# Patient Record
Sex: FEMALE | Race: WHITE | NOT HISPANIC OR LATINO | Employment: FULL TIME | URBAN - METROPOLITAN AREA
[De-identification: names, ages, dates, MRNs, and addresses within clinical notes are randomized per-mention and may not be internally consistent; named-entity substitution may affect disease eponyms.]

---

## 2017-01-04 DIAGNOSIS — E03.9 HYPOTHYROIDISM, UNSPECIFIED TYPE: ICD-10-CM

## 2017-01-04 LAB — TSH SERPL DL<=0.005 MIU/L-ACNC: 2.3 MU/L (ref 0.4–4)

## 2017-01-04 PROCEDURE — 36415 COLL VENOUS BLD VENIPUNCTURE: CPT | Performed by: NURSE PRACTITIONER

## 2017-01-04 PROCEDURE — 84443 ASSAY THYROID STIM HORMONE: CPT | Performed by: NURSE PRACTITIONER

## 2017-01-04 NOTE — PROGRESS NOTES
SUBJECTIVE:                                                    Naomy Friend is a 46 year old female who presents to clinic today for the following health issues:    Hypothyroidism Follow-up      Since last visit, patient describes the following symptoms: loose stools and fatigue       Amount of exercise or physical activity: 4-5 days/week for an average of greater than 60 minutes    Problems taking medications regularly: No    Medication side effects: none    Diet: regular (no restrictions)    Has been dealing with chronic sinus infection; just finished antibiotics and prednisone, and has noticed some discomfort on her tongue. Was exposed to another virus this week and has lost sense of smell again- plans to have a sinus surgery and tonsillectomy in March.  Mood has been well-controlled, seeing therapist every other week. Son is living at home again and working full-time, but they are generally getting along well. Daughter is 26 and will be having a baby in the next month; lives three hurs away.     Problem list and histories reviewed & adjusted, as indicated.  Additional history: as documented    Patient Active Problem List   Diagnosis     Hypothyroidism     CARDIOVASCULAR SCREENING; LDL GOAL LESS THAN 160     Mild recurrent major depression (H)     Obesity     Menorrhagia, treated with mirena IUD (inserted July 2013)      Family history of diabetes mellitus     Dysfunction of eustachian tube     HTN, goal below 140/90     Morbid obesity, unspecified obesity type (H)     Past Surgical History   Procedure Laterality Date     Laparoscopic cholecystectomy  08/05/99       Social History   Substance Use Topics     Smoking status: Never Smoker      Smokeless tobacco: Never Used     Alcohol Use: No     Family History   Problem Relation Age of Onset     CANCER Father      pancreatic     HEART DISEASE Maternal Grandmother      Cancer - colorectal Paternal Grandfather      DIABETES Brother      Asthma Son      Depression  Son      Asthma Daughter      DIABETES Mother      DIABETES Brother      DIABETES Maternal Grandmother      Hypertension Mother      CEREBROVASCULAR DISEASE Maternal Grandfather          Current Outpatient Prescriptions   Medication Sig Dispense Refill     predniSONE (DELTASONE) 10 MG tablet Take 30 mg daily for 7 days, then 20 mg daily for 7 days, then 10 mg daily for 7 days, then stop. 42 tablet 0     nystatin (MYCOSTATIN) 092963 UNIT/ML suspension Take 5 mLs (500,000 Units) by mouth 4 times daily for 7 days 140 mL 1     levothyroxine (SYNTHROID/LEVOTHROID) 75 MCG tablet Take 1 tablet (75 mcg) by mouth daily 90 tablet 1     citalopram (CELEXA) 20 MG tablet Take 1.5 tablets (30 mg) by mouth daily 135 tablet 1     lisinopril (PRINIVIL,ZESTRIL) 10 MG tablet Take 1 tablet (10 mg) by mouth daily 90 tablet 3     levonorgestrel (MIRENA) 20 MCG/24HR IUD 1 each by Intrauterine route once       clotrimazole-betamethasone (LOTRISONE) cream Apply topically 2 times daily 30 g 1     mupirocin (BACTROBAN) 2 % ointment Apply topically 2 times daily Use 2 times a day to affected area. 15 g 0     fluticasone (FLONASE) 50 MCG/ACT nasal spray Spray 2 sprays into both nostrils daily.       IBUPROFEN PO Take 600 mg by mouth 3 times daily.       [DISCONTINUED] levothyroxine (SYNTHROID/LEVOTHROID) 75 MCG tablet Take 1 tablet (75 mcg) by mouth daily PLEASE SCHEDULE AN APPOINTMENT TO RECEIVE FUTURE REFILLS [phone] 30 tablet 0     [DISCONTINUED] levothyroxine (SYNTHROID/LEVOTHROID) 75 MCG tablet Take 1 tablet (75 mcg) by mouth daily PLEASE SCHEDULE AN APPOINTMENT TO RECEIVE FUTURE REFILLS [phone] 30 tablet 0     Chlorpheniramine-Pseudoeph (DECONGESTANT + PO)        hydrOXYzine (ATARAX) 25 MG tablet Take 2-4 tablets ( mg) by mouth nightly as needed for other 60 tablet 1     cetirizine (ZYRTEC) 10 MG tablet Take 1 tablet (10 mg) by mouth every evening 90 tablet 1       ROS:  Constitutional, HEENT, cardiovascular, pulmonary, gi  and gu systems are negative, except as otherwise noted.    OBJECTIVE:                                                    /90 mmHg  Pulse 92  Temp(Src) 97.7  F (36.5  C) (Oral)  Wt 297 lb 12.8 oz (135.081 kg)  SpO2 100%  Body mass index is 46.63 kg/(m^2).  GENERAL: healthy, alert and no distress  HENT: normal cephalic/atraumatic, ear canals and TM's normal, nasal mucosa edematous , oral mucous membranes moist, tonsillar hypertrophy, tonsillar erythema, sinuses: maxillary tenderness on bilateral and white coating on tongue  NECK: bilateral anterior cervical adenopathy, no asymmetry, masses, or scars and thyroid normal to palpation  RESP: lungs clear to auscultation - no rales, rhonchi or wheezes  CV: regular rate and rhythm, normal S1 S2, no S3 or S4, no murmur, click or rub, no peripheral edema and peripheral pulses strong  MS: no gross musculoskeletal defects noted, no edema    Diagnostic Test Results:  No results found for this or any previous visit (from the past 24 hour(s)).     ASSESSMENT/PLAN:                                                      Problem List Items Addressed This Visit     Hypothyroidism - Primary    Relevant Medications    levothyroxine (SYNTHROID/LEVOTHROID) 75 MCG tablet      Other Visit Diagnoses     Other chronic sinusitis         Relevant Medications     predniSONE (DELTASONE) 10 MG tablet     Thrush         Relevant Medications     nystatin (MYCOSTATIN) 349451 UNIT/ML suspension          Discussed waiting one week; if still having sinus pain and lack of smell, start another course of prednisone and follow up with ENT.  JERRICA Church CNP  [facility]

## 2017-01-06 ENCOUNTER — OFFICE VISIT (OUTPATIENT)
Dept: FAMILY MEDICINE | Facility: CLINIC | Age: 47
End: 2017-01-06
Payer: COMMERCIAL

## 2017-01-06 VITALS
BODY MASS INDEX: 46.63 KG/M2 | WEIGHT: 293 LBS | TEMPERATURE: 97.7 F | OXYGEN SATURATION: 100 % | SYSTOLIC BLOOD PRESSURE: 138 MMHG | DIASTOLIC BLOOD PRESSURE: 90 MMHG | HEART RATE: 92 BPM

## 2017-01-06 DIAGNOSIS — J32.8 OTHER CHRONIC SINUSITIS: ICD-10-CM

## 2017-01-06 DIAGNOSIS — B37.0 THRUSH: ICD-10-CM

## 2017-01-06 DIAGNOSIS — E03.9 HYPOTHYROIDISM, UNSPECIFIED TYPE: Primary | ICD-10-CM

## 2017-01-06 PROCEDURE — 99213 OFFICE O/P EST LOW 20 MIN: CPT | Performed by: NURSE PRACTITIONER

## 2017-01-06 RX ORDER — PREDNISONE 10 MG/1
TABLET ORAL
Qty: 42 TABLET | Refills: 0 | Status: SHIPPED | OUTPATIENT
Start: 2017-01-06 | End: 2017-03-13

## 2017-01-06 RX ORDER — LEVOTHYROXINE SODIUM 75 UG/1
75 TABLET ORAL DAILY
Qty: 30 TABLET | Refills: 0 | Status: SHIPPED | OUTPATIENT
Start: 2017-01-06 | End: 2017-01-06

## 2017-01-06 RX ORDER — NYSTATIN 100000/ML
500000 SUSPENSION, ORAL (FINAL DOSE FORM) ORAL 4 TIMES DAILY
Qty: 140 ML | Refills: 1 | Status: SHIPPED | OUTPATIENT
Start: 2017-01-06 | End: 2017-01-13

## 2017-01-06 RX ORDER — LEVOTHYROXINE SODIUM 75 UG/1
75 TABLET ORAL DAILY
Qty: 90 TABLET | Refills: 1 | Status: SHIPPED | OUTPATIENT
Start: 2017-01-06 | End: 2017-03-27

## 2017-01-06 NOTE — NURSING NOTE
"Chief Complaint   Patient presents with     Thyroid Problem       Initial /92 mmHg  Pulse 92  Temp(Src) 97.7  F (36.5  C) (Oral)  Wt 297 lb 12.8 oz (135.081 kg)  SpO2 100% Estimated body mass index is 46.63 kg/(m^2) as calculated from the following:    Height as of 9/28/16: 5' 7\" (1.702 m).    Weight as of this encounter: 297 lb 12.8 oz (135.081 kg).  BP completed using cuff size: guerrero Mckeon CMA    "

## 2017-01-06 NOTE — MR AVS SNAPSHOT
After Visit Summary   1/6/2017    Naomy Friend    MRN: [id number]           Patient Information     Date Of Birth          1970        Visit Information        Provider Department      1/6/2017 10:00 AM Jovita Jonas APRN CNP [facility]        Today's Diagnoses     Hypothyroidism, unspecified type    -  1     Other chronic sinusitis         Thrush            Follow-ups after your visit        Who to contact     If you have questions or need follow up information about today's clinic visit or your schedule please contact [facility] directly at [phone].  Normal or non-critical lab and imaging results will be communicated to you by [employer], letter or phone within 4 business days after the clinic has received the results. If you do not hear from us within 7 days, please contact the clinic through [employer] or phone. If you have a critical or abnormal lab result, we will notify you by phone as soon as possible.  Submit refill requests through [facility] or call your pharmacy and they will forward the refill request to us. Please allow 3 business days for your refill to be completed.          Additional Information About Your Visit        MyChart Information     [facility] gives you secure access to your electronic health record. If you see a primary care provider, you can also send messages to your care team and make appointments. If you have questions, please call your primary care clinic.  If you do not have a primary care provider, please call [phone] and they will assist you.        Care EveryWhere ID     This is your Care EveryWhere ID. This could be used by other organizations to access your [facility] medical records  [id number]        Your Vitals Were     Pulse Temperature Pulse Oximetry             92 97.7  F (36.5  C) (Oral) 100%          Blood Pressure from Last 3 Encounters:   01/06/17 138/90   09/28/16 126/98   08/09/16 128/82    Weight from Last  3 Encounters:   01/06/17 297 lb 12.8 oz (135.081 kg)   09/28/16 298 lb 14.4 oz (135.58 kg)   08/09/16 298 lb 6.4 oz (135.353 kg)              Today, you had the following     No orders found for display         Today's Medication Changes          These changes are accurate as of: 1/6/17 10:30 AM.  If you have any questions, ask your nurse or doctor.               Start taking these medicines.        Dose/Directions    levothyroxine 75 MCG tablet   Commonly known as:  SYNTHROID/LEVOTHROID   Used for:  Hypothyroidism, unspecified type   Started by:  Jovita Jonas APRN CNP        Dose:  75 mcg   Take 1 tablet (75 mcg) by mouth daily   Quantity:  90 tablet   Refills:  1       nystatin [id number] UNIT/ML suspension   Commonly known as:  MYCOSTATIN   Used for:  Thrush   Started by:  Jovita Jonas APRN CNP        Dose:  019422 Units   Take 5 mLs (500,000 Units) by mouth 4 times daily for 7 days   Quantity:  140 mL   Refills:  1            Where to get your medicines      These medications were sent to [facility]pharmacy #[id number] - [place], [place] - [address] [place] [address] [address]     Phone:  [phone]    - levothyroxine 75 MCG tablet  - nystatin [id number] UNIT/ML suspension  - predniSONE 10 MG tablet             Primary Care Provider Office Phone # Fax #    JERRICA Church [phone]       [facility] [address] [address]        Thank you!     Thank you for choosing [facility]  for your care. Our goal is always to provide you with excellent care. Hearing back from our patients is one way we can continue to improve our services. Please take a few minutes to complete the written survey that you may receive in the mail after your visit with us. Thank you!             Your Updated Medication List - Protect others around you: Learn how to safely use, store and throw away your  medicines at www.disposemymeds.org.          This list is accurate as of: 1/6/17 10:30 AM.  Always use your most recent med list.                   Brand Name Dispense Instructions for use    cetirizine 10 MG tablet    zyrTEC    90 tablet    Take 1 tablet (10 mg) by mouth every evening       citalopram 20 MG tablet    celeXA    135 tablet    Take 1.5 tablets (30 mg) by mouth daily       clotrimazole-betamethasone cream    LOTRISONE    30 g    Apply topically 2 times daily       DECONGESTANT + PO          fluticasone 50 MCG/ACT spray    FLONASE     Spray 2 sprays into both nostrils daily.       hydrOXYzine 25 MG tablet    ATARAX    60 tablet    Take 2-4 tablets ( mg) by mouth nightly as needed for other       IBUPROFEN PO      Take 600 mg by mouth 3 times daily.       levonorgestrel 20 MCG/24HR IUD    MIRENA     1 each by Intrauterine route once       levothyroxine 75 MCG tablet    SYNTHROID/LEVOTHROID    90 tablet    Take 1 tablet (75 mcg) by mouth daily       lisinopril 10 MG tablet    PRINIVIL/ZESTRIL    90 tablet    Take 1 tablet (10 mg) by mouth daily       mupirocin 2 % ointment    BACTROBAN    15 g    Apply topically 2 times daily Use 2 times a day to affected area.       nystatin [id number] UNIT/ML suspension    MYCOSTATIN    140 mL    Take 5 mLs (500,000 Units) by mouth 4 times daily for 7 days       predniSONE 10 MG tablet    DELTASONE    42 tablet    Take 30 mg daily for 7 days, then 20 mg daily for 7 days, then 10 mg daily for 7 days, then stop.

## 2017-01-10 ENCOUNTER — CARE COORDINATION (OUTPATIENT)
Dept: OTOLARYNGOLOGY | Facility: CLINIC | Age: 47
End: 2017-01-10

## 2017-01-10 NOTE — PROGRESS NOTES
"Call was placed to the patient to discuss her current status.  She stated that she felt \"much better and could smell while on antibiotics and steroids\".  She states that she recently had symptoms of a cold again and had enlarged tonsils.  She was seen by her PCP who started her on another course of steroids.  She would like to move forward with a tonsillectomy and FESS. Will discuss this with Dr. Zurita and whether or not she needs to be seen in clinic prior to moving forward with surgery.  Naomy will be updated regarding this.    Vicki Kraus R.N. B.S.N.  1/10/2017 3:07 PM     The above information was discussed with Dr. Zurita. She is okay moving forward with surgery.  Orders were placed.  She was given the contact information for [facility] in [place].   was left with this information.    Vicki Kraus R.N. B.S.N.  1/10/2017 4:36 PM     "

## 2017-01-16 ENCOUNTER — TELEPHONE (OUTPATIENT)
Dept: OTOLARYNGOLOGY | Facility: CLINIC | Age: 47
End: 2017-01-16

## 2017-01-16 NOTE — TELEPHONE ENCOUNTER
I left a message for the patient to call back to get the surgery scheduled with Dr. Zurita.  Chelsea Aden, Surgery Scheduling Coordinator

## 2017-01-24 DIAGNOSIS — J32.8 OTHER CHRONIC SINUSITIS: Primary | ICD-10-CM

## 2017-01-24 DIAGNOSIS — F33.0 MILD RECURRENT MAJOR DEPRESSION (H): ICD-10-CM

## 2017-01-24 RX ORDER — CITALOPRAM HYDROBROMIDE 20 MG/1
30 TABLET ORAL DAILY
Qty: 45 TABLET | Refills: 0 | Status: SHIPPED | OUTPATIENT
Start: 2017-01-24 | End: 2017-02-01

## 2017-01-24 RX ORDER — PREDNISONE 10 MG/1
TABLET ORAL
Qty: 42 TABLET | Refills: 0 | Status: CANCELLED | OUTPATIENT
Start: 2017-01-24

## 2017-01-24 NOTE — TELEPHONE ENCOUNTER
Prednisone declined  we only use this one time for sinusitis within a month or two as it can suppress our bodies adrenal gland   She needs to meet with ENT for a better plan

## 2017-01-24 NOTE — TELEPHONE ENCOUNTER
Prednisone 10MG Tablet      Last Written Prescription Date:  1/6/17  Last Fill Quantity: 42,   # refills: 0  Last Office Visit with [facility] or  Health prescribing provider: 1/6/17  Future Office visit:       Routing refill request to provider for review/approval because:  Drug not on the [facility], [facility] or [facility] refill protocol or controlled substance

## 2017-01-24 NOTE — TELEPHONE ENCOUNTER
Called patient did not make request for prednisone - is following up with ENT        citalopram (CELEXA) 20 MG tablet     Last Written Prescription Date: 8/8/2016  Last Fill Quantity: 135, # refills: 1  Last Office Visit with [facility] primary care provider:         Last PHQ-9 score on record=   PHQ-9 SCORE 8/9/2016   Total Score -   Total Score 8       Spoke with patient on phone discuss depression to be addressed in office visit every 6 months - reviewed one month refill - patient states she will give a call back to schedule      Routing refill request to provider for review/approval because:  PROVIDER TO APPROVE OVERRIDE   MEDICATION IS ADVISING PRIOR AUTHORIZATION MAY BE NEEDED    Routing to RD FP MA - please review if prior authorization needed    Thank you,  Canide Nath RN

## 2017-02-01 DIAGNOSIS — F33.0 MILD RECURRENT MAJOR DEPRESSION (H): Primary | ICD-10-CM

## 2017-02-01 NOTE — TELEPHONE ENCOUNTER
citalopram (CELEXA) 20 MG tablet      Last Written Prescription Date: 01/24/2017  Last Fill Quantity: 45, # refills: 0  Last Office Visit with [facility] primary care provider:  01/06/2017        Last PHQ-9 score on record=   PHQ-9 SCORE 8/9/2016   Total Score -   Total Score 8

## 2017-02-03 NOTE — TELEPHONE ENCOUNTER
Sent PHQ 9 questionnaire via [employer]. Await response prior to refilling.    Vicki Cosby RN  [facility]

## 2017-02-06 RX ORDER — CITALOPRAM HYDROBROMIDE 20 MG/1
30 TABLET ORAL DAILY
Qty: 135 TABLET | Refills: 1 | Status: SHIPPED | OUTPATIENT
Start: 2017-02-06 | End: 2017-09-25

## 2017-02-06 NOTE — TELEPHONE ENCOUNTER
PHQ-9 score:    PHQ-9 SCORE 2/4/2017   Total Score -   Total Score MyChart 4 (Minimal depression)   Total Score -       Prescription approved per [facility] Refill Protocol.    Vicki Cosby RN  [facility]

## 2017-02-07 ENCOUNTER — TELEPHONE (OUTPATIENT)
Dept: FAMILY MEDICINE | Facility: CLINIC | Age: 47
End: 2017-02-07

## 2017-02-07 NOTE — TELEPHONE ENCOUNTER
Prior Authorization needed on:     Medication:  Citalopram Dose:  20 mg (1.5 tabs daily=30 mg)    Pharmacy confirmed as   [id number] - [place], [place] - [address], [place] [address] [address] [facility] [address]  Phone: [phone] Fax: [phone]  Yes    Insurance Name:  [facility]  Insurance Phone: [phone]  Insurance Patient ID: [id number]  BIN: [id number]  PCN: [facility]  RxGROUP: [id number]    PA submitted to insurance company via cover my meds    Alternatives Suggested:  None given, insurance only allows 1 tab per day dosing.     Rosario Mckeon February 7, 2017 at 2:19 PM

## 2017-02-14 NOTE — TELEPHONE ENCOUNTER
Called to check status of PA. Insurance company states PA is still in process & can take up to 15 business days.

## 2017-02-14 NOTE — TELEPHONE ENCOUNTER
Prior Authorization: Approved    Approved as of: 2/6/17-2/6/18    Rosario Mckeon February 14, 2017 at 3:56 PM

## 2017-02-16 NOTE — TELEPHONE ENCOUNTER
Date Scheduled: 3-6-17  Facility: [facility]  Surgeon: Dr. Zurita   Post-op appointment scheduled: 3-13-17   scheduled?: No  Surgery packet/instructions confirmed received?  Yes, mailed  Special Considerations:   Chelsea Aden, Surgery Scheduling Coordinator

## 2017-02-27 ENCOUNTER — OFFICE VISIT (OUTPATIENT)
Dept: FAMILY MEDICINE | Facility: CLINIC | Age: 47
End: 2017-02-27
Payer: COMMERCIAL

## 2017-02-27 VITALS
BODY MASS INDEX: 45.99 KG/M2 | OXYGEN SATURATION: 94 % | TEMPERATURE: 97.3 F | SYSTOLIC BLOOD PRESSURE: 120 MMHG | HEIGHT: 67 IN | HEART RATE: 95 BPM | WEIGHT: 293 LBS | DIASTOLIC BLOOD PRESSURE: 78 MMHG

## 2017-02-27 DIAGNOSIS — Z01.818 PREOP GENERAL PHYSICAL EXAM: Primary | ICD-10-CM

## 2017-02-27 DIAGNOSIS — B37.0 THRUSH: ICD-10-CM

## 2017-02-27 DIAGNOSIS — M19.90 ARTHRITIS: ICD-10-CM

## 2017-02-27 DIAGNOSIS — E66.9 OBESITY, UNSPECIFIED OBESITY SEVERITY, UNSPECIFIED OBESITY TYPE: ICD-10-CM

## 2017-02-27 DIAGNOSIS — E03.9 HYPOTHYROIDISM, UNSPECIFIED TYPE: ICD-10-CM

## 2017-02-27 DIAGNOSIS — J34.9 SINUS DISEASE: ICD-10-CM

## 2017-02-27 DIAGNOSIS — G47.33 OBSTRUCTIVE SLEEP APNEA SYNDROME: ICD-10-CM

## 2017-02-27 DIAGNOSIS — I10 BENIGN ESSENTIAL HYPERTENSION: ICD-10-CM

## 2017-02-27 DIAGNOSIS — J35.1 ENLARGED TONSILS: ICD-10-CM

## 2017-02-27 LAB
ERYTHROCYTE [DISTWIDTH] IN BLOOD BY AUTOMATED COUNT: 13.5 % (ref 10–15)
HCT VFR BLD AUTO: 45.4 % (ref 35–47)
HGB BLD-MCNC: 14.4 G/DL (ref 11.7–15.7)
MCH RBC QN AUTO: 27.4 PG (ref 26.5–33)
MCHC RBC AUTO-ENTMCNC: 31.7 G/DL (ref 31.5–36.5)
MCV RBC AUTO: 87 FL (ref 78–100)
PLATELET # BLD AUTO: 294 10E9/L (ref 150–450)
RBC # BLD AUTO: 5.25 10E12/L (ref 3.8–5.2)
TSH SERPL DL<=0.05 MIU/L-ACNC: 3.71 MU/L (ref 0.4–4)
WBC # BLD AUTO: 15.5 10E9/L (ref 4–11)

## 2017-02-27 PROCEDURE — 84443 ASSAY THYROID STIM HORMONE: CPT | Performed by: NURSE PRACTITIONER

## 2017-02-27 PROCEDURE — 85027 COMPLETE CBC AUTOMATED: CPT | Performed by: NURSE PRACTITIONER

## 2017-02-27 PROCEDURE — 99214 OFFICE O/P EST MOD 30 MIN: CPT | Performed by: NURSE PRACTITIONER

## 2017-02-27 PROCEDURE — 36415 COLL VENOUS BLD VENIPUNCTURE: CPT | Performed by: NURSE PRACTITIONER

## 2017-02-27 RX ORDER — NYSTATIN 100000/ML
500000 SUSPENSION, ORAL (FINAL DOSE FORM) ORAL 4 TIMES DAILY
Qty: 140 ML | Refills: 0 | Status: SHIPPED | OUTPATIENT
Start: 2017-02-27 | End: 2017-03-06

## 2017-02-27 NOTE — MR AVS SNAPSHOT
After Visit Summary   2/27/2017    Naomy Friend    MRN: [id number]           Patient Information     Date Of Birth          1970        Visit Information        Provider Department      2/27/2017 8:00 AM Jovita Jonas APRN [facility]        Today's Diagnoses     Preop general physical exam    -  1    Obstructive sleep apnea syndrome        Enlarged tonsils        Sinus disease        Obesity, unspecified obesity severity, unspecified obesity type        Benign essential hypertension        Arthritis        Hypothyroidism, unspecified type          Care Instructions      Before Your Surgery      Call your surgeon if there is any change in your health. This includes signs of a cold or flu (such as a sore throat, runny nose, cough, rash or fever).    Do not smoke, drink alcohol or take over the counter medicine (unless your surgeon or primary care doctor tells you to) for the 24 hours before and after surgery.    If you take prescribed drugs: Follow your doctor s orders about which medicines to take and which to stop until after surgery.    Eating and drinking prior to surgery: follow the instructions from your surgeon    Take a shower or bath the night before surgery. Use the soap your surgeon gave you to gently clean your skin. If you do not have soap from your surgeon, use your regular soap. Do not shave or scrub the surgery site.  Wear clean pajamas and have clean sheets on your bed.         Follow-ups after your visit        Your next 10 appointments already scheduled     Mar 06, 2017   Procedure with Vicki Zurita MD   [facility] (--)    [address] [address] [place], [place] [address]e [facility] [address]   [phone]            Mar 13, 2017 10:00 AM CDT   [facility] Visit with [facility] MD   [facility]    [address] [place] [address]   [phone]              Who to contact     If  "you have questions or need follow up information about today's clinic visit or your schedule please contact [facility] directly at [phone].  Normal or non-critical lab and imaging results will be communicated to you by MyChart, letter or phone within 4 business days after the clinic has received the results. If you do not hear from us within 7 days, please contact the clinic through [employer] or phone. If you have a critical or abnormal lab result, we will notify you by phone as soon as possible.  Submit refill requests through [employer] or call your pharmacy and they will forward the refill request to us. Please allow 3 business days for your refill to be completed.          Additional Information About Your Visit        [employer] Information     [facility] gives you secure access to your electronic health record. If you see a primary care provider, you can also send messages to your care team and make appointments. If you have questions, please call your primary care clinic.  If you do not have a primary care provider, please call [phone] and they will assist you.        Care EveryWhere ID     This is your Care EveryWhere ID. This could be used by other organizations to access your [place] [facility] medical records  [id number]        Your Vitals Were     Pulse Temperature Height Pulse Oximetry BMI (Body Mass Index)       95 97.3  F (36.3  C) (Oral) 5' 7.1\" (1.704 m) 94% 46.27 kg/m2        Blood Pressure from Last 3 Encounters:   02/27/17 120/78   01/06/17 138/90   09/28/16 (!) 126/98    Weight from Last 3 Encounters:   02/27/17 296 lb 4.8 oz (134.4 kg)   01/06/17 297 lb 12.8 oz (135.1 kg)   09/28/16 298 lb 14.4 oz (135.6 kg)              We Performed the Following     CBC with platelets     TSH        Primary Care Provider Office Phone # Fax #    JERRICA Church [phone]       [facility] [address]  [facility] [address]        Thank you!     Thank you " for choosing [facility]  for your care. Our goal is always to provide you with excellent care. Hearing back from our patients is one way we can continue to improve our services. Please take a few minutes to complete the written survey that you may receive in the mail after your visit with us. Thank you!             Your Updated Medication List - Protect others around you: Learn how to safely use, store and throw away your medicines at www.disposemymeds.org.          This list is accurate as of: 2/27/17  8:49 AM.  Always use your most recent med list.                   Brand Name Dispense Instructions for use    cetirizine 10 MG tablet    zyrTEC    90 tablet    Take 1 tablet (10 mg) by mouth every evening       citalopram 20 MG tablet    celeXA    135 tablet    Take 1.5 tablets (30 mg) by mouth daily       clotrimazole-betamethasone cream    LOTRISONE    30 g    Apply topically 2 times daily       DECONGESTANT + PO      Reported on 2/27/2017       fluticasone 50 MCG/ACT spray    FLONASE     Spray 2 sprays into both nostrils daily Reported on 2/27/2017       hydrOXYzine 25 MG tablet    ATARAX    60 tablet    Take 2-4 tablets ( mg) by mouth nightly as needed for other       IBUPROFEN PO      Take 600 mg by mouth 3 times daily.       levonorgestrel 20 MCG/24HR IUD    MIRENA     1 each by Intrauterine route once       levothyroxine 75 MCG tablet    SYNTHROID/LEVOTHROID    90 tablet    Take 1 tablet (75 mcg) by mouth daily       lisinopril 10 MG tablet    PRINIVIL/ZESTRIL    90 tablet    Take 1 tablet (10 mg) by mouth daily       mupirocin 2 % ointment    BACTROBAN    15 g    Apply topically 2 times daily Use 2 times a day to affected area.       predniSONE 10 MG tablet    DELTASONE    42 tablet    Take 30 mg daily for 7 days, then 20 mg daily for 7 days, then 10 mg daily for 7 days, then stop.

## 2017-02-27 NOTE — PROGRESS NOTES
[address] [address]  [phone]  Dept: [phone]    PRE-OP EVALUATION:  Today's date: 2017    Naomy Friend (: 1970) presents for pre-operative evaluation assessment as requested by Dr. Zurita.  She requires evaluation and anesthesia risk assessment prior to undergoing surgery/procedure for treatment of tonsils.  Proposed procedure: Bilateral tonsillectomy, right functional endoscopic sinus surgery    Date of Surgery/ Procedure: 3/6/17  Time of Surgery/ Procedure: 12 pm  Hospital/Surgical Facility: [facility]  Fax number for surgical facility: n/a  Primary Physician: Jovita Jonas  Type of Anesthesia Anticipated: General    Patient has a Health Care Directive or Living Will:  NO    1. NO - Do you have a history of heart attack, stroke, stent, bypass or surgery on an artery in the head, neck, heart or legs?  2. NO - Do you ever have any pain or discomfort in your chest?  3. NO - Do you have a history of  Heart Failure?  4. NO - Are you troubled by shortness of breath when: walking on the level, up a slight hill or at night?  5. YES - DO YOU CURRENTLY HAVE A COLD, BRONCHITIS OR OTHER RESPIRATORY INFECTION? - Possibly, sneezing last night   6. NO - Do you have a cough, shortness of breath or wheezing?  7. NO - Do you sometimes get pains in the calves of your legs when you walk?  8. YES - DO YOU OR ANYONE IN YOUR FAMILY HAVE PREVIOUS HISTORY OF BLOOD CLOTS? -mother  9. NO - Do you or does anyone in your family have a serious bleeding problem such as prolonged bleeding following surgeries or cuts?  10. NO - Have you ever had problems with anemia or been told to take iron pills?  11. NO - Have you had any abnormal blood loss such as black, tarry or bloody stools, or abnormal vaginal bleeding?  12. NO - Have you ever had a blood transfusion?  13. NO - Have you or any of your relatives ever had problems with  anesthesia?  14. YES - DO YOU HAVE SLEEP APNEA, EXCESSIVE SNORING OR DAYTIME DROWSINESS? -All   15. NO - Do you have any prosthetic heart valves?  16. NO - Do you have prosthetic joints?  17. NO - Is there any chance that you may be pregnant?      HPI:                                                      Brief HPI related to upcoming procedure: Ongoing history of enlarged tonsils and extensive sinus disease. Naomy has undergone multiple courses of steroids and antibiotics.      HYPERTENSION - Patient has longstanding history of mod-severe HTN , currently denies any symptoms referable to elevated blood pressure. Specifically denies chest pain, palpitations, dyspnea, orthopnea, PND or peripheral edema. Blood pressure readings have been in normal range. Current medication regimen is as listed below. Patient denies any side effects of medication.                                                                                                                                                                                          .  HYPOTHYROIDISM - Patient has a longstanding history of chronic Hypothyroidism. Patient has been doing well, noting no tremor, insomnia, hair loss or changes in skin texture.  Continues to take medications as directed, without adverse reactions or side effects.                                                                                                                                                                                                                        .  SLEEP PROBLEM - Patient has a longstanding history of snoring, excessive daytime somnolence and fatigue of moderate severity. Patient has tried OTC medications with limited success.   She DOES NOT have a CPAP macihine.                                                                                                                                      .    MEDICAL HISTORY:                                                       Patient Active Problem List    Diagnosis Date Noted     Morbid obesity, unspecified obesity type (H) 10/03/2016     Priority: Medium     Menorrhagia, treated with mirena IUD (inserted July 2013)  12/02/2013     Priority: Medium     HTN, goal below 140/90 06/15/2015     Dysfunction of eustachian tube 01/03/2014     Family history of diabetes mellitus 12/02/2013     Mild recurrent major depression (H) 10/01/2012     Obesity      Obesity  Problem list name updated by automated process. Provider to review       CARDIOVASCULAR SCREENING; LDL GOAL LESS THAN 160 06/06/2012     Hypothyroidism 07/18/2011      Past Medical History   Diagnosis Date     CARDIOVASCULAR SCREENING; LDL GOAL LESS THAN 160 6/6/2012     Obesity, unspecified      Obesity     Past Surgical History   Procedure Laterality Date     Laparoscopic cholecystectomy  08/05/99     Current Outpatient Prescriptions   Medication Sig Dispense Refill     nystatin (MYCOSTATIN) 166778 UNIT/ML suspension Take 5 mLs (500,000 Units) by mouth 4 times daily for 7 days 140 mL 0     citalopram (CELEXA) 20 MG tablet Take 1.5 tablets (30 mg) by mouth daily 135 tablet 1     levothyroxine (SYNTHROID/LEVOTHROID) 75 MCG tablet Take 1 tablet (75 mcg) by mouth daily 90 tablet 1     lisinopril (PRINIVIL,ZESTRIL) 10 MG tablet Take 1 tablet (10 mg) by mouth daily 90 tablet 3     levonorgestrel (MIRENA) 20 MCG/24HR IUD 1 each by Intrauterine route once       clotrimazole-betamethasone (LOTRISONE) cream Apply topically 2 times daily 30 g 1     mupirocin (BACTROBAN) 2 % ointment Apply topically 2 times daily Use 2 times a day to affected area. 15 g 0     fluticasone (FLONASE) 50 MCG/ACT nasal spray Spray 2 sprays into both nostrils daily Reported on 2/27/2017       IBUPROFEN PO Take 600 mg by mouth 3 times daily.       predniSONE (DELTASONE) 10 MG tablet Take 30 mg daily for 7 days, then 20 mg daily for 7 days, then 10 mg daily for 7 days, then stop. (Patient not taking: Reported on  "2/27/2017) 42 tablet 0     Chlorpheniramine-Pseudoeph (DECONGESTANT + PO) Reported on 2/27/2017       hydrOXYzine (ATARAX) 25 MG tablet Take 2-4 tablets ( mg) by mouth nightly as needed for other (Patient not taking: Reported on 2/27/2017) 60 tablet 1     cetirizine (ZYRTEC) 10 MG tablet Take 1 tablet (10 mg) by mouth every evening (Patient not taking: Reported on 2/27/2017) 90 tablet 1     OTC products: None, except as noted above    Allergies   Allergen Reactions     Percocet [Oxycodone-Acetaminophen] Itching      Latex Allergy: NO    Social History   Substance Use Topics     Smoking status: Never Smoker     Smokeless tobacco: Never Used     Alcohol use No     History   Drug Use No       REVIEW OF SYSTEMS:                                                    C: NEGATIVE for fever, chills, change in weight  I: NEGATIVE for worrisome rashes, moles or lesions  E: NEGATIVE for vision changes or irritation  E/M: NEGATIVE for ear, mouth and throat problems  R: NEGATIVE for significant cough or SOB  B: NEGATIVE for masses, tenderness or discharge  CV: NEGATIVE for chest pain, palpitations or peripheral edema  GI: NEGATIVE for nausea, abdominal pain, heartburn, or change in bowel habits  : NEGATIVE for frequency, dysuria, or hematuria  MUSCULOSKELETAL:myalgia  N: NEGATIVE for weakness, dizziness or paresthesias  E: NEGATIVE for temperature intolerance, skin/hair changes  H: NEGATIVE for bleeding problems  P: NEGATIVE for changes in mood or affect    EXAM:                                                    /78  Pulse 95  Temp 97.3  F (36.3  C) (Oral)  Ht 5' 7.1\" (1.704 m)  Wt 296 lb 4.8 oz (134.4 kg)  SpO2 94%  BMI 46.27 kg/m2    GENERAL APPEARANCE: healthy, alert and no distress     EYES: EOMI,- PERRL     HENT: TM's pearly grey, normal light reflex bilateral, tonsillar hypertrophy, tonsillar exudate and thrush; Nasal mucosa edema; thrush present on tongue     RESP: lungs clear to auscultation - no rales, " "rhonchi or wheezes     CV: regular rates and rhythm, normal S1 S2, no S3 or S4 and no murmur, click or rub -     ABDOMEN:  soft, nontender, no HSM or masses and bowel sounds normal     MS: extremities normal- no gross deformities noted, no evidence of inflammation in joints, FROM in all extremities.     SKIN: no suspicious lesions or rashes     NEURO: Normal strength and tone, sensory exam grossly normal, mentation intact and speech normal     PSYCH: mentation appears normal. and affect normal/bright     LYMPHATICS: normal ant/post cervical, supraclavicular nodes    DIAGNOSTICS:                                                    No labs or EKG required for low risk surgery (cataract, skin procedure, breast biopsy, etc)    Recent Labs   Lab Test  04/26/16   0912  01/25/16   0835  04/10/15   1122  07/16/12   1545   HGB   --    --   15.3  10.9*   PLT   --    --   304   --    NA  141  140   --   143   POTASSIUM  4.0  4.1   --   4.1   CR  0.74  0.62   --   0.64   A1C  6.2*  6.3*   --    --         IMPRESSION:                                                    Reason for surgery/procedure: Tonsillectomy; sinus disease  Diagnosis/reason for consult: Tonsillectomy and Right functional endoscopic surgery    The proposed surgical procedure is considered INTERMEDIATE risk.    REVISED CARDIAC RISK INDEX  The patient has the following serious cardiovascular risks for perioperative complications such as (MI, PE, VFib and 3  AV Block):  No serious cardiac risks  INTERPRETATION: 1 risks: Class II (low risk - 0.9% complication rate)    The patient has the following additional risks for perioperative complications:    Likely Sleep Apnea  Obesity  Arthritis of Right jaw   Pain medication- Has noticed severe itching with percocet and \"increased energy\"          ICD-10-CM    1. Preop general physical exam Z01.818 CBC with platelets   2. Obstructive sleep apnea syndrome G47.33    3. Enlarged tonsils J35.1    4. Sinus disease J34.9    5. " Obesity, unspecified obesity severity, unspecified obesity type E66.9    6. Benign essential hypertension I10    7. Arthritis M19.90    8. Hypothyroidism, unspecified type E03.9 TSH   9. Thrush B37.0 nystatin (MYCOSTATIN) [id number] UNIT/ML suspension       RECOMMENDATIONS:                                                      --Consult hospital rounder / IM to assist post-op medical management    Pulmonary Risk  Incentive spirometry post op  Respiratory Therapy (Respiratory Care IP Consult)  post op        Obstructive Sleep Apnea (or suspected sleep apnea)  Hospital staff are advised to monitor for sleep related oxygen desaturations due to suspicion of VANITA      --Patient is to take all scheduled medications on the day of surgery EXCEPT for modifications listed below.    APPROVAL GIVEN to proceed with proposed procedure, without further diagnostic evaluation       Signed Electronically by: JERRICA Church CNP    Copy of this evaluation report is provided to requesting physician.    [facility] Preop Guidelines

## 2017-02-27 NOTE — NURSING NOTE
"Chief Complaint   Patient presents with     Pre-Op Exam       Initial BP (!) 138/94 (BP Location: Right arm, Patient Position: Chair, Cuff Size: Adult Large)  Pulse 95  Temp 97.3  F (36.3  C) (Oral)  Ht 5' 7.1\" (1.704 m)  Wt 296 lb 4.8 oz (134.4 kg)  SpO2 94%  BMI 46.27 kg/m2 Estimated body mass index is 46.27 kg/(m^2) as calculated from the following:    Height as of this encounter: 5' 7.1\" (1.704 m).    Weight as of this encounter: 296 lb 4.8 oz (134.4 kg).  Medication Reconciliation: complete     Rosario Mckeon CMA    "

## 2017-02-28 RX ORDER — CEFAZOLIN SODIUM 2 G/100ML
2 INJECTION, SOLUTION INTRAVENOUS EVERY 8 HOURS
Status: CANCELLED | OUTPATIENT
Start: 2017-03-15

## 2017-03-02 DIAGNOSIS — J03.90 TONSILLITIS: Primary | ICD-10-CM

## 2017-03-02 NOTE — NURSING NOTE
Dr. Zurita decided to start the patient on Augmentin for a 5 day course.  This information was called to the patient and she voiced an understanding.    Vicki Kraus R.N., B.S.N.  [phone]  3/2/2017 2:24 PM

## 2017-03-06 ENCOUNTER — PRE VISIT (OUTPATIENT)
Dept: OTOLARYNGOLOGY | Facility: CLINIC | Age: 47
End: 2017-03-06

## 2017-03-06 NOTE — TELEPHONE ENCOUNTER
Spoke with Naomy, cancelled her 2 post op appointments and scheduled a new one. Her surgery was rescheduled.    Breanna Sharif CMA ([facility])

## 2017-03-09 ENCOUNTER — TELEPHONE (OUTPATIENT)
Dept: FAMILY MEDICINE | Facility: CLINIC | Age: 47
End: 2017-03-09

## 2017-03-09 DIAGNOSIS — B35.6 TINEA CRURIS: ICD-10-CM

## 2017-03-09 NOTE — TELEPHONE ENCOUNTER
Clotrimazole-Betamethasone LOT      Last Written Prescription Date:  12/23/14  Last Fill Quantity: 30g,   # refills: 1  Last Office Visit with [facility], [facility] or [facility] [employer] prescribing provider: 2/27/17  Future Office visit:    Next 5 appointments (look out 90 days)     Mar 27, 2017 10:15 AM CDT   Return Visit with Vicki Zurita MD   [facility]    [address] [place] [address] [phone]                   Routing refill request to provider for review/approval because:  Drug not on the [facility], [facility] or [facility] [employer] [employer] refill protocol or controlled substance    Patient did NOT call this medication in, pharmacy sent over to clinic VIA FAX

## 2017-03-10 NOTE — TELEPHONE ENCOUNTER
Routing to provider    Jovita,   Pt has been using the lotion for on an off yeasty rash issues, she started 3 days ago using again to her groin but has run out. She had recently started on an antibiotic, her preop labs were elevated  Results for BARRIE MAYES (MRN [id number]) as of 3/10/2017 09:25   Ref. Range 2/27/2017 08:54   TSH Latest Ref Range: 0.40 - 4.00 mU/L 3.71   WBC Latest Ref Range: 4.0 - 11.0 10e9/L 15.5 (H)   Hemoglobin Latest Ref Range: 11.7 - 15.7 g/dL 14.4   Hematocrit Latest Ref Range: 35.0 - 47.0 % 45.4   Platelet Count Latest Ref Range: 150 - 450 10e9/L 294   RBC Count Latest Ref Range: 3.8 - 5.2 10e12/L 5.25 (H)   MCV Latest Ref Range: 78 - 100 fl 87   MCH Latest Ref Range: 26.5 - 33.0 pg 27.4   MCHC Latest Ref Range: 31.5 - 36.5 g/dL 31.7   RDW Latest Ref Range: 10.0 - 15.0 % 13.5   Also her surg was canceled and rescheduled for 3/15/17    Please review and advise  Laura Cheung RN

## 2017-03-15 ENCOUNTER — SURGERY (OUTPATIENT)
Age: 47
End: 2017-03-15

## 2017-03-15 ENCOUNTER — ANESTHESIA EVENT (OUTPATIENT)
Dept: SURGERY | Facility: CLINIC | Age: 47
End: 2017-03-15
Payer: COMMERCIAL

## 2017-03-15 ENCOUNTER — HOSPITAL ENCOUNTER (OUTPATIENT)
Facility: CLINIC | Age: 47
Setting detail: OBSERVATION
Discharge: HOME | End: 2017-03-16
Attending: OTOLARYNGOLOGY | Admitting: OTOLARYNGOLOGY
Payer: COMMERCIAL

## 2017-03-15 ENCOUNTER — ANESTHESIA (OUTPATIENT)
Dept: SURGERY | Facility: CLINIC | Age: 47
End: 2017-03-15
Payer: COMMERCIAL

## 2017-03-15 DIAGNOSIS — G89.18 POSTOPERATIVE PAIN: Primary | ICD-10-CM

## 2017-03-15 PROBLEM — J03.90 TONSILLITIS: Status: ACTIVE | Noted: 2017-03-15

## 2017-03-15 LAB — HCG UR QL: NEGATIVE

## 2017-03-15 PROCEDURE — 71000027 ZZH RECOVERY PHASE 2 EACH 15 MINS: Performed by: OTOLARYNGOLOGY

## 2017-03-15 PROCEDURE — 81025 URINE PREGNANCY TEST: CPT | Performed by: ANESTHESIOLOGY

## 2017-03-15 PROCEDURE — 25800025 ZZH RX 258: Performed by: NURSE ANESTHETIST, CERTIFIED REGISTERED

## 2017-03-15 PROCEDURE — 25000128 H RX IP 250 OP 636: Performed by: NURSE ANESTHETIST, CERTIFIED REGISTERED

## 2017-03-15 PROCEDURE — 25000125 ZZHC RX 250: Performed by: NURSE ANESTHETIST, CERTIFIED REGISTERED

## 2017-03-15 PROCEDURE — G0378 HOSPITAL OBSERVATION PER HR: HCPCS

## 2017-03-15 PROCEDURE — 25000125 ZZHC RX 250: Performed by: ANESTHESIOLOGY

## 2017-03-15 PROCEDURE — 37000008 ZZH ANESTHESIA TECHNICAL FEE, 1ST 30 MIN: Performed by: OTOLARYNGOLOGY

## 2017-03-15 PROCEDURE — 36000057 ZZH SURGERY LEVEL 3 1ST 30 MIN - UMMC: Performed by: OTOLARYNGOLOGY

## 2017-03-15 PROCEDURE — 37000009 ZZH ANESTHESIA TECHNICAL FEE, EACH ADDTL 15 MIN: Performed by: OTOLARYNGOLOGY

## 2017-03-15 PROCEDURE — 25000566 ZZH SEVOFLURANE, EA 15 MIN: Performed by: OTOLARYNGOLOGY

## 2017-03-15 PROCEDURE — 40000170 ZZH STATISTIC PRE-PROCEDURE ASSESSMENT II: Performed by: OTOLARYNGOLOGY

## 2017-03-15 PROCEDURE — 71000015 ZZH RECOVERY PHASE 1 LEVEL 2 EA ADDTL HR: Performed by: OTOLARYNGOLOGY

## 2017-03-15 PROCEDURE — 25000125 ZZHC RX 250: Performed by: OTOLARYNGOLOGY

## 2017-03-15 PROCEDURE — 25800025 ZZH RX 258: Performed by: STUDENT IN AN ORGANIZED HEALTH CARE EDUCATION/TRAINING PROGRAM

## 2017-03-15 PROCEDURE — 71000014 ZZH RECOVERY PHASE 1 LEVEL 2 FIRST HR: Performed by: OTOLARYNGOLOGY

## 2017-03-15 PROCEDURE — 25000132 ZZH RX MED GY IP 250 OP 250 PS 637: Performed by: STUDENT IN AN ORGANIZED HEALTH CARE EDUCATION/TRAINING PROGRAM

## 2017-03-15 PROCEDURE — 25000128 H RX IP 250 OP 636: Performed by: ANESTHESIOLOGY

## 2017-03-15 PROCEDURE — 25000132 ZZH RX MED GY IP 250 OP 250 PS 637: Performed by: OTOLARYNGOLOGY

## 2017-03-15 PROCEDURE — 88304 TISSUE EXAM BY PATHOLOGIST: CPT | Performed by: OTOLARYNGOLOGY

## 2017-03-15 PROCEDURE — 27210794 ZZH OR GENERAL SUPPLY STERILE: Performed by: OTOLARYNGOLOGY

## 2017-03-15 PROCEDURE — 36000059 ZZH SURGERY LEVEL 3 EA 15 ADDTL MIN UMMC: Performed by: OTOLARYNGOLOGY

## 2017-03-15 RX ORDER — NALOXONE HYDROCHLORIDE 0.4 MG/ML
.1-.4 INJECTION, SOLUTION INTRAMUSCULAR; INTRAVENOUS; SUBCUTANEOUS
Status: DISCONTINUED | OUTPATIENT
Start: 2017-03-15 | End: 2017-03-15 | Stop reason: HOSPADM

## 2017-03-15 RX ORDER — FLUTICASONE PROPIONATE 50 MCG
2 SPRAY, SUSPENSION (ML) NASAL DAILY
Status: DISCONTINUED | OUTPATIENT
Start: 2017-03-16 | End: 2017-03-16 | Stop reason: HOSPADM

## 2017-03-15 RX ORDER — HYDROCODONE BITARTRATE AND ACETAMINOPHEN 7.5; 325 MG/15ML; MG/15ML
10-15 SOLUTION ORAL EVERY 4 HOURS PRN
Qty: 750 ML | Refills: 0 | Status: SHIPPED | OUTPATIENT
Start: 2017-03-15 | End: 2017-03-15

## 2017-03-15 RX ORDER — NEOSTIGMINE METHYLSULFATE 1 MG/ML
VIAL (ML) INJECTION PRN
Status: DISCONTINUED | OUTPATIENT
Start: 2017-03-15 | End: 2017-03-15

## 2017-03-15 RX ORDER — LIDOCAINE 40 MG/G
CREAM TOPICAL
Status: DISCONTINUED | OUTPATIENT
Start: 2017-03-15 | End: 2017-03-15 | Stop reason: HOSPADM

## 2017-03-15 RX ORDER — GLYCOPYRROLATE 0.2 MG/ML
INJECTION, SOLUTION INTRAMUSCULAR; INTRAVENOUS PRN
Status: DISCONTINUED | OUTPATIENT
Start: 2017-03-15 | End: 2017-03-15

## 2017-03-15 RX ORDER — ONDANSETRON 4 MG/1
4 TABLET, ORALLY DISINTEGRATING ORAL EVERY 6 HOURS PRN
Status: DISCONTINUED | OUTPATIENT
Start: 2017-03-15 | End: 2017-03-16 | Stop reason: HOSPADM

## 2017-03-15 RX ORDER — PROPOFOL 10 MG/ML
INJECTION, EMULSION INTRAVENOUS PRN
Status: DISCONTINUED | OUTPATIENT
Start: 2017-03-15 | End: 2017-03-15

## 2017-03-15 RX ORDER — ONDANSETRON 2 MG/ML
INJECTION INTRAMUSCULAR; INTRAVENOUS PRN
Status: DISCONTINUED | OUTPATIENT
Start: 2017-03-15 | End: 2017-03-15

## 2017-03-15 RX ORDER — LEVOTHYROXINE SODIUM 75 UG/1
75 TABLET ORAL DAILY
Status: DISCONTINUED | OUTPATIENT
Start: 2017-03-16 | End: 2017-03-16 | Stop reason: HOSPADM

## 2017-03-15 RX ORDER — ESMOLOL HYDROCHLORIDE 10 MG/ML
INJECTION INTRAVENOUS PRN
Status: DISCONTINUED | OUTPATIENT
Start: 2017-03-15 | End: 2017-03-15

## 2017-03-15 RX ORDER — FENTANYL CITRATE 50 UG/ML
INJECTION, SOLUTION INTRAMUSCULAR; INTRAVENOUS PRN
Status: DISCONTINUED | OUTPATIENT
Start: 2017-03-15 | End: 2017-03-15

## 2017-03-15 RX ORDER — HYDROMORPHONE HCL/0.9% NACL/PF 0.2MG/0.2
.2-.4 SYRINGE (ML) INTRAVENOUS
Status: DISCONTINUED | OUTPATIENT
Start: 2017-03-15 | End: 2017-03-16 | Stop reason: HOSPADM

## 2017-03-15 RX ORDER — DEXAMETHASONE SODIUM PHOSPHATE 4 MG/ML
INJECTION, SOLUTION INTRA-ARTICULAR; INTRALESIONAL; INTRAMUSCULAR; INTRAVENOUS; SOFT TISSUE PRN
Status: DISCONTINUED | OUTPATIENT
Start: 2017-03-15 | End: 2017-03-15

## 2017-03-15 RX ORDER — BUPIVACAINE HYDROCHLORIDE AND EPINEPHRINE 2.5; 5 MG/ML; UG/ML
INJECTION, SOLUTION EPIDURAL; INFILTRATION; INTRACAUDAL; PERINEURAL PRN
Status: DISCONTINUED | OUTPATIENT
Start: 2017-03-15 | End: 2017-03-15 | Stop reason: HOSPADM

## 2017-03-15 RX ORDER — CETIRIZINE HYDROCHLORIDE 10 MG/1
10 TABLET ORAL EVERY EVENING
Status: DISCONTINUED | OUTPATIENT
Start: 2017-03-15 | End: 2017-03-16 | Stop reason: HOSPADM

## 2017-03-15 RX ORDER — ACETAMINOPHEN 325 MG/1
650 TABLET ORAL EVERY 6 HOURS PRN
Status: CANCELLED | OUTPATIENT
Start: 2017-03-15

## 2017-03-15 RX ORDER — SODIUM CHLORIDE, SODIUM LACTATE, POTASSIUM CHLORIDE, CALCIUM CHLORIDE 600; 310; 30; 20 MG/100ML; MG/100ML; MG/100ML; MG/100ML
INJECTION, SOLUTION INTRAVENOUS CONTINUOUS
Status: DISCONTINUED | OUTPATIENT
Start: 2017-03-15 | End: 2017-03-15 | Stop reason: HOSPADM

## 2017-03-15 RX ORDER — ONDANSETRON 4 MG/1
4 TABLET, ORALLY DISINTEGRATING ORAL EVERY 30 MIN PRN
Status: DISCONTINUED | OUTPATIENT
Start: 2017-03-15 | End: 2017-03-15 | Stop reason: HOSPADM

## 2017-03-15 RX ORDER — LIDOCAINE HYDROCHLORIDE 20 MG/ML
INJECTION, SOLUTION INFILTRATION; PERINEURAL PRN
Status: DISCONTINUED | OUTPATIENT
Start: 2017-03-15 | End: 2017-03-15

## 2017-03-15 RX ORDER — SOD CHLOR,BICARB/SQUEEZ BOTTLE
1 PACKET, WITH RINSE DEVICE NASAL 2 TIMES DAILY
Qty: 10 EACH | Refills: 3 | Status: SHIPPED | OUTPATIENT
Start: 2017-03-15 | End: 2020-04-29

## 2017-03-15 RX ORDER — ONDANSETRON 4 MG/1
4 TABLET, FILM COATED ORAL EVERY 8 HOURS PRN
Qty: 20 TABLET | Refills: 0 | Status: SHIPPED | OUTPATIENT
Start: 2017-03-15 | End: 2017-03-27

## 2017-03-15 RX ORDER — PREDNISONE 20 MG/1
20 TABLET ORAL 2 TIMES DAILY
Qty: 4 TABLET | Refills: 0 | Status: SHIPPED | OUTPATIENT
Start: 2017-03-15 | End: 2017-03-27

## 2017-03-15 RX ORDER — CLOTRIMAZOLE AND BETAMETHASONE DIPROPIONATE 10; .64 MG/G; MG/G
CREAM TOPICAL 2 TIMES DAILY
Status: DISCONTINUED | OUTPATIENT
Start: 2017-03-15 | End: 2017-03-16 | Stop reason: HOSPADM

## 2017-03-15 RX ORDER — CHLORHEXIDINE GLUCONATE ORAL RINSE 1.2 MG/ML
15 SOLUTION DENTAL 2 TIMES DAILY
Qty: 473 ML | Refills: 1 | Status: SHIPPED | OUTPATIENT
Start: 2017-03-15 | End: 2017-06-20

## 2017-03-15 RX ORDER — HYDROCODONE BITARTRATE AND ACETAMINOPHEN 7.5; 325 MG/15ML; MG/15ML
10-15 SOLUTION ORAL EVERY 4 HOURS PRN
Qty: 750 ML | Refills: 0 | Status: SHIPPED | OUTPATIENT
Start: 2017-03-15 | End: 2017-03-24

## 2017-03-15 RX ORDER — HYDROXYZINE HYDROCHLORIDE 25 MG/1
50-100 TABLET, FILM COATED ORAL
Status: DISCONTINUED | OUTPATIENT
Start: 2017-03-15 | End: 2017-03-16 | Stop reason: HOSPADM

## 2017-03-15 RX ORDER — FENTANYL CITRATE 50 UG/ML
25-50 INJECTION, SOLUTION INTRAMUSCULAR; INTRAVENOUS
Status: DISCONTINUED | OUTPATIENT
Start: 2017-03-15 | End: 2017-03-15 | Stop reason: HOSPADM

## 2017-03-15 RX ORDER — NALOXONE HYDROCHLORIDE 0.4 MG/ML
.1-.4 INJECTION, SOLUTION INTRAMUSCULAR; INTRAVENOUS; SUBCUTANEOUS
Status: DISCONTINUED | OUTPATIENT
Start: 2017-03-15 | End: 2017-03-16 | Stop reason: HOSPADM

## 2017-03-15 RX ORDER — HYDROCODONE BITARTRATE AND ACETAMINOPHEN 7.5; 325 MG/15ML; MG/15ML
10 SOLUTION ORAL
Status: COMPLETED | OUTPATIENT
Start: 2017-03-15 | End: 2017-03-15

## 2017-03-15 RX ORDER — NALOXONE HYDROCHLORIDE 0.4 MG/ML
.1-.4 INJECTION, SOLUTION INTRAMUSCULAR; INTRAVENOUS; SUBCUTANEOUS
Status: DISCONTINUED | OUTPATIENT
Start: 2017-03-15 | End: 2017-03-15

## 2017-03-15 RX ORDER — HYDROCODONE BITARTRATE AND ACETAMINOPHEN 7.5; 325 MG/15ML; MG/15ML
15 SOLUTION ORAL EVERY 4 HOURS PRN
Qty: 750 ML | Refills: 0 | Status: SHIPPED | OUTPATIENT
Start: 2017-03-15 | End: 2017-03-15

## 2017-03-15 RX ORDER — HYDROMORPHONE HYDROCHLORIDE 1 MG/ML
.3-.5 INJECTION, SOLUTION INTRAMUSCULAR; INTRAVENOUS; SUBCUTANEOUS EVERY 10 MIN PRN
Status: DISCONTINUED | OUTPATIENT
Start: 2017-03-15 | End: 2017-03-15 | Stop reason: HOSPADM

## 2017-03-15 RX ORDER — OXYMETAZOLINE HYDROCHLORIDE 0.05 G/100ML
SPRAY NASAL PRN
Status: DISCONTINUED | OUTPATIENT
Start: 2017-03-15 | End: 2017-03-15 | Stop reason: HOSPADM

## 2017-03-15 RX ORDER — MUPIROCIN 20 MG/G
OINTMENT TOPICAL 2 TIMES DAILY
Status: DISCONTINUED | OUTPATIENT
Start: 2017-03-15 | End: 2017-03-16 | Stop reason: HOSPADM

## 2017-03-15 RX ORDER — ONDANSETRON 2 MG/ML
4 INJECTION INTRAMUSCULAR; INTRAVENOUS EVERY 6 HOURS PRN
Status: DISCONTINUED | OUTPATIENT
Start: 2017-03-15 | End: 2017-03-16 | Stop reason: HOSPADM

## 2017-03-15 RX ORDER — HYDROCODONE BITARTRATE AND ACETAMINOPHEN 7.5; 325 MG/15ML; MG/15ML
10 SOLUTION ORAL EVERY 4 HOURS PRN
Status: DISCONTINUED | OUTPATIENT
Start: 2017-03-15 | End: 2017-03-16 | Stop reason: HOSPADM

## 2017-03-15 RX ORDER — SODIUM CHLORIDE, SODIUM LACTATE, POTASSIUM CHLORIDE, CALCIUM CHLORIDE 600; 310; 30; 20 MG/100ML; MG/100ML; MG/100ML; MG/100ML
INJECTION, SOLUTION INTRAVENOUS CONTINUOUS PRN
Status: DISCONTINUED | OUTPATIENT
Start: 2017-03-15 | End: 2017-03-15

## 2017-03-15 RX ORDER — ONDANSETRON 2 MG/ML
4 INJECTION INTRAMUSCULAR; INTRAVENOUS EVERY 30 MIN PRN
Status: DISCONTINUED | OUTPATIENT
Start: 2017-03-15 | End: 2017-03-15 | Stop reason: HOSPADM

## 2017-03-15 RX ORDER — CEFAZOLIN SODIUM 1 G/3ML
INJECTION, POWDER, FOR SOLUTION INTRAMUSCULAR; INTRAVENOUS PRN
Status: DISCONTINUED | OUTPATIENT
Start: 2017-03-15 | End: 2017-03-15

## 2017-03-15 RX ORDER — MEPERIDINE HYDROCHLORIDE 25 MG/ML
12.5 INJECTION INTRAMUSCULAR; INTRAVENOUS; SUBCUTANEOUS
Status: DISCONTINUED | OUTPATIENT
Start: 2017-03-15 | End: 2017-03-15 | Stop reason: HOSPADM

## 2017-03-15 RX ORDER — SODIUM CHLORIDE, SODIUM LACTATE, POTASSIUM CHLORIDE, CALCIUM CHLORIDE 600; 310; 30; 20 MG/100ML; MG/100ML; MG/100ML; MG/100ML
INJECTION, SOLUTION INTRAVENOUS CONTINUOUS
Status: DISCONTINUED | OUTPATIENT
Start: 2017-03-15 | End: 2017-03-16

## 2017-03-15 RX ORDER — AMOXICILLIN 250 MG
1 CAPSULE ORAL 2 TIMES DAILY
Qty: 60 TABLET | Refills: 1 | Status: SHIPPED | OUTPATIENT
Start: 2017-03-15 | End: 2017-06-20

## 2017-03-15 RX ADMIN — FENTANYL CITRATE 50 MCG: 50 INJECTION, SOLUTION INTRAMUSCULAR; INTRAVENOUS at 12:45

## 2017-03-15 RX ADMIN — ESMOLOL HYDROCHLORIDE 20 MG: 10 INJECTION, SOLUTION INTRAVENOUS at 14:22

## 2017-03-15 RX ADMIN — METOROPROLOL TARTRATE 2 MG: 5 INJECTION, SOLUTION INTRAVENOUS at 14:55

## 2017-03-15 RX ADMIN — PHENYLEPHRINE HYDROCHLORIDE 100 MCG: 10 INJECTION, SOLUTION INTRAMUSCULAR; INTRAVENOUS; SUBCUTANEOUS at 12:08

## 2017-03-15 RX ADMIN — BUPIVACAINE HYDROCHLORIDE AND EPINEPHRINE BITARTRATE 1.5 ML: 2.5; .0091 INJECTION, SOLUTION EPIDURAL; INFILTRATION; INTRACAUDAL; PERINEURAL at 12:20

## 2017-03-15 RX ADMIN — HYDROMORPHONE HYDROCHLORIDE 0.5 MG: 1 INJECTION, SOLUTION INTRAMUSCULAR; INTRAVENOUS; SUBCUTANEOUS at 13:21

## 2017-03-15 RX ADMIN — SODIUM CHLORIDE, POTASSIUM CHLORIDE, SODIUM LACTATE AND CALCIUM CHLORIDE: 600; 310; 30; 20 INJECTION, SOLUTION INTRAVENOUS at 11:40

## 2017-03-15 RX ADMIN — MIDAZOLAM HYDROCHLORIDE 2 MG: 1 INJECTION, SOLUTION INTRAMUSCULAR; INTRAVENOUS at 11:40

## 2017-03-15 RX ADMIN — SUCCINYLCHOLINE CHLORIDE 140 MG: 20 INJECTION, SOLUTION INTRAMUSCULAR; INTRAVENOUS at 11:53

## 2017-03-15 RX ADMIN — PHENYLEPHRINE HYDROCHLORIDE 100 MCG: 10 INJECTION, SOLUTION INTRAMUSCULAR; INTRAVENOUS; SUBCUTANEOUS at 12:36

## 2017-03-15 RX ADMIN — HYDROMORPHONE HYDROCHLORIDE 0.5 MG: 10 INJECTION, SOLUTION INTRAMUSCULAR; INTRAVENOUS; SUBCUTANEOUS at 14:40

## 2017-03-15 RX ADMIN — METOROPROLOL TARTRATE 2 MG: 5 INJECTION, SOLUTION INTRAVENOUS at 14:47

## 2017-03-15 RX ADMIN — SODIUM CHLORIDE, POTASSIUM CHLORIDE, SODIUM LACTATE AND CALCIUM CHLORIDE: 600; 310; 30; 20 INJECTION, SOLUTION INTRAVENOUS at 22:14

## 2017-03-15 RX ADMIN — HYDROCODONE BITARTRATE AND ACETAMINOPHEN 10 ML: 2.5; 108 SOLUTION ORAL at 20:32

## 2017-03-15 RX ADMIN — BUPIVACAINE HYDROCHLORIDE AND EPINEPHRINE BITARTRATE 4.5 ML: 2.5; .0091 INJECTION, SOLUTION EPIDURAL; INFILTRATION; INTRACAUDAL; PERINEURAL at 13:17

## 2017-03-15 RX ADMIN — HYDROMORPHONE HYDROCHLORIDE 0.5 MG: 10 INJECTION, SOLUTION INTRAMUSCULAR; INTRAVENOUS; SUBCUTANEOUS at 14:57

## 2017-03-15 RX ADMIN — ROCURONIUM BROMIDE 40 MG: 10 INJECTION INTRAVENOUS at 11:56

## 2017-03-15 RX ADMIN — CEFAZOLIN 2 G: 1 INJECTION, POWDER, FOR SOLUTION INTRAMUSCULAR; INTRAVENOUS at 12:10

## 2017-03-15 RX ADMIN — LIDOCAINE HYDROCHLORIDE 80 MG: 20 INJECTION, SOLUTION INFILTRATION; PERINEURAL at 11:53

## 2017-03-15 RX ADMIN — FENTANYL CITRATE 50 MCG: 50 INJECTION, SOLUTION INTRAMUSCULAR; INTRAVENOUS at 13:13

## 2017-03-15 RX ADMIN — FENTANYL CITRATE 25 MCG: 50 INJECTION, SOLUTION INTRAMUSCULAR; INTRAVENOUS at 16:35

## 2017-03-15 RX ADMIN — ONDANSETRON 4 MG: 2 INJECTION INTRAMUSCULAR; INTRAVENOUS at 12:05

## 2017-03-15 RX ADMIN — FENTANYL CITRATE 100 MCG: 50 INJECTION, SOLUTION INTRAMUSCULAR; INTRAVENOUS at 11:53

## 2017-03-15 RX ADMIN — Medication 30 ML: at 12:42

## 2017-03-15 RX ADMIN — FENTANYL CITRATE 50 MCG: 50 INJECTION, SOLUTION INTRAMUSCULAR; INTRAVENOUS at 12:20

## 2017-03-15 RX ADMIN — HYDROMORPHONE HYDROCHLORIDE 0.5 MG: 10 INJECTION, SOLUTION INTRAMUSCULAR; INTRAVENOUS; SUBCUTANEOUS at 15:46

## 2017-03-15 RX ADMIN — DEXAMETHASONE SODIUM PHOSPHATE 10 MG: 4 INJECTION, SOLUTION INTRAMUSCULAR; INTRAVENOUS at 12:05

## 2017-03-15 RX ADMIN — HYDROMORPHONE HYDROCHLORIDE 0.5 MG: 10 INJECTION, SOLUTION INTRAMUSCULAR; INTRAVENOUS; SUBCUTANEOUS at 15:20

## 2017-03-15 RX ADMIN — GLYCOPYRROLATE 1 MG: 0.2 INJECTION, SOLUTION INTRAMUSCULAR; INTRAVENOUS at 14:05

## 2017-03-15 RX ADMIN — Medication 15 EACH: at 17:02

## 2017-03-15 RX ADMIN — METOROPROLOL TARTRATE 1 MG: 5 INJECTION, SOLUTION INTRAVENOUS at 15:22

## 2017-03-15 RX ADMIN — PROPOFOL 20 MG: 10 INJECTION, EMULSION INTRAVENOUS at 13:13

## 2017-03-15 RX ADMIN — FENTANYL CITRATE 50 MCG: 50 INJECTION, SOLUTION INTRAMUSCULAR; INTRAVENOUS at 16:31

## 2017-03-15 RX ADMIN — ROCURONIUM BROMIDE 10 MG: 10 INJECTION INTRAVENOUS at 12:55

## 2017-03-15 RX ADMIN — PROPOFOL 140 MG: 10 INJECTION, EMULSION INTRAVENOUS at 11:53

## 2017-03-15 RX ADMIN — SODIUM CHLORIDE, POTASSIUM CHLORIDE, SODIUM LACTATE AND CALCIUM CHLORIDE: 600; 310; 30; 20 INJECTION, SOLUTION INTRAVENOUS at 12:48

## 2017-03-15 RX ADMIN — FENTANYL CITRATE 25 MCG: 50 INJECTION, SOLUTION INTRAMUSCULAR; INTRAVENOUS at 16:29

## 2017-03-15 RX ADMIN — Medication 5 MG: at 14:05

## 2017-03-15 ASSESSMENT — PAIN DESCRIPTION - DESCRIPTORS: DESCRIPTORS: ACHING

## 2017-03-15 NOTE — OR NURSING
Paged DR. Kandy Carlson regarding pt's 02 sats. She desats to the 70s on room air, and on 2L desats to upper 80s. O2 increased to 4Lper NC to maintain sats, but still drops to 88% with apneic periods of 20-30 seconds. RR 6-8 while sleeping. While awake, RR is 16 and sats are mid-high 90s on RA.

## 2017-03-15 NOTE — IP AVS SNAPSHOT
[address] [address]    Phone:  [phone]                                       After Visit Summary   3/15/2017    Naomy Friend    MRN: [id number]           After Visit Summary Signature Page     I have received my discharge instructions, and my questions have been answered. I have discussed any challenges I see with this plan with the nurse or doctor.    ..........................................................................................................................................  Patient/Patient Representative Signature      ..........................................................................................................................................  Patient Representative Print Name and Relationship to Patient    ..................................................               ................................................  Date                                            Time    ..........................................................................................................................................  Reviewed by Signature/Title    ...................................................              ..............................................  Date                                                            Time

## 2017-03-15 NOTE — IP AVS SNAPSHOT
"                  MRN:[id number]                      After Visit Summary   3/15/2017    Naomy Friend    MRN: [id number]           Thank you!     Thank you for choosing [facility] for your care. Our goal is always to provide you with excellent care. Hearing back from our patients is one way we can continue to improve our services. Please take a few minutes to complete the written survey that you may receive in the mail after you visit with us. Thank you!        Patient Information     Date Of Birth          1970        About your hospital stay     You were admitted on:  March 15, 2017 You last received care in the:  [address] [facility]    You were discharged on:  March 16, 2017        Reason for your hospital stay       Post-operative care                  Who to Call     For medical emergencies, please call 911.  For non-urgent questions about your medical care, please call your primary care provider or clinic, [phone]  For questions related to your surgery, please call your surgery clinic        Attending Provider     Provider Vicki Araujo MD Otolaryngology       Primary Care Provider Office Phone # Fax #    JERRICA Church [facility] [phone]       [facility] [address] [address]         When to contact your care team       Please notify your doctor if you experience wound breakdown, sustained bleeding from the wound site, or increasing redness, swelling, and/or purulent malorodorous discharge from the wound site which may indicate infection. If you feel it is acute, or experience sudden changes in breathing, chest pain, or excessive sleepiness/somnolence please return to the emergency department or call 911. If you have questions or concerns during the day please call ENT clinic and [phone]. If at night you can call [facility] at [phone] and ask for the \"ENT resident on call\".                  After " Care Instructions     Activity       Your activity upon discharge: No heavy lifting greater than 10 lbs and no strenuous exercise for 2 weeks or until follow up appointment. No driving while taking narcotic pain medications.            Diet       Follow this diet upon discharge: mechanical soft diet. No hard or crunchy foods for at least 2 weeks.            Diet Instructions       Try clear liquids today, and increase to soft foods as you feel comfortable (eggs, oatmeal, pudding, mashed potatoes, etc). Keep on soft food diet until your follow up appointment in 3 weeks.            Discharge Instructions - Lifting restrictions       Lifting Restrictions 10 pounds until seen at Post-op follow up appointment. A gallon of milk is 8lbs. Sneeze/cough with your mouth open, do not blow your nose, do not strain while going to the bathroom (we will give you stool softeners), and try not to bend over (increases pressure in your nose) until we see you in clinic            No blowing nose           No driving or operating machinery       While taking the lortab pain medication                  Follow-up Appointments     Adult [facility] Follow-up and recommended labs and tests       Follow up in ENT clinic with Dr. Zurita on Monday, March 27th at 10:15AM. Please call the clinic with questions/concerns: [phone].    Otolaryngology/ENT Clinic:  [facility]  [address] [place] [address]      Appointments on [place] and/or [facility] (with [facility] or [facility] provider or service). Call [phone] if you haven't heard regarding these appointments within 7 days of discharge.                  Your next 10 appointments already scheduled     Mar 27, 2017 10:15 AM CDT   Return Visit with Vicki Zurita MD   [facility]    [address] [place] [id number] [phone]              Further  instructions from your care team       [facility] [place]  Same-Day Surgery   Adult Discharge Orders & Instructions     For 24 hours after surgery    1. Get plenty of rest.  A responsible adult must stay with you for at least 24 hours after you leave the hospital.   2. Do not drive or use heavy equipment.  If you have weakness or tingling, don't drive or use heavy equipment until this feeling goes away.  3. Do not drink alcohol.  4. Avoid strenuous or risky activities.  Ask for help when climbing stairs.   5. You may feel lightheaded.  IF so, sit for a few minutes before standing.  Have someone help you get up.   6. If you have nausea (feel sick to your stomach): Drink only clear liquids such as apple juice, ginger ale, broth or 7-Up.  Rest may also help.  Be sure to drink enough fluids.  Move to a regular diet as you feel able.  7. You may have a slight fever. Call the doctor if your fever is over 100 F (37.7 C) (taken under the tongue) or lasts longer than 24 hours.  8. You may have a dry mouth, a sore throat, muscle aches or trouble sleeping.  These should go away after 24 hours.  9. Do not make important or legal decisions.   Call your doctor for any of the followin.  Signs of infection (fever, growing tenderness at the surgery site, a large amount of drainage or bleeding, severe pain, foul-smelling drainage, redness, swelling).    2. It has been over 8 to 10 hours since surgery and you are still not able to urinate (pass water).    3.  Headache for over 24 hours.    To contact a doctor, call Dr Zurita's office at [phone] (ENT clinic) or [phone] (Sleep Clinic) or:    x   [phone] and ask for the resident on call for   ENT/Otololaryngology (answered 24 hours a day)  x   Emergency Department:    [facility]: [phone]       (TTY for hearing impaired: [phone])         Tips for taking pain medications  To get the best pain relief possible , remember  "these points:      Take pain medications as directed, before pain becomes severe      Pain medication can upset your stomach: taking it with food may help      Constipation is a common side effect of pain medication. Drink plenty of  Fluids      Eat foods high in fiber. Take a stool softener  if recommended by your doctor or  Pharmacist.        Do not drink alcohol, drive or operate machinery while taking pain medications.      Ask about other ways to control pain, such as with heat, ice or relaxation.    NO ibuprofen or other NSAIDS (advil, nuprin, naprosyn, motrin) for a week. Can take tylenol INSTEAD OF Lortab       if pain is mild, but DO NOT supplement tylenol WITH Lortab because Lortab has tylenol in it and extra       tylenol could result in an overdose. Goal tylenol auymj=3640xd/day. Not more than 4000mg/day.    Start saline rinses 3/16/2017.                Pending Results     Date and Time Order Name Status Description    3/15/2017 1403 Surgical pathology exam In process             Statement of Approval     Ordered          03/16/17 0943  I have reviewed and agree with all the recommendations and orders detailed in this document.  EFFECTIVE NOW     Approved and electronically signed by:  Diane Melgoza PA-C             Admission Information     Date & Time Provider Department Dept. Phone    3/15/2017 Vicki Zurita MD [address] [facility] [phone]      Your Vitals Were     Blood Pressure Temperature Respirations Height Weight Pulse Oximetry    161/82 (BP Location: Right arm) 97  F (36.1  C) (Oral) 16 1.676 m (5' 6\") 135.1 kg (297 lb 13.5 oz) 92%    BMI (Body Mass Index)                   48.07 kg/m2           [employer] Information     [employer] gives you secure access to your electronic health record. If you see a primary care provider, you can also send messages to your care team and make appointments. If you have questions, please call your primary care clinic.  If you do not have a " primary care provider, please call [phone] and they will assist you.        Care EveryWhere ID     This is your Care EveryWhere ID. This could be used by other organizations to access your [place] medical records  [id number]           Review of your medicines      START taking        Dose / Directions    chlorhexidine 0.12 % solution   Commonly known as:  PERIDEX   Used for:  Postoperative pain        Dose:  15 mL   Swish and spit 15 mLs in mouth 2 times daily   Quantity:  473 mL   Refills:  1       HYDROcodone-acetaminophen 7.5-325 MG/15ML solution   Commonly known as:  LORTAB   Used for:  Postoperative pain        Dose:  10-15 mL   Take 10-15 mLs by mouth every 4 hours as needed for moderate to severe pain or pain   Quantity:  750 mL   Refills:  0       ondansetron 4 MG tablet   Commonly known as:  ZOFRAN   Used for:  Postoperative pain        Dose:  4 mg   Take 1 tablet (4 mg) by mouth every 8 hours as needed for nausea   Quantity:  20 tablet   Refills:  0       predniSONE 20 MG tablet   Commonly known as:  DELTASONE   Used for:  Postoperative pain        Dose:  20 mg   Take 1 tablet (20 mg) by mouth 2 times daily   Quantity:  4 tablet   Refills:  0       senna-docusate 8.6-50 MG per tablet   Commonly known as:  SENOKOT-S;PERICOLACE   Used for:  Postoperative pain        Dose:  1 tablet   Take 1 tablet by mouth 2 times daily   Quantity:  60 tablet   Refills:  1       SINUS RINSE BOTTLE KIT Pack   Used for:  Postoperative pain        Dose:  1 packet   Spray 1 packet in nostril 2 times daily   Quantity:  10 each   Refills:  3         CONTINUE these medicines which have NOT CHANGED        Dose / Directions    cetirizine 10 MG tablet   Commonly known as:  zyrTEC   Used for:  Post-nasal drainage        Dose:  10 mg   Take 1 tablet (10 mg) by mouth every evening   Quantity:  90 tablet   Refills:  1       citalopram 20 MG tablet   Commonly known as:  celeXA   Used for:  Mild recurrent major depression (H)         Dose:  30 mg   Take 1.5 tablets (30 mg) by mouth daily   Quantity:  135 tablet   Refills:  1       clotrimazole-betamethasone cream   Commonly known as:  LOTRISONE   Used for:  Tinea cruris        Apply topically 2 times daily   Quantity:  30 g   Refills:  1       DECONGESTANT + PO        Reported on 2/27/2017   Refills:  0       fluticasone 50 MCG/ACT spray   Commonly known as:  FLONASE        Dose:  2 spray   Spray 2 sprays into both nostrils daily Reported on 2/27/2017   Refills:  0       hydrOXYzine 25 MG tablet   Commonly known as:  ATARAX   Used for:  Other insomnia        Dose:   mg   Take 2-4 tablets ( mg) by mouth nightly as needed for other   Quantity:  60 tablet   Refills:  1       levonorgestrel 20 MCG/24HR IUD   Commonly known as:  MIRENA        Dose:  1 each   1 each by Intrauterine route once   Refills:  0       levothyroxine 75 MCG tablet   Commonly known as:  SYNTHROID/LEVOTHROID   Used for:  Hypothyroidism, unspecified type        Dose:  75 mcg   Take 1 tablet (75 mcg) by mouth daily   Quantity:  90 tablet   Refills:  1       mupirocin 2 % ointment   Commonly known as:  BACTROBAN   Used for:  Pyoderma        Apply topically 2 times daily Use 2 times a day to affected area.   Quantity:  15 g   Refills:  0         STOP taking     IBUPROFEN PO                Where to get your medicines      These medications were sent to [facility]/pharmacy #[id number] - [facility] [address] [facility] [address], [facility] [address]     Phone:  [phone]     chlorhexidine 0.12 % solution    ondansetron 4 MG tablet    predniSONE 20 MG tablet         These medications were sent to [facility] [place], [place] - [address] [facility]  [address] [facility] [address]     Phone:  [phone]     senna-docusate 8.6-50 MG per tablet    SINUS RINSE BOTTLE KIT Pack         Some of these will need a paper prescription and others can be bought  over the counter. Ask your nurse if you have questions.     Bring a paper prescription for each of these medications     HYDROcodone-acetaminophen 7.5-325 MG/15ML solution                Protect others around you: Learn how to safely use, store and throw away your medicines at www.disposemymeds.org.             Medication List: This is a list of all your medications and when to take them. Check marks below indicate your daily home schedule. Keep this list as a reference.      Medications           Morning Afternoon Evening Bedtime As Needed    cetirizine 10 MG tablet   Commonly known as:  zyrTEC   Take 1 tablet (10 mg) by mouth every evening                                chlorhexidine 0.12 % solution   Commonly known as:  PERIDEX   Swish and spit 15 mLs in mouth 2 times daily                                citalopram 20 MG tablet   Commonly known as:  celeXA   Take 1.5 tablets (30 mg) by mouth daily   Last time this was given:  30 mg on 3/16/2017  8:25 AM                                clotrimazole-betamethasone cream   Commonly known as:  LOTRISONE   Apply topically 2 times daily   Last time this was given:  3/16/2017  8:25 AM                                DECONGESTANT + PO   Reported on 2/27/2017                                fluticasone 50 MCG/ACT spray   Commonly known as:  FLONASE   Spray 2 sprays into both nostrils daily Reported on 2/27/2017                                HYDROcodone-acetaminophen 7.5-325 MG/15ML solution   Commonly known as:  LORTAB   Take 10-15 mLs by mouth every 4 hours as needed for moderate to severe pain or pain   Last time this was given:  10 mLs on 3/16/2017  8:17 AM                                hydrOXYzine 25 MG tablet   Commonly known as:  ATARAX   Take 2-4 tablets ( mg) by mouth nightly as needed for other                                levonorgestrel 20 MCG/24HR IUD   Commonly known as:  MIRENA   1 each by Intrauterine route once                                 levothyroxine 75 MCG tablet   Commonly known as:  SYNTHROID/LEVOTHROID   Take 1 tablet (75 mcg) by mouth daily   Last time this was given:  75 mcg on 3/16/2017  8:25 AM                                mupirocin 2 % ointment   Commonly known as:  BACTROBAN   Apply topically 2 times daily Use 2 times a day to affected area.   Last time this was given:  3/16/2017  8:25 AM                                ondansetron 4 MG tablet   Commonly known as:  ZOFRAN   Take 1 tablet (4 mg) by mouth every 8 hours as needed for nausea                                predniSONE 20 MG tablet   Commonly known as:  DELTASONE   Take 1 tablet (20 mg) by mouth 2 times daily                                senna-docusate 8.6-50 MG per tablet   Commonly known as:  SENOKOT-S;PERICOLACE   Take 1 tablet by mouth 2 times daily                                SINUS RINSE BOTTLE KIT Pack   Spray 1 packet in nostril 2 times daily                                          More Information        Tonsillectomy, Post-Op Bleeding  You have had surgery to remove your tonsils. Bleeding at the surgery site can happen after surgery. The doctor can often control it using direct pressure or applying medicine to shrink the blood vessels. If this fails, you will need to return to the operating room for further treatment. Notify your doctor at once or return to this hospital if there are signs of any further bleeding.  Home Care    Your throat will be sore. Throat pain after surgery improves over the first 3-5 days. It is normal to have more pain at the end of the first week before it finally goes away.    Soft foods will be easier to swallow.    Drink plenty of fluids to prevent dehydration. You must continue to drink even though your throat hurts.    For pain relief, suck on ice cubes or frozen fruit pops, eat ice cream or sherbet, and drink cold liquids. You may also use liquid acetaminophen. Avoid taking ibuprofen or aspirin. These may increase bleeding risk. If  your doctor has prescribed pain medication, take this as directed.     If antibiotics were prescribed, take these as directed until they are gone.    Do not overheat your home since this dries the air and may irritate throat tissues, especially at night. A cool-mist humidifier in the bedroom may help.    Avoid exposure to people with colds or the flu during the recovery period. You may be more likely to get ill during this time.    Smoking irritates the surgery site. If you smoke, this is a good time to stop.    Avoid any heavy exercise, lifting, or straining for the next 10 days.  Follow-up care  Follow up with your doctor or this facility as advised.  When to see medical advice  Call your doctor right away if any of the following occur:    Trouble speaking, swallowing, or breathing    Nausea and vomiting    Bleeding from the mouth    Fever over 100.4 F (38.3 C)    Increasing pain not controlled by pain medications    Signs of dehydration: Very dark urine, less urine, dry mouth, weakness    7812-3589 The [employer]. [address] [place] [address]. All rights reserved. This information is not intended as a substitute for professional medical care. Always follow your healthcare professional's instructions.                Adult Tonsillectomy  The tonsils are 2 small masses of tissue at the back of the throat. They are part of the body s immune system. This helps the body fight disease. In some people, the tonsils become infected or enlarged. This can cause severe sore throats, snoring, or other problems. Tonsillectomy is surgery to remove the tonsils. Tonsillectomy may be recommended if you have obstruction causing sleep apnea, or recurring, chronic, or severe infections. This sheet tells you more about this surgery and what to expect.    Preparing for surgery  Prepare as you have been told. Tell your healthcare provider about all medicine you take. This includes over-the-counter drugs. It also  includes herbs and other supplements. You may need to stop taking some or all of them before surgery as directed by your healthcare provider. Also, follow any directions you re given for not eating or drinking before surgery.  The day of surgery  The surgery takes about 60 minutes. You will likely go home on the same day.  Before the surgery  Here is what to expect before the surgery begins:    An IV line is put into a vein in your arm or hand. This line supplies fluids and medicines.    To keep you free of pain during the surgery, you re given general anesthesia. This medicine puts you into a state like deep sleep through the surgery.  During the surgery  Here is what to expect during the surgery:    A special device is used to keep the mouth open.    Other tools are used to remove the tonsils from the back of the throat. The tissue is taken out through the mouth.    The device holding the mouth open is then removed.  After the surgery  You will be taken to a recovery room. Healthcare staff will make sure you can drink some liquids. They will also make sure your pain is being managed. When you are ready to leave the hospital, you will need to be driven home by an adult family member or friend.  Recovering at home  It will likely take about 2 weeks to heal from the surgery. During your recovery:    Expect to have throat pain. You may also feel pain in your ears. This is  referred  pain from the throat, and is normal. Your post-surgery pain may come and go. It may be worse on the 4th or 5th day after surgery.    Talk as little as possible, if it is painful.    Take pain medicine as directed.    Do not drive while you are on opioid or narcotic pain medicine. Expect to feel sleepy or dizzy while you are taking this medicine.    Do not use ibuprofen or aspirin for 14 days after surgery unless your healthcare provider says it s OK. You may use acetaminophen as directed.    Use 2 or 3 pillows under your head while  resting. This will help keep swelling down.    Drink lots of chilled liquids. Water, noncitrus juices, and frozen juice bars are good choices.    Eat cold foods and soft foods, which are easiest to swallow. Try foods like ice cream, gelatin, scrambled eggs, pasta, and mashed potatoes.    Avoid foods that require a lot of chewing. Also avoid foods that may scratch the throat, like toast or potato chips. Avoid hot, spicy, or acidic foods.    Avoid strenuous activity for 2 to 3 weeks after surgery.    Be aware that white patches will form in the throat during healing. These are scabs and are not a sign of infection. The patches will come off in 1 to 2 weeks and may cause bleeding. To minimize bleeding, drink lots of fluids. Gargling with cold water can help.  Call the healthcare provider  Call your healthcare provider if you have any of the following:    Chest pain or trouble breathing (call 911)    Fever of 100.4 F (38 C) or higher, or as directed by your healthcare provider    Bright red bleeding from the mouth or nose    Severe pain not relieved by medicine    Signs of dehydration (dark urine, urinating less often)    Heavy or persistent bleeding in the throat at any time    Other signs or symptoms as indicated by your healthcare provider   Follow-up  Schedule a follow-up visit with your healthcare provider as advised. During this visit, the healthcare provider will make sure you are healing well. Ask any questions you have about the surgery or your recovery.  Risks and possible complications   Risks of this surgery include:    Infection    Bleeding    Injury to the lips or teeth    Painful swallowing during recovery    The need for a second surgery    Risks of anesthesia      9576-3433 [employer]. [address] [place] [address], [place] [address]. All rights reserved. This information is not intended as a substitute for professional medical care. Always follow your healthcare professional's  instructions.

## 2017-03-15 NOTE — DISCHARGE INSTRUCTIONS
[facility]  Same-Day Surgery   Adult Discharge Orders & Instructions     For 24 hours after surgery    1. Get plenty of rest.  A responsible adult must stay with you for at least 24 hours after you leave the hospital.   2. Do not drive or use heavy equipment.  If you have weakness or tingling, don't drive or use heavy equipment until this feeling goes away.  3. Do not drink alcohol.  4. Avoid strenuous or risky activities.  Ask for help when climbing stairs.   5. You may feel lightheaded.  IF so, sit for a few minutes before standing.  Have someone help you get up.   6. If you have nausea (feel sick to your stomach): Drink only clear liquids such as apple juice, ginger ale, broth or 7-Up.  Rest may also help.  Be sure to drink enough fluids.  Move to a regular diet as you feel able.  7. You may have a slight fever. Call the doctor if your fever is over 100 F (37.7 C) (taken under the tongue) or lasts longer than 24 hours.  8. You may have a dry mouth, a sore throat, muscle aches or trouble sleeping.  These should go away after 24 hours.  9. Do not make important or legal decisions.   Call your doctor for any of the followin.  Signs of infection (fever, growing tenderness at the surgery site, a large amount of drainage or bleeding, severe pain, foul-smelling drainage, redness, swelling).    2. It has been over 8 to 10 hours since surgery and you are still not able to urinate (pass water).    3.  Headache for over 24 hours.    To contact a doctor, call Dr Zurita's office at [phone] (ENT clinic) or [phone] (Sleep Clinic) or:    x   [phone] and ask for the resident on call for   ENT/Otololaryngology (answered 24 hours a day)  x   Emergency Department:    [facility]: [phone]       (TTY for hearing impaired: [phone])         Tips for taking pain medications  To get the best pain relief possible , remember these points:      Take pain medications as  directed, before pain becomes severe      Pain medication can upset your stomach: taking it with food may help      Constipation is a common side effect of pain medication. Drink plenty of  Fluids      Eat foods high in fiber. Take a stool softener  if recommended by your doctor or  Pharmacist.        Do not drink alcohol, drive or operate machinery while taking pain medications.      Ask about other ways to control pain, such as with heat, ice or relaxation.    NO ibuprofen or other NSAIDS (advil, nuprin, naprosyn, motrin) for a week. Can take tylenol INSTEAD OF Lortab       if pain is mild, but DO NOT supplement tylenol WITH Lortab because Lortab has tylenol in it and extra       tylenol could result in an overdose. Goal tylenol kiegs=4896ia/day. Not more than 4000mg/day.    Start saline rinses 3/16/2017.
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
## 2017-03-15 NOTE — ANESTHESIA POSTPROCEDURE EVALUATION
Patient: Naomy Friend    Procedure(s):  Bilateral Tonsillectomy, Right Functional Endoscopic Sinus Surgery  - Wound Class: II-Clean Contaminated   - Wound Class: II-Clean Contaminated    Diagnosis:Recurrent Tonsillitis and Sinusitis   Diagnosis Additional Information: No value filed.    Anesthesia Type:  General, ETT    Note:  Anesthesia Post Evaluation    Patient location during evaluation: PACU  Patient participation: Able to fully participate in evaluation  Level of consciousness: awake and alert  Pain management: adequate  Airway patency: patent  Cardiovascular status: acceptable  Respiratory status: acceptable  Hydration status: acceptable  PONV: none     Anesthetic complications: None          Last vitals:  Vitals:    03/15/17 1006 03/15/17 1425 03/15/17 1430   BP: (!) 146/99 (!) 146/102 (!) 198/110   Resp:  18 18   Temp: 36.5  C (97.7  F) 36.6  C (97.9  F)    SpO2: 96% 98% 98%         Electronically Signed By: Chikis Alonzo MD  March 15, 2017  2:45 PM

## 2017-03-15 NOTE — OP NOTE
DATE OF OPERATION:  03/15/2017      PREOPERATIVE DIAGNOSES:   1.  Recurrent tonsillith.   2.  Recurrent tonsillitis.   3.  Chronic right maxillary sinusitis.      POSTOPERATIVE DIAGNOSES:     1.  Recurrent tonsillith.   2.  Recurrent tonsillitis.   3.  Chronic right maxillary sinusitis.      OPERATIVE PROCEDURES:   1.  Bilateral tonsillectomy.   2.  Right maxillary antrostomy with anterior ethmoidectomy.      SURGEON:  Vicki Zurita MD      ASSISTANTS:  Kandy Carlson MD, resident      ANESTHESIA:  General endotracheal.      SPECIMENS REMOVED:  Right and left tonsils.      COMPLICATIONS:  None.      INDICATIONS:  Naomy Friend is a 46-year-old female with a history of recurrent tonsillitis as well as chronic tonsillitis from chronic tonsil stone formation.  In addition, she has a history of frequent sinus infections.  A CT scan showed an impacted right maxillary sinus that was present despite 3 weeks of antibiotics and steroids.      FINDINGS:  The patient had 3+ tonsils that were cryptic and tonsil stones were present.  Right maxillary sinus did not show any polyps or pus.      DESCRIPTION OF PROCEDURE:  The patient was brought into the operating room, placed on the operating table in supine position.  A member of the Department of Anesthesia was present and intubated the patient without difficulty.  The tube was secured and the table was turned 90 degrees.  A timeout was taken to correctly identify the patient and the procedure.  The patient was prepped and draped in our normal sterile fashion.  The Afrin pledgets were placed in the right nose.  This allowed for decongestion of the tissues and the pledgets were removed.  The septum is deviated to the left anteriorly.  A 0-degree rigid endoscope was used to visualize the right nasal cavity as well as the right sinus spaces.  The right middle turbinate was medialized.  An uncinectomy was performed using a [place] elevator to incise at the very anterior portion of  the uncinate.  The vertical attachment was removed and the horizontal attachment was also removed.  The patient's tissue was inflamed overall.  A ball-tipped seeker was then used to identify the maxillary os.  An Allis tip sucker was then placed into the os and the maxillary os was dilated.  A straight true biter was then used to enlarge the maxillary antrum posteriorly.  The loose mucosal tissue was then debrided away.  Once this was done, the maxillary antrostomy was completed, and we were able to see into the maxillary sinus without difficulty.  In order to better improve visualization as well, anterior ethmoidectomy was performed.  A curet was then used to enter the ethmoid bulla and the anterior face of the ethmoid bulla was removed.  Microdebrider was used to remove the soft tissue.  The patient had minimal bleeding.  An Afrin pledget was then packed for the remainder of the case in the right nose.      We then turned our attention to a tonsillectomy.  McIvor mouth retractor was then placed and used to expose the oropharynx.  The left tonsil was grasped with an Allis clamp and medialized.  An incision was made along the anterior tonsillar border.  The tonsillar capsule was identified and the left tonsil was dissected away from the underlying musculature.  The patient did have a large vein that was bleeding and required suction Bovie cautery for control.  The left tonsil was passed off the field.  The right tonsil was removed in a similar fashion. Once again there was a large bleeder at the midportion of the tonsillar fossa on the right side.  This was similar to the left side.  We controlled this very easily with suction Bovie cautery.  The right tonsil was then removed and passed off the field.  The mouth was irrigated.  The retractor was released for 60 seconds and resuspended.  We determined there was good hemostasis.  The McIvor was removed.  The patient was handed back over to Anesthesia, allowed to  extubate and transferred to the PACU in stable condition.         YUNIEL LLOYD MD             D: 03/15/2017 14:37   T: 03/15/2017 18:45   MT:       Name:     BARRIE MAYES   MRN:      [id number]        Account:        [id number]   :      1970           Procedure Date: 03/15/2017      Document: [id number]

## 2017-03-15 NOTE — OR NURSING
Dionna Carlson, resident to ask if pt can take ibuprofen at home and when she should start her sinus irrigation.

## 2017-03-15 NOTE — ANESTHESIA CARE TRANSFER NOTE
Patient: Naomy Friend    Procedure(s):  Bilateral Tonsillectomy, Right Functional Endoscopic Sinus Surgery  - Wound Class: II-Clean Contaminated   - Wound Class: II-Clean Contaminated    Diagnosis: Recurrent Tonsillitis and Sinusitis   Diagnosis Additional Information: No value filed.    Anesthesia Type:   General, ETT     Note:  Airway :Face Mask  Patient transferred to:PACU  Comments: Pt denies pain or nausea. Report given to RN.       Vitals: (Last set prior to Anesthesia Care Transfer)    CRNA VITALS  3/15/2017 1353 - 3/15/2017 1428      3/15/2017             Pulse: 79    SpO2: 99 %    Resp Rate (observed): (!)  6    Resp Rate (set): 10                Electronically Signed By: JERRICA Julian CRNA  March 15, 2017  2:28 PM

## 2017-03-15 NOTE — BRIEF OP NOTE
[facility], [place]    Brief Operative Note    Pre-operative diagnosis: Recurrent Tonsillitis and Sinusitis   Post-operative diagnosis Same  Procedure: Procedure(s):  Bilateral Tonsillectomy, Right Functional Endoscopic Sinus Surgery  - Wound Class: II-Clean Contaminated   - Wound Class: II-Clean Contaminated  Surgeon: Surgeon(s) and Role:     * Vicki Zurita MD - Primary     * Kandy Carlson MD - Resident - Assisting  Anesthesia: General   Estimated blood loss: 30mL  Drains: None  Specimens:   ID Type Source Tests Collected by Time Destination   A : Right Tonsil  Tissue Tonsil, Right SURGICAL PATHOLOGY EXAM Vicki Zurita MD 3/15/2017  1:59 PM    B : Left Tonsil  Tissue Tonsil, Left SURGICAL PATHOLOGY EXAM Vicki Zurita MD 3/15/2017  1:59 PM      Findings:   3+ tonsils, nasal septum deviated to left anteriorly..  Complications: None.  Implants: None.    Kandy Carlson

## 2017-03-16 ENCOUNTER — CARE COORDINATION (OUTPATIENT)
Dept: CARDIOLOGY | Facility: CLINIC | Age: 47
End: 2017-03-16

## 2017-03-16 VITALS
WEIGHT: 293 LBS | HEIGHT: 66 IN | DIASTOLIC BLOOD PRESSURE: 68 MMHG | OXYGEN SATURATION: 92 % | RESPIRATION RATE: 16 BRPM | SYSTOLIC BLOOD PRESSURE: 142 MMHG | TEMPERATURE: 97.1 F | BODY MASS INDEX: 47.09 KG/M2

## 2017-03-16 PROCEDURE — G0378 HOSPITAL OBSERVATION PER HR: HCPCS

## 2017-03-16 PROCEDURE — 25800025 ZZH RX 258: Performed by: STUDENT IN AN ORGANIZED HEALTH CARE EDUCATION/TRAINING PROGRAM

## 2017-03-16 PROCEDURE — 25000132 ZZH RX MED GY IP 250 OP 250 PS 637: Performed by: STUDENT IN AN ORGANIZED HEALTH CARE EDUCATION/TRAINING PROGRAM

## 2017-03-16 RX ADMIN — HYDROCODONE BITARTRATE AND ACETAMINOPHEN 10 ML: 7.5; 325 SOLUTION ORAL at 08:17

## 2017-03-16 RX ADMIN — HYDROCODONE BITARTRATE AND ACETAMINOPHEN 10 ML: 7.5; 325 SOLUTION ORAL at 04:19

## 2017-03-16 RX ADMIN — SODIUM CHLORIDE, POTASSIUM CHLORIDE, SODIUM LACTATE AND CALCIUM CHLORIDE: 600; 310; 30; 20 INJECTION, SOLUTION INTRAVENOUS at 06:36

## 2017-03-16 RX ADMIN — CITALOPRAM HYDROBROMIDE 30 MG: 20 TABLET ORAL at 08:25

## 2017-03-16 RX ADMIN — MUPIROCIN: 20 OINTMENT TOPICAL at 08:25

## 2017-03-16 RX ADMIN — CLOTRIMAZOLE AND BETAMETHASONE DIPROPIONATE: 10; .5 CREAM TOPICAL at 08:25

## 2017-03-16 RX ADMIN — HYDROCODONE BITARTRATE AND ACETAMINOPHEN 10 ML: 7.5; 325 SOLUTION ORAL at 00:21

## 2017-03-16 RX ADMIN — LEVOTHYROXINE SODIUM 75 MCG: 75 TABLET ORAL at 08:25

## 2017-03-16 NOTE — PROGRESS NOTES
Notified of patient having oxygen desaturations to 70s while sleeping requiring 4L nasal canula for appropriate oxygenation above 90%. Will admit patient to Observation for failed post-op/anesthesia recovery due to requiring supplemental oxygen.    Patient discussed with ENT staff, Dr. Vicki Zurita.    Rossy Sosa MD  Otolaryngology- Head and Neck Surgery PGY2

## 2017-03-16 NOTE — PLAN OF CARE
Observation goals: Delayed Recovery from Anesthesia PRIOR TO DISCHARGE    List all goals to be met before discharge home:   - hypoxia resolved for at least 2 hours without supplemental oxygen - Met. O2 sats >92% on RA.   - adequate po intake - Met. Tolerating mechanical soft diet.   Pain well controlled with prn PO lortab q4hrs. Up ad mendoza, voiding not saving. IVF d'c. PIV removed. Discharge teaching completed, paperwork signed. Transport called, patient meds picked up.

## 2017-03-16 NOTE — OR NURSING
Paged Dr. Rossy Sosa to ask about pre-op orders, and to request a transfer order to try to get room placement.

## 2017-03-16 NOTE — OR NURSING
Report given to Katharine Mckenzie RN on 7B. Transferred pt via wheelchair to room 28 on room air, with  present. Informed receiving nurse that pt will need 02 for sleeping, and that I just gave her pain med. Receiving nurse had no questions.

## 2017-03-16 NOTE — PROGRESS NOTES
Alert and oriented. Makes need known. BP elevated. Received Lortab x2 for throat pain with some effectiveness. No signs of bleeding.  Intermittently on oxygen overnight. Desats to mid 80 s on RA. Up independently. Tolerating sips of water and popsicles. Voiding not saving.     Observation goals: Delayed Recovery from Anesthesia PRIOR TO DISCHARGE  List all goals to be met before discharge home:   - hypoxia resolved for at least 2 hours without supplemental oxygen - partially met. O2 sats at 94-96% on RA. Dips to  mid 80s when laying down.  - adequate po intake - partially met. Has not had solid food yet. Tolerating clears.

## 2017-03-16 NOTE — PROGRESS NOTES
Observation goals: Delayed Recovery from Anesthesia PRIOR TO DISCHARGE     Comments: List all goals to be met before discharge home:   - hypoxia resolved for at least 2 hours without supplemental oxygen   - adequate po intake         Patient arrived from  S/P bilateral tonsillectomy and right maxillary sinus scrapping.  Observation status.  Monitor for oxygen needs, patient was desatting in  in the 70% on room air.  Brought up in wheelchair from .  Ambulated independent to room and bed.  Denies pain, just given Lortab.  Denies nausea.  No bleeding. On 2 L NC 94%.  Has not eaten food yet.  Drinking apple juice, popsicles.   Observation sheet given to patient.

## 2017-03-16 NOTE — PROGRESS NOTES
"[facility]  \"Hello, my name is Sonia Enrique , and I am calling from the [facility].  I want to check in and see how you are doing, after leaving the hospital.  You may also receive a call from your Care Coordinator (care team), but I want to make sure you don t have any urgent needs.  I have a couple questions to review with you:     Post-Discharge Outreach                                                    Naomy Friend is a 46 year old female     Follow-up Appointments           Adult [facility] Follow-up and recommended labs and tests       Follow up in ENT clinic with Dr. Zurita on Monday, March 27th at 10:15AM. Please call the clinic with questions/concerns: [phone].     Otolaryngology/ENT Clinic:  [facility]  [address] [place] [address]        Appointments on [place] and/or [facility] (with [facility] or [facility] provider or service). Call [phone] if you haven't heard regarding these appointments within 7 days of discharge.                       Your next 10 appointments already scheduled            Mar 27, 2017 10:15 AM CDT   Return Visit with Vicki Zurita MD   [facility] ([facility])              Care Team:    Patient Care Team       Relationship Specialty Notifications Jovita Fonseca APRN CNP PCP - General Nurse Practitioner  3/24/16     Phone: [phone] Fax: [phone]         [address] [address]            Transition of Care Review                                                      Did you have a surgery or procedure during your hospital visit? Yes   If yes, do you have any of the following:     Signs of infection:  No    Pain:  Yes     Pain Scale \"Managed by pain meds\"     Location: Surg site/neck    Wound/incision concerns? no    Do you have all of your " medications/refills?  Yes    Are you having any side effects or questions about your medication(s)? No    Do you have any new or worsening symptoms?  Yes- has a cough    Do you have any future appointments scheduled?   Yes       Next 5 appointments (look out 90 days)     Mar 27, 2017 10:15 AM CDT   Return Visit with Vicki Zurita MD   [facility] ([facility])    [address] [place] [id number] [phone]                      Plan                                                      Thanks for your time.  Your Care Coordinator may follow-up within the next couple days.  In the meantime if you have questions, concerns or problems call your care team.        Sonia Enrique

## 2017-03-16 NOTE — OR NURSING
No more apneic periods noted. RR 12-16, with snoring. Pt still desats on RA to 87-88%. Changed oxi plus mask to NC 2 L, and maintaining sats above 92%.

## 2017-03-16 NOTE — OR NURSING
Pt will be admitted overnight for observation per Dr. Rossy Sosa. Pt and  aware. Awaiting orders. Switched to oxi plus mask since pt is a mouth breather.

## 2017-03-16 NOTE — PLAN OF CARE
Observation goals: Delayed Recovery from Anesthesia PRIOR TO DISCHARGE    List all goals to be met before discharge home:   - hypoxia resolved for at least 2 hours without supplemental oxygen - met. O2 sats at 94-96% on RA. History of sleep apnea, O2 sats drop to 88% when sleeping, patient has sleep dr. And have plans for sleep study in future.   - adequate po intake - met. Tolerating mechanical soft diet. Denies n/v.

## 2017-03-16 NOTE — DISCHARGE SUMMARY
Discharge Summary  Naomy Friend  [id number]  1970    Date of Admission: 3/15/2017  Date of Discharge: 3/16/2017    Admission Diagnosis: Recurrent Tonsillitis and Sinusitis   Tonsillitis  Discharge Diagnosis: Same    Procedures:  Date: 3/15/2017  Procedure(s):  1. Bilateral tonsillectomy.   2. Right maxillary antrostomy with anterior ethmoidectomy.     Pathology: pending    HPI: Naomy Friend is a 46 year old female with history of recurrent tonsillitis as well as chronic tonsillitis from chronic tonsil stone formation. In addition, she has a history of frequent sinus infections. A CT scan showed an impacted right maxillary sinus that was present despite 3 weeks of antibiotics and steroids. It was recommended that she undergo operative intervention and the patient consented to the above procedure after detailed explanation of the risks and benefits of said procedure.    Hospital Course: The patient was admitted to the hospital and underwent the above mentioned procedure. She tolerated the procedure without any intra- or guille-operative complications. Please see the operative report for full details of the procedure. The patient was admitted for post-operative monitoring due to supplemental oxygen requirements post-operatively. Her postoperative course was uneventful. Supplemental oxygen was able to be weaned off.  At discharge, the patient's pain was well controlled, the patient was voiding on her own, and was ambulating and tolerating a full liquid diet.     Discharge Exam:  Vitals:    03/16/17 0300 03/16/17 0314 03/16/17 0417 03/16/17 0713   BP:   167/89 161/82   BP Location:   Right arm Right arm   Cuff Size:       Resp:   16 16   Temp:   96.1  F (35.6  C) 97  F (36.1  C)   TempSrc:   Axillary Oral   SpO2: 97% 96% 94% 92%   Weight:       Height:           General: A&O x 3, No acute distress  HEENT: EOMI. Expected post-surgical changes to tonsillar beds bilaterally, no bleeding in the oropharynx.    Respiratory: Breathing non-labored on room air, no stridor, no accessory muscle use.     Discharge Medications:   Naomy Friend   Home Medication Instructions FREDDIE:[id number]    Printed on:03/16/17 1033   Medication Information                      cetirizine (ZYRTEC) 10 MG tablet  Take 1 tablet (10 mg) by mouth every evening             chlorhexidine (PERIDEX) 0.12 % solution  Swish and spit 15 mLs in mouth 2 times daily             Chlorpheniramine-Pseudoeph (DECONGESTANT + PO)  Reported on 2/27/2017             citalopram (CELEXA) 20 MG tablet  Take 1.5 tablets (30 mg) by mouth daily             clotrimazole-betamethasone (LOTRISONE) cream  Apply topically 2 times daily             fluticasone (FLONASE) 50 MCG/ACT nasal spray  Spray 2 sprays into both nostrils daily Reported on 2/27/2017             HYDROcodone-acetaminophen (LORTAB) 7.5-325 MG/15ML solution  Take 10-15 mLs by mouth every 4 hours as needed for moderate to severe pain or pain             hydrOXYzine (ATARAX) 25 MG tablet  Take 2-4 tablets ( mg) by mouth nightly as needed for other             Hypertonic Nasal Wash (SINUS RINSE BOTTLE KIT) PACK  Spray 1 packet in nostril 2 times daily             levonorgestrel (MIRENA) 20 MCG/24HR IUD  1 each by Intrauterine route once             levothyroxine (SYNTHROID/LEVOTHROID) 75 MCG tablet  Take 1 tablet (75 mcg) by mouth daily             mupirocin (BACTROBAN) 2 % ointment  Apply topically 2 times daily Use 2 times a day to affected area.             ondansetron (ZOFRAN) 4 MG tablet  Take 1 tablet (4 mg) by mouth every 8 hours as needed for nausea             predniSONE (DELTASONE) 20 MG tablet  Take 1 tablet (20 mg) by mouth 2 times daily             senna-docusate (SENOKOT-S;PERICOLACE) 8.6-50 MG per tablet  Take 1 tablet by mouth 2 times daily                   Discharge Procedure Orders  Discharge Instructions - Lifting restrictions   Order Comments: Lifting Restrictions 10 pounds until seen  at Post-op follow up appointment. A gallon of milk is 8lbs. Sneeze/cough with your mouth open, do not blow your nose, do not strain while going to the bathroom (we will give you stool softeners), and try not to bend over (increases pressure in your nose) until we see you in clinic     Diet Instructions   Order Comments: Try clear liquids today, and increase to soft foods as you feel comfortable (eggs, oatmeal, pudding, mashed potatoes, etc). Keep on soft food diet until your follow up appointment in 3 weeks.     No driving or operating machinery   Order Comments: While taking the lortab pain medication     No blowing nose     Reason for your hospital stay   Order Comments: Post-operative care     Adult [facility] Follow-up and recommended labs and tests   Order Comments: Follow up in ENT clinic with Dr. Zurita on Monday, March 27th at 10:15AM. Please call the clinic with questions/concerns: [phone].    Otolaryngology/ENT Clinic:  [facility]  [address] [place] [address]      Appointments on [place] and/or [facility] (with [facility] or [facility] provider or service). Call [phone] if you haven't heard regarding these appointments within 7 days of discharge.     Activity   Order Comments: Your activity upon discharge: No heavy lifting greater than 10 lbs and no strenuous exercise for 2 weeks or until follow up appointment. No driving while taking narcotic pain medications.   Order Specific Question Answer Comments   Is discharge order? Yes      When to contact your care team   Order Comments: Please notify your doctor if you experience wound breakdown, sustained bleeding from the wound site, or increasing redness, swelling, and/or purulent malorodorous discharge from the wound site which may indicate infection. If you feel it is acute, or experience sudden changes in breathing, chest pain, or excessive sleepiness/somnolence please return to the  "emergency department or call 911. If you have questions or concerns during the day please call ENT clinic and [phone]. If at night you can call [facility] at [phone] and ask for the \"ENT resident on call\".     Full Code     Diet   Order Comments: Follow this diet upon discharge: mechanical soft diet. No hard or crunchy foods for at least 2 weeks.   Order Specific Question Answer Comments   Is discharge order? Yes          Dispo: To home in good condition. All of the patient's questions/concerns have been addressed at this time.     Diane Melgoza PA-C  Otolaryngology-Head & Neck Surgery  Please contact ENT by dialing [phone] and entering job code [phone].    "

## 2017-03-16 NOTE — PROGRESS NOTES
Observation goals: Delayed Recovery from Anesthesia PRIOR TO DISCHARGE  List all goals to be met before discharge home:   - hypoxia resolved for at least 2 hours without supplemental oxygen - met. O2 sats at 94-96% on RA.   - adequate po intake - partially met. Has not had solid food yet. Tolerating clears.

## 2017-03-17 LAB — COPATH REPORT: NORMAL

## 2017-03-20 ENCOUNTER — TELEPHONE (OUTPATIENT)
Dept: OTOLARYNGOLOGY | Facility: CLINIC | Age: 47
End: 2017-03-20

## 2017-03-20 ENCOUNTER — TELEPHONE (OUTPATIENT)
Dept: NURSING | Facility: CLINIC | Age: 47
End: 2017-03-20

## 2017-03-20 NOTE — TELEPHONE ENCOUNTER
Call Type: Triage Call    Presenting Problem: Caller had tonsillectomy and sinus surgery last  Thursday. On Sunday morning early she had some bleeding and had chip  go into ED for cauter. She says that today the pain medication is  not holding the full four hours at the dose prescribed. Message to  provider in EPIC  Triage Note:  Guideline Title: Postoperative Problems  Recommended Disposition: Call Provider Immediately  Original Inclination: Wanted to speak with a nurse  Override Disposition:  Intended Action: Follow advice given  Physician Contacted: No  Pain not relieved by pain medication when taken as directed ?  YES  Signs of dehydration ? NO  Unconscious now ? NO  Abdominal bloating ? NO  Depression and no other symptoms ? NO  Severe breathing problems ? NO  Wound separation AND internal organs protrude through wound or surgical incision  (evisceration) ? NO  Hives /Urticaria /Rash ? NO  New or worsening signs and symptoms that may indicate shock ? NO  Neck lump/swelling ? NO  Coughing up large amount of obvious blood (not blood-streaked sputum) ? NO  New seizure now or within last 6 hours ? NO  Continuous or heavy bleeding from operative site and NOT controlled with 10  minutes of steady pressure ? NO  Passing red, black or tarry material from rectum AND onset of new signs and  symptoms of hypovolemia ? NO  Wearing cast or splint AND new or worsening pain, swelling, numbness, tingling,  coolness or change in color that is NOT improved by elevation for 30 minutes OR  not resolved within 2 hours ? NO  Vomiting red, bloody or coffee-ground material, more than streaks of blood or  scant amount (not following nosebleed within past day) ? NO  New onset severe pain and pale, discolored or cool below the surgical site  compared to the other extremity ? NO  New or worsening breathing problems ? NO  Heavy vaginal bleeding (soaking 1 pad/tampon every hour for 2 hours or more) ? NO  Chest discomfort associated with  shortness of breath, sweating, odd heartbeats or  different heart rate, nausea, vomiting, lightheadedness, or fainting lasting 5 or  more minutes now or within the last hour ? NO  Chest pain spreading to the shoulders, neck, jaw, in one or both arms, stomach or  back lasting 5 or more minutes now or within the last hour. Pain is NOT  associated with taking a deep breath or a productive cough, movement, or touch to  a localized area. ? NO  Signs and symptoms of anaphylaxis ? NO  Questions or concerns about postoperative wound ? NO  Pressure, fullness, squeezing sensation or pain anywhere in the chest lasting 5 or  more minutes now or within the last hour. Pain is NOT associated with taking a  deep breath or a productive cough, movement, or touch to a localized area on the  chest. ? NO  Sudden onset of focal neurological changes (difficulty speaking, numbness,  weakness, paralysis, loss of coordination, or change in vision such as double  vision or loss of visual field) ? NO  Sudden onset of shortness of breath, chest pain and cough with blood tinged sputum  ? NO  Abdominal pain, any blood in vomitus or stool, abdominal bloating, new fever or  other cautionary symptoms began after GI surgery or procedure and is lasting  longer than defined in provider specified discharge information. ? NO  Abdominal pain, any blood in vomitus or stool, abdominal bloating, new fever or  other cautionary symptoms began after GI surgery or procedure and is lasting  longer than defined in provider specified discharge information. ? NO  New onset of unbearable pain within last 24 hours ? NO  Unable to retain food or fluids for 24 hours or more due to recurrent vomiting ? NO  Physician Instructions:  Care Advice: POST-OP PAIN RELIEF:  - If narcotic analgesic ordered, take as  directed.  Drug dependence or tolerance is not a common problem for most  individuals.  - Call provider for further questions about prescription pain  medications.  -  Take medications with food or on a full stomach to avoid GI  upset or possible bleeding.  Do not use NSAID's, aspirin or aspirin-containing medicines after any  surgery or procedure without the permission of provider.

## 2017-03-20 NOTE — TELEPHONE ENCOUNTER
Returned phone call to pt who states she is having ear, tongue and throat pain following post-tonsillectomy bleed at [facility] on Sunday.  Pt states she is taking lortab 12.5ml during the day, 15ml at bedtime.  Discussed comfort measures such as eating and drinking about 30 minutes following pain med dose, eating small amounts of popsicle or ice chips, using analgesic lozenges if needed.  Continued soft diet and minimal activity recommended to decrease chance of re-bleed.  Pt verbalizes agreement with plan and will call back with further concerns or questions as needed.  Follow up appointment scheduled for 3/27/17.  Hannah Garnder RN

## 2017-03-20 NOTE — TELEPHONE ENCOUNTER
Naomy called. She had a tonsillectomy and sinus surgery on 0-3/25/17. Early Sunday Morning she was seen in ED for bleeding and had some cautery. She says that today she is having increased mouth and throat pain. She has been taking the full dose of lortab and it helps but only for about 2 hours. Please advise.    Linnette Coronado RN     [place] Nurse Advisor

## 2017-03-22 RX ORDER — CLOTRIMAZOLE AND BETAMETHASONE DIPROPIONATE 10; .64 MG/G; MG/G
CREAM TOPICAL 2 TIMES DAILY
Qty: 30 G | Refills: 1 | Status: SHIPPED | OUTPATIENT
Start: 2017-03-22 | End: 2018-07-27

## 2017-03-24 ENCOUNTER — OFFICE VISIT (OUTPATIENT)
Dept: FAMILY MEDICINE | Facility: CLINIC | Age: 47
End: 2017-03-24
Payer: COMMERCIAL

## 2017-03-24 VITALS
OXYGEN SATURATION: 96 % | WEIGHT: 285 LBS | BODY MASS INDEX: 46 KG/M2 | DIASTOLIC BLOOD PRESSURE: 86 MMHG | HEART RATE: 86 BPM | SYSTOLIC BLOOD PRESSURE: 120 MMHG | TEMPERATURE: 97.7 F

## 2017-03-24 DIAGNOSIS — L29.9 ITCHING: ICD-10-CM

## 2017-03-24 DIAGNOSIS — G89.18 POSTOPERATIVE PAIN: ICD-10-CM

## 2017-03-24 DIAGNOSIS — J95.830 POST-TONSILLECTOMY HEMORRHAGE: ICD-10-CM

## 2017-03-24 DIAGNOSIS — Z90.89 POST-TONSILLECTOMY PAIN: Primary | ICD-10-CM

## 2017-03-24 DIAGNOSIS — G89.18 POST-TONSILLECTOMY PAIN: Primary | ICD-10-CM

## 2017-03-24 PROCEDURE — 99213 OFFICE O/P EST LOW 20 MIN: CPT | Performed by: NURSE PRACTITIONER

## 2017-03-24 RX ORDER — HYDROCODONE BITARTRATE AND ACETAMINOPHEN 7.5; 325 MG/15ML; MG/15ML
10-15 SOLUTION ORAL EVERY 4 HOURS PRN
Qty: 750 ML | Refills: 0 | Status: SHIPPED | OUTPATIENT
Start: 2017-03-24 | End: 2017-06-20

## 2017-03-24 RX ORDER — HYDROXYZINE HYDROCHLORIDE 25 MG/1
25-50 TABLET, FILM COATED ORAL EVERY 6 HOURS PRN
Qty: 60 TABLET | Refills: 1 | Status: SHIPPED | OUTPATIENT
Start: 2017-03-24 | End: 2017-06-20

## 2017-03-24 NOTE — PROGRESS NOTES
SUBJECTIVE:                                                    Naomy Friend is a 46 year old female who presents to clinic today for the following health issues:    Hypertension Follow-up      Outpatient blood pressures are not being checked.    Low Salt Diet: low salt       Amount of exercise or physical activity: None for the last 1-2 weeks     Problems taking medications regularly: No    Medication side effects: Itching from pain med    Diet: Mechanical diet    Has had a very hard time since surgery. Was hospitalized overnight recently for burst incision and blood clot formation at the site. She has continued to have a lot of pain in throat. No fever or chills right now. Able to swallow liquids and soft foods. Tongue feels tingly, but not raw or irritated. No bleeding currently. Has a follow up with ENT on Monday, and she is almost out of her pain medications. Taking Hydrocodone liquid about every 2-4 hours. Has not yet been able to go longer than 4 hours without pain meds- has an instruction sheet from her doctor discussing how to alternate acetaminophen for a dose once she is feeling a bit better.   -------------------------------------    Problem list and histories reviewed & adjusted, as indicated.  Additional history: as documented    Patient Active Problem List   Diagnosis     Hypothyroidism     CARDIOVASCULAR SCREENING; LDL GOAL LESS THAN 160     Mild recurrent major depression (H)     Obesity     Menorrhagia, treated with mirena IUD (inserted July 2013)      Family history of diabetes mellitus     Dysfunction of eustachian tube     HTN, goal below 140/90     Morbid obesity, unspecified obesity type (H)     Tonsillitis     Past Surgical History:   Procedure Laterality Date     BREAST SURGERY      breast reduction bilat     ENDOSCOPIC SINUS SURGERY Right 3/15/2017    Procedure: ENDOSCOPIC SINUS SURGERY;  Surgeon: Vicki Zurita MD;  Location:  OR     LAPAROSCOPIC CHOLECYSTECTOMY  08/05/99      TONSILLECTOMY Bilateral 3/15/2017    Procedure: TONSILLECTOMY;  Surgeon: Vicki Zurita MD;  Location:  OR       Social History   Substance Use Topics     Smoking status: Never Smoker     Smokeless tobacco: Never Used     Alcohol use No     Family History   Problem Relation Age of Onset     DIABETES Mother      Hypertension Mother      CANCER Father      pancreatic     HEART DISEASE Maternal Grandmother      DIABETES Maternal Grandmother      CEREBROVASCULAR DISEASE Maternal Grandfather      Cancer - colorectal Paternal Grandfather      DIABETES Brother      Asthma Son      Depression Son      Asthma Daughter          Current Outpatient Prescriptions   Medication Sig Dispense Refill     hydrOXYzine (ATARAX) 25 MG tablet Take 1-2 tablets (25-50 mg) by mouth every 6 hours as needed for itching 60 tablet 1     HYDROcodone-acetaminophen (LORTAB) 7.5-325 MG/15ML solution Take 10-15 mLs by mouth every 4 hours as needed for moderate to severe pain or pain 750 mL 0     clotrimazole-betamethasone (LOTRISONE) cream Apply topically 2 times daily 30 g 1     chlorhexidine (PERIDEX) 0.12 % solution Swish and spit 15 mLs in mouth 2 times daily 473 mL 1     Hypertonic Nasal Wash (SINUS RINSE BOTTLE KIT) PACK Spray 1 packet in nostril 2 times daily 10 each 3     senna-docusate (SENOKOT-S;PERICOLACE) 8.6-50 MG per tablet Take 1 tablet by mouth 2 times daily 60 tablet 1     citalopram (CELEXA) 20 MG tablet Take 1.5 tablets (30 mg) by mouth daily 135 tablet 1     levothyroxine (SYNTHROID/LEVOTHROID) 75 MCG tablet Take 1 tablet (75 mcg) by mouth daily 90 tablet 1     cetirizine (ZYRTEC) 10 MG tablet Take 1 tablet (10 mg) by mouth every evening 90 tablet 1     levonorgestrel (MIRENA) 20 MCG/24HR IUD 1 each by Intrauterine route once       mupirocin (BACTROBAN) 2 % ointment Apply topically 2 times daily Use 2 times a day to affected area. 15 g 0     predniSONE (DELTASONE) 20 MG tablet Take 1 tablet (20 mg) by mouth 2  times daily (Patient not taking: Reported on 3/24/2017) 4 tablet 0     ondansetron (ZOFRAN) 4 MG tablet Take 1 tablet (4 mg) by mouth every 8 hours as needed for nausea (Patient not taking: Reported on 3/24/2017) 20 tablet 0     Chlorpheniramine-Pseudoeph (DECONGESTANT + PO) Reported on 3/24/2017       hydrOXYzine (ATARAX) 25 MG tablet Take 2-4 tablets ( mg) by mouth nightly as needed for other (Patient not taking: Reported on 3/24/2017) 60 tablet 1     fluticasone (FLONASE) 50 MCG/ACT nasal spray Spray 2 sprays into both nostrils daily Reported on 3/24/2017         Reviewed and updated as needed this visit by clinical staff  Tobacco  Allergies  Meds  Med Hx  Surg Hx  Fam Hx  Soc Hx      Reviewed and updated as needed this visit by Provider         ROS:  Constitutional, HEENT, cardiovascular, pulmonary, gi and gu systems are negative, except as otherwise noted.    OBJECTIVE:                                                    /86 (BP Location: Left arm, Patient Position: Chair, Cuff Size: Adult Large)  Pulse 86  Temp 97.7  F (36.5  C) (Oral)  Wt 285 lb (129.3 kg)  SpO2 96%  BMI 46 kg/m2  Body mass index is 46 kg/(m^2).  GENERAL: healthy, alert and no distress  EYES: Eyes grossly normal to inspection, PERRL and conjunctivae and sclerae normal  HENT: normal cephalic/atraumatic, ear canals and TM's normal, nasal mucosa edematous , oropharynx clear, oral mucous membranes moist and tongue is white  NECK: bilateral anterior cervical adenopathy, no asymmetry, masses, or scars and thyroid normal to palpation  RESP: lungs clear to auscultation - no rales, rhonchi or wheezes  CV: regular rate and rhythm, normal S1 S2, no S3 or S4, no murmur, click or rub, no peripheral edema and peripheral pulses strong  PSYCH: mentation appears normal, affect normal/bright    Diagnostic Test Results:  No results found for this or any previous visit (from the past 24 hour(s)).     ASSESSMENT/PLAN:                                                       Problem List Items Addressed This Visit     None      Visit Diagnoses     Post-tonsillectomy pain    -  Primary    Itching        Relevant Medications    hydrOXYzine (ATARAX) 25 MG tablet    Postoperative pain        Relevant Medications    HYDROcodone-acetaminophen (LORTAB) 7.5-325 MG/15ML solution    Post-tonsillectomy hemorrhage             Blood pressure is normal today- was likely raised due to pain and anxiety. Discussed keeping up with pain medication as scheduled over the weekend, and following up with ENT on Monday. She will swish her mouth with salt water and brush her tongue, and let us know if the whiteness/odd sensation on her tongue does not resolve.  JERRICA Church [facility]

## 2017-03-24 NOTE — NURSING NOTE
"Chief Complaint   Patient presents with     Hypertension       Initial /86 (BP Location: Left arm, Patient Position: Chair, Cuff Size: Adult Large)  Pulse 86  Temp 97.7  F (36.5  C) (Oral)  Wt 285 lb (129.3 kg)  SpO2 96%  BMI 46 kg/m2 Estimated body mass index is 46 kg/(m^2) as calculated from the following:    Height as of 3/15/17: 5' 6\" (1.676 m).    Weight as of this encounter: 285 lb (129.3 kg).  Medication Reconciliation: complete     Rosario Mckeon CMA    "

## 2017-03-24 NOTE — MR AVS SNAPSHOT
After Visit Summary   3/24/2017    Naomy Friend    MRN: [id number]           Patient Information     Date Of Birth          1970        Visit Information        Provider Department      3/24/2017 9:30 AM Jovita Jonas APRN CNP [facility]        Today's Diagnoses     Itching    -  1    Postoperative pain           Follow-ups after your visit        Your next 10 appointments already scheduled     Mar 27, 2017 10:15 AM CDT   Return Visit with Vicki Zurita MD   [facility]    [id number] [place] [address] [phone]              Who to contact     If you have questions or need follow up information about today's clinic visit or your schedule please contact [facility] directly at [phone].  Normal or non-critical lab and imaging results will be communicated to you by [facility]IM[facility]art, letter or phone within 4 business days after the clinic has received the results. If you do not hear from us within 7 days, please contact the clinic through [facility] [facility] or phone. If you have a critical or abnormal lab result, we will notify you by phone as soon as possible.  Submit refill requests through Brainpark or call your pharmacy and they will forward the refill request to us. Please allow 3 business days for your refill to be completed.          Additional Information About Your Visit        MyChart Information     Brainpark gives you secure access to your electronic health record. If you see a primary care provider, you can also send messages to your care team and make appointments. If you have questions, please call your primary care clinic.  If you do not have a primary care provider, please call [phone] and they will assist you.        Care EveryWhere ID     This is your Care EveryWhere ID. This could be used by other organizations to access your [place] medical records  [id number]        Your  Vitals Were     Pulse Temperature Pulse Oximetry BMI (Body Mass Index)          86 97.7  F (36.5  C) (Oral) 96% 46 kg/m2         Blood Pressure from Last 3 Encounters:   03/24/17 120/86   03/16/17 142/68   02/27/17 120/78    Weight from Last 3 Encounters:   03/24/17 285 lb (129.3 kg)   03/15/17 297 lb 13.5 oz (135.1 kg)   02/27/17 296 lb 4.8 oz (134.4 kg)              Today, you had the following     No orders found for display         Today's Medication Changes          These changes are accurate as of: 3/24/17  9:50 AM.  If you have any questions, ask your nurse or doctor.               These medicines have changed or have updated prescriptions.        Dose/Directions    * hydrOXYzine 25 MG tablet   Commonly known as:  ATARAX   This may have changed:  Another medication with the same name was added. Make sure you understand how and when to take each.   Used for:  Other insomnia   Changed by:  Jovita Jonas APRN CNP        Dose:   mg   Take 2-4 tablets ( mg) by mouth nightly as needed for other   Quantity:  60 tablet   Refills:  1       * hydrOXYzine 25 MG tablet   Commonly known as:  ATARAX   This may have changed:  You were already taking a medication with the same name, and this prescription was added. Make sure you understand how and when to take each.   Used for:  Itching   Changed by:  Jovita Jonas APRN CNP        Dose:  25-50 mg   Take 1-2 tablets (25-50 mg) by mouth every 6 hours as needed for itching   Quantity:  60 tablet   Refills:  1       * Notice:  This list has 2 medication(s) that are the same as other medications prescribed for you. Read the directions carefully, and ask your doctor or other care provider to review them with you.         Where to get your medicines      These medications were sent to [facility]/pharmacy #[id number] - [facility] [address], [place] AT [address] [place], [facility] [address]     Phone:  [phone]      hydrOXYzine 25 MG tablet         Some of these will need a paper prescription and others can be bought over the counter.  Ask your nurse if you have questions.     Bring a paper prescription for each of these medications     HYDROcodone-acetaminophen 7.5-325 MG/15ML solution                Primary Care Provider Office Phone # Fax #    JERRICA Church [phone]       [facility] [address] [facility] [address]  [facility] [address]        Thank you!     Thank you for choosing [facility]  for your care. Our goal is always to provide you with excellent care. Hearing back from our patients is one way we can continue to improve our services. Please take a few minutes to complete the written survey that you may receive in the mail after your visit with us. Thank you!             Your Updated Medication List - Protect others around you: Learn how to safely use, store and throw away your medicines at www.disposemymeds.org.          This list is accurate as of: 3/24/17  9:50 AM.  Always use your most recent med list.                   Brand Name Dispense Instructions for use    cetirizine 10 MG tablet    zyrTEC    90 tablet    Take 1 tablet (10 mg) by mouth every evening       chlorhexidine 0.12 % solution    PERIDEX    473 mL    Swish and spit 15 mLs in mouth 2 times daily       citalopram 20 MG tablet    celeXA    135 tablet    Take 1.5 tablets (30 mg) by mouth daily       clotrimazole-betamethasone cream    LOTRISONE    30 g    Apply topically 2 times daily       DECONGESTANT + PO      Reported on 3/24/2017       fluticasone 50 MCG/ACT spray    FLONASE     Spray 2 sprays into both nostrils daily Reported on 3/24/2017       HYDROcodone-acetaminophen 7.5-325 MG/15ML solution    LORTAB    750 mL    Take 10-15 mLs by mouth every 4 hours as needed for moderate to severe pain or pain       * hydrOXYzine 25 MG tablet    ATARAX    60 tablet    Take 2-4 tablets ( mg) by mouth  nightly as needed for other       * hydrOXYzine 25 MG tablet    ATARAX    60 tablet    Take 1-2 tablets (25-50 mg) by mouth every 6 hours as needed for itching       levonorgestrel 20 MCG/24HR IUD    MIRENA     1 each by Intrauterine route once       levothyroxine 75 MCG tablet    SYNTHROID/LEVOTHROID    90 tablet    Take 1 tablet (75 mcg) by mouth daily       mupirocin 2 % ointment    BACTROBAN    15 g    Apply topically 2 times daily Use 2 times a day to affected area.       ondansetron 4 MG tablet    ZOFRAN    20 tablet    Take 1 tablet (4 mg) by mouth every 8 hours as needed for nausea       predniSONE 20 MG tablet    DELTASONE    4 tablet    Take 1 tablet (20 mg) by mouth 2 times daily       senna-docusate 8.6-50 MG per tablet    SENOKOT-S;PERICOLACE    60 tablet    Take 1 tablet by mouth 2 times daily       SINUS RINSE BOTTLE KIT Pack     10 each    Spray 1 packet in nostril 2 times daily       * Notice:  This list has 2 medication(s) that are the same as other medications prescribed for you. Read the directions carefully, and ask your doctor or other care provider to review them with you.

## 2017-03-27 ENCOUNTER — OFFICE VISIT (OUTPATIENT)
Dept: OTOLARYNGOLOGY | Facility: CLINIC | Age: 47
End: 2017-03-27
Payer: COMMERCIAL

## 2017-03-27 VITALS — BODY MASS INDEX: 45.8 KG/M2 | HEIGHT: 66 IN | WEIGHT: 285 LBS

## 2017-03-27 DIAGNOSIS — Z90.89 S/P TONSILLECTOMY: Primary | ICD-10-CM

## 2017-03-27 PROCEDURE — 99024 POSTOP FOLLOW-UP VISIT: CPT | Performed by: OTOLARYNGOLOGY

## 2017-03-27 RX ORDER — LEVOTHYROXINE SODIUM 100 UG/1
100 TABLET ORAL
COMMUNITY
Start: 2012-05-05 | End: 2017-07-14

## 2017-03-27 RX ORDER — CLOTRIMAZOLE AND BETAMETHASONE DIPROPIONATE 10; .5 MG/ML; MG/ML
LOTION TOPICAL
COMMUNITY
Start: 2015-09-01 | End: 2017-10-12

## 2017-03-27 RX ORDER — CITALOPRAM HYDROBROMIDE 20 MG/1
20 TABLET ORAL
COMMUNITY
Start: 2012-05-05 | End: 2017-03-27

## 2017-03-27 NOTE — MR AVS SNAPSHOT
After Visit Summary   3/27/2017    Naomy Friend    MRN: [id number]           Patient Information     Date Of Birth          1970        Visit Information        Provider Department      3/27/2017 10:15 AM Vicki Zurita MD [facility]         Follow-ups after your visit        Your next 10 appointments already scheduled     Apr 10, 2017  9:00 AM CDT   Return Visit with Vicki Zurita MD   [facility] ([facility])    [address] [place] [address] [phone]              Who to contact     If you have questions or need follow up information about today's clinic visit or your schedule please contact [facility] directly at [phone].  Normal or non-critical lab and imaging results will be communicated to you by [employer].ithart, letter or phone within 4 business days after the clinic has received the results. If you do not hear from us within 7 days, please contact the clinic through Spl[employer].ithart or phone. If you have a critical or abnormal lab result, we will notify you by phone as soon as possible.  Submit refill requests through CyberSense or call your pharmacy and they will forward the refill request to us. Please allow 3 business days for your refill to be completed.          Additional Information About Your Visit        Splick.ithart Information     CyberSense gives you secure access to your electronic health record. If you see a primary care provider, you can also send messages to your care team and make appointments. If you have questions, please call your primary care clinic.  If you do not have a primary care provider, please call [phone] and they will assist you.      CyberSense is an electronic gateway that provides easy, online access to your medical records. With CyberSense, you can request a clinic appointment, read your test results, renew a prescription or communicate with your care team.     To  "access your existing account, please contact your [facility] or call [phone] for assistance.        Care EveryWhere ID     This is your Care EveryWhere ID. This could be used by other organizations to access your [place] medical records  [id number]        Your Vitals Were     Height BMI (Body Mass Index)                1.676 m (5' 6\") 46 kg/m2           Blood Pressure from Last 3 Encounters:   03/24/17 120/86   03/16/17 142/68   02/27/17 120/78    Weight from Last 3 Encounters:   03/27/17 129.3 kg (285 lb)   03/24/17 129.3 kg (285 lb)   03/15/17 135.1 kg (297 lb 13.5 oz)              Today, you had the following     No orders found for display       Primary Care Provider Office Phone # Fax #    JERRICA Church [phone]       [facility] [address] [facility] [address]  [facility] [address]        Thank you!     Thank you for choosing [facility]  for your care. Our goal is always to provide you with excellent care. Hearing back from our patients is one way we can continue to improve our services. Please take a few minutes to complete the written survey that you may receive in the mail after your visit with us. Thank you!             Your Updated Medication List - Protect others around you: Learn how to safely use, store and throw away your medicines at www.disposemymeds.org.          This list is accurate as of: 3/27/17 10:44 AM.  Always use your most recent med list.                   Brand Name Dispense Instructions for use    cetirizine 10 MG tablet    zyrTEC    90 tablet    Take 1 tablet (10 mg) by mouth every evening       chlorhexidine 0.12 % solution    PERIDEX    473 mL    Swish and spit 15 mLs in mouth 2 times daily       citalopram 20 MG tablet    celeXA    135 tablet    Take 1.5 tablets (30 mg) by mouth daily       * clotrimazole-betamethasone lotion    LOTRISONE         * clotrimazole-betamethasone cream    LOTRISONE    " 30 g    Apply topically 2 times daily       DECONGESTANT + PO      Reported on 3/24/2017       fluticasone 50 MCG/ACT spray    FLONASE     Spray 2 sprays into both nostrils daily Reported on 3/24/2017       HYDROcodone-acetaminophen 7.5-325 MG/15ML solution    LORTAB    750 mL    Take 10-15 mLs by mouth every 4 hours as needed for moderate to severe pain or pain       * hydrOXYzine 25 MG tablet    ATARAX    60 tablet    Take 2-4 tablets ( mg) by mouth nightly as needed for other       * hydrOXYzine 25 MG tablet    ATARAX    60 tablet    Take 1-2 tablets (25-50 mg) by mouth every 6 hours as needed for itching       levonorgestrel 20 MCG/24HR IUD    MIRENA     1 each by Intrauterine route once       levothyroxine 100 MCG tablet    SYNTHROID/LEVOTHROID     Take 100 mcg by mouth       mupirocin 2 % ointment    BACTROBAN    15 g    Apply topically 2 times daily Use 2 times a day to affected area.       senna-docusate 8.6-50 MG per tablet    SENOKOT-S;PERICOLACE    60 tablet    Take 1 tablet by mouth 2 times daily       SINUS RINSE BOTTLE KIT Pack     10 each    Spray 1 packet in nostril 2 times daily       * Notice:  This list has 4 medication(s) that are the same as other medications prescribed for you. Read the directions carefully, and ask your doctor or other care provider to review them with you.

## 2017-03-27 NOTE — NURSING NOTE
"Naomy Friend's goals for this visit include:   Chief Complaint   Patient presents with     RECHECK     8 days ago had a blood clot let go and had it cauterized       She requests these members of her care team be copied on today's visit information: yes      PCP: Jovita Jonas    Referring Provider:  No referring provider defined for this encounter.    Chief Complaint   Patient presents with     RECHECK     8 days ago had a blood clot let go and had it cauterized       Initial Ht 1.676 m (5' 6\")  Wt 129.3 kg (285 lb)  BMI 46 kg/m2 Estimated body mass index is 46 kg/(m^2) as calculated from the following:    Height as of this encounter: 1.676 m (5' 6\").    Weight as of this encounter: 129.3 kg (285 lb).  Medication Reconciliation: complete    "

## 2017-03-27 NOTE — PROGRESS NOTES
CC: s/p tonsillectomy and  Right FESS on 3/15/2017    HPI:  Patient did have a post-tonsillectomy bleed about 4 days after surgery.  She did have to go back to the OR for cauterization.  This occurred in MG.  She is still having sore throat but better.  No bleeding since the cauterization.  Her right ear feels full.  She is doing sinus rinse.    PE:  GEN: nad  NOSE: slightly dry  O/C: tonsillar fossae still with healing exudates but no bleeding.    A/P:  Healing from surgery.  Will have her continue sinus rinse.  Will need to clean sinuses in 2 weeks.  Once she is healed we will need to start a workup for VANITA.

## 2017-04-10 ENCOUNTER — OFFICE VISIT (OUTPATIENT)
Dept: OTOLARYNGOLOGY | Facility: CLINIC | Age: 47
End: 2017-04-10
Payer: COMMERCIAL

## 2017-04-10 VITALS
HEART RATE: 88 BPM | DIASTOLIC BLOOD PRESSURE: 114 MMHG | WEIGHT: 293 LBS | TEMPERATURE: 97.7 F | SYSTOLIC BLOOD PRESSURE: 154 MMHG | HEIGHT: 66 IN | BODY MASS INDEX: 47.09 KG/M2

## 2017-04-10 DIAGNOSIS — R06.83 SNORING: Primary | ICD-10-CM

## 2017-04-10 DIAGNOSIS — J32.0 CHRONIC MAXILLARY SINUSITIS: ICD-10-CM

## 2017-04-10 PROCEDURE — 99024 POSTOP FOLLOW-UP VISIT: CPT | Performed by: OTOLARYNGOLOGY

## 2017-04-10 NOTE — PROGRESS NOTES
CC: s/p tonsillectomy and right FESS on 3/15/17    HPI:  Patient reports that she was doing well until this weekend.  She has caught a head cold which has made her sore throat return.  It is better than it was one week ago though.  She is only taking ibuprofren for the sore throat.  She is doing sinus rinses for the cold.  She has no complaints about her sinuses at this time.    PE:  GEN: nad  O/C: right tonsil site is healed though there is some mild edema present.  Left tonsil site is 90% healed.  NASAL ENDOSCOPY: In order to better evaluate the right sinus surgery site, a 0 degree rigid endoscope was used.  There is a small synchae present by the right middle turbinate.  The mucosa is healthy.  Clear nasal secretions seen.  No pus.    A/P:  Patient is well-healed from the sinus surgery.  She is healing as expected from the tonsillectomy.  The cold has set her back a little but she is cleared to return to all normal activity.  She may swim.  She will follow-up with me as needed.  If she has increasing frequency of sinus infections, she should return to see me but may see her PMD for the occasional sinus flare.  I made a referral for sleep medicine evaluation for suspected VANITA.  Her PMD has also made the same referral.

## 2017-04-10 NOTE — NURSING NOTE
"Naomy Friend's goals for this visit include:   Chief Complaint   Patient presents with     RECHECK     Things are improved, but now she has a cold       She requests these members of her care team be copied on today's visit information: yes      PCP: Jovita Jonas    Referring Provider:  No referring provider defined for this encounter.    Chief Complaint   Patient presents with     RECHECK     Things are improved, but now she has a cold       Initial BP (!) 154/114  Pulse 88  Temp 97.7  F (36.5  C)  Ht 1.676 m (5' 6\")  Wt 133.4 kg (294 lb)  BMI 47.45 kg/m2 Estimated body mass index is 47.45 kg/(m^2) as calculated from the following:    Height as of this encounter: 1.676 m (5' 6\").    Weight as of this encounter: 133.4 kg (294 lb).  Medication Reconciliation: complete    "

## 2017-04-10 NOTE — MR AVS SNAPSHOT
After Visit Summary   4/10/2017    Naomy Friend    MRN: [id number]           Patient Information     Date Of Birth          1970        Visit Information        Provider Department      4/10/2017 9:00 AM Vicki Zurita MD [facility]        Today's Diagnoses     Snoring    -  1    Chronic maxillary sinusitis           Follow-ups after your visit        Who to contact     If you have questions or need follow up information about today's clinic visit or your schedule please contact [facility] directly at [phone].  Normal or non-critical lab and imaging results will be communicated to you by [employer] Repairhart, letter or phone within 4 business days after the clinic has received the results. If you do not hear from us within 7 days, please contact the clinic through datangot or phone. If you have a critical or abnormal lab result, we will notify you by phone as soon as possible.  Submit refill requests through MongoDB or call your pharmacy and they will forward the refill request to us. Please allow 3 business days for your refill to be completed.          Additional Information About Your Visit        MyChart Information     MongoDB gives you secure access to your electronic health record. If you see a primary care provider, you can also send messages to your care team and make appointments. If you have questions, please call your primary care clinic.  If you do not have a primary care provider, please call [phone] and they will assist you.      MongoDB is an electronic gateway that provides easy, online access to your medical records. With MongoDB, you can request a clinic appointment, read your test results, renew a prescription or communicate with your care team.     To access your existing account, please contact your [facility] or call [phone] for assistance.        Care EveryWhere ID     This is your Care  "EveryWhere ID. This could be used by other organizations to access your [place] medical records  [id number]        Your Vitals Were     Pulse Temperature Height BMI (Body Mass Index)          88 97.7  F (36.5  C) 1.676 m (5' 6\") 47.45 kg/m2         Blood Pressure from Last 3 Encounters:   04/10/17 (!) 154/114   03/24/17 120/86   03/16/17 142/68    Weight from Last 3 Encounters:   04/10/17 133.4 kg (294 lb)   03/27/17 129.3 kg (285 lb)   03/24/17 129.3 kg (285 lb)              We Performed the Following     NASAL ENDOSCOPY, DIAGNOSTIC     SLEEP EVALUATION & MANAGEMENT REFERRAL - ADULT        Primary Care Provider Office Phone # Fax #    JERRICA Church [phone]       [facility] [address] [address]        Thank you!     Thank you for choosing [facility]  for your care. Our goal is always to provide you with excellent care. Hearing back from our patients is one way we can continue to improve our services. Please take a few minutes to complete the written survey that you may receive in the mail after your visit with us. Thank you!             Your Updated Medication List - Protect others around you: Learn how to safely use, store and throw away your medicines at www.disposemymeds.org.          This list is accurate as of: 4/10/17 11:59 AM.  Always use your most recent med list.                   Brand Name Dispense Instructions for use    cetirizine 10 MG tablet    zyrTEC    90 tablet    Take 1 tablet (10 mg) by mouth every evening       chlorhexidine 0.12 % solution    PERIDEX    473 mL    Swish and spit 15 mLs in mouth 2 times daily       * citalopram 20 MG tablet    celeXA    135 tablet    Take 1.5 tablets (30 mg) by mouth daily       * citalopram 10 MG tablet    celeXA    30 tablet    TAKE 1 TAB BY MOUTH DAILY WITH 20MG TAB FOR TOTAL DAILY DOSE OF 30MG       * clotrimazole-betamethasone lotion    LOTRISONE         * " clotrimazole-betamethasone cream    LOTRISONE    30 g    Apply topically 2 times daily       DECONGESTANT + PO      Reported on 3/24/2017       fluticasone 50 MCG/ACT spray    FLONASE     Spray 2 sprays into both nostrils daily Reported on 3/24/2017       HYDROcodone-acetaminophen 7.5-325 MG/15ML solution    LORTAB    750 mL    Take 10-15 mLs by mouth every 4 hours as needed for moderate to severe pain or pain       * hydrOXYzine 25 MG tablet    ATARAX    60 tablet    Take 2-4 tablets ( mg) by mouth nightly as needed for other       * hydrOXYzine 25 MG tablet    ATARAX    60 tablet    Take 1-2 tablets (25-50 mg) by mouth every 6 hours as needed for itching       levonorgestrel 20 MCG/24HR IUD    MIRENA     1 each by Intrauterine route once       levothyroxine 100 MCG tablet    SYNTHROID/LEVOTHROID     Take 100 mcg by mouth       mupirocin 2 % ointment    BACTROBAN    15 g    Apply topically 2 times daily Use 2 times a day to affected area.       senna-docusate 8.6-50 MG per tablet    SENOKOT-S;PERICOLACE    60 tablet    Take 1 tablet by mouth 2 times daily       SINUS RINSE BOTTLE KIT Pack     10 each    Spray 1 packet in nostril 2 times daily       * Notice:  This list has 6 medication(s) that are the same as other medications prescribed for you. Read the directions carefully, and ask your doctor or other care provider to review them with you.

## 2017-06-19 ENCOUNTER — TELEPHONE (OUTPATIENT)
Dept: FAMILY MEDICINE | Facility: CLINIC | Age: 47
End: 2017-06-19

## 2017-06-19 NOTE — TELEPHONE ENCOUNTER
_Pt states the citalopram is no longer working for her and she is requesting to be advised.  Pt can be reached @ [phone] shamika

## 2017-06-20 ENCOUNTER — OFFICE VISIT (OUTPATIENT)
Dept: FAMILY MEDICINE | Facility: CLINIC | Age: 47
End: 2017-06-20
Payer: COMMERCIAL

## 2017-06-20 VITALS
OXYGEN SATURATION: 99 % | DIASTOLIC BLOOD PRESSURE: 100 MMHG | WEIGHT: 290.2 LBS | SYSTOLIC BLOOD PRESSURE: 156 MMHG | TEMPERATURE: 98.6 F | BODY MASS INDEX: 46.84 KG/M2 | HEART RATE: 81 BPM

## 2017-06-20 DIAGNOSIS — I10 HTN, GOAL BELOW 140/90: ICD-10-CM

## 2017-06-20 DIAGNOSIS — E03.9 HYPOTHYROIDISM, UNSPECIFIED TYPE: Primary | ICD-10-CM

## 2017-06-20 DIAGNOSIS — Z30.430 ENCOUNTER FOR INSERTION OF MIRENA IUD: ICD-10-CM

## 2017-06-20 DIAGNOSIS — R19.00 PELVIC FULLNESS: ICD-10-CM

## 2017-06-20 DIAGNOSIS — F33.0 MILD RECURRENT MAJOR DEPRESSION (H): ICD-10-CM

## 2017-06-20 LAB
BASOPHILS # BLD AUTO: 0 10E9/L (ref 0–0.2)
BASOPHILS NFR BLD AUTO: 0.3 %
DIFFERENTIAL METHOD BLD: ABNORMAL
EOSINOPHIL # BLD AUTO: 0.2 10E9/L (ref 0–0.7)
EOSINOPHIL NFR BLD AUTO: 1.8 %
ERYTHROCYTE [DISTWIDTH] IN BLOOD BY AUTOMATED COUNT: 14.7 % (ref 10–15)
HCT VFR BLD AUTO: 41.9 % (ref 35–47)
HGB BLD-MCNC: 13.3 G/DL (ref 11.7–15.7)
LYMPHOCYTES # BLD AUTO: 2.7 10E9/L (ref 0.8–5.3)
LYMPHOCYTES NFR BLD AUTO: 20.2 %
MCH RBC QN AUTO: 26.5 PG (ref 26.5–33)
MCHC RBC AUTO-ENTMCNC: 31.7 G/DL (ref 31.5–36.5)
MCV RBC AUTO: 84 FL (ref 78–100)
MONOCYTES # BLD AUTO: 0.8 10E9/L (ref 0–1.3)
MONOCYTES NFR BLD AUTO: 6.3 %
NEUTROPHILS # BLD AUTO: 9.4 10E9/L (ref 1.6–8.3)
NEUTROPHILS NFR BLD AUTO: 71.4 %
PLATELET # BLD AUTO: 314 10E9/L (ref 150–450)
RBC # BLD AUTO: 5.02 10E12/L (ref 3.8–5.2)
WBC # BLD AUTO: 13.2 10E9/L (ref 4–11)

## 2017-06-20 PROCEDURE — 36415 COLL VENOUS BLD VENIPUNCTURE: CPT | Performed by: NURSE PRACTITIONER

## 2017-06-20 PROCEDURE — 85025 COMPLETE CBC W/AUTO DIFF WBC: CPT | Performed by: NURSE PRACTITIONER

## 2017-06-20 PROCEDURE — 99214 OFFICE O/P EST MOD 30 MIN: CPT | Performed by: NURSE PRACTITIONER

## 2017-06-20 PROCEDURE — 84439 ASSAY OF FREE THYROXINE: CPT | Performed by: NURSE PRACTITIONER

## 2017-06-20 PROCEDURE — 84443 ASSAY THYROID STIM HORMONE: CPT | Performed by: NURSE PRACTITIONER

## 2017-06-20 NOTE — PROGRESS NOTES
SUBJECTIVE:                                                    Naomy Friend is a 46 year old female who presents to clinic today for the following health issues:    Depression Followup    Status since last visit: Worsened in past couple weeks    See PHQ-9 for current symptoms.  Other associated symptoms: Difficulty relaxing/feeling jittery for the past 1-2 weeks     Son having mental health trouble and trying meth, daughter and grandchild live far away, working two jobs and feeling overwhelmed, financial trouble, car in repair    Celexa started before 2013.  Usually sees a therapist, but work schedule changed and affected ability to get to appointments    Tonsillectomy in March and had profuse bleeding    Complicating factors:   Significant life event:  Yes   Current substance abuse:  None  Anxiety or Panic symptoms:  Yes    PHQ-9  English PHQ-9   Any Language            Amount of exercise or physical activity:     Problems taking medications regularly: No    Medication side effects: none    Diet: regular (no restrictions)    Other questions/concerns: Concerned about IUD, has had some spotting every other day for 4 weeks, painful intercourse and bleeding after intercourse, possible cramping. Got Mirena IUD July 2013    Problem list and histories reviewed & adjusted, as indicated.  Additional history: as documented    Reviewed and updated as needed this visit by clinical staff       Reviewed and updated as needed this visit by Provider         ROS:  Constitutional, HEENT, cardiovascular, pulmonary, gi and gu systems are negative, except as otherwise noted.    OBJECTIVE:                                                    BP (!) 156/100 (BP Location: Right arm, Patient Position: Chair, Cuff Size: Adult Large)  Pulse 81  Temp 98.6  F (37  C) (Oral)  Wt 290 lb 3.2 oz (131.6 kg)  SpO2 99%  BMI 46.84 kg/m2  Body mass index is 46.84 kg/(m^2).  GENERAL: healthy, alert and no distress  EYES: Eyes grossly normal to  inspection, PERRL and conjunctivae and sclerae normal  HENT: ear canals and TM's normal, nose and mouth without ulcers or lesions  NECK: no adenopathy, no asymmetry, masses, or scars and thyroid normal to palpation  RESP: lungs clear to auscultation - no rales, rhonchi or wheezes  CV: regular rate and rhythm, normal S1 S2, no S3 or S4, no murmur, click or rub, no peripheral edema and peripheral pulses strong  ABDOMEN: soft, nontender, no hepatosplenomegaly, no masses and bowel sounds normal  PSYCH: mentation appears normal, affect normal/bright    Diagnostic Test Results:  Results for orders placed or performed in visit on 06/20/17   TSH   Result Value Ref Range    TSH 2.43 0.40 - 4.00 mU/L   T4 free   Result Value Ref Range    T4 Free 1.29 0.76 - 1.46 ng/dL   CBC with platelets differential   Result Value Ref Range    WBC 13.2 (H) 4.0 - 11.0 10e9/L    RBC Count 5.02 3.8 - 5.2 10e12/L    Hemoglobin 13.3 11.7 - 15.7 g/dL    Hematocrit 41.9 35.0 - 47.0 %    MCV 84 78 - 100 fl    MCH 26.5 26.5 - 33.0 pg    MCHC 31.7 31.5 - 36.5 g/dL    RDW 14.7 10.0 - 15.0 %    Platelet Count 314 150 - 450 10e9/L    Diff Method Automated Method     % Neutrophils 71.4 %    % Lymphocytes 20.2 %    % Monocytes 6.3 %    % Eosinophils 1.8 %    % Basophils 0.3 %    Absolute Neutrophil 9.4 (H) 1.6 - 8.3 10e9/L    Absolute Lymphocytes 2.7 0.8 - 5.3 10e9/L    Absolute Monocytes 0.8 0.0 - 1.3 10e9/L    Absolute Eosinophils 0.2 0.0 - 0.7 10e9/L    Absolute Basophils 0.0 0.0 - 0.2 10e9/L        ASSESSMENT/PLAN:                                                    Depression; recurrent episode-- Moderate   Associated with the following complications:    None   Plan:  Labs:  Thyroid      (E03.9) Hypothyroidism, unspecified type  (primary encounter diagnosis)  Comment:   Plan: TSH, T4 free            (F33.0) Mild recurrent major depression (H)  Comment:   Plan: CBC with platelets differential        Check for anemia    (I10) HTN, goal below  140/90  Comment:   Plan: take home pressures and sent Bandtastic message back    (Z30.430) Menorrhagia, treated with mirena IUD (inserted July 2013)   Comment:   Plan: US Pelvic Complete with Transvaginal            (R19.00) Pelvic fullness  Comment:   Plan: US Pelvic Complete with Transvaginal              Patient Instructions   Schedule pelvic ultrasound for IUD placement check    Call and set up CPAP    Check thyroid and hemoglobin today    Check BP out of the clinic a couple times a week for the next 2 weeks and send Bandtastic message with results      JERRICA Mcknight [facility]

## 2017-06-20 NOTE — LETTER
[place] [id number] [place] [id number] [phone]      June 20, 2017      Naomy OCTAVIO Friend  [address] [place] [id number]            To Whom it May Concern,    The patient was seen today and treated.  She will miss work 6/20/17 due to illness.          Sincerely,      DEANGELO Chiu

## 2017-06-20 NOTE — TELEPHONE ENCOUNTER
Spoke with pt. Really emotional. Has a lot of things going on in her life. Son is depressed and has been doing drugs. Working 2 jobs to pay the bills.  Little things set her off. Just feels like it hasn't been working for the past month.   Has the mirena and for 3 weeks has been spotting and this is unusual for her. Will be laying in bed and will have cramps and pain.     Completed PHQ 9 with pt on the phone.  PHQ-9 score:    PHQ-9 SCORE 6/20/2017   Total Score -   Total Score MyChart -   Total Score 10       Appt scheduled for today.    Vicki Cosby RN  [facility]

## 2017-06-20 NOTE — MR AVS SNAPSHOT
After Visit Summary   6/20/2017    Naomy Friend    MRN: [id number]           Patient Information     Date Of Birth          1970        Visit Information        Provider Department      6/20/2017 1:00 PM Yareli Damon APRN CNP [facility]        Today's Diagnoses     Hypothyroidism, unspecified type    -  1    Mild recurrent major depression (H)        HTN, goal below 140/90        Menorrhagia, treated with mirena IUD (inserted July 2013)         Pelvic fullness          Care Instructions    Schedule pelvic ultrasound for IUD placement check    Call and set up CPAP    Check thyroid and hemoglobin today    Check BP out of the clinic a couple times a week for the next 2 weeks and send ZeaVision message with results          Follow-ups after your visit        Future tests that were ordered for you today     Open Future Orders        Priority Expected Expires Ordered    US Pelvic Complete with Transvaginal Routine  6/20/2018 6/20/2017            Who to contact     If you have questions or need follow up information about today's clinic visit or your schedule please contact [facility] directly at [phone].  Normal or non-critical lab and imaging results will be communicated to you by [employer]isan [employer], letter or phone within 4 business days after the clinic has received the results. If you do not hear from us within 7 days, please contact the clinic through [employer] or phone. If you have a critical or abnormal lab result, we will notify you by phone as soon as possible.  Submit refill requests through [employer] or call your pharmacy and they will forward the refill request to us. Please allow 3 business days for your refill to be completed.          Additional Information About Your Visit        [employer]isan [employer]hart Information     Z[employer] gives you secure access to your electronic health record. If you see a primary care provider, you can also send messages to your care team and make  appointments. If you have questions, please call your primary care clinic.  If you do not have a primary care provider, please call [phone] and they will assist you.        Care EveryWhere ID     This is your Care EveryWhere ID. This could be used by other organizations to access your [place] medical records  [id number]        Your Vitals Were     Pulse Temperature Pulse Oximetry BMI (Body Mass Index)          81 98.6  F (37  C) (Oral) 99% 46.84 kg/m2         Blood Pressure from Last 3 Encounters:   06/20/17 (!) 156/100   04/10/17 (!) 154/114   03/24/17 120/86    Weight from Last 3 Encounters:   06/20/17 290 lb 3.2 oz (131.6 kg)   04/10/17 294 lb (133.4 kg)   03/27/17 285 lb (129.3 kg)              We Performed the Following     CBC with platelets differential     T4 free     TSH          Today's Medication Changes          These changes are accurate as of: 6/20/17  2:05 PM.  If you have any questions, ask your nurse or doctor.               Stop taking these medicines if you haven't already. Please contact your care team if you have questions.     chlorhexidine 0.12 % solution   Commonly known as:  PERIDEX           HYDROcodone-acetaminophen 7.5-325 MG/15ML solution   Commonly known as:  LORTAB           hydrOXYzine 25 MG tablet   Commonly known as:  ATARAX           senna-docusate 8.6-50 MG per tablet   Commonly known as:  SENOKOT-S;PERICOLACE                    Primary Care Provider Office Phone # Fax #    JERRICA Church [facility] [phone]       [facility] [address] [address]        Thank you!     Thank you for choosing [facility]  for your care. Our goal is always to provide you with excellent care. Hearing back from our patients is one way we can continue to improve our services. Please take a few minutes to complete the written survey that you may receive in the mail after your visit with us. Thank you!             Your Updated  Medication List - Protect others around you: Learn how to safely use, store and throw away your medicines at www.disposemymeds.org.          This list is accurate as of: 6/20/17  2:05 PM.  Always use your most recent med list.                   Brand Name Dispense Instructions for use    cetirizine 10 MG tablet    zyrTEC    90 tablet    Take 1 tablet (10 mg) by mouth every evening       * citalopram 20 MG tablet    celeXA    135 tablet    Take 1.5 tablets (30 mg) by mouth daily       * citalopram 10 MG tablet    celeXA    30 tablet    TAKE 1 TAB BY MOUTH DAILY WITH 20MG TAB FOR TOTAL DAILY DOSE OF 30MG       * clotrimazole-betamethasone lotion    LOTRISONE         * clotrimazole-betamethasone cream    LOTRISONE    30 g    Apply topically 2 times daily       DECONGESTANT + PO      Reported on 3/24/2017       fluticasone 50 MCG/ACT spray    FLONASE     Spray 2 sprays into both nostrils daily Reported on 3/24/2017       levonorgestrel 20 MCG/24HR IUD    MIRENA     1 each by Intrauterine route once       levothyroxine 100 MCG tablet    SYNTHROID/LEVOTHROID     Take 100 mcg by mouth       mupirocin 2 % ointment    BACTROBAN    15 g    Apply topically 2 times daily Use 2 times a day to affected area.       sinus rinse bottle     10 each    Spray 1 packet in nostril 2 times daily       * Notice:  This list has 4 medication(s) that are the same as other medications prescribed for you. Read the directions carefully, and ask your doctor or other care provider to review them with you.

## 2017-06-20 NOTE — NURSING NOTE
"Chief Complaint   Patient presents with     Depression       Initial BP (!) 156/100 (BP Location: Right arm, Patient Position: Chair, Cuff Size: Adult Large)  Pulse 81  Temp 98.6  F (37  C) (Oral)  Wt 290 lb 3.2 oz (131.6 kg)  SpO2 99%  BMI 46.84 kg/m2 Estimated body mass index is 46.84 kg/(m^2) as calculated from the following:    Height as of 4/10/17: 5' 6\" (1.676 m).    Weight as of this encounter: 290 lb 3.2 oz (131.6 kg).  Medication Reconciliation: complete     Rosario Mckeon CMA    "

## 2017-06-20 NOTE — PATIENT INSTRUCTIONS
Schedule pelvic ultrasound for IUD placement check    Call and set up CPAP    Check thyroid and hemoglobin today    Check BP out of the clinic a couple times a week for the next 2 weeks and send M-Files message with results

## 2017-06-21 LAB
T4 FREE SERPL-MCNC: 1.29 NG/DL (ref 0.76–1.46)
TSH SERPL DL<=0.05 MIU/L-ACNC: 2.43 MU/L (ref 0.4–4)

## 2017-06-21 ASSESSMENT — PATIENT HEALTH QUESTIONNAIRE - PHQ9: SUM OF ALL RESPONSES TO PHQ QUESTIONS 1-9: 10

## 2017-06-26 ENCOUNTER — MYC MEDICAL ADVICE (OUTPATIENT)
Dept: FAMILY MEDICINE | Facility: CLINIC | Age: 47
End: 2017-06-26

## 2017-06-26 DIAGNOSIS — I10 HTN, GOAL BELOW 140/90: Primary | ICD-10-CM

## 2017-06-26 NOTE — TELEPHONE ENCOUNTER
Mariya,     Patient sent [employer] message with blood pressure readings.     6/22  144/100  6/23  147/101  6/26  138/91      Candi Garcia RN  [facility]

## 2017-06-28 ENCOUNTER — RADIANT APPOINTMENT (OUTPATIENT)
Dept: ULTRASOUND IMAGING | Facility: CLINIC | Age: 47
End: 2017-06-28
Attending: NURSE PRACTITIONER
Payer: COMMERCIAL

## 2017-06-28 DIAGNOSIS — R19.00 PELVIC FULLNESS: ICD-10-CM

## 2017-06-28 DIAGNOSIS — Z30.430 ENCOUNTER FOR INSERTION OF MIRENA IUD: ICD-10-CM

## 2017-06-28 PROCEDURE — 76830 TRANSVAGINAL US NON-OB: CPT | Performed by: OBSTETRICS & GYNECOLOGY

## 2017-06-28 NOTE — TELEPHONE ENCOUNTER
Mariya,  Please see MyScreent message below.    Vicki Cosby RN  [facility]

## 2017-06-28 NOTE — PROGRESS NOTES
Naomy,    The ultrasound showed the IUD in the correct place and normal anatomy.  There was a normal simple cyst on the left ovary that does not need any follow-up.  I suspect that the amount of medication for the IUD is starting to wane.  I'd advise a follow-up with OB-GYN for the bleeding.  If you have any questions, please feel free to contact the clinic.    DEANGELO Chiu

## 2017-07-05 ENCOUNTER — TELEPHONE (OUTPATIENT)
Dept: MIDWIFE SERVICES | Facility: CLINIC | Age: 47
End: 2017-07-05

## 2017-07-05 DIAGNOSIS — E03.9 HYPOTHYROIDISM, UNSPECIFIED TYPE: ICD-10-CM

## 2017-07-05 RX ORDER — LEVOTHYROXINE SODIUM 75 UG/1
TABLET ORAL
Qty: 90 TABLET | Refills: 1 | Status: SHIPPED | OUTPATIENT
Start: 2017-07-05 | End: 2017-12-12

## 2017-07-05 RX ORDER — LISINOPRIL 20 MG/1
20 TABLET ORAL DAILY
Qty: 90 TABLET | Refills: 1 | Status: SHIPPED | OUTPATIENT
Start: 2017-07-05 | End: 2017-12-12

## 2017-07-05 NOTE — TELEPHONE ENCOUNTER
Patient daughter left message for patient to get pre op clearance for surgery on 7/7/17. Can the last office visit work, or does she need to be seen.   Sent a [employer] message to the pt as requested with Rachel's recommendations. Aidee Rios RN
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
## 2017-07-05 NOTE — TELEPHONE ENCOUNTER
LEVOTHYROXINE 75 MCG TABLET       Last Written Prescription Date: n/a  Last Quantity: n/a, # refills: n/a  Last Office Visit with [facility], [facility] or [facility] prescribing provider: 6/20/17   Next 5 appointments (look out 90 days)     Jul 14, 2017 10:00 AM CDT   Office Visit with JERRICA Brothers CNM   [facility] ([facility])    [address] [place] [id number] [phone]                   TSH   Date Value Ref Range Status   06/20/2017 2.43 0.40 - 4.00 mU/L Final     Patient reported medication
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
## 2017-07-14 ENCOUNTER — OFFICE VISIT (OUTPATIENT)
Dept: MIDWIFE SERVICES | Facility: CLINIC | Age: 47
End: 2017-07-14
Payer: COMMERCIAL

## 2017-07-14 ENCOUNTER — CARE COORDINATION (OUTPATIENT)
Dept: CARE COORDINATION | Facility: CLINIC | Age: 47
End: 2017-07-14

## 2017-07-14 VITALS
BODY MASS INDEX: 43.44 KG/M2 | SYSTOLIC BLOOD PRESSURE: 114 MMHG | HEIGHT: 66 IN | TEMPERATURE: 97.4 F | HEART RATE: 73 BPM | OXYGEN SATURATION: 99 % | DIASTOLIC BLOOD PRESSURE: 67 MMHG | WEIGHT: 270.3 LBS

## 2017-07-14 DIAGNOSIS — Z30.430 ENCOUNTER FOR INTRAUTERINE DEVICE PLACEMENT: ICD-10-CM

## 2017-07-14 DIAGNOSIS — Z30.432 ENCOUNTER FOR IUD REMOVAL: ICD-10-CM

## 2017-07-14 DIAGNOSIS — Z11.3 SCREEN FOR STD (SEXUALLY TRANSMITTED DISEASE): ICD-10-CM

## 2017-07-14 DIAGNOSIS — Z12.31 ENCOUNTER FOR SCREENING MAMMOGRAM FOR BREAST CANCER: Primary | ICD-10-CM

## 2017-07-14 DIAGNOSIS — F33.0 MILD RECURRENT MAJOR DEPRESSION (H): ICD-10-CM

## 2017-07-14 PROCEDURE — 58301 REMOVE INTRAUTERINE DEVICE: CPT | Performed by: ADVANCED PRACTICE MIDWIFE

## 2017-07-14 PROCEDURE — 99214 OFFICE O/P EST MOD 30 MIN: CPT | Mod: 25 | Performed by: ADVANCED PRACTICE MIDWIFE

## 2017-07-14 PROCEDURE — 87591 N.GONORRHOEAE DNA AMP PROB: CPT | Performed by: ADVANCED PRACTICE MIDWIFE

## 2017-07-14 PROCEDURE — 58300 INSERT INTRAUTERINE DEVICE: CPT | Performed by: ADVANCED PRACTICE MIDWIFE

## 2017-07-14 PROCEDURE — 87491 CHLMYD TRACH DNA AMP PROBE: CPT | Performed by: ADVANCED PRACTICE MIDWIFE

## 2017-07-14 NOTE — PROGRESS NOTES
Clinic Care Coordination Contact  [facility]/Voicemail    Referral Source: PCP  Clinical Data: Care Coordinator Outreach  Outreach attempted x 1.  Left message on voicemail with call back information and requested return call.  Plan:  Care Coordinator will try to reach patient again in 1-2 business days.  LOKI Salas    Care Coordinator  [facility] and [facility]   [phone]

## 2017-07-14 NOTE — PATIENT INSTRUCTIONS

## 2017-07-14 NOTE — MR AVS SNAPSHOT
After Visit Summary   7/14/2017    Naomy Friend    MRN: [id number]           Patient Information     Date Of Birth          1970        Visit Information        Provider Department      7/14/2017 10:00 AM [facility]in [facility]        Today's Diagnoses     Encounter for screening mammogram for breast cancer    -  1      Care Instructions    What Mirena Users May Expect    What to watch for right after Mirena is placed  Some women may experience uterine cramps, bleeding, and/or dizziness during and right after Mirena is placed. To help minimize the cramps, you may taken ibuprofen 600 mg with food prior to your appointment. These symptoms should improve over the next 24 hours.  Mild cramping may be present for a few days after your placement  As a follow up, you should check your strings on 4 weeks or visit your clinic once in the first 4 to 12 weeks after Mirena is placed to make sure it is in the right position. After that, Mirena can be checked once a year as part of your routine exam.    Please use a back-up method (abstinence or condoms) for 5 days after placement.    Your periods may change  For the first 3 to 6 months, your monthly period may become irregular. You may also have frequent spotting or light bleeding. A few women have heavy bleeding during this time. After your body adjusts, the number of bleeding days is likely to decrease (but may remain irregular), and you may even find that your periods stop altogether for as long as Mirena is in place. Around the end of the third month of use, you may see up to a 75% reduction in the amount of menstrual bleeding. By one year, about 1 out of 5 users may hay have no period at all. At the end of two years, 70% have little or no bleeding. Your periods will return rapidly once Mirena is removed.     Mirena Strings  You may check your own Mirena strings by inserting a finger into the vagina and feeling the  strings as they exit the cervix.  The strings will initially feel firm, like fishing line, but will soften over a few weeks.  After the strings have softened, you or your partner should not be able to feel the strings during intercourse.  If you can feel the IUD, see your healthcare provider to have the position confirmed.  You may use tampons with Mirena in place.    Mirena does not protect against HIV or STDs.  Mirena does not prevent the formation of ovarian cysts.  Mirena does not typically reduce acne or cause weight gain or mood changes.    Please call [facility] at [phone] if you have questions or concerns.    For more information:  [email or web address]          Follow-ups after your visit        Your next 10 appointments already scheduled     Jul 14, 2017 10:00 AM CDT   Office Visit with JERRICA Brothers CNM   [facility] ([facility]    [address] [place] [id number] [phone]           Bring a current list of meds and any records pertaining to this visit.  For Physicals, please bring immunization records and any forms needing to be filled out.  Please arrive 10 minutes early to complete paperwork.              Who to contact     If you have questions or need follow up information about today's clinic visit or your schedule please contact [facility] directly at [phone].  Normal or non-critical lab and imaging results will be communicated to you by MyChart, letter or phone within 4 business days after the clinic has received the results. If you do not hear from us within 7 days, please contact the clinic through MyChart or phone. If you have a critical or abnormal lab result, we will notify you by phone as soon as possible.  Submit refill requests through [employer] or call your pharmacy and they will forward the refill request to us. Please allow 3 business days for your refill to be  "completed.          Additional Information About Your Visit        [employer] Information     [employer] gives you secure access to your electronic health record. If you see a primary care provider, you can also send messages to your care team and make appointments. If you have questions, please call your primary care clinic.  If you do not have a primary care provider, please call [phone] and they will assist you.        Care EveryWhere ID     This is your Care EveryWhere ID. This could be used by other organizations to access your [facility] medical records  [id number]        Your Vitals Were     Height                   5' 6\" (1.676 m)            Blood Pressure from Last 3 Encounters:   06/20/17 (!) 156/100   04/10/17 (!) 154/114   03/24/17 120/86    Weight from Last 3 Encounters:   06/20/17 290 lb 3.2 oz (131.6 kg)   04/10/17 294 lb (133.4 kg)   03/27/17 285 lb (129.3 kg)              Today, you had the following     No orders found for display       Primary Care Provider Office Phone # Fax #    Jovita Jonas, JERRICA [facility] [phone]       [facility] [address]  [facility] [address]        Equal Access to Services     HODA SOSA : Hadii aad ku hadasho Soomaali, waaxda luqadaha, qaybta kaalmada adeegyada, waxay idiin hayaan adeeg kharapa lamay . So [facility] [phone].    ATENCIÓN: Si habla español, tiene a lorenzana disposición servicios gratuitos de asistencia lingüística. Llame al [phone].    We comply with applicable federal civil rights laws and [place] laws. We do not discriminate on the basis of race, color, national origin, age, disability sex, sexual orientation or gender identity.            Thank you!     Thank you for choosing [facility]  for your care. Our goal is always to provide you with excellent care. Hearing back from our patients is one way we can continue to improve our services. Please take a few minutes to complete the written survey " that you may receive in the mail after your visit with us. Thank you!             Your Updated Medication List - Protect others around you: Learn how to safely use, store and throw away your medicines at www.disposemymeds.org.          This list is accurate as of: 7/14/17  9:52 AM.  Always use your most recent med list.                   Brand Name Dispense Instructions for use Diagnosis    cetirizine 10 MG tablet    zyrTEC    90 tablet    Take 1 tablet (10 mg) by mouth every evening    Post-nasal drainage       * citalopram 20 MG tablet    celeXA    135 tablet    Take 1.5 tablets (30 mg) by mouth daily    Mild recurrent major depression (H)       * citalopram 10 MG tablet    celeXA    30 tablet    TAKE 1 TAB BY MOUTH DAILY WITH 20MG TAB FOR TOTAL DAILY DOSE OF 30MG    Mild recurrent major depression (H)       * clotrimazole-betamethasone lotion    LOTRISONE          * clotrimazole-betamethasone cream    LOTRISONE    30 g    Apply topically 2 times daily    Tinea cruris       DECONGESTANT + PO      Reported on 3/24/2017        fluticasone 50 MCG/ACT spray    FLONASE     Spray 2 sprays into both nostrils daily Reported on 3/24/2017        levonorgestrel 20 MCG/24HR IUD    MIRENA     1 each by Intrauterine route once        * levothyroxine 100 MCG tablet    SYNTHROID/LEVOTHROID     Take 100 mcg by mouth        * levothyroxine 75 MCG tablet    SYNTHROID/LEVOTHROID    90 tablet    TAKE 1 TABLET (75 MCG) BY MOUTH DAILY    Hypothyroidism, unspecified type       lisinopril 20 MG tablet    PRINIVIL/ZESTRIL    90 tablet    Take 1 tablet (20 mg) by mouth daily    HTN, goal below 140/90       mupirocin 2 % ointment    BACTROBAN    15 g    Apply topically 2 times daily Use 2 times a day to affected area.    Pyoderma       sinus rinse bottle     10 each    Spray 1 packet in nostril 2 times daily    Postoperative pain       * Notice:  This list has 6 medication(s) that are the same as other medications prescribed for you. Read  the directions carefully, and ask your doctor or other care provider to review them with you.

## 2017-07-14 NOTE — PROGRESS NOTES
" MIRENA   LOT: [id number]   EXP:    NDC: 24284-165-15      Chief Complaint   Patient presents with     IUD     removal and replacement of Mirena.   MIRENA   LOT: [id number]   EXP:    NDC: 31919-538-83       Initial /67 (BP Location: Left arm, Patient Position: Sitting, Cuff Size: Adult Large)  Pulse 73  Temp 97.4  F (36.3  C) (Oral)  Ht 5' 6\" (1.676 m)  Wt 270 lb 4.8 oz (122.6 kg)  LMP 2017  SpO2 99%  Breastfeeding? No  BMI 43.63 kg/m2 Estimated body mass index is 43.63 kg/(m^2) as calculated from the following:    Height as of this encounter: 5' 6\" (1.676 m).    Weight as of this encounter: 270 lb 4.8 oz (122.6 kg).  BP completed using cuff size: large        The following HM Due: mammogram, possible      The following patient reported/Care Every where data was sent to:  P ABSTRACT QUALITY INITIATIVES [70555]  n/a     patient has appointment for today and orders have been placed             "
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
## 2017-07-18 LAB
C TRACH DNA SPEC QL NAA+PROBE: NORMAL
N GONORRHOEA DNA SPEC QL NAA+PROBE: NORMAL
SPECIMEN SOURCE: NORMAL
SPECIMEN SOURCE: NORMAL

## 2017-08-04 NOTE — PROGRESS NOTES
Naomy,    Your thyroid labs were all normal. We had checked your CBC related to depression and thinking about hemoglobin.  However, your white blood cell was elevated (hemoglobin was normal).  It looks like this has been your trend as you had similar results 5 months ago and 2 years ago.  Have you ever seen hematology?  If you have any questions, please feel free to contact the clinic.    DEANGELO Chiu

## 2017-08-11 ENCOUNTER — MYC MEDICAL ADVICE (OUTPATIENT)
Dept: FAMILY MEDICINE | Facility: CLINIC | Age: 47
End: 2017-08-11

## 2017-08-11 DIAGNOSIS — D72.825 BANDEMIA: Primary | ICD-10-CM

## 2017-08-11 NOTE — TELEPHONE ENCOUNTER
Mariya    Please see pt mychart message    Advise    Anabella Cheung RN   [facility]

## 2017-08-21 ENCOUNTER — MYC MEDICAL ADVICE (OUTPATIENT)
Dept: FAMILY MEDICINE | Facility: CLINIC | Age: 47
End: 2017-08-21

## 2017-08-21 DIAGNOSIS — R10.84 ABDOMINAL PAIN, GENERALIZED: Primary | ICD-10-CM

## 2017-08-21 NOTE — TELEPHONE ENCOUNTER
Patient scheduled for an appointment tomorrow at 920am    Candi Garcia RN  [facility]

## 2017-08-21 NOTE — TELEPHONE ENCOUNTER
Route to provider pool    See Thrupoint message    Colonoscopy is cued    Advise    Anabella Cheung, RN   [facility]

## 2017-08-22 ENCOUNTER — OFFICE VISIT (OUTPATIENT)
Dept: FAMILY MEDICINE | Facility: CLINIC | Age: 47
End: 2017-08-22
Payer: COMMERCIAL

## 2017-08-22 VITALS
TEMPERATURE: 97.1 F | HEART RATE: 67 BPM | SYSTOLIC BLOOD PRESSURE: 110 MMHG | WEIGHT: 266 LBS | DIASTOLIC BLOOD PRESSURE: 58 MMHG | BODY MASS INDEX: 42.93 KG/M2 | OXYGEN SATURATION: 96 %

## 2017-08-22 DIAGNOSIS — R10.32 ABDOMINAL PAIN, LEFT LOWER QUADRANT: Primary | ICD-10-CM

## 2017-08-22 LAB
ERYTHROCYTE [DISTWIDTH] IN BLOOD BY AUTOMATED COUNT: 14.8 % (ref 10–15)
HCT VFR BLD AUTO: 44.2 % (ref 35–47)
HGB BLD-MCNC: 14.2 G/DL (ref 11.7–15.7)
MCH RBC QN AUTO: 27.6 PG (ref 26.5–33)
MCHC RBC AUTO-ENTMCNC: 32.1 G/DL (ref 31.5–36.5)
MCV RBC AUTO: 86 FL (ref 78–100)
PLATELET # BLD AUTO: 289 10E9/L (ref 150–450)
RBC # BLD AUTO: 5.15 10E12/L (ref 3.8–5.2)
WBC # BLD AUTO: 11.8 10E9/L (ref 4–11)

## 2017-08-22 PROCEDURE — 36415 COLL VENOUS BLD VENIPUNCTURE: CPT | Performed by: PHYSICIAN ASSISTANT

## 2017-08-22 PROCEDURE — 99214 OFFICE O/P EST MOD 30 MIN: CPT | Performed by: PHYSICIAN ASSISTANT

## 2017-08-22 PROCEDURE — 85027 COMPLETE CBC AUTOMATED: CPT | Performed by: PHYSICIAN ASSISTANT

## 2017-08-22 NOTE — NURSING NOTE
"Chief Complaint   Patient presents with     ER F/U       Initial /58  Pulse 67  Temp 97.1  F (36.2  C) (Oral)  Wt 266 lb (120.7 kg)  SpO2 96%  BMI 42.93 kg/m2 Estimated body mass index is 42.93 kg/(m^2) as calculated from the following:    Height as of 7/14/17: 5' 6\" (1.676 m).    Weight as of this encounter: 266 lb (120.7 kg).  Medication Reconciliation: complete   Donna Mojica MA      "

## 2017-08-22 NOTE — MR AVS SNAPSHOT
After Visit Summary   8/22/2017    Naomy Friend    MRN: [id number]           Patient Information     Date Of Birth          1970        Visit Information        Provider Department      8/22/2017 9:20 AM Cristal Redmond PA-C [facility]        Today's Diagnoses     Abdominal pain, left lower quadrant    -  1       Follow-ups after your visit        Additional Services     GASTROENTEROLOGY ADULT REF PROCEDURE ONLY       Last Lab Result: Creatinine (mg/dL)       Date                     Value                 04/26/2016               0.74             ----------  Body mass index is 42.93 kg/(m^2).     Needed:  No  Language:  English    Patient will be contacted to schedule procedure.     Please be aware that coverage of these services is subject to the terms and limitations of your health insurance plan.  Call member services at your health plan with any benefit or coverage questions.  Any procedures must be performed at a [facility] facility OR coordinated by your clinic's referral office.    Please bring the following with you to your appointment:    (1) Any X-Rays, CTs or MRIs which have been performed.  Contact the facility where they were done to arrange for  prior to your scheduled appointment.    (2) List of current medications   (3) This referral request   (4) Any documents/labs given to you for this referral                  Who to contact     If you have questions or need follow up information about today's clinic visit or your schedule please contact [facility] directly at [phone].  Normal or non-critical lab and imaging results will be communicated to you by MyChart, letter or phone within 4 business days after the clinic has received the results. If you do not hear from us within 7 days, please contact the clinic through MyChart or phone. If you have a critical or abnormal lab result, we will notify you by phone as soon as  possible.  Submit refill requests through [employer] or call your pharmacy and they will forward the refill request to us. Please allow 3 business days for your refill to be completed.          Additional Information About Your Visit        DocRunhart Information     [employer] gives you secure access to your electronic health record. If you see a primary care provider, you can also send messages to your care team and make appointments. If you have questions, please call your primary care clinic.  If you do not have a primary care provider, please call [phone] and they will assist you.        Care EveryWhere ID     This is your Care EveryWhere ID. This could be used by other organizations to access your [facility] medical records  [id number]        Your Vitals Were     Pulse Temperature Pulse Oximetry BMI (Body Mass Index)          67 97.1  F (36.2  C) (Oral) 96% 42.93 kg/m2         Blood Pressure from Last 3 Encounters:   08/22/17 110/58   07/14/17 114/67   06/20/17 (!) 156/100    Weight from Last 3 Encounters:   08/22/17 266 lb (120.7 kg)   07/14/17 270 lb 4.8 oz (122.6 kg)   06/20/17 290 lb 3.2 oz (131.6 kg)              We Performed the Following     CBC with platelets     GASTROENTEROLOGY ADULT REF PROCEDURE ONLY        Primary Care Provider Office Phone # Fax #    Jovita JERRICA [facility] [phone]       [address]  [facility] [address]        Equal Access to Services     HODA SOSA : Hadii julien ku adriáno Soysabel, waaxda luqadaha, qaybta kaalmada tamra, josr tyson . So [facility] [phone].    ATENCIÓN: Si habla español, tiene a lorenzana disposición servicios gratuitos de asistencia lingüística. Llame al [phone].    We comply with applicable federal civil rights laws and [place] laws. We do not discriminate on the basis of race, color, national origin, age, disability sex, sexual orientation or gender identity.            Thank you!     Thank you  for choosing [facility]  for your care. Our goal is always to provide you with excellent care. Hearing back from our patients is one way we can continue to improve our services. Please take a few minutes to complete the written survey that you may receive in the mail after your visit with us. Thank you!             Your Updated Medication List - Protect others around you: Learn how to safely use, store and throw away your medicines at www.disposemymeds.org.          This list is accurate as of: 8/22/17 10:16 AM.  Always use your most recent med list.                   Brand Name Dispense Instructions for use Diagnosis    cetirizine 10 MG tablet    zyrTEC    90 tablet    Take 1 tablet (10 mg) by mouth every evening    Post-nasal drainage       * citalopram 20 MG tablet    celeXA    135 tablet    Take 1.5 tablets (30 mg) by mouth daily    Mild recurrent major depression (H)       * citalopram 10 MG tablet    celeXA    30 tablet    TAKE 1 TAB BY MOUTH DAILY WITH 20MG TAB FOR TOTAL DAILY DOSE OF 30MG    Mild recurrent major depression (H)       * clotrimazole-betamethasone lotion    LOTRISONE          * clotrimazole-betamethasone cream    LOTRISONE    30 g    Apply topically 2 times daily    Tinea cruris       DECONGESTANT + PO      Reported on 3/24/2017        fluticasone 50 MCG/ACT spray    FLONASE     Spray 2 sprays into both nostrils daily Reported on 3/24/2017        levonorgestrel 20 MCG/24HR IUD    MIRENA     1 each by Intrauterine route once        levothyroxine 75 MCG tablet    SYNTHROID/LEVOTHROID    90 tablet    TAKE 1 TABLET (75 MCG) BY MOUTH DAILY    Hypothyroidism, unspecified type       lisinopril 20 MG tablet    PRINIVIL/ZESTRIL    90 tablet    Take 1 tablet (20 mg) by mouth daily    HTN, goal below 140/90       mupirocin 2 % ointment    BACTROBAN    15 g    Apply topically 2 times daily Use 2 times a day to affected area.    Pyoderma       NAPROXEN PO      Take 375 mg by mouth         sinus rinse bottle     10 each    Spray 1 packet in nostril 2 times daily    Postoperative pain       * Notice:  This list has 4 medication(s) that are the same as other medications prescribed for you. Read the directions carefully, and ask your doctor or other care provider to review them with you.

## 2017-08-22 NOTE — PROGRESS NOTES
SUBJECTIVE:     ED/UC Followup:    Facility:  [facility]   Date of visit: 08/18/2017  Reason for visit: lower abdominal pain   Current Status: still having the same pain not feeling good, would like a white blood cell count again     HPI: Naomy Friend is a 46 year old female who presents to clinic today for follow up from 8/18  ED. She presented to the ED on Friday, with B/L lower abdominal pain. She was recently treated for gonorrhea in July, had her mirena replaced and repeat testing which was negative. She rated the pain in the ED at a 7-8/10. She has had continued elevated WBC, last taken on Friday at 16.2 and again at 13. She had a CT of abdomen and pelvis which was unremarkable. She has had decreased appetite and some intermittent vaginal bleeding. Today, she still complains of the pain but it has improved.     Problem list and histories reviewed & adjusted, as indicated.  Additional history: as documented    Patient Active Problem List   Diagnosis     Hypothyroidism     CARDIOVASCULAR SCREENING; LDL GOAL LESS THAN 160     Mild recurrent major depression (H)     Obesity     Menorrhagia, treated with mirena IUD (inserted July 2013)      Family history of diabetes mellitus     Dysfunction of eustachian tube     HTN, goal below 140/90     Morbid obesity, unspecified obesity type (H)     Tonsillitis     Past Surgical History:   Procedure Laterality Date     BREAST SURGERY      breast reduction bilat     ENDOSCOPIC SINUS SURGERY Right 3/15/2017    Procedure: ENDOSCOPIC SINUS SURGERY;;  Surgeon: Vicki Zurita MD;  Location:  OR     LAPAROSCOPIC CHOLECYSTECTOMY  08/05/99     TONSILLECTOMY Bilateral 3/15/2017    Procedure: TONSILLECTOMY;  Bilateral Tonsillectomy, Right Functional Endoscopic Sinus Surgery ;  Surgeon: Vicki Zurita MD;  Location:  OR       Social History   Substance Use Topics     Smoking status: Never Smoker     Smokeless tobacco: Never Used     Alcohol use No      Family History   Problem Relation Age of Onset     DIABETES Mother      Hypertension Mother      CANCER Father      pancreatic     HEART DISEASE Maternal Grandmother      DIABETES Maternal Grandmother      CEREBROVASCULAR DISEASE Maternal Grandfather      Cancer - colorectal Paternal Grandfather      DIABETES Brother      Asthma Son      Depression Son      Asthma Daughter          Current Outpatient Prescriptions   Medication Sig Dispense Refill     NAPROXEN PO Take 375 mg by mouth       levothyroxine (SYNTHROID/LEVOTHROID) 75 MCG tablet TAKE 1 TABLET (75 MCG) BY MOUTH DAILY 90 tablet 1     lisinopril (PRINIVIL/ZESTRIL) 20 MG tablet Take 1 tablet (20 mg) by mouth daily 90 tablet 1     citalopram (CELEXA) 10 MG tablet TAKE 1 TAB BY MOUTH DAILY WITH 20MG TAB FOR TOTAL DAILY DOSE OF 30MG 30 tablet 3     clotrimazole-betamethasone (LOTRISONE) lotion        clotrimazole-betamethasone (LOTRISONE) cream Apply topically 2 times daily 30 g 1     Hypertonic Nasal Wash (SINUS RINSE BOTTLE KIT) PACK Spray 1 packet in nostril 2 times daily 10 each 3     citalopram (CELEXA) 20 MG tablet Take 1.5 tablets (30 mg) by mouth daily 135 tablet 1     Chlorpheniramine-Pseudoeph (DECONGESTANT + PO) Reported on 3/24/2017       cetirizine (ZYRTEC) 10 MG tablet Take 1 tablet (10 mg) by mouth every evening 90 tablet 1     levonorgestrel (MIRENA) 20 MCG/24HR IUD 1 each by Intrauterine route once       mupirocin (BACTROBAN) 2 % ointment Apply topically 2 times daily Use 2 times a day to affected area. 15 g 0     fluticasone (FLONASE) 50 MCG/ACT nasal spray Spray 2 sprays into both nostrils daily Reported on 3/24/2017       Allergies   Allergen Reactions     Percocet [Oxycodone-Acetaminophen] Itching         Reviewed and updated as needed this visit by clinical staff       Reviewed and updated as needed this visit by Provider       ROS:  Constitutional, HEENT, cardiovascular, pulmonary, GI, , musculoskeletal, neuro, skin, endocrine and  psych systems are negative, except as otherwise noted.      OBJECTIVE:   /58  Pulse 67  Temp 97.1  F (36.2  C) (Oral)  Wt 266 lb (120.7 kg)  SpO2 96%  BMI 42.93 kg/m2  Body mass index is 42.93 kg/(m^2).  GENERAL: healthy, alert and no distress  NECK: no adenopathy, no asymmetry, masses, or scars and thyroid normal to palpation  RESP: lungs clear to auscultation - no rales, rhonchi or wheezes  CV: regular rate and rhythm, normal S1 S2, no S3 or S4, no murmur, click or rub, no peripheral edema and peripheral pulses strong  ABDOMEN: soft, nontender, no hepatosplenomegaly, no masses and bowel sounds normal  MS: no gross musculoskeletal defects noted, no edema    Diagnostic Test Results:  Results for orders placed or performed in visit on 08/22/17   CBC with platelets   Result Value Ref Range    WBC 11.8 (H) 4.0 - 11.0 10e9/L    RBC Count 5.15 3.8 - 5.2 10e12/L    Hemoglobin 14.2 11.7 - 15.7 g/dL    Hematocrit 44.2 35.0 - 47.0 %    MCV 86 78 - 100 fl    MCH 27.6 26.5 - 33.0 pg    MCHC 32.1 31.5 - 36.5 g/dL    RDW 14.8 10.0 - 15.0 %    Platelet Count 289 150 - 450 10e9/L         ASSESSMENT/PLAN:   1. Abdominal pain, left lower quadrant   Patient with abdominal pain without a source. Elevated WBC has been seen in the past and may be her baseline, mildly elevated at 11.8 today. She has had some chronic abdominal pain, and that sounds what she is describing today. Sending in for colonoscopy for further eval into abdominal pain. Went over RED FLAG symptoms.   - GASTROENTEROLOGY ADULT REF PROCEDURE ONLY  - CBC with platelets    25 minutes was spent with the patient and greater than 50% was spent counseling on abdominal pain.  Cristal Redmond PA-C  [facility]

## 2017-09-01 ENCOUNTER — HOSPITAL ENCOUNTER (OUTPATIENT)
Facility: AMBULATORY SURGERY CENTER | Age: 47
Discharge: HOME | End: 2017-09-01
Attending: INTERNAL MEDICINE | Admitting: INTERNAL MEDICINE
Payer: COMMERCIAL

## 2017-09-01 ENCOUNTER — TELEPHONE (OUTPATIENT)
Dept: GASTROENTEROLOGY | Facility: CLINIC | Age: 47
End: 2017-09-01

## 2017-09-01 ENCOUNTER — SURGERY (OUTPATIENT)
Age: 47
End: 2017-09-01
Payer: COMMERCIAL

## 2017-09-01 ENCOUNTER — PRE VISIT (OUTPATIENT)
Dept: SURGERY | Facility: CLINIC | Age: 47
End: 2017-09-01

## 2017-09-01 VITALS
TEMPERATURE: 97.6 F | DIASTOLIC BLOOD PRESSURE: 66 MMHG | RESPIRATION RATE: 18 BRPM | SYSTOLIC BLOOD PRESSURE: 132 MMHG | HEART RATE: 70 BPM | OXYGEN SATURATION: 95 %

## 2017-09-01 DIAGNOSIS — K62.9 ANAL LESION: Primary | ICD-10-CM

## 2017-09-01 PROCEDURE — G8918 PT W/O PREOP ORDER IV AB PRO: HCPCS

## 2017-09-01 PROCEDURE — 45378 DIAGNOSTIC COLONOSCOPY: CPT

## 2017-09-01 PROCEDURE — G8907 PT DOC NO EVENTS ON DISCHARG: HCPCS

## 2017-09-01 PROCEDURE — 45378 DIAGNOSTIC COLONOSCOPY: CPT | Performed by: INTERNAL MEDICINE

## 2017-09-01 RX ORDER — FENTANYL CITRATE 50 UG/ML
INJECTION, SOLUTION INTRAMUSCULAR; INTRAVENOUS PRN
Status: DISCONTINUED | OUTPATIENT
Start: 2017-09-01 | End: 2017-09-01 | Stop reason: HOSPADM

## 2017-09-01 RX ORDER — ONDANSETRON 2 MG/ML
4 INJECTION INTRAMUSCULAR; INTRAVENOUS
Status: DISCONTINUED | OUTPATIENT
Start: 2017-09-01 | End: 2017-09-02 | Stop reason: HOSPADM

## 2017-09-01 RX ORDER — LIDOCAINE 40 MG/G
CREAM TOPICAL
Status: DISCONTINUED | OUTPATIENT
Start: 2017-09-01 | End: 2017-09-02 | Stop reason: HOSPADM

## 2017-09-01 RX ADMIN — FENTANYL CITRATE 100 MCG: 50 INJECTION, SOLUTION INTRAMUSCULAR; INTRAVENOUS at 09:07

## 2017-09-01 RX ADMIN — FENTANYL CITRATE 50 MCG: 50 INJECTION, SOLUTION INTRAMUSCULAR; INTRAVENOUS at 09:09

## 2017-09-01 RX ADMIN — FENTANYL CITRATE 50 MCG: 50 INJECTION, SOLUTION INTRAMUSCULAR; INTRAVENOUS at 09:12

## 2017-09-01 NOTE — TELEPHONE ENCOUNTER
1.  Date/reason for appt:  9/07/17   Anal Lesion    2.  Referring provider:  Dr Rodríguez, Internal    3.  Call to patient (Yes / No - short description):  No, referred    Records reviewed.  All records are in Epic

## 2017-09-01 NOTE — TELEPHONE ENCOUNTER
----- Message from Anuel Rodríguez MD sent at 9/1/2017 10:38 AM CDT -----  Regarding: schedule colorectal surger consult  Pt has anal lesion that i'd like to be evaluated by colorectal surgery. Order is in. Pt is expecting call to help schedule.    Can you help?    Thanks,  Anuel

## 2017-09-01 NOTE — TELEPHONE ENCOUNTER
Nurse called patient and gave her the number to call and schedule appointment with Colorectal surgery. She will call and schedule today.    Meredith Gallagher RN, BSN  [facility]  GI/Gen Surg/Hepatology Care Coordinator

## 2017-09-04 ASSESSMENT — ENCOUNTER SYMPTOMS
NAUSEA: 0
NERVOUS/ANXIOUS: 1
RECTAL BLEEDING: 0
DEPRESSION: 1
JAUNDICE: 0
ABDOMINAL PAIN: 1
HOT FLASHES: 0
DECREASED CONCENTRATION: 0
BLOATING: 0
DIARRHEA: 1
BOWEL INCONTINENCE: 0
RECTAL PAIN: 0
VOMITING: 0
INSOMNIA: 1
HEARTBURN: 0
PANIC: 0
DECREASED LIBIDO: 0
BLOOD IN STOOL: 0
CONSTIPATION: 0

## 2017-09-05 DIAGNOSIS — B00.9 RECURRENT HERPES SIMPLEX: ICD-10-CM

## 2017-09-06 RX ORDER — ACYCLOVIR 400 MG/1
TABLET ORAL
Qty: 15 TABLET | Refills: 0 | Status: SHIPPED | OUTPATIENT
Start: 2017-09-06 | End: 2017-10-15

## 2017-09-06 NOTE — TELEPHONE ENCOUNTER
Routing refill request to provider for review/approval because:  Labs not current:  Creatinine    Lab cued, if you agree, lab only appt    Anabella Cheung RN   [facility]

## 2017-09-06 NOTE — TELEPHONE ENCOUNTER
ACYCLOVIR 400 MG TABLET       Last Written Prescription Date: n/a  Last Fill Quantity: n/a, # refills: n/a  Last Office Visit with G, P or [facility] prescribing provider: 8/22/17        Creatinine   Date Value Ref Range Status   04/26/2016 0.74 0.52 - 1.04 mg/dL Final

## 2017-09-07 ENCOUNTER — OFFICE VISIT (OUTPATIENT)
Dept: SURGERY | Facility: CLINIC | Age: 47
End: 2017-09-07

## 2017-09-07 VITALS
DIASTOLIC BLOOD PRESSURE: 88 MMHG | HEIGHT: 66 IN | SYSTOLIC BLOOD PRESSURE: 154 MMHG | OXYGEN SATURATION: 96 % | BODY MASS INDEX: 42.72 KG/M2 | HEART RATE: 58 BPM | TEMPERATURE: 97.8 F | WEIGHT: 265.8 LBS

## 2017-09-07 DIAGNOSIS — K62.9 ANAL LESION: Primary | ICD-10-CM

## 2017-09-07 DIAGNOSIS — A63.0 ANAL CONDYLOMA: ICD-10-CM

## 2017-09-07 ASSESSMENT — PAIN SCALES - GENERAL: PAINLEVEL: NO PAIN (0)

## 2017-09-07 NOTE — NURSING NOTE
"Chief Complaint   Patient presents with     Clinic Care Coordination - Initial     New pt consult, anal lesion.        Vitals:    09/07/17 0722   BP: 154/88   BP Location: Left arm   Patient Position: Chair   Cuff Size: Adult Large   Pulse: 58   Temp: 97.8  F (36.6  C)   TempSrc: Oral   SpO2: 96%   Weight: 265 lb 12.8 oz   Height: 5' 6\"       Body mass index is 42.9 kg/(m^2).    Clari SOTO LPN                        "

## 2017-09-07 NOTE — MR AVS SNAPSHOT
After Visit Summary   9/7/2017    Naomy Friend    MRN: [id number]           Patient Information     Date Of Birth          1970        Visit Information        Provider Department      9/7/2017 7:30 AM Linnette Galarza APRN CNP [facility]        Today's Diagnoses     Anal lesion    -  1    Anal condyloma           Follow-ups after your visit        Future tests that were ordered for you today     Open Future Orders        Priority Expected Expires Ordered    ANOSCOPY W BIOPSY SINGLE/MULTIPLE Routine  10/22/2017 9/7/2017            Who to contact     Please call your clinic at [phone] to:    Ask questions about your health    Make or cancel appointments    Discuss your medicines    Learn about your test results    Speak to your doctor   If you have compliments or concerns about an experience at your clinic, or if you wish to file a complaint, please contact [facility] Patient Relations at [phone] or email us at [email or web address]         Additional Information About Your Visit        [employer] Information     [facility] gives you secure access to your electronic health record. If you see a primary care provider, you can also send messages to your care team and make appointments. If you have questions, please call your primary care clinic.  If you do not have a primary care provider, please call [phone] and they will assist you.      [facility] is an electronic gateway that provides easy, online access to your medical records. With [facility], you can request a clinic appointment, read your test results, renew a prescription or communicate with your care team.     To access your existing account, please contact your [facility] or call [phone] for assistance.        Care EveryWhere ID     This is your Care EveryWhere ID. This could be used by other organizations to access your  "[place] medical records  [id number]        Your Vitals Were     Pulse Temperature Height Pulse Oximetry BMI (Body Mass Index)       58 97.8  F (36.6  C) (Oral) 5' 6\" 96% 42.9 kg/m2        Blood Pressure from Last 3 Encounters:   09/07/17 154/88   09/01/17 132/66   08/22/17 110/58    Weight from Last 3 Encounters:   09/07/17 265 lb 12.8 oz   08/22/17 266 lb   07/14/17 270 lb 4.8 oz               Primary Care Provider Office Phone # Fax #    Jovita Jonas, APRN [phone]       [address] [facility] [address]  [facility] [address]        Equal Access to Services     HODA SOSA : Hadii aad ku hadasho Sojohnathanali, waaxda luqadaha, qaybta kaalmada adeegyada, waxdc hardwickin marta tyson . So [facility] [phone].    ATENCIÓN: Si habla español, tiene a lorenzana disposición servicios gratuitos de asistencia lingüística. [facility] [phone].    We comply with applicable federal civil rights laws and [place] laws. We do not discriminate on the basis of race, color, national origin, age, disability sex, sexual orientation or gender identity.            Thank you!     Thank you for choosing [facility]  for your care. Our goal is always to provide you with excellent care. Hearing back from our patients is one way we can continue to improve our services. Please take a few minutes to complete the written survey that you may receive in the mail after your visit with us. Thank you!             Your Updated Medication List - Protect others around you: Learn how to safely use, store and throw away your medicines at www.disposemymeds.org.          This list is accurate as of: 9/7/17  7:57 AM.  Always use your most recent med list.                   Brand Name Dispense Instructions for use Diagnosis    acyclovir 400 MG tablet    ZOVIRAX    15 tablet    TAKE 1 TABLET (400 MG) BY MOUTH 3 TIMES DAILY    Recurrent herpes simplex       cetirizine 10 MG tablet    zyrTEC    90 tablet    Take 1 " tablet (10 mg) by mouth every evening    Post-nasal drainage       * citalopram 20 MG tablet    celeXA    135 tablet    Take 1.5 tablets (30 mg) by mouth daily    Mild recurrent major depression (H)       * citalopram 10 MG tablet    celeXA    30 tablet    TAKE 1 TAB BY MOUTH DAILY WITH 20MG TAB FOR TOTAL DAILY DOSE OF 30MG    Mild recurrent major depression (H)       * clotrimazole-betamethasone lotion    LOTRISONE          * clotrimazole-betamethasone cream    LOTRISONE    30 g    Apply topically 2 times daily    Tinea cruris       DECONGESTANT + PO      Reported on 3/24/2017        fluticasone 50 MCG/ACT spray    FLONASE     Spray 2 sprays into both nostrils daily Reported on 3/24/2017        levonorgestrel 20 MCG/24HR IUD    MIRENA     1 each by Intrauterine route once        levothyroxine 75 MCG tablet    SYNTHROID/LEVOTHROID    90 tablet    TAKE 1 TABLET (75 MCG) BY MOUTH DAILY    Hypothyroidism, unspecified type       lisinopril 20 MG tablet    PRINIVIL/ZESTRIL    90 tablet    Take 1 tablet (20 mg) by mouth daily    HTN, goal below 140/90       MULTIVITAMIN ADULT PO           mupirocin 2 % ointment    BACTROBAN    15 g    Apply topically 2 times daily Use 2 times a day to affected area.    Pyoderma       NAPROXEN PO      Take 375 mg by mouth        sinus rinse bottle     10 each    Spray 1 packet in nostril 2 times daily    Postoperative pain       * Notice:  This list has 4 medication(s) that are the same as other medications prescribed for you. Read the directions carefully, and ask your doctor or other care provider to review them with you.

## 2017-09-07 NOTE — PROGRESS NOTES
Colon and Rectal Surgery Consult Clinic Note    Date: 2017     Referring provider:  Anuel Rodríguez MD  [address] [facility] [address]  [place] [address]     RE: Naomy Friend  : 1970  MARIE: 2017    Naomy Frined is a very pleasant 46 year old female without a significant past medical history with a recent diagnosis of anal lesion.  Given these findings they were subsequently sent to the Colon and Rectal Surgery Clinic for an opinion on this and a new patient consultation.     Ms. Friend underwent a colonoscopy on 17 with Dr. Rodríguez and was noted to have a lesion in her anal canal suspicious for a condyloma. She reports that she had genital condyloma many years ago but has never had anal condyloma. She is otherwise healthy. She is not a smoker. She denies any pain or bleeding.    Assessment/Plan: 46 year old female with verruciform lesion in the posterior anal canal. Recommended biopsy of this in clinic today as it is fairly large. Will set her up for EUA with fulguration/excision of anal condyloma. Discussed the nature of condyloma, the HPV virus, and that they can be recurrent. Would want to see her every 6 months for a wart check. Patient's questions were answered to her stated satisfaction and she is in agreement with this plan.    Medical history:  Past Medical History:   Diagnosis Date     CARDIOVASCULAR SCREENING; LDL GOAL LESS THAN 160 2012     Hypertension     watching blood pressure     Obesity, unspecified     Obesity     Sleep apnea     no cpap     Thyroid disease        Surgical history:  Past Surgical History:   Procedure Laterality Date     BREAST SURGERY      breast reduction bilat     COLONOSCOPY WITH CO2 INSUFFLATION N/A 2017    Procedure: COLONOSCOPY WITH CO2 INSUFFLATION;  Colonoscopy, Abdominal pain, left lower quadrant, [place], BMI 42.93, [phone];  Surgeon: Anuel Rodríguez MD;  Location:  OR     ENDOSCOPIC SINUS SURGERY Right 3/15/2017     Procedure: ENDOSCOPIC SINUS SURGERY;;  Surgeon: Vicki Zurita MD;  Location: [facility] OR     LAPAROSCOPIC CHOLECYSTECTOMY  08/05/99     TONSILLECTOMY Bilateral 3/15/2017    Procedure: TONSILLECTOMY;  Bilateral Tonsillectomy, Right Functional Endoscopic Sinus Surgery ;  Surgeon: Vicki Zurita MD;  Location: [facility] OR       Problem list:  Patient Active Problem List    Diagnosis Date Noted     Tonsillitis 03/15/2017     Priority: Medium     Morbid obesity, unspecified obesity type (H) 10/03/2016     Priority: Medium     HTN, goal below 140/90 06/15/2015     Priority: Medium     Dysfunction of eustachian tube 01/03/2014     Priority: Medium     Menorrhagia, treated with mirena IUD (inserted July 2013)  12/02/2013     Priority: Medium     Family history of diabetes mellitus 12/02/2013     Priority: Medium     Mild recurrent major depression (H) 10/01/2012     Priority: Medium     Obesity      Priority: Medium     Obesity  Problem list name updated by automated process. Provider to review       CARDIOVASCULAR SCREENING; LDL GOAL LESS THAN 160 06/06/2012     Priority: Medium     Hypothyroidism 07/18/2011     Priority: Medium       Medications:  Current Outpatient Prescriptions   Medication Sig Dispense Refill     Multiple Vitamins-Minerals (MULTIVITAMIN ADULT PO)        acyclovir (ZOVIRAX) 400 MG tablet TAKE 1 TABLET (400 MG) BY MOUTH 3 TIMES DAILY 15 tablet 0     NAPROXEN PO Take 375 mg by mouth       levothyroxine (SYNTHROID/LEVOTHROID) 75 MCG tablet TAKE 1 TABLET (75 MCG) BY MOUTH DAILY 90 tablet 1     lisinopril (PRINIVIL/ZESTRIL) 20 MG tablet Take 1 tablet (20 mg) by mouth daily 90 tablet 1     citalopram (CELEXA) 10 MG tablet TAKE 1 TAB BY MOUTH DAILY WITH 20MG TAB FOR TOTAL DAILY DOSE OF 30MG 30 tablet 3     clotrimazole-betamethasone (LOTRISONE) lotion        clotrimazole-betamethasone (LOTRISONE) cream Apply topically 2 times daily 30 g 1     Hypertonic Nasal Wash (SINUS RINSE BOTTLE KIT) PACK  "Spray 1 packet in nostril 2 times daily 10 each 3     citalopram (CELEXA) 20 MG tablet Take 1.5 tablets (30 mg) by mouth daily 135 tablet 1     cetirizine (ZYRTEC) 10 MG tablet Take 1 tablet (10 mg) by mouth every evening 90 tablet 1     levonorgestrel (MIRENA) 20 MCG/24HR IUD 1 each by Intrauterine route once       mupirocin (BACTROBAN) 2 % ointment Apply topically 2 times daily Use 2 times a day to affected area. 15 g 0     fluticasone (FLONASE) 50 MCG/ACT nasal spray Spray 2 sprays into both nostrils daily Reported on 3/24/2017       Chlorpheniramine-Pseudoeph (DECONGESTANT + PO) Reported on 3/24/2017         Allergies:  Allergies   Allergen Reactions     Percocet [Oxycodone-Acetaminophen] Itching       Family history:  Family History   Problem Relation Age of Onset     DIABETES Mother      Hypertension Mother      CANCER Father      pancreatic     HEART DISEASE Maternal Grandmother      DIABETES Maternal Grandmother      CEREBROVASCULAR DISEASE Maternal Grandfather      Cancer - colorectal Paternal Grandfather      DIABETES Brother      Asthma Son      Depression Son      Asthma Daughter        Social history:  Social History   Substance Use Topics     Smoking status: Never Smoker     Smokeless tobacco: Never Used     Alcohol use No    Marital status: .  Occupation: works for a [employer] and is a .    Nursing Notes:   Erin Mcfarlane LPN  9/7/2017  7:25 AM  Signed  Chief Complaint   Patient presents with     Clinic Care Coordination - Initial     New pt consult, anal lesion.        Vitals:    09/07/17 0722   BP: 154/88   BP Location: Left arm   Patient Position: Chair   Cuff Size: Adult Large   Pulse: 58   Temp: 97.8  F (36.6  C)   TempSrc: Oral   SpO2: 96%   Weight: 265 lb 12.8 oz   Height: 5' 6\"       Body mass index is 42.9 kg/(m^2).    Clari SOTO LPN                           Physical Examination:  /88 (BP Location: Left arm, Patient Position: Chair, Cuff Size: Adult Large)  Pulse " "58  Temp 97.8  F (36.6  C) (Oral)  Ht 5' 6\"  Wt 265 lb 12.8 oz  SpO2 96%  BMI 42.9 kg/m2  General: alert, oriented, in no acute distress, sitting comfortably  HEENT: mucous membranes moist  Perianal external examination:  Perianal skin: Intact with no excoriation or lichenification.  Lesions: No evidence of an external lesion, nodularity, or induration in the perianal region.  Eversion of buttocks: There was not evidence of an anal fissure. Details: N/A.  Skin tags or external hemorrhoids: None  Digital rectal examination: Was performed.   Sphincter tone: Good.  Palpable lesions: Yes - Location: lesion palpable in the posterior anal canal.  Other: None.  Bimanual examination: was not performed      Anoscopy: Was performed.   Hemorrhoids: Yes. Grade 1-2 internal hemorrhoids without bleednig  Lesions: Yes: verruciform lesion in the posterior anal canal, approximately 2 cm in size      Procedures:  After injection with 1.5% lidocaine with epinephrine, biopsy was obtained in the posterior anal canal using baby Tischler forceps and hemostasis was obtained using Monsel and silver nitrate.     Total face to face time was 20 minutes, outside the procedure time, >50% counseling.    JERRICA Hamm, NP-C  Colon and Rectal Surgery   [facility]    This note was created using speech recognition software and may contain unintended word substitutions.  "

## 2017-09-08 ENCOUNTER — TELEPHONE (OUTPATIENT)
Dept: SURGERY | Facility: CLINIC | Age: 47
End: 2017-09-08

## 2017-09-08 DIAGNOSIS — A63.0 ANAL CONDYLOMA: Primary | ICD-10-CM

## 2017-09-08 LAB — COPATH REPORT: NORMAL

## 2017-09-08 NOTE — TELEPHONE ENCOUNTER
Called to discuss pathology results showing condyloma. Proceed with EUA with fulguration as planned and I would like to see her for a 6 month recheck.  Patient's questions were answered to her stated satisfaction and she is in agreement with this plan.    JERRICA Hamm, NP-C  Colon and Rectal Surgery  [facility]

## 2017-09-15 ENCOUNTER — CARE COORDINATION (OUTPATIENT)
Dept: CARE COORDINATION | Facility: CLINIC | Age: 47
End: 2017-09-15

## 2017-09-15 NOTE — PROGRESS NOTES
Clinic Care Coordination Contact  [facility]/Voicemail    Referral Source: PCP  Clinical Data: Care Coordinator Outreach  Outreach attempted x 1.  Left message on voicemail with call back information and requested return call.  Plan:  Care Coordinator will continue to work with patient.  LOKI Salas    Care Coordinator  [facility]   [phone]

## 2017-09-20 ENCOUNTER — OFFICE VISIT (OUTPATIENT)
Dept: FAMILY MEDICINE | Facility: CLINIC | Age: 47
End: 2017-09-20
Payer: COMMERCIAL

## 2017-09-20 ENCOUNTER — TELEPHONE (OUTPATIENT)
Dept: SURGERY | Facility: CLINIC | Age: 47
End: 2017-09-20

## 2017-09-20 VITALS
HEIGHT: 66 IN | HEART RATE: 79 BPM | TEMPERATURE: 97.6 F | DIASTOLIC BLOOD PRESSURE: 84 MMHG | RESPIRATION RATE: 16 BRPM | BODY MASS INDEX: 41.3 KG/M2 | OXYGEN SATURATION: 98 % | SYSTOLIC BLOOD PRESSURE: 146 MMHG | WEIGHT: 257 LBS

## 2017-09-20 DIAGNOSIS — Z01.818 PREOP GENERAL PHYSICAL EXAM: Primary | ICD-10-CM

## 2017-09-20 LAB — HGB BLD-MCNC: 14.1 G/DL (ref 11.7–15.7)

## 2017-09-20 PROCEDURE — 36415 COLL VENOUS BLD VENIPUNCTURE: CPT | Performed by: PHYSICIAN ASSISTANT

## 2017-09-20 PROCEDURE — 85018 HEMOGLOBIN: CPT | Performed by: PHYSICIAN ASSISTANT

## 2017-09-20 PROCEDURE — 93000 ELECTROCARDIOGRAM COMPLETE: CPT | Performed by: PHYSICIAN ASSISTANT

## 2017-09-20 PROCEDURE — 99214 OFFICE O/P EST MOD 30 MIN: CPT | Performed by: PHYSICIAN ASSISTANT

## 2017-09-20 NOTE — LETTER
My Depression Action Plan  Name: Naomy Friend   Date of Birth 1970  Date: 9/20/2017    My doctor: Jovita Jonas   My clinic: [address] [address] [phone]          GREEN    ZONE   Good Control    What it looks like:     Things are going generally well. You have normal up s and down s. You may even feel depressed from time to time, but bad moods usually last less than a day.   What you need to do:  1. Continue to care for yourself (see self care plan)  2. Check your depression survival kit and update it as needed  3. Follow your physician s recommendations including any medication.  4. Do not stop taking medication unless you consult with your physician first.           YELLOW         ZONE Getting Worse    What it looks like:     Depression is starting to interfere with your life.     It may be hard to get out of bed; you may be starting to isolate yourself from others.    Symptoms of depression are starting to last most all day and this has happened for several days.     You may have suicidal thoughts but they are not constant.   What you need to do:     1. Call your care team, your response to treatment will improve if you keep your care team informed of your progress. Yellow periods are signs an adjustment may need to be made.     2. Continue your self-care, even if you have to fake it!    3. Talk to someone in your support network    4. Open up your depression survival kit           RED    ZONE Medical Alert - Get Help    What it looks like:     Depression is seriously interfering with your life.     You may experience these or other symptoms: You can t get out of bed most days, can t work or engage in other necessary activities, you have trouble taking care of basic hygiene, or basic responsibilities, thoughts of suicide or death that will not go away, self-injurious behavior.     What you need to do:  1. Call your  care team and request a same-day appointment. If they are not available (weekends or after hours) call your local crisis line, emergency room or 911.      Electronically signed by: Mahsa Urbina, September 20, 2017    Depression Self Care Plan / Survival Kit    Self-Care for Depression  Here s the deal. Your body and mind are really not as separate as most people think.  What you do and think affects how you feel and how you feel influences what you do and think. This means if you do things that people who feel good do, it will help you feel better.  Sometimes this is all it takes.  There is also a place for medication and therapy depending on how severe your depression is, so be sure to consult with your medical provider and/ or Behavioral Health Consultant if your symptoms are worsening or not improving.     In order to better manage my stress, I will:    Exercise  Get some form of exercise, every day. This will help reduce pain and release endorphins, the  feel good  chemicals in your brain. This is almost as good as taking antidepressants!  This is not the same as joining a gym and then never going! (they count on that by the way ) It can be as simple as just going for a walk or doing some gardening, anything that will get you moving.      Hygiene   Maintain good hygiene (Get out of bed in the morning, Make your bed, Brush your teeth, Take a shower, and Get dressed like you were going to work, even if you are unemployed).  If your clothes don't fit try to get ones that do.    Diet  I will strive to eat foods that are good for me, drink plenty of water, and avoid excessive sugar, caffeine, alcohol, and other mood-altering substances.  Some foods that are helpful in depression are: complex carbohydrates, B vitamins, flaxseed, fish or fish oil, fresh fruits and vegetables.    Psychotherapy  I agree to participate in Individual Therapy (if recommended).    Medication  If prescribed medications, I agree to take them.   Missing doses can result in serious side effects.  I understand that drinking alcohol, or other illicit drug use, may cause potential side effects.  I will not stop my medication abruptly without first discussing it with my provider.    Staying Connected With Others  I will stay in touch with my friends, family members, and my primary care provider/team.    Use your imagination  Be creative.  We all have a creative side; it doesn t matter if it s oil painting, sand castles, or mud pies! This will also kick up the endorphins.    Witness Beauty  (AKA stop and smell the roses) Take a look outside, even in mid-winter. Notice colors, textures. Watch the squirrels and birds.     Service to others  Be of service to others.  There is always someone else in need.  By helping others we can  get out of ourselves  and remember the really important things.  This also provides opportunities for practicing all the other parts of the program.    Humor  Laugh and be silly!  Adjust your TV habits for less news and crime-drama and more comedy.    Control your stress  Try breathing deep, massage therapy, biofeedback, and meditation. Find time to relax each day.     My support system    Clinic Contact:  Phone number:    Contact 1:  Phone number:    Contact 2:  Phone number:    Yazdanism/:  Phone number:    Therapist:  Phone number:    Local crisis center:    Phone number:    Other community support:  Phone number:

## 2017-09-20 NOTE — TELEPHONE ENCOUNTER
Patient left a message stating she's misplaced her paperwork for surgery (can't remember if she received paperwork in clinic or if it was mailed to her).  Patient states she would like prep instructions and check-in information.  Called patient at number provided and left a message.  Informed patient that surgery packet was provided at the clinic appointment 09/07/17.  Informed patient I will re-send her surgery packet via [employer], and that she can call me directly if she would like her packet sent a different method.  Provided my direct number for questions or concerns.  Surgery packet sent to patient via [employer].

## 2017-09-20 NOTE — MR AVS SNAPSHOT
After Visit Summary   9/20/2017    Naomy Friend    MRN: [id number]           Patient Information     Date Of Birth          1970        Visit Information        Provider Department      9/20/2017 8:00 AM Cristal Redmond PA-C [facility]        Today's Diagnoses     Preop general physical exam    -  1      Care Instructions      Before Your Surgery      Call your surgeon if there is any change in your health. This includes signs of a cold or flu (such as a sore throat, runny nose, cough, rash or fever).    Do not smoke, drink alcohol or take over the counter medicine (unless your surgeon or primary care doctor tells you to) for the 24 hours before and after surgery.    If you take prescribed drugs: Follow your doctor s orders about which medicines to take and which to stop until after surgery.    Eating and drinking prior to surgery: follow the instructions from your surgeon    Take a shower or bath the night before surgery. Use the soap your surgeon gave you to gently clean your skin. If you do not have soap from your surgeon, use your regular soap. Do not shave or scrub the surgery site.  Wear clean pajamas and have clean sheets on your bed.           Follow-ups after your visit        Your next 10 appointments already scheduled     Sep 26, 2017   Procedure with Td Garvey MD   [facility] [place], Same Day Surgery (--)    [address] [address] [phone]              Who to contact     If you have questions or need follow up information about today's clinic visit or your schedule please contact [facility] directly at [phone].  Normal or non-critical lab and imaging results will be communicated to you by MyChart, letter or phone within 4 business days after the clinic has received the results. If you do not hear from us within 7 days, please contact the clinic through MyChart or phone. If you have a critical or abnormal lab  "result, we will notify you by phone as soon as possible.  Submit refill requests through [employer] or call your pharmacy and they will forward the refill request to us. Please allow 3 business days for your refill to be completed.          Additional Information About Your Visit        [facility] Information     [employer] gives you secure access to your electronic health record. If you see a primary care provider, you can also send messages to your care team and make appointments. If you have questions, please call your primary care clinic.  If you do not have a primary care provider, please call [phone] and they will assist you.        Care EveryWhere ID     This is your Care EveryWhere ID. This could be used by other organizations to access your [facility] medical records  [id number]        Your Vitals Were     Pulse Temperature Respirations Height Pulse Oximetry BMI (Body Mass Index)    79 97.6  F (36.4  C) (Oral) 16 5' 6\" (1.676 m) 98% 41.48 kg/m2       Blood Pressure from Last 3 Encounters:   09/20/17 146/84   09/07/17 154/88   09/01/17 132/66    Weight from Last 3 Encounters:   09/20/17 257 lb (116.6 kg)   09/07/17 265 lb 12.8 oz (120.6 kg)   08/22/17 266 lb (120.7 kg)              We Performed the Following     EKG 12-lead complete w/read - Clinics     Hemoglobin        Primary Care Provider Office Phone # Fax #    Jovita JERRICA [facility] [phone]       [address] [address]        Equal Access to Services     HODA SOSA : Hadii julien lawler hadasho Sojohnathanali, waaxda luqadaha, qaybta kaalmada adekaylynnyajayla, josr tyson . So [facility] [phone].    ATENCIÓN: Si habla español, tiene a lorenzana disposición servicios gratuitos de asistencia lingüística. Llame al [phone].    We comply with applicable federal civil rights laws and [place] laws. We do not discriminate on the basis of race, color, national origin, age, disability sex, sexual orientation or " gender identity.            Thank you!     Thank you for choosing [facility]  for your care. Our goal is always to provide you with excellent care. Hearing back from our patients is one way we can continue to improve our services. Please take a few minutes to complete the written survey that you may receive in the mail after your visit with us. Thank you!             Your Updated Medication List - Protect others around you: Learn how to safely use, store and throw away your medicines at www.disposemymeds.org.          This list is accurate as of: 9/20/17 10:25 AM.  Always use your most recent med list.                   Brand Name Dispense Instructions for use Diagnosis    acyclovir 400 MG tablet    ZOVIRAX    15 tablet    TAKE 1 TABLET (400 MG) BY MOUTH 3 TIMES DAILY    Recurrent herpes simplex       cetirizine 10 MG tablet    zyrTEC    90 tablet    Take 1 tablet (10 mg) by mouth every evening    Post-nasal drainage       * citalopram 20 MG tablet    celeXA    135 tablet    Take 1.5 tablets (30 mg) by mouth daily    Mild recurrent major depression (H)       * citalopram 10 MG tablet    celeXA    30 tablet    TAKE 1 TAB BY MOUTH DAILY WITH 20MG TAB FOR TOTAL DAILY DOSE OF 30MG    Mild recurrent major depression (H)       * clotrimazole-betamethasone lotion    LOTRISONE          * clotrimazole-betamethasone cream    LOTRISONE    30 g    Apply topically 2 times daily    Tinea cruris       DECONGESTANT + PO      Reported on 3/24/2017        fluticasone 50 MCG/ACT spray    FLONASE     Spray 2 sprays into both nostrils daily Reported on 3/24/2017        levonorgestrel 20 MCG/24HR IUD    MIRENA     1 each by Intrauterine route once        levothyroxine 75 MCG tablet    SYNTHROID/LEVOTHROID    90 tablet    TAKE 1 TABLET (75 MCG) BY MOUTH DAILY    Hypothyroidism, unspecified type       lisinopril 20 MG tablet    PRINIVIL/ZESTRIL    90 tablet    Take 1 tablet (20 mg) by mouth daily    HTN, goal below 140/90        MULTIVITAMIN ADULT PO           mupirocin 2 % ointment    BACTROBAN    15 g    Apply topically 2 times daily Use 2 times a day to affected area.    Pyoderma       NAPROXEN PO      Take 375 mg by mouth        sinus rinse bottle     10 each    Spray 1 packet in nostril 2 times daily    Postoperative pain       * Notice:  This list has 4 medication(s) that are the same as other medications prescribed for you. Read the directions carefully, and ask your doctor or other care provider to review them with you.

## 2017-09-20 NOTE — NURSING NOTE
"Chief Complaint   Patient presents with     Pre-Op Exam       Initial /84 (BP Location: Right arm)  Pulse 79  Temp 97.6  F (36.4  C) (Oral)  Resp 16  Ht 5' 6\" (1.676 m)  Wt 257 lb (116.6 kg)  SpO2 98%  BMI 41.48 kg/m2 Estimated body mass index is 41.48 kg/(m^2) as calculated from the following:    Height as of this encounter: 5' 6\" (1.676 m).    Weight as of this encounter: 257 lb (116.6 kg).  Medication Reconciliation: complete     Mahsa Urbina CMA      "

## 2017-09-20 NOTE — PROGRESS NOTES
[address] [address]  [phone]  Dept: [phone]    PRE-OP EVALUATION:  Today's date: 2017    Naomy Friend (: 1970) presents for pre-operative evaluation assessment as requested by Dr. Td Garvey.  She requires evaluation and anesthesia risk assessment prior to undergoing surgery/procedure for treatment of Condyloma .  Proposed procedure: COMBINED EXAM UNDER ANESTHESIA, FULGURATE CONDYLOMA ANUS    Date of Surgery/ Procedure: 2017  Time of Surgery/ Procedure: 08:15AM  Hospital/Surgical Facility: [facility]    Primary Physician: Jovita Jonas  Type of Anesthesia Anticipated: General    Patient has a Health Care Directive or Living Will:  NO    Preop Questions 2017   1.  Do you have a history of heart attack, stroke, stent, bypass or surgery on an artery in the head, neck, heart or legs? No   2.  Do you ever have any pain or discomfort in your chest? No   3.  Do you have a history of  Heart Failure? No   4.   Are you troubled by shortness of breath when:  walking on a level surface, or up a slight hill, or at night? No   5.  Do you currently have a cold, bronchitis or other respiratory infection? YES - currently has a cold   6.  Do you have a cough, shortness of breath, or wheezing? YES - wheezing with cold   7.  Do you sometimes get pains in the calves of your legs when you walk? No   8. Do you or anyone in your family have previous history of blood clots? YES - mom had phlebitis when she was born   9.  Do you or does anyone in your family have a serious bleeding problem such as prolonged bleeding following surgeries or cuts? No   10. Have you ever had problems with anemia or been told to take iron pills? No   11. Have you had any abnormal blood loss such as black, tarry or bloody stools, or abnormal vaginal bleeding? No   12. Have you ever had a blood  transfusion? No   13. Have you or any of your relatives ever had problems with anesthesia? No   14. Do you have sleep apnea, excessive snoring or daytime drowsiness? YES - sleep apnea, had tonsils removed has gotten better   15. Do you have any prosthetic heart valves? No   16. Do you have prosthetic joints? No   17. Is there any chance that you may be pregnant? No           HPI:                                                      Brief HPI related to upcoming procedure: Anal verruciform lesion, condyloma. Patient has hx of vaginal condyloma, but has not had recurrence in many years. She denies pain and bleeding from lesion.       HYPERTENSION - Patient has longstanding history of mod-severe HTN , currently denies any symptoms referable to elevated blood pressure. Specifically denies chest pain, palpitations, dyspnea, orthopnea, PND or peripheral edema. Blood pressure readings have been in normal range. Current medication regimen is as listed below. Patient denies any side effects of medication.                                                                                                                                                                                          .    MEDICAL HISTORY:                                                    Patient Active Problem List    Diagnosis Date Noted     Tonsillitis 03/15/2017     Priority: Medium     Morbid obesity, unspecified obesity type (H) 10/03/2016     Priority: Medium     HTN, goal below 140/90 06/15/2015     Priority: Medium     Dysfunction of eustachian tube 01/03/2014     Priority: Medium     Menorrhagia, treated with mirena IUD (inserted July 2013)  12/02/2013     Priority: Medium     Family history of diabetes mellitus 12/02/2013     Priority: Medium     Mild recurrent major depression (H) 10/01/2012     Priority: Medium     Obesity      Priority: Medium     Obesity  Problem list name updated by automated process. Provider to review        CARDIOVASCULAR SCREENING; LDL GOAL LESS THAN 160 06/06/2012     Priority: Medium     Hypothyroidism 07/18/2011     Priority: Medium      Past Medical History:   Diagnosis Date     CARDIOVASCULAR SCREENING; LDL GOAL LESS THAN 160 6/6/2012     Hypertension     watching blood pressure     Obesity, unspecified     Obesity     Sleep apnea     no cpap     Thyroid disease      Past Surgical History:   Procedure Laterality Date     BREAST SURGERY      breast reduction bilat     COLONOSCOPY WITH CO2 INSUFFLATION N/A 9/1/2017    Procedure: COLONOSCOPY WITH CO2 INSUFFLATION;  Colonoscopy, Abdominal pain, left lower quadrant, Parenteau, BMI 42.93, [phone];  Surgeon: Anuel Rodríguez MD;  Location:  OR     ENDOSCOPIC SINUS SURGERY Right 3/15/2017    Procedure: ENDOSCOPIC SINUS SURGERY;;  Surgeon: Vicki Zurita MD;  Location: [facility] OR     LAPAROSCOPIC CHOLECYSTECTOMY  08/05/99     TONSILLECTOMY Bilateral 3/15/2017    Procedure: TONSILLECTOMY;  Bilateral Tonsillectomy, Right Functional Endoscopic Sinus Surgery ;  Surgeon: Vicki Zurita MD;  Location: [facility] OR     Current Outpatient Prescriptions   Medication Sig Dispense Refill     Multiple Vitamins-Minerals (MULTIVITAMIN ADULT PO)        acyclovir (ZOVIRAX) 400 MG tablet TAKE 1 TABLET (400 MG) BY MOUTH 3 TIMES DAILY 15 tablet 0     NAPROXEN PO Take 375 mg by mouth       levothyroxine (SYNTHROID/LEVOTHROID) 75 MCG tablet TAKE 1 TABLET (75 MCG) BY MOUTH DAILY 90 tablet 1     lisinopril (PRINIVIL/ZESTRIL) 20 MG tablet Take 1 tablet (20 mg) by mouth daily 90 tablet 1     citalopram (CELEXA) 10 MG tablet TAKE 1 TAB BY MOUTH DAILY WITH 20MG TAB FOR TOTAL DAILY DOSE OF 30MG 30 tablet 3     clotrimazole-betamethasone (LOTRISONE) lotion        clotrimazole-betamethasone (LOTRISONE) cream Apply topically 2 times daily 30 g 1     Hypertonic Nasal Wash (SINUS RINSE BOTTLE KIT) PACK Spray 1 packet in nostril 2 times daily 10 each 3     citalopram (CELEXA)  "20 MG tablet Take 1.5 tablets (30 mg) by mouth daily 135 tablet 1     Chlorpheniramine-Pseudoeph (DECONGESTANT + PO) Reported on 3/24/2017       cetirizine (ZYRTEC) 10 MG tablet Take 1 tablet (10 mg) by mouth every evening 90 tablet 1     levonorgestrel (MIRENA) 20 MCG/24HR IUD 1 each by Intrauterine route once       mupirocin (BACTROBAN) 2 % ointment Apply topically 2 times daily Use 2 times a day to affected area. 15 g 0     fluticasone (FLONASE) 50 MCG/ACT nasal spray Spray 2 sprays into both nostrils daily Reported on 3/24/2017       OTC products: None, except as noted above. NO recent use of steroids.     Allergies   Allergen Reactions     Percocet [Oxycodone-Acetaminophen] Itching      Latex Allergy: NO    Social History   Substance Use Topics     Smoking status: Never Smoker     Smokeless tobacco: Never Used     Alcohol use No     History   Drug Use No       REVIEW OF SYSTEMS:                                                    Constitutional, neuro, ENT, endocrine, pulmonary, cardiac, gastrointestinal, genitourinary, musculoskeletal, integument and psychiatric systems are negative, except as otherwise noted.      EXAM:                                                    /84 (BP Location: Right arm)  Pulse 79  Temp 97.6  F (36.4  C) (Oral)  Resp 16  Ht 5' 6\" (1.676 m)  Wt 257 lb (116.6 kg)  SpO2 98%  BMI 41.48 kg/m2      GENERAL APPEARANCE: healthy, alert and no distress     EYES: EOMI, PERRL     HENT: ear canals and TM's normal and nose and mouth without ulcers or lesions     NECK: no adenopathy, no asymmetry, masses, or scars and thyroid normal to palpation     RESP: lungs clear to auscultation - no rales, rhonchi or wheezes     CV: regular rates and rhythm, normal S1 S2, no S3 or S4 and no murmur, click or rub     ABDOMEN:  soft, nontender, no HSM or masses and bowel sounds normal     MS: extremities normal- no gross deformities noted, no evidence of inflammation in joints, FROM in all " extremities.     SKIN: no suspicious lesions or rashes     NEURO: Normal strength and tone, sensory exam grossly normal, mentation intact and speech normal     PSYCH: mentation appears normal. and affect normal/bright     LYMPHATICS: No axillary, cervical, or supraclavicular nodes    DIAGNOSTICS:                                                    EKG: appears normal, NSR, normal axis, normal intervals, no acute ST/T changes c/w ischemia, no LVH by voltage criteria, unchanged from previous tracings  Hemoglobin : 14.1    IMPRESSION:                                                    Reason for surgery/procedure: Removal of FULGURATE CONDYLOMA ANUS  Diagnosis/reason for consult: Fulgurate condyloma anus    The proposed surgical procedure is considered INTERMEDIATE risk.    REVISED CARDIAC RISK INDEX  The patient has the following serious cardiovascular risks for perioperative complications such as (MI, PE, VFib and 3  AV Block):  No serious cardiac risks  INTERPRETATION: 0 risks: Class I (very low risk - 0.4% complication rate)    The patient has the following additional risks for perioperative complications:  No identified additional risks    1. Preop general physical exam  - Hemoglobin  - EKG 12-lead complete w/read - Clinics        RECOMMENDATIONS:                                                      --Consult hospital rounder / IM to assist post-op medical management    --Patient is to take all scheduled medications on the day of surgery EXCEPT for modifications listed below. She will take medications AFTER procedure. She will not take NSAIDs 5 days prior to surgery.     APPROVAL GIVEN to proceed with proposed procedure, without further diagnostic evaluation       Signed Electronically by: Cristal Redmond PA-C    Copy of this evaluation report is provided to requesting physician.    [place] Preop Guidelines

## 2017-09-22 ENCOUNTER — TELEPHONE (OUTPATIENT)
Dept: SURGERY | Facility: CLINIC | Age: 47
End: 2017-09-22

## 2017-09-22 NOTE — TELEPHONE ENCOUNTER
"Pt calling with questions related to upcoming surgery with Dr. Garvey. Pt states she has \"lion piercing near clitoris\". Pt is concerned that this could interfere with cautery as it is metal. Pt states during last surgery it was covered in paper tape. She plans to have it removed this weekend but is not sure if she will be able to and is wondering if surgery would be cancelled if it is not removed. Advised pt that cautery can be performed with metal in the vicinity, for example when a pt has a metal hip, this typically changes where the grounding pad is placed. However, let her know that this is not a guarantee. Pt states understanding. Additionally, pt is inquiring about being in chlorine pool day after surgery as she is a . Explained to pt that this could dry out any wound she has so most likely it will be recommended to wait longer than one day before going in the pool, but this is something she can address with Dr. Garvey before surgery. Jessica BAUTISTA LPN    "

## 2017-09-25 ENCOUNTER — ANESTHESIA EVENT (OUTPATIENT)
Dept: SURGERY | Facility: CLINIC | Age: 47
End: 2017-09-25
Payer: COMMERCIAL

## 2017-09-25 DIAGNOSIS — F33.0 MILD RECURRENT MAJOR DEPRESSION (H): ICD-10-CM

## 2017-09-25 RX ORDER — IPRATROPIUM BROMIDE AND ALBUTEROL SULFATE 2.5; .5 MG/3ML; MG/3ML
3 SOLUTION RESPIRATORY (INHALATION) ONCE
Status: CANCELLED | OUTPATIENT
Start: 2017-09-25 | End: 2017-09-25

## 2017-09-25 NOTE — ANESTHESIA PREPROCEDURE EVALUATION
Anesthesia Evaluation     . Pt has had prior anesthetic. Type: General    No history of anesthetic complications          ROS/MED HX    ENT/Pulmonary:     (+)sleep apnea, , . .    Neurologic:  - neg neurologic ROS     Cardiovascular:     (+) hypertension-range: 140-150, ---. : . . . :. . Previous cardiac testing date:results:date: results:ECG reviewed date: results:9/20/17: NSR date: results:          METS/Exercise Tolerance:  >4 METS   Hematologic:  - neg hematologic  ROS       Musculoskeletal:  - neg musculoskeletal ROS       GI/Hepatic:  - neg GI/hepatic ROS       Renal/Genitourinary:  - ROS Renal section negative       Endo:     (+) thyroid problem hypothyroidism, Obesity, .      Psychiatric:     (+) psychiatric history depression      Infectious Disease:   (+) Other Infectious Disease Recent cold      Malignancy:      - no malignancy   Other:    (+) C-spine cleared: N/A, no H/O Chronic Pain,no other significant disability                  Physical Exam  Normal systems: cardiovascular, pulmonary and dental    Airway   Mallampati: II  TM distance: >3 FB  Neck ROM: full    Dental     Cardiovascular       Pulmonary     Other findings: Labia piercing removed with chaperone and given to significant other in denture cup                Anesthesia Plan      History & Physical Review  History and physical reviewed and following examination; no interval change.    ASA Status:  2 .    NPO Status:  > 8 hours    Plan for MAC with Intravenous and Propofol induction. Maintenance will be TIVA.  Reason for MAC:  Deep or markedly invasive procedure (G8)  PONV prophylaxis:  Ondansetron (or other 5HT-3)       Postoperative Care  Postoperative pain management:  IV analgesics, Oral pain medications and Multi-modal analgesia.      Consents  Anesthetic plan, risks, benefits and alternatives discussed with:  Patient..        Procedure: Procedure(s):  Examination Under anethesia, Excision And Fulguration Of Anal Condyloma - Wound  Class:     HPI: 47 year old female with anal condyloma who requires anesthesia for Procedure(s):  Examination Under anethesia, Excision And Fulguration Of Anal Condyloma - Wound Class: .     PMHx significant for obesity, VANITA, hypothyroidism, HTN, depression, and recent cold. Pt underwent tonsillectomy in March 2017 with improvement noted in VANITA.     PMH/PSH:  Past Medical History:   Diagnosis Date     CARDIOVASCULAR SCREENING; LDL GOAL LESS THAN 160 6/6/2012     Hypertension     watching blood pressure     Obesity, unspecified     Obesity     Sleep apnea     no cpap     Thyroid disease        Past Surgical History:   Procedure Laterality Date     BREAST SURGERY      breast reduction bilat     COLONOSCOPY WITH CO2 INSUFFLATION N/A 9/1/2017    Procedure: COLONOSCOPY WITH CO2 INSUFFLATION;  Colonoscopy, Abdominal pain, left lower quadrant, Parenteau, BMI 42.93, -[phone];  Surgeon: Anuel Rodríguez MD;  Location:  OR     ENDOSCOPIC SINUS SURGERY Right 3/15/2017    Procedure: ENDOSCOPIC SINUS SURGERY;;  Surgeon: Vicki Zurita MD;  Location:  OR     LAPAROSCOPIC CHOLECYSTECTOMY  08/05/99     TONSILLECTOMY Bilateral 3/15/2017    Procedure: TONSILLECTOMY;  Bilateral Tonsillectomy, Right Functional Endoscopic Sinus Surgery ;  Surgeon: Vicki Zurita MD;  Location: [facility] OR         No current facility-administered medications on file prior to encounter.   Current Outpatient Prescriptions on File Prior to Encounter:  Multiple Vitamins-Minerals (MULTIVITAMIN ADULT PO)    acyclovir (ZOVIRAX) 400 MG tablet TAKE 1 TABLET (400 MG) BY MOUTH 3 TIMES DAILY   levothyroxine (SYNTHROID/LEVOTHROID) 75 MCG tablet TAKE 1 TABLET (75 MCG) BY MOUTH DAILY   lisinopril (PRINIVIL/ZESTRIL) 20 MG tablet Take 1 tablet (20 mg) by mouth daily   citalopram (CELEXA) 10 MG tablet TAKE 1 TAB BY MOUTH DAILY WITH 20MG TAB FOR TOTAL DAILY DOSE OF 30MG   clotrimazole-betamethasone (LOTRISONE) lotion     clotrimazole-betamethasone (LOTRISONE) cream Apply topically 2 times daily   Hypertonic Nasal Wash (SINUS RINSE BOTTLE KIT) PACK Spray 1 packet in nostril 2 times daily   citalopram (CELEXA) 20 MG tablet Take 1.5 tablets (30 mg) by mouth daily   Chlorpheniramine-Pseudoeph (DECONGESTANT + PO) Reported on 3/24/2017   cetirizine (ZYRTEC) 10 MG tablet Take 1 tablet (10 mg) by mouth every evening   levonorgestrel (MIRENA) 20 MCG/24HR IUD 1 each by Intrauterine route once   mupirocin (BACTROBAN) 2 % ointment Apply topically 2 times daily Use 2 times a day to affected area.   fluticasone (FLONASE) 50 MCG/ACT nasal spray Spray 2 sprays into both nostrils daily Reported on 3/24/2017       SH:   Social History   Substance Use Topics     Smoking status: Never Smoker     Smokeless tobacco: Never Used     Alcohol use No       Allergies:   Allergies   Allergen Reactions     Percocet [Oxycodone-Acetaminophen] Itching       NPO Status: Per ASA Guidelines    Labs:    Blood Bank:  No results found for: ABO, RH, AS  BMP:  Recent Labs   Lab Test  04/26/16   0912   NA  141   POTASSIUM  4.0   CHLORIDE  106   CO2  28   BUN  12   CR  0.74   GLC  144*   BRIDGETT  8.8     CBC:   Recent Labs   Lab Test  09/20/17   0834  08/22/17   1026   WBC   --   11.8*   RBC   --   5.15   HGB  14.1  14.2   HCT   --   44.2   MCV   --   86   MCH   --   27.6   MCHC   --   32.1   RDW   --   14.8   PLT   --   289     Coags:  No results for input(s): INR, PTT, FIBR in the last 91611 hours.       - ASA 2  - MAC with moderate sedation, standard ASA monitors, IV induction, balanced anesthetic. General as backup  - PIV  - Antibiotics per surgery  - PONV prophylaxis  - Gabapentin and tylenol preoperatively for postoperative pain control    Ramiro Paredes DO  [facility]   Department of Anesthesiology  P: [phone]                History and physical assessed; Patient examined. I have reviewed and agree with this pre-op assessment and anesthetic plan with addendums as necessary.      Risks and alternatives presented and discussed. Patient and family agree. All questions answered.      Chato Stallings MD  Staff Anesthesiologist  *[phone]

## 2017-09-26 ENCOUNTER — SURGERY (OUTPATIENT)
Age: 47
End: 2017-09-26

## 2017-09-26 ENCOUNTER — ANESTHESIA (OUTPATIENT)
Dept: SURGERY | Facility: CLINIC | Age: 47
End: 2017-09-26
Payer: COMMERCIAL

## 2017-09-26 ENCOUNTER — HOSPITAL ENCOUNTER (OUTPATIENT)
Facility: CLINIC | Age: 47
Discharge: HOME | End: 2017-09-26
Attending: COLON & RECTAL SURGERY | Admitting: COLON & RECTAL SURGERY
Payer: COMMERCIAL

## 2017-09-26 VITALS
HEIGHT: 66 IN | TEMPERATURE: 97.8 F | BODY MASS INDEX: 40.75 KG/M2 | SYSTOLIC BLOOD PRESSURE: 146 MMHG | OXYGEN SATURATION: 99 % | RESPIRATION RATE: 15 BRPM | WEIGHT: 253.53 LBS | DIASTOLIC BLOOD PRESSURE: 89 MMHG

## 2017-09-26 DIAGNOSIS — A63.0 ANAL CONDYLOMA: Primary | ICD-10-CM

## 2017-09-26 LAB
CREAT SERPL-MCNC: 0.64 MG/DL (ref 0.52–1.04)
GFR SERPL CREATININE-BSD FRML MDRD: >90 ML/MIN/1.7M2
GLUCOSE BLDC GLUCOMTR-MCNC: 124 MG/DL (ref 70–99)
HCG UR QL: NEGATIVE
POTASSIUM SERPL-SCNC: 3.8 MMOL/L (ref 3.4–5.3)

## 2017-09-26 PROCEDURE — 25000125 ZZHC RX 250: Performed by: COLON & RECTAL SURGERY

## 2017-09-26 PROCEDURE — 40000170 ZZH STATISTIC PRE-PROCEDURE ASSESSMENT II: Performed by: COLON & RECTAL SURGERY

## 2017-09-26 PROCEDURE — 36000053 ZZH SURGERY LEVEL 2 EA 15 ADDTL MIN - UMMC: Performed by: COLON & RECTAL SURGERY

## 2017-09-26 PROCEDURE — S0020 INJECTION, BUPIVICAINE HYDRO: HCPCS | Performed by: COLON & RECTAL SURGERY

## 2017-09-26 PROCEDURE — 81025 URINE PREGNANCY TEST: CPT | Performed by: ANESTHESIOLOGY

## 2017-09-26 PROCEDURE — 88305 TISSUE EXAM BY PATHOLOGIST: CPT | Performed by: COLON & RECTAL SURGERY

## 2017-09-26 PROCEDURE — 27210794 ZZH OR GENERAL SUPPLY STERILE: Performed by: COLON & RECTAL SURGERY

## 2017-09-26 PROCEDURE — 82962 GLUCOSE BLOOD TEST: CPT

## 2017-09-26 PROCEDURE — 71000027 ZZH RECOVERY PHASE 2 EACH 15 MINS: Performed by: COLON & RECTAL SURGERY

## 2017-09-26 PROCEDURE — 25000125 ZZHC RX 250: Performed by: STUDENT IN AN ORGANIZED HEALTH CARE EDUCATION/TRAINING PROGRAM

## 2017-09-26 PROCEDURE — 82565 ASSAY OF CREATININE: CPT | Performed by: ANESTHESIOLOGY

## 2017-09-26 PROCEDURE — 25000128 H RX IP 250 OP 636: Performed by: STUDENT IN AN ORGANIZED HEALTH CARE EDUCATION/TRAINING PROGRAM

## 2017-09-26 PROCEDURE — 84132 ASSAY OF SERUM POTASSIUM: CPT | Performed by: ANESTHESIOLOGY

## 2017-09-26 PROCEDURE — 36415 COLL VENOUS BLD VENIPUNCTURE: CPT | Performed by: ANESTHESIOLOGY

## 2017-09-26 PROCEDURE — 37000009 ZZH ANESTHESIA TECHNICAL FEE, EACH ADDTL 15 MIN: Performed by: COLON & RECTAL SURGERY

## 2017-09-26 PROCEDURE — 25000132 ZZH RX MED GY IP 250 OP 250 PS 637: Performed by: STUDENT IN AN ORGANIZED HEALTH CARE EDUCATION/TRAINING PROGRAM

## 2017-09-26 PROCEDURE — 25000128 H RX IP 250 OP 636: Performed by: ANESTHESIOLOGY

## 2017-09-26 PROCEDURE — 36000051 ZZH SURGERY LEVEL 2 1ST 30 MIN - UMMC: Performed by: COLON & RECTAL SURGERY

## 2017-09-26 PROCEDURE — 37000008 ZZH ANESTHESIA TECHNICAL FEE, 1ST 30 MIN: Performed by: COLON & RECTAL SURGERY

## 2017-09-26 RX ORDER — ACETAMINOPHEN 325 MG/1
975 TABLET ORAL ONCE
Status: COMPLETED | OUTPATIENT
Start: 2017-09-26 | End: 2017-09-26

## 2017-09-26 RX ORDER — OXYCODONE HYDROCHLORIDE 5 MG/1
5-10 TABLET ORAL
Qty: 30 TABLET | Refills: 0 | Status: SHIPPED | OUTPATIENT
Start: 2017-09-26 | End: 2017-10-12

## 2017-09-26 RX ORDER — POLYETHYLENE GLYCOL 3350 17 G/17G
1 POWDER, FOR SOLUTION ORAL DAILY PRN
Qty: 500 G | Refills: 0 | Status: SHIPPED | OUTPATIENT
Start: 2017-09-26 | End: 2017-10-12

## 2017-09-26 RX ORDER — IBUPROFEN 800 MG/1
800 TABLET, FILM COATED ORAL 3 TIMES DAILY
Qty: 30 TABLET | Refills: 0 | Status: SHIPPED | OUTPATIENT
Start: 2017-09-26 | End: 2017-10-06

## 2017-09-26 RX ORDER — LIDOCAINE HYDROCHLORIDE 20 MG/ML
INJECTION, SOLUTION INFILTRATION; PERINEURAL PRN
Status: DISCONTINUED | OUTPATIENT
Start: 2017-09-26 | End: 2017-09-26

## 2017-09-26 RX ORDER — MEPERIDINE HYDROCHLORIDE 25 MG/ML
12.5 INJECTION INTRAMUSCULAR; INTRAVENOUS; SUBCUTANEOUS
Status: DISCONTINUED | OUTPATIENT
Start: 2017-09-26 | End: 2017-09-26 | Stop reason: HOSPADM

## 2017-09-26 RX ORDER — NALOXONE HYDROCHLORIDE 0.4 MG/ML
.1-.4 INJECTION, SOLUTION INTRAMUSCULAR; INTRAVENOUS; SUBCUTANEOUS
Status: DISCONTINUED | OUTPATIENT
Start: 2017-09-26 | End: 2017-09-26 | Stop reason: HOSPADM

## 2017-09-26 RX ORDER — PROPOFOL 10 MG/ML
INJECTION, EMULSION INTRAVENOUS PRN
Status: DISCONTINUED | OUTPATIENT
Start: 2017-09-26 | End: 2017-09-26

## 2017-09-26 RX ORDER — ONDANSETRON 4 MG/1
4 TABLET, ORALLY DISINTEGRATING ORAL EVERY 30 MIN PRN
Status: DISCONTINUED | OUTPATIENT
Start: 2017-09-26 | End: 2017-09-26 | Stop reason: HOSPADM

## 2017-09-26 RX ORDER — ACETAMINOPHEN 325 MG/1
975 TABLET ORAL ONCE
Status: DISCONTINUED | OUTPATIENT
Start: 2017-09-26 | End: 2017-09-26 | Stop reason: HOSPADM

## 2017-09-26 RX ORDER — ONDANSETRON 2 MG/ML
4 INJECTION INTRAMUSCULAR; INTRAVENOUS EVERY 30 MIN PRN
Status: DISCONTINUED | OUTPATIENT
Start: 2017-09-26 | End: 2017-09-26 | Stop reason: HOSPADM

## 2017-09-26 RX ORDER — FENTANYL CITRATE 50 UG/ML
25-50 INJECTION, SOLUTION INTRAMUSCULAR; INTRAVENOUS
Status: DISCONTINUED | OUTPATIENT
Start: 2017-09-26 | End: 2017-09-26 | Stop reason: HOSPADM

## 2017-09-26 RX ORDER — GABAPENTIN 300 MG/1
300 CAPSULE ORAL ONCE
Status: COMPLETED | OUTPATIENT
Start: 2017-09-26 | End: 2017-09-26

## 2017-09-26 RX ORDER — ACETAMINOPHEN 325 MG/1
650 TABLET ORAL 4 TIMES DAILY
Qty: 80 TABLET | Refills: 0 | Status: SHIPPED | OUTPATIENT
Start: 2017-09-26 | End: 2017-10-06

## 2017-09-26 RX ORDER — FENTANYL CITRATE 50 UG/ML
INJECTION, SOLUTION INTRAMUSCULAR; INTRAVENOUS PRN
Status: DISCONTINUED | OUTPATIENT
Start: 2017-09-26 | End: 2017-09-26

## 2017-09-26 RX ORDER — SODIUM CHLORIDE, SODIUM LACTATE, POTASSIUM CHLORIDE, CALCIUM CHLORIDE 600; 310; 30; 20 MG/100ML; MG/100ML; MG/100ML; MG/100ML
INJECTION, SOLUTION INTRAVENOUS CONTINUOUS
Status: DISCONTINUED | OUTPATIENT
Start: 2017-09-26 | End: 2017-09-26 | Stop reason: HOSPADM

## 2017-09-26 RX ORDER — PROPOFOL 10 MG/ML
INJECTION, EMULSION INTRAVENOUS CONTINUOUS PRN
Status: DISCONTINUED | OUTPATIENT
Start: 2017-09-26 | End: 2017-09-26

## 2017-09-26 RX ORDER — HYDROMORPHONE HYDROCHLORIDE 1 MG/ML
.3-.5 INJECTION, SOLUTION INTRAMUSCULAR; INTRAVENOUS; SUBCUTANEOUS EVERY 10 MIN PRN
Status: DISCONTINUED | OUTPATIENT
Start: 2017-09-26 | End: 2017-09-26 | Stop reason: HOSPADM

## 2017-09-26 RX ORDER — CITALOPRAM HYDROBROMIDE 20 MG/1
TABLET ORAL
Qty: 135 TABLET | Refills: 0 | Status: SHIPPED | OUTPATIENT
Start: 2017-09-26 | End: 2017-11-10

## 2017-09-26 RX ORDER — HYDRALAZINE HYDROCHLORIDE 20 MG/ML
2.5-5 INJECTION INTRAMUSCULAR; INTRAVENOUS EVERY 10 MIN PRN
Status: DISCONTINUED | OUTPATIENT
Start: 2017-09-26 | End: 2017-09-26 | Stop reason: HOSPADM

## 2017-09-26 RX ORDER — BUPIVACAINE HYDROCHLORIDE 2.5 MG/ML
INJECTION, SOLUTION EPIDURAL; INFILTRATION; INTRACAUDAL PRN
Status: DISCONTINUED | OUTPATIENT
Start: 2017-09-26 | End: 2017-09-26 | Stop reason: HOSPADM

## 2017-09-26 RX ORDER — FENTANYL CITRATE 50 UG/ML
25-50 INJECTION, SOLUTION INTRAMUSCULAR; INTRAVENOUS EVERY 5 MIN PRN
Status: DISCONTINUED | OUTPATIENT
Start: 2017-09-26 | End: 2017-09-26 | Stop reason: HOSPADM

## 2017-09-26 RX ORDER — SILVER SULFADIAZINE 10 MG/G
CREAM TOPICAL
Qty: 85 G | Refills: 1 | Status: SHIPPED | OUTPATIENT
Start: 2017-09-26 | End: 2019-10-31

## 2017-09-26 RX ORDER — ONDANSETRON 4 MG/1
4 TABLET, ORALLY DISINTEGRATING ORAL
Status: DISCONTINUED | OUTPATIENT
Start: 2017-09-26 | End: 2017-09-26 | Stop reason: HOSPADM

## 2017-09-26 RX ORDER — SODIUM CHLORIDE, SODIUM LACTATE, POTASSIUM CHLORIDE, CALCIUM CHLORIDE 600; 310; 30; 20 MG/100ML; MG/100ML; MG/100ML; MG/100ML
INJECTION, SOLUTION INTRAVENOUS CONTINUOUS PRN
Status: DISCONTINUED | OUTPATIENT
Start: 2017-09-26 | End: 2017-09-26

## 2017-09-26 RX ORDER — SILVER SULFADIAZINE 10 MG/G
CREAM TOPICAL PRN
Status: DISCONTINUED | OUTPATIENT
Start: 2017-09-26 | End: 2017-09-26 | Stop reason: HOSPADM

## 2017-09-26 RX ORDER — METRONIDAZOLE 250 MG/1
250 TABLET ORAL 3 TIMES DAILY
Qty: 15 TABLET | Refills: 0 | Status: SHIPPED | OUTPATIENT
Start: 2017-09-26 | End: 2017-10-01

## 2017-09-26 RX ADMIN — SILVER SULFADIAZINE 1 G: 10 CREAM TOPICAL at 08:44

## 2017-09-26 RX ADMIN — FENTANYL CITRATE 50 MCG: 50 INJECTION, SOLUTION INTRAMUSCULAR; INTRAVENOUS at 09:17

## 2017-09-26 RX ADMIN — SODIUM CHLORIDE, POTASSIUM CHLORIDE, SODIUM LACTATE AND CALCIUM CHLORIDE: 600; 310; 30; 20 INJECTION, SOLUTION INTRAVENOUS at 08:18

## 2017-09-26 RX ADMIN — ACETAMINOPHEN 975 MG: 325 TABLET, FILM COATED ORAL at 07:32

## 2017-09-26 RX ADMIN — PROPOFOL 20 MG: 10 INJECTION, EMULSION INTRAVENOUS at 08:24

## 2017-09-26 RX ADMIN — Medication 0.3 MG: at 09:48

## 2017-09-26 RX ADMIN — Medication 0.2 MG: at 10:08

## 2017-09-26 RX ADMIN — FENTANYL CITRATE 50 MCG: 50 INJECTION, SOLUTION INTRAMUSCULAR; INTRAVENOUS at 09:34

## 2017-09-26 RX ADMIN — PROPOFOL 50 MCG/KG/MIN: 10 INJECTION, EMULSION INTRAVENOUS at 08:24

## 2017-09-26 RX ADMIN — METRONIDAZOLE 500 MG: 500 INJECTION, SOLUTION INTRAVENOUS at 08:33

## 2017-09-26 RX ADMIN — PROPOFOL 30 MG: 10 INJECTION, EMULSION INTRAVENOUS at 08:34

## 2017-09-26 RX ADMIN — BUPIVACAINE HYDROCHLORIDE 30 ML: 2.5 INJECTION, SOLUTION EPIDURAL; INFILTRATION; INTRACAUDAL at 08:36

## 2017-09-26 RX ADMIN — MIDAZOLAM HYDROCHLORIDE 2 MG: 1 INJECTION, SOLUTION INTRAMUSCULAR; INTRAVENOUS at 08:24

## 2017-09-26 RX ADMIN — PROPOFOL 30 MG: 10 INJECTION, EMULSION INTRAVENOUS at 08:35

## 2017-09-26 RX ADMIN — Medication 1 TABLET: at 10:38

## 2017-09-26 RX ADMIN — LIDOCAINE HYDROCHLORIDE 60 MG: 20 INJECTION, SOLUTION INFILTRATION; PERINEURAL at 08:24

## 2017-09-26 RX ADMIN — FENTANYL CITRATE 50 MCG: 50 INJECTION, SOLUTION INTRAMUSCULAR; INTRAVENOUS at 08:24

## 2017-09-26 RX ADMIN — GABAPENTIN 300 MG: 300 CAPSULE ORAL at 07:32

## 2017-09-26 NOTE — TELEPHONE ENCOUNTER
Prescription approved per [facility] Refill Protocol.    Anabella Cheung RN   [facility]

## 2017-09-26 NOTE — DISCHARGE INSTRUCTIONS
Anorectal Surgery Instructions    What can I expect after anorectal surgery?  Most anorectal procedures are done as outpatient surgery, and you go home the same day as the procedure. A few surgical procedures will require that you stay in the hospital for about one to three days. No matter where the procedure is done or how long or short it takes, these recommendations will help you heal and feel more comfortable.    Medicines:  The anal area is very sensitive; you can expect to have some pain for up to 2-4 weeks after the procedure. Your doctor will give you a prescription for one or more pain medications.    Take naprosyn 500 mg twice a day OR ibuprofen 600 mg four times a day     Take this on a regular basis (not as needed) following your surgery.     The drugs are best taken with food.  Do not take if it causes stomach upset or if you have a history of ulcers or gastritis. You can stop the naprosyn (or ibuprofen) or reduce the dose when you are feeling better.    DO NOT use naprosyn, ibuprofen, or other similar agents (eg. Advil or Aleve) if you have inflammatory bowel disease (Ulcerative Colitis or Crohn's disease) or if your doctor as advised you against using these medications    Take acetominaphen (Tylenol) 650-1000 mg four times a day.     Take this on a regular basis (not as needed) following surgery for pain control.     Take the lower dose if you are >65 years old or have liver disease. The maximum dose of acetominaphen is 4000 mg a day. You can stop the acetaminophen or reduce the dose when you are feeling better.    It is important to realize that many narcotic pain relievers (including vicodin, percocet, tylenol #3) also have acetaminophen, and excessive doses of acetaminophen can be dangerous, so do not take these in addition to acetominaphen.  You may take narcotics that don't contain acetominaphen such as oxycodone.      Take oxycodone AS NEEDED in addition to the acetominaphen and naprosyn.       Because narcotics have side effects (including constipation), you should reduce your use of these medications as tolerated as your pain improves.    *In general, the best strategy is to take (if you are able to tolerate it) the tylenol and naprosen on a regular basis until your pain has largely gone away. You can take the narcotic pain medicine as needed in addition to the tylenol and ibuprofen. As your pain begins to lessen, you should cut back on your narcotic use while continuing to take your regular tylenol and naprosyn doses.      Refilling prescriptions. If you need additional pain medication, please call the triage nurse at [phone] during normal business hours (8 a.m. to 4 p.m., Monday though Friday) or have your pharmacy fax a refill request to [phone]. If you call after hours or on the weekends, the doctor on call may not know you personally and may not renew narcotic pain medication by phone. Call your primary care provider for all other medication refills.    Perineal care:  Tub baths:    If possible, take a tub bath immediately after each bowel movement.     Baths should be take at least 3 times daily for the first week to 10 days following your procedure. You should soak in the tub for 10 to 15 minutes each time with water as warm as you can tolerate.     Even after you go back to work, it is a good idea to sit in the tub in the morning, after returning from work, and again in the evening before bedtime.    Bleeding/Infection:    You can expect to have some bleeding after bowel movements, but it should stop soon after you wipe.     Use a wet cloth or perianal pad (Tucks or Preparation H pads) to gently wipe the area after each bowel movement.    Do not rub the anal area or use a lot of pressure.    Using a spray bottle filled with warm water helps loosen any remaining stool. Blot gently with a soft dry cloth or tissue paper.    Infection around the anal opening is not very common. The anal  area has excellent blood supply, which helps the area to heal. Bloody discharge after bowel movements is normal and may last 2 to 4 weeks after your surgery. However, if you bleed between bowel movements and cannot get it to stop, call the triage nurse immediately [phone].    Bowel function:  Take a fiber supplement such as Metamucil, which is over the counter. It is important to drink six to eight glasses of water or juice everyday when using fiber products.    If you do not have a bowel movement after 1-2 days:    Take Milk of Magnesia-2 tablespoons.       If there are no results, repeat this or add over the counter Miralax.      If you still do not have results, contact the clinic.     If there are no results, repeat this. Stop taking Milk of Magnesia or other laxatives if you begin to have diarrhea.    * Constipation will cause you to strain when you have a bowel movement. The hard stool will be difficult to pass, will increase pain and bleeding, and will slow down healing.  Try to avoid constipation and/or diarrhea as this can make the pain and bleeding worse.    * It is important to have regular bowel movements at least every other day and to keep your stool soft.  A high fiber diet, including at least four servings of fruits or vegetables daily, will help to keep your bowel movements regular and soft.    Activity:  After your procedure, there are no restrictions on your activity     except restrictions surrounding being on narcotics and in pain, such as no heavy machine operating or driving.     You may walk, climb stairs, ride in a car, and sit as tolerated.     It is helpful to avoid sitting in one position for long periods (2 or more hours).    After some surgeries, you may be told not to perform any lifting (more than 10 pounds) for several weeks after surgery.    When to call:  When do I need to call the doctor or triage nurse?    If you experience any of the problems listed here, call our triage  nurse during business hours ([phone]).     The nurse will help you with your problem or have the doctor call you.     After hours and on weekends, please call the main hospital number ([phone]) and ask for the colon and rectal surgery person on call.     Some is available to help you 24 hours a day, seven days a week.    Call for:   ? Fever greater than 101 degrees   ? Chills   ? Foul-smelling drainage   ? Nausea and vomiting   ? Diarrhea - greater than 3 water stools in 24 hours   ? Constipation - no bowel movement after 3 days   ? Severe bleeding that does not stop soon after a bowel movement   ? Problems with the incision, including increased pain, swelling, or redness    ..[facility], [place]  Same-Day Surgery   Adult Discharge Orders & Instructions     For 24 hours after surgery    1. Get plenty of rest.  A responsible adult must stay with you for at least 24 hours after you leave the hospital.   2. Do not drive or use heavy equipment.  If you have weakness or tingling, don't drive or use heavy equipment until this feeling goes away.  3. Do not drink alcohol.  4. Avoid strenuous or risky activities.  Ask for help when climbing stairs.   5. You may feel lightheaded.  IF so, sit for a few minutes before standing.  Have someone help you get up.   6. If you have nausea (feel sick to your stomach): Drink only clear liquids such as apple juice, ginger ale, broth or 7-Up.  Rest may also help.  Be sure to drink enough fluids.  Move to a regular diet as you feel able.  7. You may have a slight fever. Call the doctor if your fever is over 100 F (37.7 C) (taken under the tongue) or lasts longer than 24 hours.  8. You may have a dry mouth, a sore throat, muscle aches or trouble sleeping.  These should go away after 24 hours.  9. Do not make important or legal decisions.   Call your doctor for any of the followin.  Signs of infection (fever, growing tenderness at the surgery  site, a large amount of drainage or bleeding, severe pain, foul-smelling drainage, redness, swelling).    2. It has been over 8 to 10 hours since surgery and you are still not able to urinate (pass water).    3.  Headache for over 24 hours.    4.  Numbness, tingling or weakness the day after surgery (if you had spinal anesthesia).  To contact a doctor, call ________________________________________ or:        [phone] and ask for the resident on call for   ______________________________________________ (answered 24 hours a day)      Emergency Department:    [facility]: [phone]       (TTY for hearing impaired: [phone])    [facility]: [phone]       (TTY for hearing impaired: [phone])    .       Tips for taking pain medications  To get the best pain relief possible , remember these points:      Take pain medications as directed, before pain becomes severe      Pain medication can upset your stomach: taking it with food may help      Constipation is a common side effect of pain medication. Drink plenty of  Fluids      Eat foods high in fiber. Take a stool softener  if recommended by your doctor or  Pharmacist.        Do not drink alcohol, drive or operate machinery while taking pain medications.      Ask about other ways to control pain, such as with heat, ice or relaxation.

## 2017-09-26 NOTE — OR NURSING
"Pt states buttock pain \"almost gone now\" states she feels she can sit up in recliner now, pt assisted to bathroom, anal area dressing remains D/I, pt able to void without difficulty, tolerated activity well, pt  now in phase II room   "

## 2017-09-26 NOTE — IP AVS SNAPSHOT
Same Day Surgery [address] [address]    Phone:  [phone]                                       After Visit Summary   9/26/2017    Naomy Friend    MRN: [id number]           After Visit Summary Signature Page     I have received my discharge instructions, and my questions have been answered. I have discussed any challenges I see with this plan with the nurse or doctor.    ..........................................................................................................................................  Patient/Patient Representative Signature      ..........................................................................................................................................  Patient Representative Print Name and Relationship to Patient    ..................................................               ................................................  Date                                            Time    ..........................................................................................................................................  Reviewed by Signature/Title    ...................................................              ..............................................  Date                                                            Time

## 2017-09-26 NOTE — OP NOTE
"PREOPERATIVE DIAGNOSIS:  1. Anal condyloma.    POSTOPERATIVE DIAGNOSIS:   1. Anal condyloma.    PROCEDURE:  1. Evaluation under anesthesia.  2. Fulguration and excision of anal condyloma.    ANESTHESIA: MAC plus local anesthesia.    SURGEON:  Td Garvey M.D.    ASSISTANT(S): Elana Paul M.D.    INDICATIONS FOR PROCEDURE  Naomy Friend is a 47 year old female who presented with symptomatic anal condyloma. I thoroughly discussed the risks, benefits, and alternatives of operative treatment with the patient and he/she agreed to proceed.    General risks related to anorectal surgery were reviewed with the patient. These include, but are not limited to urinary retention, abscess, infection, bleeding, chronic pain, anal stenosis, fistula, fissure, and fecal incontinence.     OPERATIVE PROCEDURE: After obtaining informed consent, the patient was brought to the operating room and placed in the prone jackknife position. Appropriate preoperative mechanical deep venous thrombosis prophylaxis, as well as preoperative prophylactic parenteral antibiotics were given. MAC anesthesia was gently induced. Bilateral lower extremity pneumatic compression devices were applied and all pressure points were cushioned. The perianal area was then preped and draped in the standard sterile fashion. After a \"time-out\" was performed, a total of 30 mL of Bupivacaine 0.25% without epinephrine was injected as a pudendal block. Digital rectal exam and anoscopy were performed. A left posterior 2x1.5 cm condyloma was seen at the dentate line. This was excised full-thickness with monopolar electrocautery. Care was taken to not injure any fibers of the anal sphincter complex. The specimen was sent for permanent pathology. The defect was closed with a running locking 3-0 Vicryl suture. The distal rectum and anal canal were irrigated. Hemostasis was achieved. Instrument, sponge, and needle counts were all correct as reported to me. Kala " was applied, as well as a sterile dressing. The patient tolerated the procedure well.    COMPLICATIONS: none, immediately.    ESTIMATED BLOOD LOSS: 3 mL.    REPLACEMENT: 200 mL crystalloid.    SPECIMEN(S): Right posterior anal condyloma.    DRAINS/TUBES: None.    OPERATIVE FINDINGS: right posterior internal anal condyloma. No external condylomas were seen.    DISPOSITION: 3C.    Td Garvey M.D., M.Sc.    Division of Colon & Rectal Surgery  [facility]  [phone] (p)  [phone] (o)    CC:  [facility] billing.  Linnette Howe NP.

## 2017-09-26 NOTE — ANESTHESIA CARE TRANSFER NOTE
Patient: Naomy Friend    Procedure(s):  Examination Under anethesia, Excision And Fulguration Of Anal Condyloma - Wound Class: II-Clean Contaminated    Diagnosis: Anal Condyloma  Diagnosis Additional Information: No value filed.    Anesthesia Type:   MAC     Note:  Airway :Face Mask  Patient transferred to:Phase II  Comments: VSS. Breathing spontaneously at a regular rate with adequate tidal volumes and maintaining O2 sats on 6L facemask. Denies nausea or pain. No apparent complications from anesthesia.     Ramiro Paredes DO  CA-1        Vitals: (Last set prior to Anesthesia Care Transfer)    CRNA VITALS  9/26/2017 0821 - 9/26/2017 0921      9/26/2017             Pulse: 79    Ht Rate: 79    SpO2: 97 %                Electronically Signed By: Ramiro Paredes DO  September 26, 2017  9:35 AM

## 2017-09-26 NOTE — ANESTHESIA POSTPROCEDURE EVALUATION
Patient: Naomy Friend    Procedure(s):  Examination Under anethesia, Excision And Fulguration Of Anal Condyloma - Wound Class: II-Clean Contaminated    Diagnosis:Anal Condyloma  Diagnosis Additional Information: No value filed.    Anesthesia Type:  MAC    Note:  Anesthesia Post Evaluation    Patient location during evaluation: Phase 2  Patient participation: Able to fully participate in evaluation  Level of consciousness: awake and alert  Pain management: adequate  Airway patency: patent  Cardiovascular status: acceptable  Respiratory status: acceptable  Hydration status: acceptable  PONV: none     Anesthetic complications: None          Last vitals:  Vitals:    09/26/17 0858 09/26/17 0900 09/26/17 0915   BP: 129/81 137/82 146/77   Resp: 12 12 10   Temp:  36.6  C (97.8  F)    SpO2:  100% 100%         Electronically Signed By: Chato Stallings MD  September 26, 2017  9:39 AM

## 2017-09-26 NOTE — IP AVS SNAPSHOT
MRN:[id number]                      After Visit Summary   9/26/2017    Naomy Friend    MRN: [id number]           Thank you!     Thank you for choosing [facility] for your care. Our goal is always to provide you with excellent care. Hearing back from our patients is one way we can continue to improve our services. Please take a few minutes to complete the written survey that you may receive in the mail after you visit with us. Thank you!        Patient Information     Date Of Birth          1970        About your hospital stay     You were admitted on:  September 26, 2017 You last received care in the:  Same Day Surgery [facility]    You were discharged on:  September 26, 2017       Who to Call     For medical emergencies, please call 911.  For non-urgent questions about your medical care, please call your primary care provider or clinic, [phone]  For questions related to your surgery, please call your surgery clinic        Attending Provider     Provider Specialty    Td Garvey MD Colon and Rectal Surgery       Primary Care Provider Office Phone # Fax #    Jovita JERRICA Carter [facility] [phone]      After Care Instructions     Diet Instructions       Resume pre-procedure diet            Discharge Instructions       Follow up appointment as instructed by Surgeon and/or RN            Dressing       Keep dry folded 4x4 gauze or cotton ball on anus and change twice daily, and as needed.            No Alcohol       For 24 hours after procedure and if taking narcotic pain medications or Metronidazole (FLAGYL).            Sitz bath       Start sitz baths the night of surgery and perform 2-3 times per day, and after bowel movements. Sit down in 8-10 inches of warm water (no need to add soap or salts) in the bathtub for 5 minutes at a time. You can also use a removable showerhead for the same purpose.                  Further instructions from your care team        Anorectal Surgery Instructions    What can I expect after anorectal surgery?  Most anorectal procedures are done as outpatient surgery, and you go home the same day as the procedure. A few surgical procedures will require that you stay in the hospital for about one to three days. No matter where the procedure is done or how long or short it takes, these recommendations will help you heal and feel more comfortable.    Medicines:  The anal area is very sensitive; you can expect to have some pain for up to 2-4 weeks after the procedure. Your doctor will give you a prescription for one or more pain medications.    Take naprosyn 500 mg twice a day OR ibuprofen 600 mg four times a day     Take this on a regular basis (not as needed) following your surgery.     The drugs are best taken with food.  Do not take if it causes stomach upset or if you have a history of ulcers or gastritis. You can stop the naprosyn (or ibuprofen) or reduce the dose when you are feeling better.    DO NOT use naprosyn, ibuprofen, or other similar agents (eg. Advil or Aleve) if you have inflammatory bowel disease (Ulcerative Colitis or Crohn's disease) or if your doctor as advised you against using these medications    Take acetominaphen (Tylenol) 650-1000 mg four times a day.     Take this on a regular basis (not as needed) following surgery for pain control.     Take the lower dose if you are >65 years old or have liver disease. The maximum dose of acetominaphen is 4000 mg a day. You can stop the acetaminophen or reduce the dose when you are feeling better.    It is important to realize that many narcotic pain relievers (including vicodin, percocet, tylenol #3) also have acetaminophen, and excessive doses of acetaminophen can be dangerous, so do not take these in addition to acetominaphen.  You may take narcotics that don't contain acetominaphen such as oxycodone.      Take oxycodone AS NEEDED in addition to the acetominaphen and naprosyn.       Because narcotics have side effects (including constipation), you should reduce your use of these medications as tolerated as your pain improves.    *In general, the best strategy is to take (if you are able to tolerate it) the tylenol and naprosen on a regular basis until your pain has largely gone away. You can take the narcotic pain medicine as needed in addition to the tylenol and ibuprofen. As your pain begins to lessen, you should cut back on your narcotic use while continuing to take your regular tylenol and naprosyn doses.      Refilling prescriptions. If you need additional pain medication, please call the triage nurse at [phone] during normal business hours (8 a.m. to 4 p.m., Monday though Friday) or have your pharmacy fax a refill request to [phone]. If you call after hours or on the weekends, the doctor on call may not know you personally and may not renew narcotic pain medication by phone. Call your primary care provider for all other medication refills.    Perineal care:  Tub baths:    If possible, take a tub bath immediately after each bowel movement.     Baths should be take at least 3 times daily for the first week to 10 days following your procedure. You should soak in the tub for 10 to 15 minutes each time with water as warm as you can tolerate.     Even after you go back to work, it is a good idea to sit in the tub in the morning, after returning from work, and again in the evening before bedtime.    Bleeding/Infection:    You can expect to have some bleeding after bowel movements, but it should stop soon after you wipe.     Use a wet cloth or perianal pad (Tucks or Preparation H pads) to gently wipe the area after each bowel movement.    Do not rub the anal area or use a lot of pressure.    Using a spray bottle filled with warm water helps loosen any remaining stool. Blot gently with a soft dry cloth or tissue paper.    Infection around the anal opening is not very common. The anal  area has excellent blood supply, which helps the area to heal. Bloody discharge after bowel movements is normal and may last 2 to 4 weeks after your surgery. However, if you bleed between bowel movements and cannot get it to stop, call the triage nurse immediately [phone].    Bowel function:  Take a fiber supplement such as Metamucil, which is over the counter. It is important to drink six to eight glasses of water or juice everyday when using fiber products.    If you do not have a bowel movement after 1-2 days:    Take Milk of Magnesia-2 tablespoons.       If there are no results, repeat this or add over the counter Miralax.      If you still do not have results, contact the clinic.     If there are no results, repeat this. Stop taking Milk of Magnesia or other laxatives if you begin to have diarrhea.    * Constipation will cause you to strain when you have a bowel movement. The hard stool will be difficult to pass, will increase pain and bleeding, and will slow down healing.  Try to avoid constipation and/or diarrhea as this can make the pain and bleeding worse.    * It is important to have regular bowel movements at least every other day and to keep your stool soft.  A high fiber diet, including at least four servings of fruits or vegetables daily, will help to keep your bowel movements regular and soft.    Activity:  After your procedure, there are no restrictions on your activity     except restrictions surrounding being on narcotics and in pain, such as no heavy machine operating or driving.     You may walk, climb stairs, ride in a car, and sit as tolerated.     It is helpful to avoid sitting in one position for long periods (2 or more hours).    After some surgeries, you may be told not to perform any lifting (more than 10 pounds) for several weeks after surgery.    When to call:  When do I need to call the doctor or triage nurse?    If you experience any of the problems listed here, call our triage  nurse during business hours ([phone]).     The nurse will help you with your problem or have the doctor call you.     After hours and on weekends, please call the main hospital number ([phone]) and ask for the colon and rectal surgery person on call.     Some is available to help you 24 hours a day, seven days a week.    Call for:   ? Fever greater than 101 degrees   ? Chills   ? Foul-smelling drainage   ? Nausea and vomiting   ? Diarrhea - greater than 3 water stools in 24 hours   ? Constipation - no bowel movement after 3 days   ? Severe bleeding that does not stop soon after a bowel movement   ? Problems with the incision, including increased pain, swelling, or redness    ..[facility], [place]  Same-Day Surgery   Adult Discharge Orders & Instructions     For 24 hours after surgery    1. Get plenty of rest.  A responsible adult must stay with you for at least 24 hours after you leave the hospital.   2. Do not drive or use heavy equipment.  If you have weakness or tingling, don't drive or use heavy equipment until this feeling goes away.  3. Do not drink alcohol.  4. Avoid strenuous or risky activities.  Ask for help when climbing stairs.   5. You may feel lightheaded.  IF so, sit for a few minutes before standing.  Have someone help you get up.   6. If you have nausea (feel sick to your stomach): Drink only clear liquids such as apple juice, ginger ale, broth or 7-Up.  Rest may also help.  Be sure to drink enough fluids.  Move to a regular diet as you feel able.  7. You may have a slight fever. Call the doctor if your fever is over 100 F (37.7 C) (taken under the tongue) or lasts longer than 24 hours.  8. You may have a dry mouth, a sore throat, muscle aches or trouble sleeping.  These should go away after 24 hours.  9. Do not make important or legal decisions.   Call your doctor for any of the followin.  Signs of infection (fever, growing tenderness at the surgery  "site, a large amount of drainage or bleeding, severe pain, foul-smelling drainage, redness, swelling).    2. It has been over 8 to 10 hours since surgery and you are still not able to urinate (pass water).    3.  Headache for over 24 hours.    4.  Numbness, tingling or weakness the day after surgery (if you had spinal anesthesia).  To contact a doctor, call ________________________________________ or:        [phone] and ask for the resident on call for   ______________________________________________ (answered 24 hours a day)      Emergency Department:    [facility]: [phone]       (TTY for hearing impaired: [phone])    [facility]: [phone]       (TTY for hearing impaired: [phone])    .       Tips for taking pain medications  To get the best pain relief possible , remember these points:      Take pain medications as directed, before pain becomes severe      Pain medication can upset your stomach: taking it with food may help      Constipation is a common side effect of pain medication. Drink plenty of  Fluids      Eat foods high in fiber. Take a stool softener  if recommended by your doctor or  Pharmacist.        Do not drink alcohol, drive or operate machinery while taking pain medications.      Ask about other ways to control pain, such as with heat, ice or relaxation.      Pending Results     No orders found from 9/24/2017 to 9/27/2017.            Admission Information     Date & Time Provider Department Dept. Phone    9/26/2017 Td Garvey MD Same Day Surgery [facility] [phone]      Your Vitals Were     Blood Pressure Temperature Respirations Height Weight Pulse Oximetry    146/77 97.8  F (36.6  C) (Oral) 10 1.676 m (5' 6\") 115 kg (253 lb 8.5 oz) 100%    BMI (Body Mass Index)                   40.92 kg/m2           [facility] Information     [facility] gives you secure access to your electronic health record. If you see a primary care provider, you can also " send messages to your care team and make appointments. If you have questions, please call your primary care clinic.  If you do not have a primary care provider, please call [phone] and they will assist you.        Care EveryWhere ID     This is your Care EveryWhere ID. This could be used by other organizations to access your [facility] medical records  [id number]        Equal Access to Services     [facility] : Hadii aad ku hadasho Sojohnathanali, waaxda luqadaha, qaybta kaalmada adekaylynnyada, josr jonesafricapa tyson . So [facility] [phone].    ATENCIÓN: Si habla español, tiene a lorenzana disposición servicios gratuitos de asistencia lingüística. Negrita al [phone].    We comply with applicable federal civil rights laws and [place] laws. We do not discriminate on the basis of race, color, national origin, age, disability sex, sexual orientation or gender identity.               Review of your medicines      START taking        Dose / Directions    acetaminophen 325 MG tablet   Commonly known as:  TYLENOL   Used for:  Anal condyloma        Dose:  650 mg   Take 2 tablets (650 mg) by mouth 4 times daily for 10 days Alternate with ibuprofen (ADVIL/MOTRIN), IF ordered.   Quantity:  80 tablet   Refills:  0       ibuprofen 800 MG tablet   Commonly known as:  ADVIL/MOTRIN   Used for:  Anal condyloma        Dose:  800 mg   Take 1 tablet (800 mg) by mouth 3 times daily for 10 days Alternate with acetaminophen (TYLENOL), IF ordered.   Quantity:  30 tablet   Refills:  0       metroNIDAZOLE 250 MG tablet   Commonly known as:  FLAGYL   Used for:  Anal condyloma        Dose:  250 mg   Take 1 tablet (250 mg) by mouth 3 times daily for 5 days   Quantity:  15 tablet   Refills:  0       oxyCODONE 5 MG IR tablet   Commonly known as:  ROXICODONE   Used for:  Anal condyloma        Dose:  5-10 mg   Take 1-2 tablets (5-10 mg) by mouth every 3 hours as needed for severe pain   Quantity:  30 tablet   Refills:  0        polyethylene glycol powder   Commonly known as:  MIRALAX/GLYCOLAX   Used for:  Anal condyloma        Dose:  1 capful   Take 17 g (1 capful) by mouth daily as needed for constipation (If no bowel movement in 24 hours. Mix in 8 oz of water.  May take twice daily.)   Quantity:  500 g   Refills:  0       silver sulfADIAZINE 1 % cream   Commonly known as:  SILVADENE   Used for:  Anal condyloma        Apply thick layer to anus 3-4 times per day for irritation for 5 days.   Quantity:  85 g   Refills:  1         CONTINUE these medicines which have NOT CHANGED        Dose / Directions    acyclovir 400 MG tablet   Commonly known as:  ZOVIRAX   Used for:  Recurrent herpes simplex        TAKE 1 TABLET (400 MG) BY MOUTH 3 TIMES DAILY   Quantity:  15 tablet   Refills:  0       cetirizine 10 MG tablet   Commonly known as:  zyrTEC   Used for:  Post-nasal drainage        Dose:  10 mg   Take 1 tablet (10 mg) by mouth every evening   Quantity:  90 tablet   Refills:  1       * citalopram 20 MG tablet   Commonly known as:  celeXA   Used for:  Mild recurrent major depression (H)        Dose:  30 mg   Take 1.5 tablets (30 mg) by mouth daily   Quantity:  135 tablet   Refills:  1       * citalopram 10 MG tablet   Commonly known as:  celeXA   Used for:  Mild recurrent major depression (H)        TAKE 1 TAB BY MOUTH DAILY WITH 20MG TAB FOR TOTAL DAILY DOSE OF 30MG   Quantity:  30 tablet   Refills:  3       * clotrimazole-betamethasone lotion   Commonly known as:  LOTRISONE        Refills:  0       * clotrimazole-betamethasone cream   Commonly known as:  LOTRISONE   Used for:  Tinea cruris        Apply topically 2 times daily   Quantity:  30 g   Refills:  1       DECONGESTANT + PO        Reported on 3/24/2017   Refills:  0       fluticasone 50 MCG/ACT spray   Commonly known as:  FLONASE        Dose:  2 spray   Spray 2 sprays into both nostrils daily Reported on 3/24/2017   Refills:  0       levonorgestrel 20 MCG/24HR IUD   Commonly known as:   MIRENA        Dose:  1 each   1 each by Intrauterine route once   Refills:  0       levothyroxine 75 MCG tablet   Commonly known as:  SYNTHROID/LEVOTHROID   Used for:  Hypothyroidism, unspecified type        TAKE 1 TABLET (75 MCG) BY MOUTH DAILY   Quantity:  90 tablet   Refills:  1       lisinopril 20 MG tablet   Commonly known as:  PRINIVIL/ZESTRIL   Used for:  HTN, goal below 140/90        Dose:  20 mg   Take 1 tablet (20 mg) by mouth daily   Quantity:  90 tablet   Refills:  1       MULTIVITAMIN ADULT PO        Refills:  0       mupirocin 2 % ointment   Commonly known as:  BACTROBAN   Used for:  Pyoderma        Apply topically 2 times daily Use 2 times a day to affected area.   Quantity:  15 g   Refills:  0       sinus rinse bottle   Used for:  Postoperative pain        Dose:  1 packet   Spray 1 packet in nostril 2 times daily   Quantity:  10 each   Refills:  3       * Notice:  This list has 4 medication(s) that are the same as other medications prescribed for you. Read the directions carefully, and ask your doctor or other care provider to review them with you.         Where to get your medicines      These medications were sent to [facility] [place], [place] - [address] [place] [address]     Phone:  [phone]     acetaminophen 325 MG tablet    ibuprofen 800 MG tablet    metroNIDAZOLE 250 MG tablet    silver sulfADIAZINE 1 % cream         Some of these will need a paper prescription and others can be bought over the counter. Ask your nurse if you have questions.     Bring a paper prescription for each of these medications     oxyCODONE 5 MG IR tablet    polyethylene glycol powder               ANTIBIOTIC INSTRUCTION     You've Been Prescribed an Antibiotic - Now What?  Your healthcare team thinks that you or your loved one might have an infection. Some infections can be treated with antibiotics, which are powerful, life-saving drugs. Like all medications,  antibiotics have side effects and should only be used when necessary. There are some important things you should know about your antibiotic treatment.      Your healthcare team may run tests before you start taking an antibiotic.    Your team may take samples (e.g., from your blood, urine or other areas) to run tests to look for bacteria. These test can be important to determine if you need an antibiotic at all and, if you do, which antibiotic will work best.      Within a few days, your healthcare team might change or even stop your antibiotic.    Your team may start you on an antibiotic while they are working to find out what is making you sick.    Your team might change your antibiotic because test results show that a different antibiotic would be better to treat your infection.    In some cases, once your team has more information, they learn that you do not need an antibiotic at all. They may find out that you don't have an infection, or that the antibiotic you're taking won't work against your infection. For example, an infection caused by a virus can't be treated with antibiotics. Staying on an antibiotic when you don't need it is more likely to be harmful than helpful.      You may experience side effects from your antibiotic.    Like all medications, antibiotics have side effects. Some of these can be serious.    Let you healthcare team know if you have any known allergies when you are admitted to the hospital.    One significant side effect of nearly all antibiotics is the risk of severe and sometimes deadly diarrhea caused by Clostridium difficile (C. Difficile). This occurs when a person takes antibiotics because some good germs are destroyed. Antibiotic use allows C. diificile to take over, putting patients at high risk for this serious infection.    As a patient or caregiver, it is important to understand your or your loved one's antibiotic treatment. It is especially important for caregivers to speak  up when patients can't speak for themselves. Here are some important questions to ask your healthcare team.    What infection is this antibiotic treating and how do you know I have that infection?    What side effects might occur from this antibiotic?    How long will I need to take this antibiotic?    Is it safe to take this antibiotic with other medications or supplements (e.g., vitamins) that I am taking?     Are there any special directions I need to know about taking this antibiotic? For example, should I take it with food?    How will I be monitored to know whether my infection is responding to the antibiotic?    What tests may help to make sure the right antibiotic is prescribed for me?      Information provided by:  www.cdc.gov/getsmart  U.S. Department of Health and Human Services  Centers for disease Control and Prevention  National Center for Emerging and Zoonotic Infectious Diseases  Division of Healthcare Quality Promotion         Protect others around you: Learn how to safely use, store and throw away your medicines at www.disposemymeds.org.             Medication List: This is a list of all your medications and when to take them. Check marks below indicate your daily home schedule. Keep this list as a reference.      Medications           Morning Afternoon Evening Bedtime As Needed    acetaminophen 325 MG tablet   Commonly known as:  TYLENOL   Take 2 tablets (650 mg) by mouth 4 times daily for 10 days Alternate with ibuprofen (ADVIL/MOTRIN), IF ordered.   Last time this was given:  975 mg on 9/26/2017  7:32 AM                                acyclovir 400 MG tablet   Commonly known as:  ZOVIRAX   TAKE 1 TABLET (400 MG) BY MOUTH 3 TIMES DAILY                                cetirizine 10 MG tablet   Commonly known as:  zyrTEC   Take 1 tablet (10 mg) by mouth every evening                                * citalopram 20 MG tablet   Commonly known as:  celeXA   Take 1.5 tablets (30 mg) by mouth daily                                 * citalopram 10 MG tablet   Commonly known as:  celeXA   TAKE 1 TAB BY MOUTH DAILY WITH 20MG TAB FOR TOTAL DAILY DOSE OF 30MG                                * clotrimazole-betamethasone lotion   Commonly known as:  LOTRISONE                                * clotrimazole-betamethasone cream   Commonly known as:  LOTRISONE   Apply topically 2 times daily                                DECONGESTANT + PO   Reported on 3/24/2017                                fluticasone 50 MCG/ACT spray   Commonly known as:  FLONASE   Spray 2 sprays into both nostrils daily Reported on 3/24/2017                                ibuprofen 800 MG tablet   Commonly known as:  ADVIL/MOTRIN   Take 1 tablet (800 mg) by mouth 3 times daily for 10 days Alternate with acetaminophen (TYLENOL), IF ordered.                                levonorgestrel 20 MCG/24HR IUD   Commonly known as:  MIRENA   1 each by Intrauterine route once                                levothyroxine 75 MCG tablet   Commonly known as:  SYNTHROID/LEVOTHROID   TAKE 1 TABLET (75 MCG) BY MOUTH DAILY                                lisinopril 20 MG tablet   Commonly known as:  PRINIVIL/ZESTRIL   Take 1 tablet (20 mg) by mouth daily                                metroNIDAZOLE 250 MG tablet   Commonly known as:  FLAGYL   Take 1 tablet (250 mg) by mouth 3 times daily for 5 days                                MULTIVITAMIN ADULT PO                                mupirocin 2 % ointment   Commonly known as:  BACTROBAN   Apply topically 2 times daily Use 2 times a day to affected area.                                oxyCODONE 5 MG IR tablet   Commonly known as:  ROXICODONE   Take 1-2 tablets (5-10 mg) by mouth every 3 hours as needed for severe pain                                polyethylene glycol powder   Commonly known as:  MIRALAX/GLYCOLAX   Take 17 g (1 capful) by mouth daily as needed for constipation (If no bowel movement in 24 hours. Mix in 8 oz of water.   May take twice daily.)                                silver sulfADIAZINE 1 % cream   Commonly known as:  SILVADENE   Apply thick layer to anus 3-4 times per day for irritation for 5 days.   Last time this was given:  1 g on 9/26/2017  8:44 AM                                sinus rinse bottle   Spray 1 packet in nostril 2 times daily                                * Notice:  This list has 4 medication(s) that are the same as other medications prescribed for you. Read the directions carefully, and ask your doctor or other care provider to review them with you.

## 2017-09-26 NOTE — BRIEF OP NOTE
[facility], [place]    Brief Operative Note    Pre-operative diagnosis: Anal Condyloma  Post-operative diagnosis * No post-op diagnosis entered *  Procedure: Procedure(s):  Examination Under anethesia, Excision And Fulguration Of Anal Condyloma - Wound Class: II-Clean Contaminated  Surgeon: Surgeon(s) and Role:     * Td Garvey MD - Primary     * Bora Paul MD - Assisting  Anesthesia: Monitor Anesthesia Care   Estimated blood loss: Minimal  Drains: None  Specimens:   ID Type Source Tests Collected by Time Destination   A : Left Posterior Condyloma Tissue Other SURGICAL PATHOLOGY EXAM Td Garvey MD 9/26/2017  8:38 AM      Findings:   Right posterior 2x1.5cm condyloma. No concerning findings of malignancy.  Complications: None.  Implants: None.

## 2017-09-26 NOTE — OR NURSING
Pt states her buttock pain is getting less but she doesn't believe she can sit in the recliner yet, see MAR, will assist pt to recliner when her pain allows

## 2017-09-26 NOTE — TELEPHONE ENCOUNTER
CITALOPRAM HBR 20 MG TABLET       Last Written Prescription Date: 2/6/17  Last Fill Quantity: 135, # refills: 1  Last Office Visit with [facility] primary care provider:  9/20/17        Last PHQ-9 score on record=   PHQ-9 SCORE 6/20/2017   Total Score -   Total Score MyChart -   Total Score 10

## 2017-09-27 ENCOUNTER — TELEPHONE (OUTPATIENT)
Dept: SURGERY | Facility: CLINIC | Age: 47
End: 2017-09-27

## 2017-09-28 LAB — COPATH REPORT: NORMAL

## 2017-09-29 ENCOUNTER — TELEPHONE (OUTPATIENT)
Dept: FAMILY MEDICINE | Facility: CLINIC | Age: 47
End: 2017-09-29

## 2017-09-29 NOTE — TELEPHONE ENCOUNTER
Patient called to report back pain and difficulty sleeping. She had surgery for anal condyloma on 9/26/17. She is currently taking oxycodone (5-10 mg q3h), alternating ibuprofen (800 mg TID) and acetaminophen (650 mg QID) for pain.     She is wondering if it is okay for her to take melatonin or an OTC sleep medicine to help with sleep while she is taking the other medications listed above.    Routing to provider pool.     Candi Garcia RN  [facility]

## 2017-09-29 NOTE — TELEPHONE ENCOUNTER
Pt called in asking if there is any medication she would be able to get as she is having a hard time sleeping due to pain. Pt believes she did something to her back after lifting up her grandchild, and she is also having pain in both of her hips. Pt states she recently had a procedure done as well, and she is in a lot of pain and is unable to sleep.    Pt can be reached @ [phone] shamika

## 2017-10-01 DIAGNOSIS — L08.0 PYODERMA: ICD-10-CM

## 2017-10-02 RX ORDER — MUPIROCIN 20 MG/G
OINTMENT TOPICAL
Qty: 22 G | Refills: 0 | OUTPATIENT
Start: 2017-10-02

## 2017-10-02 NOTE — TELEPHONE ENCOUNTER
Patient hasn't been seen by dermatology since 2014.  Appt needed.  Pharmacy notified.  Brandie Ferro RN

## 2017-10-03 LAB — COLONOSCOPY: NORMAL

## 2017-10-12 ENCOUNTER — OFFICE VISIT (OUTPATIENT)
Dept: SURGERY | Facility: CLINIC | Age: 47
End: 2017-10-12

## 2017-10-12 VITALS
SYSTOLIC BLOOD PRESSURE: 137 MMHG | BODY MASS INDEX: 40.69 KG/M2 | DIASTOLIC BLOOD PRESSURE: 84 MMHG | HEIGHT: 66 IN | OXYGEN SATURATION: 95 % | TEMPERATURE: 98.3 F | HEART RATE: 78 BPM | WEIGHT: 253.2 LBS

## 2017-10-12 DIAGNOSIS — A63.0 ANAL CONDYLOMA: ICD-10-CM

## 2017-10-12 DIAGNOSIS — Z09 FOLLOW-UP EXAMINATION FOLLOWING SURGERY: Primary | ICD-10-CM

## 2017-10-12 PROCEDURE — 36415 COLL VENOUS BLD VENIPUNCTURE: CPT

## 2017-10-12 PROCEDURE — 99284 EMERGENCY DEPT VISIT MOD MDM: CPT | Mod: Z6 | Performed by: EMERGENCY MEDICINE

## 2017-10-12 PROCEDURE — 99283 EMERGENCY DEPT VISIT LOW MDM: CPT

## 2017-10-12 ASSESSMENT — PAIN SCALES - GENERAL: PAINLEVEL: NO PAIN (0)

## 2017-10-12 NOTE — PROGRESS NOTES
Colon and Rectal Surgery Postoperative Clinic Note    RE: Naomy Friend  : 1970  MARIE: 10/12/2017    Naomy Friend is a very pleasant 47 year old female with anal condyloma now status post evaluation under anesthesia with fulguration and excision of anal condyloma. She presents today for follow up.    Interval history: Naomy has been doing well. She continues to have a small amount of blood with bowel movements occasionally, however. Only minimal discomfort. She is having normal bowel movements. She can feel an external lump that she is concerned is a hemorrhoid. She would like to know when she can have anoreceptive intercourse.    Assessment/Plan:  47 year old female with anal condyloma now status post evaluation under anesthesia with fulguration and excision of anal condyloma. 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 We discussed the nature of the HPV virus, recurrent nature of anal condyloma, and anal dysplasia. Discussed that we will need to monitor her long term for this. As she does not have any other risk factors, I think we should obtain anal cytology in the absence of warts. Recommended an anal Pap when she is completely healed given high grade dysplasia on condyloma. If this is normal, repeat in one year with a wart check in 6 months. If abnormal, would recommend high resolution anoscopy. She will contact the clinic when she feels completely healed. Small external hemorrhoids without thrombosis or bleeding. Reassured her these should decrease in the next few weeks. No anoreceptive intercourse until completely healed (4-6 weeks). Patient's questions were answered to her stated satisfaction and she is in agreement with this plan.    Medical history:  Past Medical History:   Diagnosis Date     CARDIOVASCULAR SCREENING; LDL GOAL LESS THAN 160 2012     Hypertension     watching blood pressure     Obesity, unspecified     Obesity     Sleep apnea     no cpap     Thyroid  disease        Surgical history:  Past Surgical History:   Procedure Laterality Date     BREAST SURGERY      breast reduction bilat     COLONOSCOPY WITH CO2 INSUFFLATION N/A 9/1/2017    Procedure: COLONOSCOPY WITH CO2 INSUFFLATION;  Colonoscopy, Abdominal pain, left lower quadrant, Parenteau, BMI 42.93, [phone];  Surgeon: Anuel Rodríguez MD;  Location:  OR     ENDOSCOPIC SINUS SURGERY Right 3/15/2017    Procedure: ENDOSCOPIC SINUS SURGERY;;  Surgeon: Vicki Zurita MD;  Location: [facility] OR     EXAM UNDER ANESTHESIA, FULGURATE CONDYLOMA ANUS, COMBINED N/A 9/26/2017    Procedure: COMBINED EXAM UNDER ANESTHESIA, FULGURATE CONDYLOMA ANUS;  Examination Under anethesia, Excision And Fulguration Of Anal Condyloma;  Surgeon: Td Garvey MD;  Location: [facility] OR     LAPAROSCOPIC CHOLECYSTECTOMY  08/05/99     TONSILLECTOMY Bilateral 3/15/2017    Procedure: TONSILLECTOMY;  Bilateral Tonsillectomy, Right Functional Endoscopic Sinus Surgery ;  Surgeon: Vicki Zurita MD;  Location: [facility] OR       Problem list:    Patient Active Problem List    Diagnosis Date Noted     Tonsillitis 03/15/2017     Priority: Medium     Morbid obesity, unspecified obesity type (H) 10/03/2016     Priority: Medium     HTN, goal below 140/90 06/15/2015     Priority: Medium     Dysfunction of eustachian tube 01/03/2014     Priority: Medium     Menorrhagia, treated with mirena IUD (inserted July 2013)  12/02/2013     Priority: Medium     Family history of diabetes mellitus 12/02/2013     Priority: Medium     Mild recurrent major depression (H) 10/01/2012     Priority: Medium     Obesity      Priority: Medium     Obesity  Problem list name updated by automated process. Provider to review       CARDIOVASCULAR SCREENING; LDL GOAL LESS THAN 160 06/06/2012     Priority: Medium     Hypothyroidism 07/18/2011     Priority: Medium       Medications:  Current Outpatient Prescriptions   Medication Sig Dispense Refill      citalopram (CELEXA) 20 MG tablet TAKE 1 AND 1/2 TABLETS (30 MG) BY MOUTH DAILY 135 tablet 0     Multiple Vitamins-Minerals (MULTIVITAMIN ADULT PO)        acyclovir (ZOVIRAX) 400 MG tablet TAKE 1 TABLET (400 MG) BY MOUTH 3 TIMES DAILY 15 tablet 0     levothyroxine (SYNTHROID/LEVOTHROID) 75 MCG tablet TAKE 1 TABLET (75 MCG) BY MOUTH DAILY 90 tablet 1     lisinopril (PRINIVIL/ZESTRIL) 20 MG tablet Take 1 tablet (20 mg) by mouth daily 90 tablet 1     citalopram (CELEXA) 10 MG tablet TAKE 1 TAB BY MOUTH DAILY WITH 20MG TAB FOR TOTAL DAILY DOSE OF 30MG 30 tablet 3     clotrimazole-betamethasone (LOTRISONE) cream Apply topically 2 times daily 30 g 1     Hypertonic Nasal Wash (SINUS RINSE BOTTLE KIT) PACK Spray 1 packet in nostril 2 times daily 10 each 3     Chlorpheniramine-Pseudoeph (DECONGESTANT + PO) Reported on 3/24/2017       cetirizine (ZYRTEC) 10 MG tablet Take 1 tablet (10 mg) by mouth every evening 90 tablet 1     levonorgestrel (MIRENA) 20 MCG/24HR IUD 1 each by Intrauterine route once       mupirocin (BACTROBAN) 2 % ointment Apply topically 2 times daily Use 2 times a day to affected area. 15 g 0     fluticasone (FLONASE) 50 MCG/ACT nasal spray Spray 2 sprays into both nostrils daily Reported on 3/24/2017       silver sulfADIAZINE (SILVADENE) 1 % cream Apply thick layer to anus 3-4 times per day for irritation for 5 days. (Patient not taking: Reported on 10/12/2017) 85 g 1       Allergies:  Allergies   Allergen Reactions     Percocet [Oxycodone-Acetaminophen] Itching       Family history:  Family History   Problem Relation Age of Onset     DIABETES Mother      Hypertension Mother      CANCER Father      pancreatic     HEART DISEASE Maternal Grandmother      DIABETES Maternal Grandmother      CEREBROVASCULAR DISEASE Maternal Grandfather      Cancer - colorectal Paternal Grandfather      DIABETES Brother      Asthma Son      Depression Son      Asthma Daughter        Social history:  Social History   Substance Use  "Topics     Smoking status: Never Smoker     Smokeless tobacco: Never Used     Alcohol use No     Marital status: .    Nursing Notes:   Erin Mcfarlane LPN  10/12/2017  7:39 AM  Signed  Chief Complaint   Patient presents with     Surgical Followup     Post op visit.        Vitals:    10/12/17 0736   BP: 137/84   BP Location: Left arm   Patient Position: Chair   Cuff Size: Adult Large   Pulse: 78   Temp: 98.3  F (36.8  C)   TempSrc: Oral   SpO2: 95%   Weight: 253 lb 3.2 oz   Height: 5' 6\"       Body mass index is 40.87 kg/(m^2).     Clari SOTO LPN                           Physical Examination:  /84 (BP Location: Left arm, Patient Position: Chair, Cuff Size: Adult Large)  Pulse 78  Temp 98.3  F (36.8  C) (Oral)  Ht 5' 6\"  Wt 253 lb 3.2 oz  SpO2 95%  BMI 40.87 kg/m2  General: alert, oriented, in no acute distress, sitting comfortably  HEENT: mucous membranes moist    Perianal external examination:  Perianal skin: Intact with no excoriation or lichenification.  Lesions: No evidence of an external lesion, nodularity, or induration in the perianal region.  Eversion of buttocks: There was not evidence of an anal fissure. Details: N/A.  Skin tags or external hemorrhoids: Yes: small external hemorrhoids without thrombosis or bleeding. Non tender.    Digital rectal examination: Was deferred.    Anoscopy: Was deferred.    Total face to face time was 15 minutes, >50% counseling.  This is a postop visit.    JERRICA Hamm, NP-C  Colon and Rectal Surgery  [facility]    This note was created using speech recognition software and may contain unintended word substitutions.    "

## 2017-10-12 NOTE — NURSING NOTE
"Chief Complaint   Patient presents with     Surgical Followup     Post op visit.        Vitals:    10/12/17 0736   BP: 137/84   BP Location: Left arm   Patient Position: Chair   Cuff Size: Adult Large   Pulse: 78   Temp: 98.3  F (36.8  C)   TempSrc: Oral   SpO2: 95%   Weight: 253 lb 3.2 oz   Height: 5' 6\"       Body mass index is 40.87 kg/(m^2).     Clari SOTO LPN                        "

## 2017-10-12 NOTE — MR AVS SNAPSHOT
After Visit Summary   10/12/2017    Naomy Friend    MRN: [id number]           Patient Information     Date Of Birth          1970        Visit Information        Provider Department      10/12/2017 7:30 AM Linnette Galarza APRN CNP [facility] Colon and Rectal Surgery        Today's Diagnoses     Follow-up examination following surgery    -  1    Anal condyloma           Follow-ups after your visit        Who to contact     Please call your clinic at [phone] to:    Ask questions about your health    Make or cancel appointments    Discuss your medicines    Learn about your test results    Speak to your doctor   If you have compliments or concerns about an experience at your clinic, or if you wish to file a complaint, please contact [facility] Patient Relations at [phone] or email us at [email or web address]         Additional Information About Your Visit        MyChart Information     [employer] gives you secure access to your electronic health record. If you see a primary care provider, you can also send messages to your care team and make appointments. If you have questions, please call your primary care clinic.  If you do not have a primary care provider, please call [phone] and they will assist you.      [employer] is an electronic gateway that provides easy, online access to your medical records. With [employer], you can request a clinic appointment, read your test results, renew a prescription or communicate with your care team.     To access your existing account, please contact your [facility] Clinic or call [phone] for assistance.        Care EveryWhere ID     This is your Care EveryWhere ID. This could be used by other organizations to access your [place] medical records  [id number]        Your Vitals Were     Pulse Temperature Height Pulse Oximetry BMI (Body Mass Index)       78 98.3  F (36.8  C)  "(Oral) 5' 6\" 95% 40.87 kg/m2        Blood Pressure from Last 3 Encounters:   10/12/17 137/84   09/26/17 146/89   09/20/17 146/84    Weight from Last 3 Encounters:   10/12/17 253 lb 3.2 oz   09/26/17 253 lb 8.5 oz   09/20/17 257 lb              Today, you had the following     No orders found for display       Primary Care Provider Office Phone # Fax #    Jovita Forde Pritesh, APRN [facility] [phone]       [address] [facility] [address]  [facility] [address]        Equal Access to Services     [facility]: Hadii aad ku hadasho Sojohnathanali, waaxda luqadaha, qaybta kaalmada adeegyada, josr lozano adekaylynn tyson . So [facility] [phone].    ATENCIÓN: Si habla español, tiene a lorenzana disposición servicios gratuitos de asistencia lingüística. [facility] [phone].    We comply with applicable federal civil rights laws and [place] laws. We do not discriminate on the basis of race, color, national origin, age, disability, sex, sexual orientation, or gender identity.            Thank you!     Thank you for choosing [facility] COLON AND RECTAL SURGERY  for your care. Our goal is always to provide you with excellent care. Hearing back from our patients is one way we can continue to improve our services. Please take a few minutes to complete the written survey that you may receive in the mail after your visit with us. Thank you!             Your Updated Medication List - Protect others around you: Learn how to safely use, store and throw away your medicines at www.disposemymeds.org.          This list is accurate as of: 10/12/17  8:01 AM.  Always use your most recent med list.                   Brand Name Dispense Instructions for use Diagnosis    acyclovir 400 MG tablet    ZOVIRAX    15 tablet    TAKE 1 TABLET (400 MG) BY MOUTH 3 TIMES DAILY    Recurrent herpes simplex       cetirizine 10 MG tablet    zyrTEC    90 tablet    Take 1 tablet (10 mg) by mouth every evening    Post-nasal drainage       * citalopram 10 MG " tablet    celeXA    30 tablet    TAKE 1 TAB BY MOUTH DAILY WITH 20MG TAB FOR TOTAL DAILY DOSE OF 30MG    Mild recurrent major depression (H)       * citalopram 20 MG tablet    celeXA    135 tablet    TAKE 1 AND 1/2 TABLETS (30 MG) BY MOUTH DAILY    Mild recurrent major depression (H)       clotrimazole-betamethasone cream    LOTRISONE    30 g    Apply topically 2 times daily    Tinea cruris       DECONGESTANT + PO      Reported on 3/24/2017        fluticasone 50 MCG/ACT spray    FLONASE     Spray 2 sprays into both nostrils daily Reported on 3/24/2017        levonorgestrel 20 MCG/24HR IUD    MIRENA     1 each by Intrauterine route once        levothyroxine 75 MCG tablet    SYNTHROID/LEVOTHROID    90 tablet    TAKE 1 TABLET (75 MCG) BY MOUTH DAILY    Hypothyroidism, unspecified type       lisinopril 20 MG tablet    PRINIVIL/ZESTRIL    90 tablet    Take 1 tablet (20 mg) by mouth daily    HTN, goal below 140/90       MULTIVITAMIN ADULT PO           mupirocin 2 % ointment    BACTROBAN    15 g    Apply topically 2 times daily Use 2 times a day to affected area.    Pyoderma       silver sulfADIAZINE 1 % cream    SILVADENE    85 g    Apply thick layer to anus 3-4 times per day for irritation for 5 days.    Anal condyloma       sinus rinse bottle     10 each    Spray 1 packet in nostril 2 times daily    Postoperative pain       * Notice:  This list has 2 medication(s) that are the same as other medications prescribed for you. Read the directions carefully, and ask your doctor or other care provider to review them with you.

## 2017-10-12 NOTE — LETTER
10/12/2017      RE: Naomy Friend  [address] [facility] [address]       Colon and Rectal Surgery Postoperative Clinic Note    RE: Naomy Friend  : 1970  MARIE: 10/12/2017    Naomy Friend is a very pleasant 47 year old female with anal condyloma now status post evaluation under anesthesia with fulguration and excision of anal condyloma. She presents today for follow up.    Interval history: Naomy has been doing well. She continues to have a small amount of blood with bowel movements occasionally, however. Only minimal discomfort. She is having normal bowel movements. She can feel an external lump that she is concerned is a hemorrhoid. She would like to know when she can have anoreceptive intercourse.    Assessment/Plan:  47 year old female with anal condyloma now status post evaluation under anesthesia with fulguration and excision of anal condyloma. 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 We discussed the nature of the HPV virus, recurrent nature of anal condyloma, and anal dysplasia. Discussed that we will need to monitor her long term for this. As she does not have any other risk factors, I think we should obtain anal cytology in the absence of warts. Recommended an anal Pap when she is completely healed given high grade dysplasia on condyloma. If this is normal, repeat in one year with a wart check in 6 months. If abnormal, would recommend high resolution anoscopy. She will contact the clinic when she feels completely healed. Small external hemorrhoids without thrombosis or bleeding. Reassured her these should decrease in the next few weeks. No anoreceptive intercourse until completely healed (4-6 weeks). Patient's questions were answered to her stated satisfaction and she is in agreement with this plan.    Medical history:  Past Medical History:   Diagnosis Date     CARDIOVASCULAR SCREENING; LDL GOAL LESS THAN 160 2012     Hypertension     watching  blood pressure     Obesity, unspecified     Obesity     Sleep apnea     no cpap     Thyroid disease        Surgical history:  Past Surgical History:   Procedure Laterality Date     BREAST SURGERY      breast reduction bilat     COLONOSCOPY WITH CO2 INSUFFLATION N/A 9/1/2017    Procedure: COLONOSCOPY WITH CO2 INSUFFLATION;  Colonoscopy, Abdominal pain, left lower quadrant, Parenteau, BMI 42.93, [phone];  Surgeon: Anuel Rodríguez MD;  Location:  OR     ENDOSCOPIC SINUS SURGERY Right 3/15/2017    Procedure: ENDOSCOPIC SINUS SURGERY;;  Surgeon: Vicki Zurita MD;  Location: [facility] OR     EXAM UNDER ANESTHESIA, FULGURATE CONDYLOMA ANUS, COMBINED N/A 9/26/2017    Procedure: COMBINED EXAM UNDER ANESTHESIA, FULGURATE CONDYLOMA ANUS;  Examination Under anethesia, Excision And Fulguration Of Anal Condyloma;  Surgeon: Td Garvey MD;  Location: [facility] OR     LAPAROSCOPIC CHOLECYSTECTOMY  08/05/99     TONSILLECTOMY Bilateral 3/15/2017    Procedure: TONSILLECTOMY;  Bilateral Tonsillectomy, Right Functional Endoscopic Sinus Surgery ;  Surgeon: Vicki Zurita MD;  Location: [facility] OR       Problem list:    Patient Active Problem List    Diagnosis Date Noted     Tonsillitis 03/15/2017     Priority: Medium     Morbid obesity, unspecified obesity type (H) 10/03/2016     Priority: Medium     HTN, goal below 140/90 06/15/2015     Priority: Medium     Dysfunction of eustachian tube 01/03/2014     Priority: Medium     Menorrhagia, treated with mirena IUD (inserted July 2013)  12/02/2013     Priority: Medium     Family history of diabetes mellitus 12/02/2013     Priority: Medium     Mild recurrent major depression (H) 10/01/2012     Priority: Medium     Obesity      Priority: Medium     Obesity  Problem list name updated by automated process. Provider to review       CARDIOVASCULAR SCREENING; LDL GOAL LESS THAN 160 06/06/2012     Priority: Medium     Hypothyroidism 07/18/2011     Priority:  Medium       Medications:  Current Outpatient Prescriptions   Medication Sig Dispense Refill     citalopram (CELEXA) 20 MG tablet TAKE 1 AND 1/2 TABLETS (30 MG) BY MOUTH DAILY 135 tablet 0     Multiple Vitamins-Minerals (MULTIVITAMIN ADULT PO)        acyclovir (ZOVIRAX) 400 MG tablet TAKE 1 TABLET (400 MG) BY MOUTH 3 TIMES DAILY 15 tablet 0     levothyroxine (SYNTHROID/LEVOTHROID) 75 MCG tablet TAKE 1 TABLET (75 MCG) BY MOUTH DAILY 90 tablet 1     lisinopril (PRINIVIL/ZESTRIL) 20 MG tablet Take 1 tablet (20 mg) by mouth daily 90 tablet 1     citalopram (CELEXA) 10 MG tablet TAKE 1 TAB BY MOUTH DAILY WITH 20MG TAB FOR TOTAL DAILY DOSE OF 30MG 30 tablet 3     clotrimazole-betamethasone (LOTRISONE) cream Apply topically 2 times daily 30 g 1     Hypertonic Nasal Wash (SINUS RINSE BOTTLE KIT) PACK Spray 1 packet in nostril 2 times daily 10 each 3     Chlorpheniramine-Pseudoeph (DECONGESTANT + PO) Reported on 3/24/2017       cetirizine (ZYRTEC) 10 MG tablet Take 1 tablet (10 mg) by mouth every evening 90 tablet 1     levonorgestrel (MIRENA) 20 MCG/24HR IUD 1 each by Intrauterine route once       mupirocin (BACTROBAN) 2 % ointment Apply topically 2 times daily Use 2 times a day to affected area. 15 g 0     fluticasone (FLONASE) 50 MCG/ACT nasal spray Spray 2 sprays into both nostrils daily Reported on 3/24/2017       silver sulfADIAZINE (SILVADENE) 1 % cream Apply thick layer to anus 3-4 times per day for irritation for 5 days. (Patient not taking: Reported on 10/12/2017) 85 g 1       Allergies:  Allergies   Allergen Reactions     Percocet [Oxycodone-Acetaminophen] Itching       Family history:  Family History   Problem Relation Age of Onset     DIABETES Mother      Hypertension Mother      CANCER Father      pancreatic     HEART DISEASE Maternal Grandmother      DIABETES Maternal Grandmother      CEREBROVASCULAR DISEASE Maternal Grandfather      Cancer - colorectal Paternal Grandfather      DIABETES Brother      Asthma  "Son      Depression Son      Asthma Daughter        Social history:  Social History   Substance Use Topics     Smoking status: Never Smoker     Smokeless tobacco: Never Used     Alcohol use No     Marital status: .    Nursing Notes:   Erin Mcfarlane LPN  10/12/2017  7:39 AM  Signed  Chief Complaint   Patient presents with     Surgical Followup     Post op visit.        Vitals:    10/12/17 0736   BP: 137/84   BP Location: Left arm   Patient Position: Chair   Cuff Size: Adult Large   Pulse: 78   Temp: 98.3  F (36.8  C)   TempSrc: Oral   SpO2: 95%   Weight: 253 lb 3.2 oz   Height: 5' 6\"       Body mass index is 40.87 kg/(m^2).     Clari SOTO LPN                           Physical Examination:  /84 (BP Location: Left arm, Patient Position: Chair, Cuff Size: Adult Large)  Pulse 78  Temp 98.3  F (36.8  C) (Oral)  Ht 5' 6\"  Wt 253 lb 3.2 oz  SpO2 95%  BMI 40.87 kg/m2  General: alert, oriented, in no acute distress, sitting comfortably  HEENT: mucous membranes moist    Perianal external examination:  Perianal skin: Intact with no excoriation or lichenification.  Lesions: No evidence of an external lesion, nodularity, or induration in the perianal region.  Eversion of buttocks: There was not evidence of an anal fissure. Details: N/A.  Skin tags or external hemorrhoids: Yes: small external hemorrhoids without thrombosis or bleeding. Non tender.    Digital rectal examination: Was deferred.    Anoscopy: Was deferred.    Total face to face time was 15 minutes, >50% counseling.  This is a postop visit.    JERRICA Hamm, NP-C  Colon and Rectal Surgery  [facility]    This note was created using speech recognition software and may contain unintended word substitutions.      JERRICA Hamm CNP      "

## 2017-10-13 ENCOUNTER — HOSPITAL ENCOUNTER (EMERGENCY)
Facility: CLINIC | Age: 47
Discharge: HOME | End: 2017-10-13
Attending: EMERGENCY MEDICINE | Admitting: EMERGENCY MEDICINE
Payer: COMMERCIAL

## 2017-10-13 VITALS
TEMPERATURE: 97.7 F | SYSTOLIC BLOOD PRESSURE: 124 MMHG | HEIGHT: 66 IN | WEIGHT: 253.31 LBS | DIASTOLIC BLOOD PRESSURE: 77 MMHG | RESPIRATION RATE: 16 BRPM | HEART RATE: 72 BPM | OXYGEN SATURATION: 95 % | BODY MASS INDEX: 40.71 KG/M2

## 2017-10-13 DIAGNOSIS — F33.0 MILD RECURRENT MAJOR DEPRESSION (H): ICD-10-CM

## 2017-10-13 DIAGNOSIS — K62.5 RECTAL HEMORRHAGE: ICD-10-CM

## 2017-10-13 LAB
ANION GAP SERPL CALCULATED.3IONS-SCNC: 7 MMOL/L (ref 3–14)
BASOPHILS # BLD AUTO: 0 10E9/L (ref 0–0.2)
BASOPHILS NFR BLD AUTO: 0.3 %
BUN SERPL-MCNC: 13 MG/DL (ref 7–30)
CALCIUM SERPL-MCNC: 9 MG/DL (ref 8.5–10.1)
CHLORIDE SERPL-SCNC: 108 MMOL/L (ref 94–109)
CO2 SERPL-SCNC: 25 MMOL/L (ref 20–32)
CREAT SERPL-MCNC: 0.65 MG/DL (ref 0.52–1.04)
DIFFERENTIAL METHOD BLD: ABNORMAL
EOSINOPHIL # BLD AUTO: 0.4 10E9/L (ref 0–0.7)
EOSINOPHIL NFR BLD AUTO: 2.9 %
ERYTHROCYTE [DISTWIDTH] IN BLOOD BY AUTOMATED COUNT: 13.8 % (ref 10–15)
GFR SERPL CREATININE-BSD FRML MDRD: >90 ML/MIN/1.7M2
GLUCOSE SERPL-MCNC: 101 MG/DL (ref 70–99)
HCT VFR BLD AUTO: 40.8 % (ref 35–47)
HGB BLD-MCNC: 13.1 G/DL (ref 11.7–15.7)
IMM GRANULOCYTES # BLD: 0 10E9/L (ref 0–0.4)
IMM GRANULOCYTES NFR BLD: 0.3 %
LYMPHOCYTES # BLD AUTO: 3.3 10E9/L (ref 0.8–5.3)
LYMPHOCYTES NFR BLD AUTO: 25.9 %
MCH RBC QN AUTO: 28.4 PG (ref 26.5–33)
MCHC RBC AUTO-ENTMCNC: 32.1 G/DL (ref 31.5–36.5)
MCV RBC AUTO: 89 FL (ref 78–100)
MONOCYTES # BLD AUTO: 0.6 10E9/L (ref 0–1.3)
MONOCYTES NFR BLD AUTO: 5.1 %
NEUTROPHILS # BLD AUTO: 8.3 10E9/L (ref 1.6–8.3)
NEUTROPHILS NFR BLD AUTO: 65.5 %
NRBC # BLD AUTO: 0 10*3/UL
NRBC BLD AUTO-RTO: 0 /100
PLATELET # BLD AUTO: 244 10E9/L (ref 150–450)
POTASSIUM SERPL-SCNC: 3.6 MMOL/L (ref 3.4–5.3)
RBC # BLD AUTO: 4.61 10E12/L (ref 3.8–5.2)
SODIUM SERPL-SCNC: 140 MMOL/L (ref 133–144)
WBC # BLD AUTO: 12.6 10E9/L (ref 4–11)

## 2017-10-13 PROCEDURE — 80048 BASIC METABOLIC PNL TOTAL CA: CPT | Performed by: EMERGENCY MEDICINE

## 2017-10-13 PROCEDURE — 85025 COMPLETE CBC W/AUTO DIFF WBC: CPT | Performed by: EMERGENCY MEDICINE

## 2017-10-13 ASSESSMENT — ENCOUNTER SYMPTOMS
VOMITING: 0
ANAL BLEEDING: 1
DIARRHEA: 0
SHORTNESS OF BREATH: 0
ABDOMINAL PAIN: 1
NAUSEA: 1
FEVER: 0

## 2017-10-13 NOTE — ED NOTES
Patient presents ambulatory with complaints of bright red rectal bleeding that started approximately 2030 this evening.

## 2017-10-13 NOTE — ED AVS SNAPSHOT
[facility], Emergency Department    [address] [facility] [address]    Phone:  [phone]                                       Naomy Friend   MRN: [id number]    Department:  [facility], Emergency Department   Date of Visit:  10/12/2017           Patient Information     Date Of Birth          1970        Your diagnoses for this visit were:     Rectal hemorrhage        You were seen by Balwinder Dover MD and Paige Schaefer MD.        Discharge Instructions         Lower GI Bleeding (Stable)  You have signs of blood in your stool. This is called rectal bleeding. The bleeding may have begun in another part of your gastrointestinal (GI) tract. If the blood is bright red, it is likely coming from the lower part of the GI tract. If the blood is black or dark, it might be coming from higher up in the GI tract. Very small amounts of GI bleeding may not be visible and can only be discovered during a test on your stool. Possible causes of lower GI bleeding include:    Hemorrhoids    Anal fissures    Diverticulitis    Inflammatory bowel disease (Crohn's disease or ulcerative colitis)    Polyps (growths) in the intestine  Note: Iron supplements and medicines for diarrhea or upset stomach can cause black stools. Foods such as licorice and red beets can also discolor the stool and be mistaken for bleeding. These are not bleeding and are not a cause for alarm.  Home care  You have not lost a large amount of blood and your condition appears stable at this time. You may resume normal activity as long as you feel well.  Avoid NSAIDs, such as aspirin, ibuprofen, or naproxen. They can irritate the stomach and cause further bleeding. If you are taking these medicines for other medical reasons, talk to your healthcare provider before you stop them.   Follow-up care  Follow up with your healthcare provider as advised. Further tests may be needed to find the cause of your bleeding.  When to seek medical  advice  Call your healthcare provider for any of the following:    Large amount of rectal bleeding     Increasing abdominal pain    Weakness, dizziness  Call 911  Get emergency medical care if any of the following occur:    Loss of consciousness    Vomiting blood  Date Last Reviewed: 6/24/2015 2000-2017 [employer]. [address] [place] [address]. All rights reserved. This information is not intended as a substitute for professional medical care. Always follow your healthcare professional's instructions.      Call your colorectal clinic tomorrow with an update of how you are doing. Return to the ER with new or worsening sx.    24 Hour Appointment Hotline       To make an appointment at any [facility], call [phone] ([phone]). If you don't have a family doctor or clinic, we will help you find one. [facility] clinics are conveniently located to serve the needs of you and your family.             Review of your medicines      Our records show that you are taking the medicines listed below. If these are incorrect, please call your family doctor or clinic.        Dose / Directions Last dose taken    acyclovir 400 MG tablet   Commonly known as:  ZOVIRAX   Quantity:  15 tablet        TAKE 1 TABLET (400 MG) BY MOUTH 3 TIMES DAILY   Refills:  0        cetirizine 10 MG tablet   Commonly known as:  zyrTEC   Dose:  10 mg   Quantity:  90 tablet        Take 1 tablet (10 mg) by mouth every evening   Refills:  1        * citalopram 10 MG tablet   Commonly known as:  celeXA   Quantity:  30 tablet        TAKE 1 TAB BY MOUTH DAILY WITH 20MG TAB FOR TOTAL DAILY DOSE OF 30MG   Refills:  3        * citalopram 20 MG tablet   Commonly known as:  celeXA   Quantity:  135 tablet        TAKE 1 AND 1/2 TABLETS (30 MG) BY MOUTH DAILY   Refills:  0        clotrimazole-betamethasone cream   Commonly known as:  LOTRISONE   Quantity:  30 g        Apply topically 2 times daily   Refills:  1         DECONGESTANT + PO        Reported on 3/24/2017   Refills:  0        fluticasone 50 MCG/ACT spray   Commonly known as:  FLONASE   Dose:  2 spray        Spray 2 sprays into both nostrils daily Reported on 3/24/2017   Refills:  0        levonorgestrel 20 MCG/24HR IUD   Commonly known as:  MIRENA   Dose:  1 each        1 each by Intrauterine route once   Refills:  0        levothyroxine 75 MCG tablet   Commonly known as:  SYNTHROID/LEVOTHROID   Quantity:  90 tablet        TAKE 1 TABLET (75 MCG) BY MOUTH DAILY   Refills:  1        lisinopril 20 MG tablet   Commonly known as:  PRINIVIL/ZESTRIL   Dose:  20 mg   Quantity:  90 tablet        Take 1 tablet (20 mg) by mouth daily   Refills:  1        MULTIVITAMIN ADULT PO        Refills:  0        mupirocin 2 % ointment   Commonly known as:  BACTROBAN   Quantity:  15 g        Apply topically 2 times daily Use 2 times a day to affected area.   Refills:  0        silver sulfADIAZINE 1 % cream   Commonly known as:  SILVADENE   Quantity:  85 g        Apply thick layer to anus 3-4 times per day for irritation for 5 days.   Refills:  1        sinus rinse bottle   Dose:  1 packet   Quantity:  10 each        Spray 1 packet in nostril 2 times daily   Refills:  3        * Notice:  This list has 2 medication(s) that are the same as other medications prescribed for you. Read the directions carefully, and ask your doctor or other care provider to review them with you.            Procedures and tests performed during your visit     Basic metabolic panel    CBC with platelets differential      Orders Needing Specimen Collection     None      Pending Results     No orders found from 10/11/2017 to 10/14/2017.            Pending Culture Results     No orders found from 10/11/2017 to 10/14/2017.            Pending Results Instructions     If you had any lab results that were not finalized at the time of your Discharge, you can call the ED Lab Result RN at [phone]. You will be contacted by  this team for any positive Lab results or changes in treatment. The nurses are available 7 days a week from 10A to 6:30P.  You can leave a message 24 hours per day and they will return your call.        Thank you for choosing [facility]       Thank you for choosing [facility] for your care. Our goal is always to provide you with excellent care. Hearing back from our patients is one way we can continue to improve our services. Please take a few minutes to complete the written survey that you may receive in the mail after you visit with us. Thank you!        [employer]     [employer] gives you secure access to your electronic health record. If you see a primary care provider, you can also send messages to your care team and make appointments. If you have questions, please call your primary care clinic.  If you do not have a primary care provider, please call [phone] and they will assist you.        Care EveryWhere ID     This is your Care EveryWhere ID. This could be used by other organizations to access your [facility] medical records  [id number]        Equal Access to Services     HODA SOSA : Hadii julien Tracy, waaxda abdirizak, qaybta kaalmajayla barboza, josr tyson . So [facility] [phone].    ATENCIÓN: Si habla español, tiene a lorenzana disposición servicios gratuitos de asistencia lingüística. Llame al [phone].    We comply with applicable federal civil rights laws and [place] laws. We do not discriminate on the basis of race, color, national origin, age, disability, sex, sexual orientation, or gender identity.            After Visit Summary       This is your record. Keep this with you and show to your community pharmacist(s) and doctor(s) at your next visit.

## 2017-10-13 NOTE — TELEPHONE ENCOUNTER
Patient was seen for depression follow up less than six months ago. PHQ-9 sent to patient, will await response.     DONELL VásquezN RN  [facility]

## 2017-10-13 NOTE — TELEPHONE ENCOUNTER
CITALOPRAM HBR 20 MG TABLET       Last Written Prescription Date: 9/26/17  Last Fill Quantity: 135, # refills: 0  Last Office Visit with [facility] primary care provider:  9/20/17        Last PHQ-9 score on record=   PHQ-9 SCORE 6/20/2017   Total Score -   Total Score MyChart -   Total Score 10

## 2017-10-13 NOTE — DISCHARGE INSTRUCTIONS
Lower GI Bleeding (Stable)  You have signs of blood in your stool. This is called rectal bleeding. The bleeding may have begun in another part of your gastrointestinal (GI) tract. If the blood is bright red, it is likely coming from the lower part of the GI tract. If the blood is black or dark, it might be coming from higher up in the GI tract. Very small amounts of GI bleeding may not be visible and can only be discovered during a test on your stool. Possible causes of lower GI bleeding include:    Hemorrhoids    Anal fissures    Diverticulitis    Inflammatory bowel disease (Crohn's disease or ulcerative colitis)    Polyps (growths) in the intestine  Note: Iron supplements and medicines for diarrhea or upset stomach can cause black stools. Foods such as licorice and red beets can also discolor the stool and be mistaken for bleeding. These are not bleeding and are not a cause for alarm.  Home care  You have not lost a large amount of blood and your condition appears stable at this time. You may resume normal activity as long as you feel well.  Avoid NSAIDs, such as aspirin, ibuprofen, or naproxen. They can irritate the stomach and cause further bleeding. If you are taking these medicines for other medical reasons, talk to your healthcare provider before you stop them.   Follow-up care  Follow up with your healthcare provider as advised. Further tests may be needed to find the cause of your bleeding.  When to seek medical advice  Call your healthcare provider for any of the following:    Large amount of rectal bleeding     Increasing abdominal pain    Weakness, dizziness  Call 911  Get emergency medical care if any of the following occur:    Loss of consciousness    Vomiting blood  Date Last Reviewed: 6/24/2015 2000-2017 [employer]. [address] [place] [address], [place] [address]. All rights reserved. This information is not intended as a substitute for professional medical care. Always follow your  healthcare professional's instructions.      Call your colorectal clinic tomorrow with an update of how you are doing. Return to the ER with new or worsening sx.

## 2017-10-13 NOTE — ED PROVIDER NOTES
History     Chief Complaint   Patient presents with     Rectal Bleeding     HPI  Naomy Friend is a 47-year-old female who presents to the Emergency Department after approximately 6 episodes of bright red blood per rectum tonight.  On 9/26, she had a fulguration and excision of anal condyloma.  She s been doing well since then, and actually had an office visit yesterday for recheck.  She states tonight she had 6 episodes of bright red blood per rectum, without any stool present.  They were approximately a half hour apart.  After she had had several episodes, she started developing some mild low abdominal achiness and some mild nausea.  No vomiting.  No chest pain, shortness of breath, palpitations.  No fevers. No other specific complaints.      This part of the document was transcribed by Stacy Murray Scribe.    Past Medical History:   Diagnosis Date     CARDIOVASCULAR SCREENING; LDL GOAL LESS THAN 160 6/6/2012     Hypertension     watching blood pressure     Obesity, unspecified     Obesity     Sleep apnea     no cpap     Thyroid disease        Past Surgical History:   Procedure Laterality Date     BREAST SURGERY      breast reduction bilat     COLONOSCOPY WITH CO2 INSUFFLATION N/A 9/1/2017    Procedure: COLONOSCOPY WITH CO2 INSUFFLATION;  Colonoscopy, Abdominal pain, left lower quadrant, Parenteau, BMI 42.93, [phone];  Surgeon: Anuel Rodríguez MD;  Location:  OR     ENDOSCOPIC SINUS SURGERY Right 3/15/2017    Procedure: ENDOSCOPIC SINUS SURGERY;;  Surgeon: Vicki Zurita MD;  Location: [facility] OR     EXAM UNDER ANESTHESIA, FULGURATE CONDYLOMA ANUS, COMBINED N/A 9/26/2017    Procedure: COMBINED EXAM UNDER ANESTHESIA, FULGURATE CONDYLOMA ANUS;  Examination Under anethesia, Excision And Fulguration Of Anal Condyloma;  Surgeon: Td Garvey MD;  Location:  OR     LAPAROSCOPIC CHOLECYSTECTOMY  08/05/99     TONSILLECTOMY Bilateral 3/15/2017    Procedure:  "TONSILLECTOMY;  Bilateral Tonsillectomy, Right Functional Endoscopic Sinus Surgery ;  Surgeon: Vicki Zurita MD;  Location:  OR       Family History   Problem Relation Age of Onset     DIABETES Mother      Hypertension Mother      CANCER Father      pancreatic     HEART DISEASE Maternal Grandmother      DIABETES Maternal Grandmother      CEREBROVASCULAR DISEASE Maternal Grandfather      Cancer - colorectal Paternal Grandfather      DIABETES Brother      Asthma Son      Depression Son      Asthma Daughter        Social History   Substance Use Topics     Smoking status: Never Smoker     Smokeless tobacco: Never Used     Alcohol use No         I have reviewed the Medications, Allergies, Past Medical and Surgical History, and Social History in the Epic system.    Review of Systems   Constitutional: Negative for fever.   Respiratory: Negative for shortness of breath.    Cardiovascular: Negative for chest pain.   Gastrointestinal: Positive for abdominal pain (very mild low abd discomfort), anal bleeding and nausea. Negative for diarrhea and vomiting.   All other systems reviewed and are negative.      Physical Exam   BP: (!) 181/112  Pulse: 67  Temp: 97.6  F (36.4  C)  Resp: 18  Height: 167.6 cm (5' 6\")  Weight: 114.9 kg (253 lb 4.9 oz)  SpO2: 97 %       Physical Exam   Constitutional: No distress.   HENT:   Head: Atraumatic.   Mouth/Throat: Oropharynx is clear and moist. No oropharyngeal exudate.   Eyes: Pupils are equal, round, and reactive to light. No scleral icterus.   Cardiovascular: Normal heart sounds and intact distal pulses.    Pulmonary/Chest: Breath sounds normal. No respiratory distress.   Abdominal: Soft. Bowel sounds are normal. There is no tenderness.   Genitourinary:   Genitourinary Comments: Non-thrombosed/non-inflamed external hemorrhoids seen. No evidence of bleeding noted on external exam. Internal exam deferred.   Musculoskeletal: She exhibits no edema or tenderness.   Skin: Skin is " warm. No rash noted. She is not diaphoretic.       ED Course     ED Course     Procedures             Critical Care time:  none             Labs Ordered and Resulted from Time of ED Arrival Up to the Time of Departure from the ED   CBC WITH PLATELETS DIFFERENTIAL - Abnormal; Notable for the following:        Result Value    WBC 12.6 (*)     All other components within normal limits   BASIC METABOLIC PANEL - Abnormal; Notable for the following:     Glucose 101 (*)     All other components within normal limits   MAY SALINE LOCK IV            Assessments & Plan (with Medical Decision Making)   The patient has no evidence of bleeding on external exam.  As when she was seen in the correct clinic yesterday, and they deferred internal exam due to the recent surgery, I opted to let the colorectal resident decide whether or not this was appropriate.  He did do a rectal exam, reported no blood seen.  Hemoglobin is fairly stable.  He feels that she likely did have some bleeding from the surgical site, but that currently it appears to likely stop.  No recurrent bleeding since arrival, that I'm aware of.  She's been here for a couple of hours.  I do think she is stable for discharge at this time, though she is encouraged to return to the ER with new or worsening symptoms.  The colorectal clinic will call her tomorrow to check in on how she is doing.    Dictation Disclaimer: Some of this Note has been completed with voice-recognition dictation software. Although errors are generally corrected real-time, there is the potential for a rare error to be present in the completed chart.      I have reviewed the nursing notes.    I have reviewed the findings, diagnosis, plan and need for follow up with the patient.    New Prescriptions    No medications on file       Final diagnoses:   Rectal hemorrhage       10/12/2017   [facility], [place], EMERGENCY DEPARTMENT     Paige Schaefer MD  10/13/17 0208

## 2017-10-13 NOTE — CONSULTS
"COLORECTAL SURGERY HISTORY AND PHYSICAL    ASSESSMENT/PLAN: Naomy Friend is a 47-year-old female s/p fulguration/excision of anal condyloma now with blood per rectum. Given negative rectal exam, normal hemoglobin, and reassuring vital signs, suspect she had a small amount of bleeding from her surgical site which has now stopped. She is likely passing old blood and clot which pooled in the area. Given that she is otherwise asymptomatic, we will have her discharge from the ED with close follow-up. Discussed reasons to seek medical attention including copious bright red blood per rectum, dizziness, shortness of breath, lightheadedness, and increasing pain.      Discussed with Dr. Paul, who will discuss with staff.    Giorgi Jaimes MD  PGY-3, General Surgery  Pager: [phone]    - - - - - - - - - -    CHIEF COMPLAINT: blood per rectum    HISTORY OF PRESENT ILLNESS: Naomy Friend is a 47-year-old female who underwent fulguration/excision of anal condyloma with Dr. Garvey on 9/26/2017. She has been doing well since the procedure overall. She has had small amounts of blood with wiping since Monday. She was seen in the colorectal clinic yesterday with minimal complaints. This evening, she has had about 6 episodes of blood per rectum and \"clots\" in the toilet. She has had these episodes when she tries to go to the bathroom and says that liquid blood, not stool comes out. She denies fever, chills, nausea, vomiting, constipation, anal trauma, anal intercourse since surgery.     REVIEW OF SYSTEMS: A 10 point review of systems was asked and negative except as above.    PAST MEDICAL HISTORY:   Past Medical History:   Diagnosis Date     CARDIOVASCULAR SCREENING; LDL GOAL LESS THAN 160 6/6/2012     Hypertension     watching blood pressure     Obesity, unspecified     Obesity     Sleep apnea     no cpap     Thyroid disease        SURGICAL HISTORY:   Past Surgical History:   Procedure Laterality Date     BREAST SURGERY      " breast reduction bilat     COLONOSCOPY WITH CO2 INSUFFLATION N/A 9/1/2017    Procedure: COLONOSCOPY WITH CO2 INSUFFLATION;  Colonoscopy, Abdominal pain, left lower quadrant, Parenteau, BMI 42.93, [phone];  Surgeon: Anuel Rodríguez MD;  Location: [facility] OR     ENDOSCOPIC SINUS SURGERY Right 3/15/2017    Procedure: ENDOSCOPIC SINUS SURGERY;;  Surgeon: Vicki Zurita MD;  [facility] OR     EXAM UNDER ANESTHESIA, FULGURATE CONDYLOMA ANUS, COMBINED N/A 9/26/2017    Procedure: COMBINED EXAM UNDER ANESTHESIA, FULGURATE CONDYLOMA ANUS;  Examination Under anethesia, Excision And Fulguration Of Anal Condyloma;  Surgeon: Td Garvey MD;  [facility] OR     LAPAROSCOPIC CHOLECYSTECTOMY  08/05/99     TONSILLECTOMY Bilateral 3/15/2017    Procedure: TONSILLECTOMY;  Bilateral Tonsillectomy, Right Functional Endoscopic Sinus Surgery ;  Surgeon: Vicki Zurita MD;  Location[facility] OR       SOCIAL HISTORY:   Social History     Social History     Marital status:      Spouse name: N/A     Number of children: N/A     Years of education: N/A     Social History Main Topics     Smoking status: Never Smoker     Smokeless tobacco: Never Used     Alcohol use No     Drug use: No     Sexual activity: Yes     Partners: Male     Birth control/ protection: IUD      Comment: stopped end of June for now (7/14/2017)     Other Topics Concern     Parent/Sibling W/ Cabg, Mi Or Angioplasty Before 65f 55m? No     Social History Narrative       FAMILY HISTORY:   Family History   Problem Relation Age of Onset     DIABETES Mother      Hypertension Mother      CANCER Father      pancreatic     HEART DISEASE Maternal Grandmother      DIABETES Maternal Grandmother      CEREBROVASCULAR DISEASE Maternal Grandfather      Cancer - colorectal Paternal Grandfather      DIABETES Brother      Asthma Son      Depression Son      Asthma Daughter        ALLERGIES:      Allergies   Allergen Reactions     Percocet  "[Oxycodone-Acetaminophen] Itching       MEDICATIONS:    No current facility-administered medications on file prior to encounter.   Current Outpatient Prescriptions on File Prior to Encounter:  citalopram (CELEXA) 20 MG tablet TAKE 1 AND 1/2 TABLETS (30 MG) BY MOUTH DAILY   silver sulfADIAZINE (SILVADENE) 1 % cream Apply thick layer to anus 3-4 times per day for irritation for 5 days. (Patient not taking: Reported on 10/12/2017)   Multiple Vitamins-Minerals (MULTIVITAMIN ADULT PO)    acyclovir (ZOVIRAX) 400 MG tablet TAKE 1 TABLET (400 MG) BY MOUTH 3 TIMES DAILY   levothyroxine (SYNTHROID/LEVOTHROID) 75 MCG tablet TAKE 1 TABLET (75 MCG) BY MOUTH DAILY   lisinopril (PRINIVIL/ZESTRIL) 20 MG tablet Take 1 tablet (20 mg) by mouth daily   citalopram (CELEXA) 10 MG tablet TAKE 1 TAB BY MOUTH DAILY WITH 20MG TAB FOR TOTAL DAILY DOSE OF 30MG   clotrimazole-betamethasone (LOTRISONE) cream Apply topically 2 times daily   Hypertonic Nasal Wash (SINUS RINSE BOTTLE KIT) PACK Spray 1 packet in nostril 2 times daily   Chlorpheniramine-Pseudoeph (DECONGESTANT + PO) Reported on 3/24/2017   cetirizine (ZYRTEC) 10 MG tablet Take 1 tablet (10 mg) by mouth every evening   levonorgestrel (MIRENA) 20 MCG/24HR IUD 1 each by Intrauterine route once   mupirocin (BACTROBAN) 2 % ointment Apply topically 2 times daily Use 2 times a day to affected area.   fluticasone (FLONASE) 50 MCG/ACT nasal spray Spray 2 sprays into both nostrils daily Reported on 3/24/2017       PHYSICAL EXAM:   BP (!) 181/112  Pulse 67  Temp 97.6  F (36.4  C) (Oral)  Resp 18  Ht 1.676 m (5' 6\")  Wt 114.9 kg (253 lb 4.9 oz)  SpO2 97%  BMI 40.89 kg/m2  General: Alert, well-appearing female in no acute distress.  HEENT: Normocephalic, atraumatic. Patent nares.   Chest wall: Symmetric thorax.   Respiratory: Non-labored breathing.   Cardiovascular: Regular rate and rhythm.   Gastrointestinal: Abdomen soft, non-distended, non-tender to palpation.  Anorectal: External " hemorrhoids. No other external lesions. Surgical site not visible on external exam. SNEHAL performed without discomfort, no blood on glove.  Extremities: Moving all four extremities. No limb deformities. No pedal edema. Peripheral pulses palpable in all distal extremities.  Skin: No rashes or lesions appreciated.    LAB DATA: Reviewed.   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

## 2017-10-13 NOTE — ED AVS SNAPSHOT
[facility], [place], Emergency Department    [address] [place] [id number]    Phone:  [phone]                                       Naomy Friend   MRN: [id number]    Department:  [facility], Emergency Department   Date of Visit:  10/12/2017           After Visit Summary Signature Page     I have received my discharge instructions, and my questions have been answered. I have discussed any challenges I see with this plan with the nurse or doctor.    ..........................................................................................................................................  Patient/Patient Representative Signature      ..........................................................................................................................................  Patient Representative Print Name and Relationship to Patient    ..................................................               ................................................  Date                                            Time    ..........................................................................................................................................  Reviewed by Signature/Title    ...................................................              ..............................................  Date                                                            Time

## 2017-10-15 DIAGNOSIS — B00.9 RECURRENT HERPES SIMPLEX: ICD-10-CM

## 2017-10-16 ENCOUNTER — CARE COORDINATION (OUTPATIENT)
Dept: CARE COORDINATION | Facility: CLINIC | Age: 47
End: 2017-10-16

## 2017-10-16 RX ORDER — ACYCLOVIR 400 MG/1
TABLET ORAL
Qty: 15 TABLET | Refills: 0 | Status: SHIPPED | OUTPATIENT
Start: 2017-10-16 | End: 2017-11-23

## 2017-10-16 NOTE — PROGRESS NOTES
Clinic Care Coordination Contact  [facility]/Voicemail    Referral Source: PCP  Clinical Data: Care Coordinator Outreach  Outreach attempted x 1.  Left message on voicemail with call back information and requested return call.  Plan:  Care Coordinator will try to reach patient again in 3-5 business days.  LOKI Salas    Care Coordinator  [facility], [facility], and [facility]   [phone]

## 2017-10-16 NOTE — TELEPHONE ENCOUNTER
ACYCLOVIR 400 MG TABLET       Last Written Prescription Date: 9/6/17  Last Fill Quantity: 15, # refills: 0  Last Office Visit with G, P or [facility] prescribing provider: 9/20/17        Creatinine   Date Value Ref Range Status   10/13/2017 0.65 0.52 - 1.04 mg/dL Final

## 2017-10-16 NOTE — TELEPHONE ENCOUNTER
Prescription approved per [facility] Refill Protocol.    Candi Garcia, BSN RN  [facility]

## 2017-10-17 RX ORDER — CITALOPRAM HYDROBROMIDE 20 MG/1
TABLET ORAL
Qty: 135 TABLET | Refills: 1 | OUTPATIENT
Start: 2017-10-17

## 2017-10-17 NOTE — TELEPHONE ENCOUNTER
Spoke with patient, she reports she just filled this medication and does not need a refill of it. Closing encounter.     DONELL VásquezN RN  [facility]

## 2017-11-07 ENCOUNTER — CARE COORDINATION (OUTPATIENT)
Dept: CARE COORDINATION | Facility: CLINIC | Age: 47
End: 2017-11-07

## 2017-11-07 NOTE — PROGRESS NOTES
Clinic Care Coordination Contact  [facility]/Voicemail    Referral Source: PCP  Clinical Data: Care Coordinator Outreach  Outreach attempted x 1.  Left message on voicemail with call back information and requested return call.  Plan:  Care Coordinator will do no further outreaches at this time.  LOKI Salas    Care Coordinator  [facility] Primary Care, and Women's   [phone]

## 2017-11-10 DIAGNOSIS — F33.0 MILD RECURRENT MAJOR DEPRESSION (H): ICD-10-CM

## 2017-11-10 RX ORDER — CITALOPRAM HYDROBROMIDE 20 MG/1
TABLET ORAL
Qty: 90 TABLET | Refills: 0 | Status: SHIPPED | OUTPATIENT
Start: 2017-11-10 | End: 2017-12-04

## 2017-11-10 ASSESSMENT — PATIENT HEALTH QUESTIONNAIRE - PHQ9: SUM OF ALL RESPONSES TO PHQ QUESTIONS 1-9: 8

## 2017-11-10 NOTE — TELEPHONE ENCOUNTER
CITALOPRAM HBR 20 MG TABLET        Last Written Prescription Date:  9/26/17  Last Fill Quantity: 135,   # refills: 0  Future Office visit:       Routing refill request to provider for review/approval because:  Does not meet protocol criteria  LOV: 9/20/17

## 2017-11-10 NOTE — TELEPHONE ENCOUNTER
Route to provider pool    PHQ9 done score 8  Spoke with pt. She has had a couple of tough weeks, she had pre CA and will be having this checked in a couple weeks to see if they got all or if more needs to be done, she doesn't have a plan to hurt herself but has had thoughts she would be better off dead/hurt herself. She declined offer to see someone. She feels it is related to the recent stress/issues    Anabella Cheung RN   [facility]

## 2017-11-10 NOTE — TELEPHONE ENCOUNTER
Spoke with pt, she scheduled an appt to be seen    Anabella Cheung RN   [facility]

## 2017-11-10 NOTE — TELEPHONE ENCOUNTER
Signed for #90 (60 day supply).  Last Ov for depression was 6/2017 - due for routine 6 month f/u 12/2017.  Please schedule with PCP.  DEANGELO Chiu

## 2017-11-13 ENCOUNTER — TELEPHONE (OUTPATIENT)
Dept: FAMILY MEDICINE | Facility: CLINIC | Age: 47
End: 2017-11-13

## 2017-11-13 NOTE — TELEPHONE ENCOUNTER
Completed PA for Citalopram HBR 20 MG Tablet for 1.5 Tab. Waiting for approval or denial.  Jeyson Mcfarlane MA

## 2017-11-21 ENCOUNTER — OFFICE VISIT (OUTPATIENT)
Dept: SURGERY | Facility: CLINIC | Age: 47
End: 2017-11-21

## 2017-11-21 VITALS
OXYGEN SATURATION: 97 % | TEMPERATURE: 97 F | BODY MASS INDEX: 39.7 KG/M2 | SYSTOLIC BLOOD PRESSURE: 126 MMHG | DIASTOLIC BLOOD PRESSURE: 77 MMHG | HEART RATE: 73 BPM | WEIGHT: 247 LBS | HEIGHT: 66 IN

## 2017-11-21 DIAGNOSIS — K62.82 ANAL DYSPLASIA: ICD-10-CM

## 2017-11-21 DIAGNOSIS — A63.0 ANAL CONDYLOMA: Primary | ICD-10-CM

## 2017-11-21 ASSESSMENT — PAIN SCALES - GENERAL: PAINLEVEL: NO PAIN (0)

## 2017-11-21 NOTE — PROGRESS NOTES
Colon and Rectal Surgery Postoperative Clinic Note    RE: Naomy Friend  : 1970  MARIE: 2017    Naomy Friend is a very pleasant 47 year old female with anal condyloma now status post evaluation under anesthesia with fulguration and excision of anal condyloma on 17 with Dr. Garvey. She presents today for anal cytology.    Interval history: Naomy has been doing well. She feels completely healed. No anorectal complaints.    Assessment/Plan:  47 year old female with anal condyloma now status post evaluation under anesthesia with fulguration and excision of anal condyloma. 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 We discussed the nature of the HPV virus, recurrent nature of anal condyloma, and anal dysplasia. Discussed that we will need to monitor her long term for this. As she does not have any other risk factors, I recommended anal cytology, which was obtained today. If this is normal, repeat in one year with wart checks every 6 months. If abnormal, would recommend high resolution anoscopy. Will follow up with her with the results from today. Patient's questions were answered to her stated satisfaction and she is in agreement with this plan.    Medical history:  Past Medical History:   Diagnosis Date     CARDIOVASCULAR SCREENING; LDL GOAL LESS THAN 160 2012     Hypertension     watching blood pressure     Obesity, unspecified     Obesity     Sleep apnea     no cpap     Thyroid disease        Surgical history:  Past Surgical History:   Procedure Laterality Date     BREAST SURGERY      breast reduction bilat     COLONOSCOPY WITH CO2 INSUFFLATION N/A 2017    Procedure: COLONOSCOPY WITH CO2 INSUFFLATION;  Colonoscopy, Abdominal pain, left lower quadrant, [place], BMI 42.93, [phone];  Surgeon: Anuel Rodríguez MD;  Location: [facility] OR     ENDOSCOPIC SINUS SURGERY Right 3/15/2017    Procedure: ENDOSCOPIC SINUS SURGERY;;  Surgeon: Vicki Zurita  MD Sabiha;  Location: [facility] OR     EXAM UNDER ANESTHESIA, FULGURATE CONDYLOMA ANUS, COMBINED N/A 9/26/2017    Procedure: COMBINED EXAM UNDER ANESTHESIA, FULGURATE CONDYLOMA ANUS;  Examination Under anethesia, Excision And Fulguration Of Anal Condyloma;  Surgeon: Td Garvey MD;  Location: [facility] OR     LAPAROSCOPIC CHOLECYSTECTOMY  08/05/99     TONSILLECTOMY Bilateral 3/15/2017    Procedure: TONSILLECTOMY;  Bilateral Tonsillectomy, Right Functional Endoscopic Sinus Surgery ;  Surgeon: Vicki Zurita MD;  Location: [facility] OR       Problem list:    Patient Active Problem List    Diagnosis Date Noted     Tonsillitis 03/15/2017     Priority: Medium     Morbid obesity, unspecified obesity type (H) 10/03/2016     Priority: Medium     HTN, goal below 140/90 06/15/2015     Priority: Medium     Dysfunction of eustachian tube 01/03/2014     Priority: Medium     Menorrhagia, treated with mirena IUD (inserted July 2013)  12/02/2013     Priority: Medium     Family history of diabetes mellitus 12/02/2013     Priority: Medium     Mild recurrent major depression (H) 10/01/2012     Priority: Medium     Obesity      Priority: Medium     Obesity  Problem list name updated by automated process. Provider to review       CARDIOVASCULAR SCREENING; LDL GOAL LESS THAN 160 06/06/2012     Priority: Medium     Hypothyroidism 07/18/2011     Priority: Medium       Medications:  Current Outpatient Prescriptions   Medication Sig Dispense Refill     citalopram (CELEXA) 20 MG tablet TAKE 1.5 TABLETS BY MOUTH DAILY 90 tablet 0     acyclovir (ZOVIRAX) 400 MG tablet TAKE 1 TABLET (400 MG) BY MOUTH 3 TIMES DAILY 15 tablet 0     silver sulfADIAZINE (SILVADENE) 1 % cream Apply thick layer to anus 3-4 times per day for irritation for 5 days. 85 g 1     Multiple Vitamins-Minerals (MULTIVITAMIN ADULT PO)        levothyroxine (SYNTHROID/LEVOTHROID) 75 MCG tablet TAKE 1 TABLET (75 MCG) BY MOUTH DAILY 90 tablet 1     lisinopril (PRINIVIL/ZESTRIL)  "20 MG tablet Take 1 tablet (20 mg) by mouth daily 90 tablet 1     citalopram (CELEXA) 10 MG tablet TAKE 1 TAB BY MOUTH DAILY WITH 20MG TAB FOR TOTAL DAILY DOSE OF 30MG 30 tablet 3     clotrimazole-betamethasone (LOTRISONE) cream Apply topically 2 times daily 30 g 1     Hypertonic Nasal Wash (SINUS RINSE BOTTLE KIT) PACK Spray 1 packet in nostril 2 times daily 10 each 3     Chlorpheniramine-Pseudoeph (DECONGESTANT + PO) Reported on 3/24/2017       cetirizine (ZYRTEC) 10 MG tablet Take 1 tablet (10 mg) by mouth every evening 90 tablet 1     levonorgestrel (MIRENA) 20 MCG/24HR IUD 1 each by Intrauterine route once       mupirocin (BACTROBAN) 2 % ointment Apply topically 2 times daily Use 2 times a day to affected area. 15 g 0     fluticasone (FLONASE) 50 MCG/ACT nasal spray Spray 2 sprays into both nostrils daily Reported on 3/24/2017         Allergies:  Allergies   Allergen Reactions     Percocet [Oxycodone-Acetaminophen] Itching       Family history:  Family History   Problem Relation Age of Onset     DIABETES Mother      Hypertension Mother      CANCER Father      pancreatic     HEART DISEASE Maternal Grandmother      DIABETES Maternal Grandmother      CEREBROVASCULAR DISEASE Maternal Grandfather      Cancer - colorectal Paternal Grandfather      DIABETES Brother      Asthma Son      Depression Son      Asthma Daughter        Social history:  Social History   Substance Use Topics     Smoking status: Never Smoker     Smokeless tobacco: Never Used     Alcohol use No     Marital status: .    Nursing Notes:   Mimi Vela RN  11/21/2017  7:54 AM  Signed  Chief Complaint   Patient presents with     Surgical Followup     Post op DOS: 9/26/17       Vitals:    11/21/17 0751   BP: 126/77   Pulse: 73   Temp: 97  F (36.1  C)   SpO2: 97%   Weight: 112 kg (247 lb)   Height: 1.676 m (5' 6\")       Body mass index is 39.87 kg/(m^2).      Mimi Vela                          Physical Examination:  /77  " "Pulse 73  Temp 97  F (36.1  C)  Ht 5' 6\"  Wt 247 lb  SpO2 97%  BMI 39.87 kg/m2  General: alert, oriented, in no acute distress, sitting comfortably  HEENT: mucous membranes moist    Perianal external examination:  Perianal skin: Intact with no excoriation or lichenification.  Lesions: No evidence of an external lesion, nodularity, or induration in the perianal region.  Eversion of buttocks: There was not evidence of an anal fissure. Details: N/A.  Skin tags or external hemorrhoids: Yes: small external skin tags.     Digital rectal examination: Was deferred.    Anoscopy: Was deferred.    Total face to face time was 5 minutes, >50% counseling.  This is a postop visit.    JERIRCA Hamm, NP-C  Colon and Rectal Surgery  [facility]    This note was created using speech recognition software and may contain unintended word substitutions.    "

## 2017-11-21 NOTE — MR AVS SNAPSHOT
After Visit Summary   11/21/2017    Naomy Friend    MRN: [id number]           Patient Information     Date Of Birth          1970        Visit Information        Provider Department      11/21/2017 8:00 AM Linnette Galarza APRN CNP [facility] Colon and Rectal Surgery        Today's Diagnoses     Anal condyloma    -  1    Anal dysplasia           Follow-ups after your visit        Your next 10 appointments already scheduled     Dec 04, 2017  8:00 AM CST   Office Visit with JERRICA Church CNP   [facility]    [address] [place] [id number] [phone]           Bring a current list of meds and any records pertaining to this visit. For Physicals, please bring immunization records and any forms needing to be filled out. Please arrive 10 minutes early to complete paperwork.              Who to contact     Please call your clinic at [phone] to:    Ask questions about your health    Make or cancel appointments    Discuss your medicines    Learn about your test results    Speak to your doctor   If you have compliments or concerns about an experience at your clinic, or if you wish to file a complaint, please contact [facility] Patient Relations at [phone] or email us at [email or web address]         Additional Information About Your Visit        MyChart Information     [employer] gives you secure access to your electronic health record. If you see a primary care provider, you can also send messages to your care team and make appointments. If you have questions, please call your primary care clinic.  If you do not have a primary care provider, please call [phone] and they will assist you.      [employer] is an electronic gateway that provides easy, online access to your medical records. With [employer], you can request a clinic appointment, read your test results,  "renew a prescription or communicate with your care team.     To access your existing account, please contact your [facility] or call [phone] for assistance.        Care EveryWhere ID     This is your Care EveryWhere ID. This could be used by other organizations to access your [place] medical records  [id number]        Your Vitals Were     Pulse Temperature Height Pulse Oximetry BMI (Body Mass Index)       73 97  F (36.1  C) 5' 6\" 97% 39.87 kg/m2        Blood Pressure from Last 3 Encounters:   11/21/17 126/77   10/13/17 124/77   10/12/17 137/84    Weight from Last 3 Encounters:   11/21/17 247 lb   10/13/17 253 lb 4.9 oz   10/12/17 253 lb 3.2 oz              We Performed the Following     Cytology non gyn (Anal PAP)        Primary Care Provider Office Phone # Fax #    Jovita Eula Jonas, APRN [facility] [phone]       [address] [facility] [address]  [facility] [address]        Equal Access to Services     HODA [facility] : Hadii aad ku hadasho Soomaali, waaxda luqadaha, qaybta kaalmada adeegyada, waxay idiin haytaishan donna tyson . So [facility] [phone].    ATENCIÓN: Si habla español, tiene a lorenzana disposición servicios gratuitos de asistencia lingüística. Llame al [phone].    We comply with applicable federal civil rights laws and [place] laws. We do not discriminate on the basis of race, color, national origin, age, disability, sex, sexual orientation, or gender identity.            Thank you!     Thank you for choosing [facility] COLON AND RECTAL SURGERY  for your care. Our goal is always to provide you with excellent care. Hearing back from our patients is one way we can continue to improve our services. Please take a few minutes to complete the written survey that you may receive in the mail after your visit with us. Thank you!             Your Updated Medication List - Protect others around you: Learn how to safely use, store and throw away your medicines at " www.disposemymeds.org.          This list is accurate as of: 11/21/17  8:17 AM.  Always use your most recent med list.                   Brand Name Dispense Instructions for use Diagnosis    acyclovir 400 MG tablet    ZOVIRAX    15 tablet    TAKE 1 TABLET (400 MG) BY MOUTH 3 TIMES DAILY    Recurrent herpes simplex       cetirizine 10 MG tablet    zyrTEC    90 tablet    Take 1 tablet (10 mg) by mouth every evening    Post-nasal drainage       * citalopram 10 MG tablet    celeXA    30 tablet    TAKE 1 TAB BY MOUTH DAILY WITH 20MG TAB FOR TOTAL DAILY DOSE OF 30MG    Mild recurrent major depression (H)       * citalopram 20 MG tablet    celeXA    90 tablet    TAKE 1.5 TABLETS BY MOUTH DAILY    Mild recurrent major depression (H)       clotrimazole-betamethasone cream    LOTRISONE    30 g    Apply topically 2 times daily    Tinea cruris       DECONGESTANT + PO      Reported on 3/24/2017        fluticasone 50 MCG/ACT spray    FLONASE     Spray 2 sprays into both nostrils daily Reported on 3/24/2017        levonorgestrel 20 MCG/24HR IUD    MIRENA     1 each by Intrauterine route once        levothyroxine 75 MCG tablet    SYNTHROID/LEVOTHROID    90 tablet    TAKE 1 TABLET (75 MCG) BY MOUTH DAILY    Hypothyroidism, unspecified type       lisinopril 20 MG tablet    PRINIVIL/ZESTRIL    90 tablet    Take 1 tablet (20 mg) by mouth daily    HTN, goal below 140/90       MULTIVITAMIN ADULT PO           mupirocin 2 % ointment    BACTROBAN    15 g    Apply topically 2 times daily Use 2 times a day to affected area.    Pyoderma       silver sulfADIAZINE 1 % cream    SILVADENE    85 g    Apply thick layer to anus 3-4 times per day for irritation for 5 days.    Anal condyloma       sinus rinse bottle     10 each    Spray 1 packet in nostril 2 times daily    Postoperative pain       * Notice:  This list has 2 medication(s) that are the same as other medications prescribed for you. Read the directions carefully, and ask your doctor or  other care provider to review them with you.

## 2017-11-21 NOTE — NURSING NOTE
"Chief Complaint   Patient presents with     Surgical Followup     Post op DOS: 9/26/17       Vitals:    11/21/17 0751   BP: 126/77   Pulse: 73   Temp: 97  F (36.1  C)   SpO2: 97%   Weight: 112 kg (247 lb)   Height: 1.676 m (5' 6\")       Body mass index is 39.87 kg/(m^2).      Mimi Vela                       "

## 2017-11-22 ENCOUNTER — TELEPHONE (OUTPATIENT)
Dept: SURGERY | Facility: CLINIC | Age: 47
End: 2017-11-22

## 2017-11-22 LAB — COPATH REPORT: NORMAL

## 2017-11-22 NOTE — TELEPHONE ENCOUNTER
Called to discuss anal cytology showing ASCUS. Recommended high resolution anoscopy. Patient's questions were answered to her stated satisfaction and she is in agreement with this plan.    JERRICA Hamm, NP-C  Colon and Rectal Surgery  [facility]

## 2017-11-23 DIAGNOSIS — B00.9 RECURRENT HERPES SIMPLEX: ICD-10-CM

## 2017-11-24 RX ORDER — ACYCLOVIR 400 MG/1
TABLET ORAL
Qty: 15 TABLET | Refills: 0 | Status: SHIPPED | OUTPATIENT
Start: 2017-11-24 | End: 2018-07-27

## 2017-11-24 NOTE — TELEPHONE ENCOUNTER
acyclovir (ZOVIRAX) 400 MG tablet    Last Written Prescription Date: 10/16/17  Last Fill Quantity: 15, # refills: 0  Last Office Visit with [facility], [facility] or [facility] prescribing provider: Last OV 9/20/17  Next 5 appointments (look out 90 days)     Dec 04, 2017  8:00 AM CST   Office Visit with JERRICA Church CNP   [facility] ([facility]    [address] [place] [id number] [phone]                   Creatinine   Date Value Ref Range Status   10/13/2017 0.65 0.52 - 1.04 mg/dL Final   Anabella Cheung, RN   [facility]

## 2017-12-01 NOTE — PROGRESS NOTES
SUBJECTIVE:   Naomy Friend is a 47 year old female who presents to clinic today for the following health issues:    Depression Followup    Status since last visit: Worse    See PHQ-9 for current symptoms.  Other associated symptoms: None    Complicating factors:   Significant life event:  Yes-  A few things    Current substance abuse:  None  Anxiety or Panic symptoms:  Yes-  Some anxiety    PHQ-9 Score and MyChart F/U Questions 8/9/2016 6/20/2017 11/10/2017   Total Score 8 10 8   Q9: Suicide Ideation Not at all Not at all Several days       PHQ-9  English  PHQ-9   Any Language  Suicide Assessment Five-step Evaluation and Treatment (SAFE-T)      Amount of exercise or physical activity: 4-5 days/week for an average of greater than 60 minutes    Problems taking medications regularly: No    Medication side effects: none    Diet: regular (no restrictions)    Has had problems with cold sores as well, would like a cream to try in addition to the pills.       Hypertension Follow-up      Outpatient blood pressures are not being checked.    Low Salt Diet: no added salt          Problem list and histories reviewed & adjusted, as indicated.  Additional history: as documented    BP Readings from Last 3 Encounters:   12/04/17 124/82   11/21/17 126/77   10/13/17 124/77    Wt Readings from Last 3 Encounters:   12/04/17 252 lb 11.2 oz (114.6 kg)   11/21/17 247 lb (112 kg)   10/13/17 253 lb 4.9 oz (114.9 kg)                  Labs reviewed in EPIC      Reviewed and updated as [facility]  M[facility]       Reviewed and updated as needed this visit by Provider         ROS:  Constitutional, HEENT, cardiovascular, pulmonary, gi and gu systems are negative, except as otherwise noted.      OBJECTIVE:   /82  Pulse 81  Temp 97.2  F (36.2  C) (Oral)  Wt 252 lb 11.2 oz (114.6 kg)  SpO2 98%  BMI 40.79 kg/m2  Body mass index is 40.79 kg/(m^2).   GENERAL: healthy, alert and no distress  NECK: no  adenopathy, no asymmetry, masses, or scars and thyroid normal to palpation  RESP: lungs clear to auscultation - no rales, rhonchi or wheezes  CV: regular rate and rhythm, normal S1 S2, no S3 or S4, no murmur, click or rub, no peripheral edema and peripheral pulses strong  MS: no gross musculoskeletal defects noted, no edema  PSYCH: mentation appears normal, affect normal/bright    Diagnostic Test Results:  No results found for this or any previous visit (from the past 24 hour(s)).    ASSESSMENT/PLAN:     Problem List Items Addressed This Visit     Hypothyroidism    Relevant Orders    TSH with free T4 reflex (Completed)      Other Visit Diagnoses     Benign essential hypertension    -  Primary    Relevant Medications    buPROPion (WELLBUTRIN XL) 150 MG 24 hr tablet    Other Relevant Orders    Comprehensive metabolic panel (Completed)    Lipid screening        Relevant Orders    Lipid Profile (Completed)    Moderate major depression (H)        Relevant Medications    citalopram (CELEXA) 40 MG tablet    buPROPion (WELLBUTRIN XL) 150 MG 24 hr tablet    Other Relevant Orders    CARE COORDINATION REFERRAL (Completed)    Cold sore        Relevant Medications    acyclovir (ZOVIRAX) 5 % ointment         - change medications for depression; discussed social work referral with patient concerning Naomy's many stressors.   -Blood pressure is stable currently  JERRICA Church [facility]  Please note greater than 50% of this 30 minute appointment were spent in counseling with the patient of the issues described above in the history of present illness and in the plan, including changing medications

## 2017-12-04 ENCOUNTER — OFFICE VISIT (OUTPATIENT)
Dept: FAMILY MEDICINE | Facility: CLINIC | Age: 47
End: 2017-12-04
Payer: COMMERCIAL

## 2017-12-04 VITALS
OXYGEN SATURATION: 98 % | HEART RATE: 81 BPM | SYSTOLIC BLOOD PRESSURE: 124 MMHG | TEMPERATURE: 97.2 F | BODY MASS INDEX: 40.79 KG/M2 | WEIGHT: 252.7 LBS | DIASTOLIC BLOOD PRESSURE: 82 MMHG

## 2017-12-04 DIAGNOSIS — E03.9 HYPOTHYROIDISM, UNSPECIFIED TYPE: ICD-10-CM

## 2017-12-04 DIAGNOSIS — B00.1 COLD SORE: ICD-10-CM

## 2017-12-04 DIAGNOSIS — I10 BENIGN ESSENTIAL HYPERTENSION: Primary | ICD-10-CM

## 2017-12-04 DIAGNOSIS — F32.1 MODERATE MAJOR DEPRESSION (H): ICD-10-CM

## 2017-12-04 DIAGNOSIS — Z13.220 LIPID SCREENING: ICD-10-CM

## 2017-12-04 LAB
ALBUMIN SERPL-MCNC: 3.2 G/DL (ref 3.4–5)
ALP SERPL-CCNC: 65 U/L (ref 40–150)
ALT SERPL W P-5'-P-CCNC: 25 U/L (ref 0–50)
ANION GAP SERPL CALCULATED.3IONS-SCNC: 10 MMOL/L (ref 3–14)
AST SERPL W P-5'-P-CCNC: 15 U/L (ref 0–45)
BILIRUB SERPL-MCNC: 0.3 MG/DL (ref 0.2–1.3)
BUN SERPL-MCNC: 17 MG/DL (ref 7–30)
CALCIUM SERPL-MCNC: 9 MG/DL (ref 8.5–10.1)
CHLORIDE SERPL-SCNC: 109 MMOL/L (ref 94–109)
CHOLEST SERPL-MCNC: 164 MG/DL
CO2 SERPL-SCNC: 23 MMOL/L (ref 20–32)
CREAT SERPL-MCNC: 0.76 MG/DL (ref 0.52–1.04)
GFR SERPL CREATININE-BSD FRML MDRD: 82 ML/MIN/1.7M2
GLUCOSE SERPL-MCNC: 111 MG/DL (ref 70–99)
HDLC SERPL-MCNC: 39 MG/DL
LDLC SERPL CALC-MCNC: 105 MG/DL
NONHDLC SERPL-MCNC: 125 MG/DL
POTASSIUM SERPL-SCNC: 4.2 MMOL/L (ref 3.4–5.3)
PROT SERPL-MCNC: 7.1 G/DL (ref 6.8–8.8)
SODIUM SERPL-SCNC: 142 MMOL/L (ref 133–144)
TRIGL SERPL-MCNC: 102 MG/DL
TSH SERPL DL<=0.005 MIU/L-ACNC: 2.06 MU/L (ref 0.4–4)

## 2017-12-04 PROCEDURE — 80061 LIPID PANEL: CPT | Performed by: NURSE PRACTITIONER

## 2017-12-04 PROCEDURE — 36415 COLL VENOUS BLD VENIPUNCTURE: CPT | Performed by: NURSE PRACTITIONER

## 2017-12-04 PROCEDURE — 84443 ASSAY THYROID STIM HORMONE: CPT | Performed by: NURSE PRACTITIONER

## 2017-12-04 PROCEDURE — 80053 COMPREHEN METABOLIC PANEL: CPT | Performed by: NURSE PRACTITIONER

## 2017-12-04 PROCEDURE — 99214 OFFICE O/P EST MOD 30 MIN: CPT | Performed by: NURSE PRACTITIONER

## 2017-12-04 RX ORDER — CITALOPRAM HYDROBROMIDE 40 MG/1
40 TABLET ORAL DAILY
Qty: 90 TABLET | Refills: 3 | Status: SHIPPED | OUTPATIENT
Start: 2017-12-04 | End: 2018-06-25

## 2017-12-04 RX ORDER — BUPROPION HYDROCHLORIDE 150 MG/1
150 TABLET ORAL EVERY MORNING
Qty: 90 TABLET | Refills: 1 | Status: SHIPPED | OUTPATIENT
Start: 2017-12-04 | End: 2018-06-25

## 2017-12-04 RX ORDER — ACYCLOVIR 50 MG/G
OINTMENT TOPICAL
Qty: 30 G | Refills: 3 | Status: SHIPPED | OUTPATIENT
Start: 2017-12-04 | End: 2019-07-22

## 2017-12-04 NOTE — MR AVS SNAPSHOT
After Visit Summary   12/4/2017    Naomy Friend    MRN: [id number]           Patient Information     Date Of Birth          1970        Visit Information        Provider Department      12/4/2017 8:00 AM Jovita Jonas APRN CNP [facility]        Today's Diagnoses     Benign essential hypertension    -  1    Hypothyroidism, unspecified type        Lipid screening        Moderate major depression (H)        Cold sore           Follow-ups after your visit        Additional Services     CARE COORDINATION REFERRAL       Services are provided by a Care Coordinator for people with complex needs such as: medical, social, or financial troubles.  The Care Coordinator works with the patient and their Primary Care Provider to determine health goals, obtain resources, achieve outcomes, and develop care plans that help coordinate the patient's care.     Reason for Referral: Caregiver Concerns and Other Financial Concerns    Provide additional details for Care Coordination to best meet the patient's current needs: Son is addicted to meth, financial concerns    Clinical Staff have discussed the Care Coordination Referral with the patient and/or caregiver: yes                  Who to contact     If you have questions or need follow up information about today's clinic visit or your schedule please contact [facility] directly at [phone].  Normal or non-critical lab and imaging results will be communicated to you by MyChart, letter or phone within 4 business days after the clinic has received the results. If you do not hear from us within 7 days, please contact the clinic through MyChart or phone. If you have a critical or abnormal lab result, we will notify you by phone as soon as possible.  Submit refill requests through [employer] or call your pharmacy and they will forward the refill request to us. Please allow 3 business days for your refill to be completed.           Additional Information About Your Visit        VALOREMhart Information     [employer] gives you secure access to your electronic health record. If you see a primary care provider, you can also send messages to your care team and make appointments. If you have questions, please call your primary care clinic.  If you do not have a primary care provider, please call [phone] and they will assist you.        Care EveryWhere ID     This is your Care EveryWhere ID. This could be used by other organizations to access your [place] medical records  [id number]        Your Vitals Were     Pulse Temperature Pulse Oximetry BMI (Body Mass Index)          81 97.2  F (36.2  C) (Oral) 98% 40.79 kg/m2         Blood Pressure from Last 3 Encounters:   12/04/17 (!) 156/110   11/21/17 126/77   10/13/17 124/77    Weight from Last 3 Encounters:   12/04/17 252 lb 11.2 oz (114.6 kg)   11/21/17 247 lb (112 kg)   10/13/17 253 lb 4.9 oz (114.9 kg)              We Performed the Following     CARE COORDINATION REFERRAL     Comprehensive metabolic panel     Lipid Profile     TSH with free T4 reflex          Today's Medication Changes          These changes are accurate as of: 12/4/17  8:27 AM.  If you have any questions, ask your nurse or doctor.               Start taking these medicines.        Dose/Directions    acyclovir 5 % ointment   Commonly known as:  ZOVIRAX   Used for:  Cold sore        Apply topically 5 times daily   Quantity:  30 g   Refills:  3       buPROPion 150 MG 24 hr tablet   Commonly known as:  WELLBUTRIN XL   Used for:  Benign essential hypertension        Dose:  150 mg   Take 1 tablet (150 mg) by mouth every morning   Quantity:  90 tablet   Refills:  1         These medicines have changed or have updated prescriptions.        Dose/Directions    citalopram 40 MG tablet   Commonly known as:  celeXA   This may have changed:  See the new instructions.   Used for:  Moderate major depression (H)        Dose:  40 mg   Take 1 tablet  (40 mg) by mouth daily   Quantity:  90 tablet   Refills:  3         Stop taking these medicines if you haven't already. Please contact your care team if you have questions.     mupirocin 2 % ointment   Commonly known as:  BACTROBAN                Where to get your medicines      These medications were sent to [facility]/pharmacy #[id number] - [place], [place] - [address] [place] AT COR[address] OF [facility]  [address] [facility] [address]     Phone:  [phone]     acyclovir 5 % ointment    buPROPion 150 MG 24 hr tablet    citalopram 40 MG tablet                Primary Care Provider Office Phone # Fax #    Jovita Forde Pritesh, APRN [phone]       [address] [facility] [address]  [facility] [address]        Equal Access to Services     [facility] : Hadii aad ku hadasho Soysabel, waaxda luqadaha, qaybta kaalmada adekaylynnyada, josr tyson . So [facility] [phone].    ATENCIÓN: Si habla español, tiene a lorenzana disposición servicios gratuitos de asistencia lingüística. Negrita al [phone].    We comply with applicable federal civil rights laws and [place] laws. We do not discriminate on the basis of race, color, national origin, age, disability, sex, sexual orientation, or gender identity.            Thank you!     Thank you for choosing [facility]  for your care. Our goal is always to provide you with excellent care. Hearing back from our patients is one way we can continue to improve our services. Please take a few minutes to complete the written survey that you may receive in the mail after your visit with us. Thank you!             Your Updated Medication List - Protect others around you: Learn how to safely use, store and throw away your medicines at www.disposemymeds.org.          This list is accurate as of: 12/4/17  8:27 AM.  Always use your most recent med list.                   Brand Name Dispense Instructions for use Diagnosis    acyclovir 400  MG tablet    ZOVIRAX    15 tablet    TAKE 1 TABLET (400 MG) BY MOUTH 3 TIMES DAILY    Recurrent herpes simplex       acyclovir 5 % ointment    ZOVIRAX    30 g    Apply topically 5 times daily    Cold sore       buPROPion 150 MG 24 hr tablet    WELLBUTRIN XL    90 tablet    Take 1 tablet (150 mg) by mouth every morning    Benign essential hypertension       cetirizine 10 MG tablet    zyrTEC    90 tablet    Take 1 tablet (10 mg) by mouth every evening    Post-nasal drainage       citalopram 40 MG tablet    celeXA    90 tablet    Take 1 tablet (40 mg) by mouth daily    Moderate major depression (H)       clotrimazole-betamethasone cream    LOTRISONE    30 g    Apply topically 2 times daily    Tinea cruris       DECONGESTANT + PO      Reported on 3/24/2017        fluticasone 50 MCG/ACT spray    FLONASE     Spray 2 sprays into both nostrils daily Reported on 3/24/2017        levonorgestrel 20 MCG/24HR IUD    MIRENA     1 each by Intrauterine route once        levothyroxine 75 MCG tablet    SYNTHROID/LEVOTHROID    90 tablet    TAKE 1 TABLET (75 MCG) BY MOUTH DAILY    Hypothyroidism, unspecified type       lisinopril 20 MG tablet    PRINIVIL/ZESTRIL    90 tablet    Take 1 tablet (20 mg) by mouth daily    HTN, goal below 140/90       MULTIVITAMIN ADULT PO           silver sulfADIAZINE 1 % cream    SILVADENE    85 g    Apply thick layer to anus 3-4 times per day for irritation for 5 days.    Anal condyloma       sinus rinse bottle     10 each    Spray 1 packet in nostril 2 times daily    Postoperative pain

## 2017-12-05 ENCOUNTER — CARE COORDINATION (OUTPATIENT)
Dept: CARE COORDINATION | Facility: CLINIC | Age: 47
End: 2017-12-05

## 2017-12-05 NOTE — PROGRESS NOTES
Clinic Care Coordination Contact  [facility]/Voicemail    Referral Source: PCP  Clinical Data: Care Coordinator Outreach  Outreach attempted x 1.  Left message on voicemail with call back information and requested return call.  Plan:  Care Coordinator will try to reach patient again in 3-5 business days.  LOKI Salas    Care Coordinator  [facility], [facility] and [facility]   [phone]

## 2017-12-12 DIAGNOSIS — E03.9 HYPOTHYROIDISM, UNSPECIFIED TYPE: ICD-10-CM

## 2017-12-12 DIAGNOSIS — I10 HTN, GOAL BELOW 140/90: ICD-10-CM

## 2017-12-12 RX ORDER — LISINOPRIL 20 MG/1
20 TABLET ORAL DAILY
Qty: 90 TABLET | Refills: 1 | Status: SHIPPED | OUTPATIENT
Start: 2017-12-12 | End: 2018-06-25

## 2017-12-12 RX ORDER — LEVOTHYROXINE SODIUM 75 UG/1
TABLET ORAL
Qty: 90 TABLET | Refills: 1 | Status: SHIPPED | OUTPATIENT
Start: 2017-12-12 | End: 2018-07-31

## 2017-12-12 NOTE — TELEPHONE ENCOUNTER
Levothyroxine      Last Written Prescription Date:  7/5/17  Last Fill Quantity: 90,   # refills: 1  Last Office Visit: 12/4/17  Future Office visit:       Routing refill request to provider for review/approval because:  Does not meet protocol criteria      Lisinopril      Last Written Prescription Date:  7/5/17  Last Fill Quantity: 90,   # refills: 1  Last Office Visit: 12/4/17  Future Office visit:       Routing refill request to provider for review/approval because:  Does not meet protocol criteria

## 2017-12-12 NOTE — TELEPHONE ENCOUNTER
Prescription approved per [facility] Refill Protocol.    Candi Garcia, BSN RN  [facility]

## 2017-12-18 ENCOUNTER — TELEPHONE (OUTPATIENT)
Dept: SURGERY | Facility: CLINIC | Age: 47
End: 2017-12-18

## 2017-12-18 NOTE — TELEPHONE ENCOUNTER
Patient called, wanted to know if she needed a  for her appt on Wednesday 12/20/17 with Linnette Loco NP. Patient will be having HRA procedure done. Inform patient, no  is needed, there is no restrictions. Confirmed appointment with patient. Clari SOTO LPN

## 2017-12-20 ENCOUNTER — OFFICE VISIT (OUTPATIENT)
Dept: SURGERY | Facility: CLINIC | Age: 47
End: 2017-12-20
Payer: COMMERCIAL

## 2017-12-20 VITALS
HEART RATE: 60 BPM | DIASTOLIC BLOOD PRESSURE: 87 MMHG | OXYGEN SATURATION: 97 % | SYSTOLIC BLOOD PRESSURE: 144 MMHG | BODY MASS INDEX: 39.97 KG/M2 | TEMPERATURE: 97.6 F | WEIGHT: 248.7 LBS | HEIGHT: 66 IN

## 2017-12-20 DIAGNOSIS — A63.0 ANAL CONDYLOMA: ICD-10-CM

## 2017-12-20 DIAGNOSIS — K62.82 ANAL DYSPLASIA: Primary | ICD-10-CM

## 2017-12-20 ASSESSMENT — PAIN SCALES - GENERAL: PAINLEVEL: NO PAIN (0)

## 2017-12-20 NOTE — PROGRESS NOTES
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 She had anal cytology on 17 which showed ASCUS. She presents today for high resolution anoscopy.    HPI: Naomy has been doing well. She denies any anorectal complaints. She denies any immune suppression. She is not a smoker.    ASSESSMENT: Written, informed consent was obtained prior to procedure.  Prior to the start of the procedure and with procedural staff participation, I verbally confirmed the patient s identity using two indicators, relevant allergies, that the procedure was appropriate and matched the consent or emergent situation, and that the correct equipment/implants were available. Immediately prior to starting the procedure I conducted the Time Out with the procedural staff and re-confirmed the patient s name, procedure, and site/side. (The Joint Commission universal protocol was followed.)  Yes    Sedation (Moderate or Deep): None    Anal cytology was not obtained with Dacron swab. Digital anal rectal exam was performed with no palpable lesions. Dilute acetic acid soak was completed for 2 minutes. Lubricant was used to insert the anoscope. Performed high resolution microscopy using the colposcope. The dentate line was viewed in its entirety. Abnormal areas noted were mild acetowhitening with fine punctation. No coarse punctation, verruciform lesions, or bright acetowhitening. Internal hemorrhoids without bleeding. Biopsy was not obtained. Fulguration was not performed.   The perianal area was inspected after acetic acid soak. The findings noted were anal skin tags with fine punctation in the anterior and posterior positions but without any bright  acetowhitening or coarse punctation.     The patient tolerated the procedures well.    PLAN: Recommend repeat HRA in 6 months. If again normal, repeat anal cytology yearly with wart checks every 6 months as she is low risk. Patient's questions were answered to her stated satisfaction and she is in agreement with this plan.    For details of past medical history, surgical history, family history, medications, allergies, and review of systems, please see details below.    Medical history:  Past Medical History:   Diagnosis Date     CARDIOVASCULAR SCREENING; LDL GOAL LESS THAN 160 6/6/2012     Hypertension     watching blood pressure     Obesity, unspecified     Obesity     Sleep apnea     no cpap     Thyroid disease        Surgical history:  Past Surgical History:   Procedure Laterality Date     BREAST SURGERY      breast reduction bilat     COLONOSCOPY WITH CO2 INSUFFLATION N/A 9/1/2017    Procedure: COLONOSCOPY WITH CO2 INSUFFLATION;  Colonoscopy, Abdominal pain, left lower quadrant, [place], BMI 42.93, [phone];  Surgeon: Anuel Rodríguez MD;  Location:  OR     ENDOSCOPIC SINUS SURGERY Right 3/15/2017    Procedure: ENDOSCOPIC SINUS SURGERY;;  Surgeon: Vicki Zurita MD;  Location:  OR     EXAM UNDER ANESTHESIA, FULGURATE CONDYLOMA ANUS, COMBINED N/A 9/26/2017    Procedure: COMBINED EXAM UNDER ANESTHESIA, FULGURATE CONDYLOMA ANUS;  Examination Under anethesia, Excision And Fulguration Of Anal Condyloma;  Surgeon: Td Garvey MD;  Location:  OR     LAPAROSCOPIC CHOLECYSTECTOMY  08/05/99     TONSILLECTOMY Bilateral 3/15/2017    Procedure: TONSILLECTOMY;  Bilateral Tonsillectomy, Right Functional Endoscopic Sinus Surgery ;  Surgeon: Vicki Zurita MD;  Location:  OR       Family history:  Family History   Problem Relation Age of Onset     DIABETES Mother      Hypertension Mother      CANCER Father      pancreatic     HEART DISEASE Maternal Grandmother       DIABETES Maternal Grandmother      CEREBROVASCULAR DISEASE Maternal Grandfather      Cancer - colorectal Paternal Grandfather      DIABETES Brother      Asthma Son      Depression Son      Asthma Daughter        Medications:  Current Outpatient Prescriptions   Medication Sig Dispense Refill     levothyroxine (SYNTHROID/LEVOTHROID) 75 MCG tablet TAKE 1 TABLET (75 MCG) BY MOUTH DAILY 90 tablet 1     lisinopril (PRINIVIL/ZESTRIL) 20 MG tablet Take 1 tablet (20 mg) by mouth daily 90 tablet 1     citalopram (CELEXA) 40 MG tablet Take 1 tablet (40 mg) by mouth daily 90 tablet 3     buPROPion (WELLBUTRIN XL) 150 MG 24 hr tablet Take 1 tablet (150 mg) by mouth every morning 90 tablet 1     acyclovir (ZOVIRAX) 5 % ointment Apply topically 5 times daily 30 g 3     acyclovir (ZOVIRAX) 400 MG tablet TAKE 1 TABLET (400 MG) BY MOUTH 3 TIMES DAILY 15 tablet 0     silver sulfADIAZINE (SILVADENE) 1 % cream Apply thick layer to anus 3-4 times per day for irritation for 5 days. 85 g 1     Multiple Vitamins-Minerals (MULTIVITAMIN ADULT PO)        clotrimazole-betamethasone (LOTRISONE) cream Apply topically 2 times daily 30 g 1     Hypertonic Nasal Wash (SINUS RINSE BOTTLE KIT) PACK Spray 1 packet in nostril 2 times daily 10 each 3     Chlorpheniramine-Pseudoeph (DECONGESTANT + PO) Reported on 3/24/2017       cetirizine (ZYRTEC) 10 MG tablet Take 1 tablet (10 mg) by mouth every evening 90 tablet 1     levonorgestrel (MIRENA) 20 MCG/24HR IUD 1 each by Intrauterine route once       fluticasone (FLONASE) 50 MCG/ACT nasal spray Spray 2 sprays into both nostrils daily Reported on 3/24/2017       Allergies:  The patientis allergic to percocet [oxycodone-acetaminophen].    Social history:  Social History   Substance Use Topics     Smoking status: Never Smoker     Smokeless tobacco: Never Used     Alcohol use No     Marital status: .    Review of Systems:  Nursing Notes:   Jessica Castillo LPN  12/20/2017  3:00 PM  Signed  Chief Complaint  "  Patient presents with     Clinic Care Coordination - Follow-up     HRA       Vitals:    12/20/17 1459   BP: 144/87   Pulse: 60   Temp: 97.6  F (36.4  C)   TempSrc: Oral   SpO2: 97%   Weight: 248 lb 11.2 oz   Height: 5' 6\"       Body mass index is 40.14 kg/(m^2).    Jessica BAUTISTA LPN                     This procedure was performed under a collaborative agreement with Dr. Dev Caraballo MD, Chief of Colon and Rectal Surgery, [facility].    Linnette Howe, NP-C  Colon and Rectal Surgery  [facility]      This note was created using speech recognition software and may contain unintended word substitutions.    "

## 2017-12-20 NOTE — MR AVS SNAPSHOT
After Visit Summary   12/20/2017    Naomy Friend    MRN: [id number]           Patient Information     Date Of Birth          1970        Visit Information        Provider Department      12/20/2017 3:00 PM Linnette Galarza APRN CNP [facility] Colon and Rectal Surgery        Today's Diagnoses     Anal dysplasia    -  1    Anal condyloma           Follow-ups after your visit        Your next 10 appointments already scheduled     Jun 07, 2018 11:00 AM CDT   (Arrive by 10:45 AM)   Microscopy with JERRICA Watts CNP [facility] Colon and Rectal Surgery ([facility] [place])    [address]  [address] [facility] [address] [phone]              Who to contact     Please call your clinic at [phone] to:    Ask questions about your health    Make or cancel appointments    Discuss your medicines    Learn about your test results    Speak to your doctor   If you have compliments or concerns about an experience at your clinic, or if you wish to file a complaint, please contact [facility] Patient Relations at [phone] or email us at [email or web address]         Additional Information About Your Visit        MyChart Information     [employer] gives you secure access to your electronic health record. If you see a primary care provider, you can also send messages to your care team and make appointments. If you have questions, please call your primary care clinic.  If you do not have a primary care provider, please call [phone] and they will assist you.      AfterYes is an electronic gateway that provides easy, online access to your medical records. With AfterYes, you can request a clinic appointment, read your test results, renew a prescription or communicate with your care team.     To access your existing account, please contact your [facility] or call [phone]  "for assistance.        Care EveryWhere ID     This is your Care EveryWhere ID. This could be used by other organizations to access your [place] medical records  [id number]        Your Vitals Were     Pulse Temperature Height Pulse Oximetry BMI (Body Mass Index)       60 97.6  F (36.4  C) (Oral) 5' 6\" 97% 40.14 kg/m2        Blood Pressure from Last 3 Encounters:   12/20/17 144/87   12/04/17 124/82   11/21/17 126/77    Weight from Last 3 Encounters:   12/20/17 248 lb 11.2 oz   12/04/17 252 lb 11.2 oz   11/21/17 247 lb              We Performed the Following     HC ANOSCOPY DX, HRA, INCL JORGE SPECIMEN, BRUSH/WASH        Primary Care Provider Office Phone # Fax #    Jovita JERRICA [facility] [phone]       [address]  [facility] [address]        Equal Access to Services     [facility] : Hadii aad ku hadasho Soomaali, waaxda luqadaha, qaybta kaalmada adeegyada, josr hardwickin haykiran tyson . So [facility] [phone].    ATENCIÓN: Si ana liliala espmani, tiene a lorenzana disposición servicios gratuitos de asistencia lingüística. Llame al [phone].    We comply with applicable federal civil rights laws and [place] laws. We do not discriminate on the basis of race, color, national origin, age, disability, sex, sexual orientation, or gender identity.            Thank you!     Thank you for choosing [facility] COLON AND RECTAL SURGERY  for your care. Our goal is always to provide you with excellent care. Hearing back from our patients is one way we can continue to improve our services. Please take a few minutes to complete the written survey that you may receive in the mail after your visit with us. Thank you!             Your Updated Medication List - Protect others around you: Learn how to safely use, store and throw away your medicines at www.disposemymeds.org.          This list is accurate as of: 12/20/17  4:00 PM.  Always use your most recent med list.                   Brand Name " Dispense Instructions for use Diagnosis    acyclovir 400 MG tablet    ZOVIRAX    15 tablet    TAKE 1 TABLET (400 MG) BY MOUTH 3 TIMES DAILY    Recurrent herpes simplex       acyclovir 5 % ointment    ZOVIRAX    30 g    Apply topically 5 times daily    Cold sore       buPROPion 150 MG 24 hr tablet    WELLBUTRIN XL    90 tablet    Take 1 tablet (150 mg) by mouth every morning    Benign essential hypertension       cetirizine 10 MG tablet    zyrTEC    90 tablet    Take 1 tablet (10 mg) by mouth every evening    Post-nasal drainage       citalopram 40 MG tablet    celeXA    90 tablet    Take 1 tablet (40 mg) by mouth daily    Moderate major depression (H)       clotrimazole-betamethasone cream    LOTRISONE    30 g    Apply topically 2 times daily    Tinea cruris       DECONGESTANT + PO      Reported on 3/24/2017        fluticasone 50 MCG/ACT spray    FLONASE     Spray 2 sprays into both nostrils daily Reported on 3/24/2017        levonorgestrel 20 MCG/24HR IUD    MIRENA     1 each by Intrauterine route once        levothyroxine 75 MCG tablet    SYNTHROID/LEVOTHROID    90 tablet    TAKE 1 TABLET (75 MCG) BY MOUTH DAILY    Hypothyroidism, unspecified type       lisinopril 20 MG tablet    PRINIVIL/ZESTRIL    90 tablet    Take 1 tablet (20 mg) by mouth daily    HTN, goal below 140/90       MULTIVITAMIN ADULT PO           silver sulfADIAZINE 1 % cream    SILVADENE    85 g    Apply thick layer to anus 3-4 times per day for irritation for 5 days.    Anal condyloma       sinus rinse bottle     10 each    Spray 1 packet in nostril 2 times daily    Postoperative pain

## 2017-12-20 NOTE — NURSING NOTE
"Chief Complaint   Patient presents with     Clinic Care Coordination - Follow-up     HRA       Vitals:    12/20/17 1459   BP: 144/87   Pulse: 60   Temp: 97.6  F (36.4  C)   TempSrc: Oral   SpO2: 97%   Weight: 248 lb 11.2 oz   Height: 5' 6\"       Body mass index is 40.14 kg/(m^2).    Jessica BAUTISTA LPN                  "

## 2017-12-20 NOTE — LETTER
2017       RE: Naomy Friend  [address] [address]     Dear Colleague,    Thank you for referring your patient, Naomy Friend, to the [facility] COLON AND RECTAL SURGERY at [facility]. Please see a copy of my visit note below.      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 She had anal cytology on 17 which showed ASCUS. She presents today for high resolution anoscopy.    HPI: Naomy has been doing well. She denies any anorectal complaints. She denies any immune suppression. She is not a smoker.    ASSESSMENT: Written, informed consent was obtained prior to procedure.  Prior to the start of the procedure and with procedural staff participation, I verbally confirmed the patient s identity using two indicators, relevant allergies, that the procedure was appropriate and matched the consent or emergent situation, and that the correct equipment/implants were available. Immediately prior to starting the procedure I conducted the Time Out with the procedural staff and re-confirmed the patient s name, procedure, and site/side. (The Joint Commission universal protocol was followed.)  Yes    Sedation (Moderate or Deep): None    Anal cytology was not obtained with Dacron swab. Digital anal rectal exam was performed with no palpable lesions. Dilute acetic acid soak was completed for 2 minutes. Lubricant was used to insert the anoscope. Performed high resolution microscopy using the colposcope. The dentate line was viewed in its entirety. Abnormal areas noted were mild acetowhitening with fine punctation. No coarse punctation, verruciform lesions, or bright  acetowhitening. Internal hemorrhoids without bleeding. Biopsy was not obtained. Fulguration was not performed.   The perianal area was inspected after acetic acid soak. The findings noted were anal skin tags with fine punctation in the anterior and posterior positions but without any bright acetowhitening or coarse punctation.     The patient tolerated the procedures well.    PLAN: Recommend repeat HRA in 6 months. If again normal, repeat anal cytology yearly with wart checks every 6 months as she is low risk. Patient's questions were answered to her stated satisfaction and she is in agreement with this plan.    For details of past medical history, surgical history, family history, medications, allergies, and review of systems, please see details below.    Medical history:  Past Medical History:   Diagnosis Date     CARDIOVASCULAR SCREENING; LDL GOAL LESS THAN 160 6/6/2012     Hypertension     watching blood pressure     Obesity, unspecified     Obesity     Sleep apnea     no cpap     Thyroid disease        Surgical history:  Past Surgical History:   Procedure Laterality Date     BREAST SURGERY      breast reduction bilat     COLONOSCOPY WITH CO2 INSUFFLATION N/A 9/1/2017    Procedure: COLONOSCOPY WITH CO2 INSUFFLATION;  Colonoscopy, Abdominal pain, left lower quadrant, Parenteau, BMI 42.93, [phone];  Surgeon: Anuel Rodríguez MD;  Location:  OR     ENDOSCOPIC SINUS SURGERY Right 3/15/2017    Procedure: ENDOSCOPIC SINUS SURGERY;;  Surgeon: Vicki Zurita MD;  Location: [facility] OR     EXAM UNDER ANESTHESIA, FULGURATE CONDYLOMA ANUS, COMBINED N/A 9/26/2017    Procedure: COMBINED EXAM UNDER ANESTHESIA, FULGURATE CONDYLOMA ANUS;  Examination Under anethesia, Excision And Fulguration Of Anal Condyloma;  Surgeon: Td Garvey MD;  Location: [facility] OR     LAPAROSCOPIC CHOLECYSTECTOMY  08/05/99     TONSILLECTOMY Bilateral 3/15/2017    Procedure: TONSILLECTOMY;  Bilateral Tonsillectomy,  Right Functional Endoscopic Sinus Surgery ;  Surgeon: Vicki Zurita MD;  Location:  OR       Family history:  Family History   Problem Relation Age of Onset     DIABETES Mother      Hypertension Mother      CANCER Father      pancreatic     HEART DISEASE Maternal Grandmother      DIABETES Maternal Grandmother      CEREBROVASCULAR DISEASE Maternal Grandfather      Cancer - colorectal Paternal Grandfather      DIABETES Brother      Asthma Son      Depression Son      Asthma Daughter        Medications:  Current Outpatient Prescriptions   Medication Sig Dispense Refill     levothyroxine (SYNTHROID/LEVOTHROID) 75 MCG tablet TAKE 1 TABLET (75 MCG) BY MOUTH DAILY 90 tablet 1     lisinopril (PRINIVIL/ZESTRIL) 20 MG tablet Take 1 tablet (20 mg) by mouth daily 90 tablet 1     citalopram (CELEXA) 40 MG tablet Take 1 tablet (40 mg) by mouth daily 90 tablet 3     buPROPion (WELLBUTRIN XL) 150 MG 24 hr tablet Take 1 tablet (150 mg) by mouth every morning 90 tablet 1     acyclovir (ZOVIRAX) 5 % ointment Apply topically 5 times daily 30 g 3     acyclovir (ZOVIRAX) 400 MG tablet TAKE 1 TABLET (400 MG) BY MOUTH 3 TIMES DAILY 15 tablet 0     silver sulfADIAZINE (SILVADENE) 1 % cream Apply thick layer to anus 3-4 times per day for irritation for 5 days. 85 g 1     Multiple Vitamins-Minerals (MULTIVITAMIN ADULT PO)        clotrimazole-betamethasone (LOTRISONE) cream Apply topically 2 times daily 30 g 1     Hypertonic Nasal Wash (SINUS RINSE BOTTLE KIT) PACK Spray 1 packet in nostril 2 times daily 10 each 3     Chlorpheniramine-Pseudoeph (DECONGESTANT + PO) Reported on 3/24/2017       cetirizine (ZYRTEC) 10 MG tablet Take 1 tablet (10 mg) by mouth every evening 90 tablet 1     levonorgestrel (MIRENA) 20 MCG/24HR IUD 1 each by Intrauterine route once       fluticasone (FLONASE) 50 MCG/ACT nasal spray Spray 2 sprays into both nostrils daily Reported on 3/24/2017       Allergies:  The patientis allergic to percocet  "[oxycodone-acetaminophen].    Social history:  Social History   Substance Use Topics     Smoking status: Never Smoker     Smokeless tobacco: Never Used     Alcohol use No     Marital status: .    Review of Systems:  Nursing Notes:   Jessica Castillo LPN  12/20/2017  3:00 PM  Signed  Chief Complaint   Patient presents with     Clinic Care Coordination - Follow-up     HRA       Vitals:    12/20/17 1459   BP: 144/87   Pulse: 60   Temp: 97.6  F (36.4  C)   TempSrc: Oral   SpO2: 97%   Weight: 248 lb 11.2 oz   Height: 5' 6\"       Body mass index is 40.14 kg/(m^2).    Jessica BAUTISTA LPN                     This procedure was performed under a collaborative agreement with Dr. Dev Caraballo MD, Chief of Colon and Rectal Surgery, [facility].    Linnette Howe, NP-C  Colon and Rectal Surgery  [facility]      This note was created using speech recognition software and may contain unintended word substitutions.    "

## 2018-03-15 ENCOUNTER — NURSE TRIAGE (OUTPATIENT)
Dept: NURSING | Facility: CLINIC | Age: 48
End: 2018-03-15

## 2018-03-15 NOTE — TELEPHONE ENCOUNTER
Pt states she was taking Wellbutrin XL 150mg daily and citalopram 40mg daily. She stopped Wellbutrin on her own a few weeks ago because she felt tired and sluggish and thought it was related to Wellbutrin. She is now only taking citalopram. Pt states she feels her depressive sx are worse for the past few wks. No suicidal thoughts and still performing all daily functions. More just feeling down or tending to make negative assumptions when she normally would not. Example is if a friend declines an invitation she thinks the friend no longer likes her rather than the person is busy.  She did not feel any difference in her fatigue or energy level after stopping Wellbutrin. She has not tried restarting it. She has been having some diarrhea and a few other sx she wonders if these could be med side effects. Was seeing therapy but had to stop when she took on 2nd job. Discussed w/ pt that Wellbutrin usually does not cause fatigue. In fact it tends to have a more stimulant effect. After talking more, advised pt see her PCP (prescriber of meds) to discuss further. She is not entirely comfortable w/ med regimen with or w/o Wellbutrin.  Pt voiced understanding and that she thought this was a good plan. She will make appt w/ PCP. Will call back if new concerns or feeling worse. Vanessa Au RN/FNA    Additional Information    Negative: Drug overdose and nurse unable to answer question    Negative: Caller requesting information not related to medicine    Negative: Caller requesting a prescription for Strep throat and has a positive culture result    Negative: Rash while taking a medication or within 3 days of stopping it    Negative: Immunization reaction suspected    Negative: [1] Asthma and [2] having symptoms of asthma (cough, wheezing, etc)    Negative: MORE THAN A DOUBLE DOSE of a prescription or over-the-counter (OTC) drug    Negative: [1] DOUBLE DOSE (an extra dose or lesser amount) of over-the-counter (OTC) drug AND [2]  "any symptoms (e.g., dizziness, nausea, pain, sleepiness)    Negative: [1] DOUBLE DOSE (an extra dose or lesser amount) of prescription drug AND [2] any symptoms (e.g., dizziness, nausea, pain, sleepiness)    Negative: Took another person's prescription drug    Negative: [1] DOUBLE DOSE (an extra dose or lesser amount) of prescription drug AND [2] NO symptoms (Exception: a double dose of antibiotics)    Negative: Diabetes drug error or overdose (e.g., insulin or extra dose)    Negative: [1] Request for URGENT new prescription or refill of \"essential\" medication (i.e., likelihood of harm to patient if not taken) AND [2] triager unable to fill per unit policy    Negative: [1] Prescription not at pharmacy AND [2] was prescribed today by PCP    Negative: Pharmacy calling with prescription questions and triager unable to answer question    Negative: Caller has URGENT medication question about med that PCP prescribed and triager unable to answer question    Negative: Caller has NON-URGENT medication question about med that PCP prescribed and triager unable to answer question    Negative: Caller requesting a NON-URGENT new prescription or refill and triager unable to refill per unit policy    Negative: Caller has medication question about med not prescribed by PCP and triager unable to answer question (e.g., compatibility with other med, storage)    Negative: [1] DOUBLE DOSE (an extra dose or lesser amount) of over-the-counter (OTC) drug AND [2] NO symptoms (all triage questions negative)    Negative: [1] DOUBLE DOSE (an extra dose or lesser amount) of antibiotic drug AND [2] NO symptoms (all triage questions negative)    Negative: Caller has medication question only, adult not sick, and triager answers question    Caller has medication question, adult has minor symptoms, caller declines triage, and triager answers question    Protocols used: MEDICATION QUESTION CALL-ADULT-    "

## 2018-03-26 ENCOUNTER — TELEPHONE (OUTPATIENT)
Dept: FAMILY MEDICINE | Facility: CLINIC | Age: 48
End: 2018-03-26

## 2018-03-26 NOTE — TELEPHONE ENCOUNTER
Patient stated that she was recently exposed to someone with the flu this past Saturday and Sunday. Currently does not have any s/s of flu or illness. Writer reinforced that it is the patient's personal decision whether to or not to get the vaccine-- if she is currently displaying any flu-like symptom, do not suggest the vaccine-- if no flu-like symptoms present, then yes she can get it if she would like to.    Saskia Lawrence RN  [facility]

## 2018-03-26 NOTE — TELEPHONE ENCOUNTER
Reason for call:  Other   Patient called regarding (reason for call): call back  Additional comments: The patient has been exposed to the flu and is wondering if she should get a flu shot. She would like a call back today to discuss what the best course of action would be.    Phone number to reach patient:  Cell number on file:    Telephone Information:   Mobile [phone]       Best Time: Any    Can we leave a detailed message on this number?  YES

## 2018-03-29 ENCOUNTER — OFFICE VISIT (OUTPATIENT)
Dept: FAMILY MEDICINE | Facility: CLINIC | Age: 48
End: 2018-03-29
Payer: COMMERCIAL

## 2018-03-29 VITALS
DIASTOLIC BLOOD PRESSURE: 72 MMHG | TEMPERATURE: 97.6 F | HEIGHT: 67 IN | BODY MASS INDEX: 40.02 KG/M2 | SYSTOLIC BLOOD PRESSURE: 136 MMHG | OXYGEN SATURATION: 98 % | WEIGHT: 255 LBS | HEART RATE: 67 BPM

## 2018-03-29 DIAGNOSIS — J06.9 VIRAL URI: Primary | ICD-10-CM

## 2018-03-29 PROCEDURE — 99213 OFFICE O/P EST LOW 20 MIN: CPT | Performed by: NURSE PRACTITIONER

## 2018-03-29 ASSESSMENT — PAIN SCALES - GENERAL: PAINLEVEL: NO PAIN (0)

## 2018-03-29 NOTE — PROGRESS NOTES
SUBJECTIVE:   Naomy Friend is a 47 year old female who presents to clinic today for the following health issues:  Acute Illness   Acute illness concerns: x 2 days  Onset: x 2 days    Fever: no    Chills/Sweats: no, chills    Headache (location?): YES    Sinus Pressure:no    Conjunctivitis:  no    Ear Pain: YES: right, feels like there is fluid inside.    Rhinorrhea: no    Congestion: no    Sore Throat: no     Cough: no    Wheeze: no    Decreased Appetite: no    Nausea: no    Vomiting: no    Diarrhea:  no    Dysuria/Freq.: no    Fatigue/Achiness: YES    Sick/Strep Exposure: YES, boss' wife got diagnosed with influenza A today     Therapies Tried and outcome: none   Patient's boss was negative.     Problem list and histories reviewed & adjusted, as indicated.  Additional history: as documented    Patient Active Problem List   Diagnosis     Hypothyroidism     CARDIOVASCULAR SCREENING; LDL GOAL LESS THAN 160     Mild recurrent major depression (H)     Obesity     Menorrhagia, treated with mirena IUD (inserted July 2013)      Family history of diabetes mellitus     Dysfunction of eustachian tube     HTN, goal below 140/90     Morbid obesity, unspecified obesity type (H)     Tonsillitis     Past Surgical History:   Procedure Laterality Date     BREAST SURGERY      breast reduction bilat     COLONOSCOPY WITH CO2 INSUFFLATION N/A 9/1/2017    Procedure: COLONOSCOPY WITH CO2 INSUFFLATION;  Colonoscopy, Abdominal pain, left lower quadrant, Parenteau, BMI 42.93, [phone];  Surgeon: Anuel Rodríguez MD;  Location:  OR     ENDOSCOPIC SINUS SURGERY Right 3/15/2017    Procedure: ENDOSCOPIC SINUS SURGERY;;  Surgeon: Vicki Zurita MD;  Location: [facility] OR     EXAM UNDER ANESTHESIA, FULGURATE CONDYLOMA ANUS, COMBINED N/A 9/26/2017    Procedure: COMBINED EXAM UNDER ANESTHESIA, FULGURATE CONDYLOMA ANUS;  Examination Under anethesia, Excision And Fulguration Of Anal Condyloma;  Surgeon: Td Garvey  MD Manuel;  [facility] OR     LAPAROSCOPIC CHOLECYSTECTOMY  08/05/99     TONSILLECTOMY Bilateral 3/15/2017    Procedure: TONSILLECTOMY;  Bilateral Tonsillectomy, Right Functional Endoscopic Sinus Surgery ;  Surgeon: Vicki Zurita MD;  Location:  OR       Social History   Substance Use Topics     Smoking status: Never Smoker     Smokeless tobacco: Never Used     Alcohol use No     Family History   Problem Relation Age of Onset     DIABETES Mother      Hypertension Mother      CANCER Father      pancreatic     HEART DISEASE Maternal Grandmother      DIABETES Maternal Grandmother      CEREBROVASCULAR DISEASE Maternal Grandfather      Cancer - colorectal Paternal Grandfather      DIABETES Brother      Asthma Son      Depression Son      Asthma Daughter          Current Outpatient Prescriptions   Medication Sig Dispense Refill     levothyroxine (SYNTHROID/LEVOTHROID) 75 MCG tablet TAKE 1 TABLET (75 MCG) BY MOUTH DAILY 90 tablet 1     lisinopril (PRINIVIL/ZESTRIL) 20 MG tablet Take 1 tablet (20 mg) by mouth daily 90 tablet 1     citalopram (CELEXA) 40 MG tablet Take 1 tablet (40 mg) by mouth daily 90 tablet 3     buPROPion (WELLBUTRIN XL) 150 MG 24 hr tablet Take 1 tablet (150 mg) by mouth every morning 90 tablet 1     acyclovir (ZOVIRAX) 5 % ointment Apply topically 5 times daily 30 g 3     acyclovir (ZOVIRAX) 400 MG tablet TAKE 1 TABLET (400 MG) BY MOUTH 3 TIMES DAILY 15 tablet 0     Multiple Vitamins-Minerals (MULTIVITAMIN ADULT PO)        clotrimazole-betamethasone (LOTRISONE) cream Apply topically 2 times daily 30 g 1     levonorgestrel (MIRENA) 20 MCG/24HR IUD 1 each by Intrauterine route once       fluticasone (FLONASE) 50 MCG/ACT nasal spray Spray 2 sprays into both nostrils daily Reported on 3/24/2017       silver sulfADIAZINE (SILVADENE) 1 % cream Apply thick layer to anus 3-4 times per day for irritation for 5 days. (Patient not taking: Reported on 3/29/2018) 85 g 1     Hypertonic Nasal Wash  (SINUS RINSE BOTTLE KIT) PACK Spray 1 packet in nostril 2 times daily (Patient not taking: Reported on 3/29/2018) 10 each 3     Chlorpheniramine-Pseudoeph (DECONGESTANT + PO) Reported on 3/24/2017       cetirizine (ZYRTEC) 10 MG tablet Take 1 tablet (10 mg) by mouth every evening (Patient not taking: Reported on 3/29/2018) 90 tablet 1     Allergies   Allergen Reactions     Percocet [Oxycodone-Acetaminophen] Itching     Recent Labs   Lab Test  12/04/17   0834  10/13/17   0059   06/20/17   1425   04/26/16   0912  01/25/16   0835   07/18/11   1058   A1C   --    --    --    --    --   6.2*  6.3*   --    --    LDL  105*   --    --    --    --    --   104*   --   82   HDL  39*   --    --    --    --    --   35*   --   42*   TRIG  102   --    --    --    --    --   167*   --   171*   ALT  25   --    --    --    --   25  23   < >  7   CR  0.76  0.65   < >   --    --   0.74  0.62   < >  0.68   GFRESTIMATED  82  >90   < >   --    --   84  >90  Non  GFR Calc     < >  >90   GFRESTBLACK  >90  >90   < >   --    --   >90   GFR Calc    >90   GFR Calc     < >  >90   POTASSIUM  4.2  3.6   < >   --    --   4.0  4.1   < >  4.6   TSH  2.06   --    --   2.43   < >  5.41*  5.16*   < >  2.88    < > = values in this interval not displayed.      BP Readings from Last 3 Encounters:   03/29/18 136/72   12/20/17 144/87   12/04/17 124/82    Wt Readings from Last 3 Encounters:   03/29/18 255 lb (115.7 kg)   12/20/17 248 lb 11.2 oz (112.8 kg)   12/04/17 252 lb 11.2 oz (114.6 kg)                    Reviewed and updated as needed this visit by clinical staff  Tobacco  Allergies  Med Hx  Surg Hx  Fam Hx  Soc Hx      Reviewed and updated as needed this visit by Provider  Meds  Problems         ROS:  Constitutional, HEENT, cardiovascular, pulmonary, gi and gu systems are negative, except as otherwise noted.    OBJECTIVE:     /72 (BP Location: Left arm, Patient Position: Chair, Cuff Size:  "Adult Large)  Pulse 67  Temp 97.6  F (36.4  C) (Oral)  Ht 5' 6.75\" (1.695 m)  Wt 255 lb (115.7 kg)  SpO2 98%  BMI 40.24 kg/m2  Body mass index is 40.24 kg/(m^2).  GENERAL: healthy, alert and no distress  EYES: Eyes grossly normal to inspection, PERRL and conjunctivae and sclerae normal  HENT: normal cephalic/atraumatic, ear canals and TM's normal, nose and mouth without ulcers or lesions, nasal mucosa edematous , rhinorrhea clear, oropharynx clear and oral mucous membranes moist, no sinus tenderness  NECK: no adenopathy, no asymmetry, masses, or scars and thyroid normal to palpation  RESP: lungs clear to auscultation - no rales, rhonchi or wheezes  CV: regular rate and rhythm, normal S1 S2, no S3 or S4, no murmur, click or rub, no peripheral edema and peripheral pulses strong  ABDOMEN: soft, nontender, no hepatosplenomegaly, no masses and bowel sounds normal  MS: no gross musculoskeletal defects noted, no edema    Diagnostic Test Results:  none     ASSESSMENT/PLAN:     1. Viral URI  Mild symptoms.  Was worried about flu exposure.  No symptoms of flu.  Will not test today.  To call if symptoms arise.      Patient Instructions     For your symptoms:  -please drink a lot of fluid- water is best- 8-10 glasses a day  -rest as much as possible  -Can use Mucinex (gauifenesin) 600-1200 mg twice a day for congestion (non-stimulating)  -Sudafed (pseudophedrine) can be purchased with your I.D. From the pharmacy without a prescription.  It is stimulating so try to avoid taking it at night or you may have trouble sleeping  -You can take Benadryl 12.5-50 mg(diphenhydramine) at night if symptoms are severe.  Will also help with sleep.  It will make you very drowsy, however, so make sure you have at least 8 hours to sleep.  -Saline spray can be helpful as well to clear your nasal passages of irritating allergens  -Ibuprofen or Tylenol for fever/body aches  -I recommend avoiding nasal spray like Afrin as this can cause severe " rebound congestion  -Please follow up if you have not noted improvement in 4-5 days.      At [facility], we strive to deliver an exceptional experience to you, every time we see you.  If you receive a survey in the mail, please send us back your thoughts. We really do value your feedback.    Based on your medical history, these are the current health maintenance/preventive care services that you are due for (some may have been done at this visit.)  There are no preventive care reminders to display for this patient.      Suggested websites for health information:  Www.obopay.org : Up to date and easily searchable information on multiple topics.  Www.Favbuy.gov : medication info, interactive tutorials, watch real surgeries online  Www.familydoctor.org : good info from the Academy of Family Physicians  Www.cdc.gov : public health info, travel advisories, epidemics (H1N1)  Www.aap.org : children's health info, normal development, vaccinations  Www.health.[facility].[place].us : [place] dept of health, public health issues in [place], N1N1    Your care team:                            Family Medicine Internal Medicine   MD Travis Benites MD Shantel Branch-Fleming, MD Katya Georgiev PA-C Megan Hill, APRN CNP Nam Ho, MD Pediatrics   KAMRAN Block, LEONELA Coffey APRMD Luisa Raymond CNP, MD Deborah Mielke, MD Kim Thein, APRN [facility]      Clinic hours: Monday - Thursday 7 am-7 pm; Fridays 7 am-5 pm.   Urgent care: Monday - Friday 11 am-9 pm; Saturday and Sunday 9 am-5 pm.  Pharmacy : Monday -Thursday 8 am-8 pm; Friday 8 am-6 pm; Saturday and Sunday 9 am-5 pm.     Clinic: [phone]   Pharmacy: [phone]            Jolanta Samuels, JERRICA GIPSON  [facility]

## 2018-03-29 NOTE — PATIENT INSTRUCTIONS
For your symptoms:  -please drink a lot of fluid- water is best- 8-10 glasses a day  -rest as much as possible  -Can use Mucinex (gauifenesin) 600-1200 mg twice a day for congestion (non-stimulating)  -Sudafed (pseudophedrine) can be purchased with your I.D. From the pharmacy without a prescription.  It is stimulating so try to avoid taking it at night or you may have trouble sleeping  -You can take Benadryl 12.5-50 mg(diphenhydramine) at night if symptoms are severe.  Will also help with sleep.  It will make you very drowsy, however, so make sure you have at least 8 hours to sleep.  -Saline spray can be helpful as well to clear your nasal passages of irritating allergens  -Ibuprofen or Tylenol for fever/body aches  -I recommend avoiding nasal spray like Afrin as this can cause severe rebound congestion  -Please follow up if you have not noted improvement in 4-5 days.      At [facility], we strive to deliver an exceptional experience to you, every time we see you.  If you receive a survey in the mail, please send us back your thoughts. We really do value your feedback.    Based on your medical history, these are the current health maintenance/preventive care services that you are due for (some may have been done at this visit.)  There are no preventive care reminders to display for this patient.      Suggested websites for health information:  Www.[place].org : Up to date and easily searchable information on multiple topics.  Www.medlineplus.gov : medication info, interactive tutorials, watch real surgeries online  Www.familydoctor.org : good info from the Academy of Family Physicians  Www.cdc.gov : public health info, travel advisories, epidemics (H1N1)  Www.aap.org : children's health info, normal development, vaccinations  Www.health.[facility].[place].us : [place] dept of health, public health issues in [place], N1N1    Your care team:                            Family Medicine Internal Medicine   Seven  MD Travis Spear MD Shantel Branch-Fleming, MD Katya Georgiev PA-C Megan Hill, APRN [facility]    Norberto Lawrence, MD Pediatrics   Deshaun Cosby, PAJORGE Samuels, [facility] Nadine Coffey APRN CNP   MD Luisa Smart MD Deborah Mielke, MD Kim Thein, APRN [facility]      Clinic hours: Monday - Thursday 7 am-7 pm; Fridays 7 am-5 pm.   Urgent care: Monday - Friday 11 am-9 pm; Saturday and Sunday 9 am-5 pm.  Pharmacy : Monday -Thursday 8 am-8 pm; Friday 8 am-6 pm; Saturday and Sunday 9 am-5 pm.     Clinic: [phone]   Pharmacy: [phone]

## 2018-03-29 NOTE — MR AVS SNAPSHOT
After Visit Summary   3/29/2018    Naomy Friend    MRN: [id number]           Patient Information     Date Of Birth          1970        Visit Information        Provider Department      3/29/2018 3:20 PM Jolanta Samuels APRN [facility]        Care Instructions    For your symptoms:  -please drink a lot of fluid- water is best- 8-10 glasses a day  -rest as much as possible  -Can use Mucinex (gauifenesin) 600-1200 mg twice a day for congestion (non-stimulating)  -Sudafed (pseudophedrine) can be purchased with your I.D. From the pharmacy without a prescription.  It is stimulating so try to avoid taking it at night or you may have trouble sleeping  -You can take Benadryl 12.5-50 mg(diphenhydramine) at night if symptoms are severe.  Will also help with sleep.  It will make you very drowsy, however, so make sure you have at least 8 hours to sleep.  -Saline spray can be helpful as well to clear your nasal passages of irritating allergens  -Ibuprofen or Tylenol for fever/body aches  -I recommend avoiding nasal spray like Afrin as this can cause severe rebound congestion  -Please follow up if you have not noted improvement in 4-5 days.      At [facility], we strive to deliver an exceptional experience to you, every time we see you.  If you receive a survey in the mail, please send us back your thoughts. We really do value your feedback.    Based on your medical history, these are the current health maintenance/preventive care services that you are due for (some may have been done at this visit.)  There are no preventive care reminders to display for this patient.      Suggested websites for health information:  Www.OneTwoSee.org : Up to date and easily searchable information on multiple topics.  Www.medlineplus.gov : medication info, interactive tutorials, watch real surgeries online  Www.familydoctor.org : good info from the Academy of Family  Physicians  Www.cdc.gov : public health info, travel advisories, epidemics (H1N1)  Www.aap.org : children's health info, normal development, vaccinations  Www.health.[facility].[place].us : [place] dept of health, public health issues in [place], N1N1    Your care team:                            Family Medicine Internal Medicine   MD Travis Benites MD Shantel Branch-Fleming, MD Katya Georgiev PA-C Megan Hill, APRN CNP Nam Ho, MD Pediatrics   KAMRAN Block, MD Luisa Leon CNP, MD Deborah Mielke, MD Kim Thein, APRN CNP      Clinic hours: Monday - Thursday 7 am-7 pm; Fridays 7 am-5 pm.   Urgent care: Monday - Friday 11 am-9 pm; Saturday and Sunday 9 am-5 pm.  Pharmacy : Monday -Thursday 8 am-8 pm; Friday 8 am-6 pm; Saturday and Sunday 9 am-5 pm.     Clinic: [phone]   Pharmacy: [phone]                Follow-ups after your visit        Your next 10 appointments already scheduled     Jun 14, 2018 11:00 AM CDT   (Arrive by 10:45 AM)   Microscopy with JERRICA Watts CNP   [facility] Colon and Rectal Surgery ([facility] [place])    [address] [place] [id number] [phone]              Who to contact     If you have questions or need follow up information about today's clinic visit or your schedule please contact [facility] directly at [phone].  Normal or non-critical lab and imaging results will be communicated to you by MyChart, letter or phone within 4 business days after the clinic has received the results. If you do not hear from us within 7 days, please contact the clinic through MyChart or phone. If you have a critical or abnormal lab result, we will notify you by phone as soon as possible.  Submit refill requests through [employer] or call your pharmacy and they will forward the refill request to us. Please allow 3 business days for your  "refill to be completed.          Additional Information About Your Visit        MyChart Information     [employer] gives you secure access to your electronic health record. If you see a primary care provider, you can also send messages to your care team and make appointments. If you have questions, please call your primary care clinic.  If you do not have a primary care provider, please call [phone] and they will assist you.        Care EveryWhere ID     This is your Care EveryWhere ID. This could be used by other organizations to access your [place] medical records  [id number]        Your Vitals Were     Pulse Temperature Height Pulse Oximetry BMI (Body Mass Index)       67 97.6  F (36.4  C) (Oral) 5' 6.75\" (1.695 m) 98% 40.24 kg/m2        Blood Pressure from Last 3 Encounters:   03/29/18 136/72   12/20/17 144/87   12/04/17 124/82    Weight from Last 3 Encounters:   03/29/18 255 lb (115.7 kg)   12/20/17 248 lb 11.2 oz (112.8 kg)   12/04/17 252 lb 11.2 oz (114.6 kg)              Today, you had the following     No orders found for display       Primary Care Provider Office Phone # Fax #    JovitaJERRICA Michel [facility] [phone]       [address] [facility] [address]  [facility] [address]        Equal Access to Services     HODA SOSA : Hadii aad ku hadasho Soomaali, waaxda luqadaha, qaybta kaalmada adeonesimoda, josr cassidy. So [facility] [phone].    ATENCIÓN: Si habla español, tiene a lorenzana disposición servicios gratuitos de asistencia lingüística. Llame al [phone].    We comply with applicable federal civil rights laws and [place] laws. We do not discriminate on the basis of race, color, national origin, age, disability, sex, sexual orientation, or gender identity.            Thank you!     Thank you for choosing [facility]  for your care. Our goal is always to provide you with excellent care. Hearing back from our patients is one way we can continue " to improve our services. Please take a few minutes to complete the written survey that you may receive in the mail after your visit with us. Thank you!             Your Updated Medication List - Protect others around you: Learn how to safely use, store and throw away your medicines at www.disposemymeds.org.          This list is accurate as of 3/29/18  3:48 PM.  Always use your most recent med list.                   Brand Name Dispense Instructions for use Diagnosis    acyclovir 400 MG tablet    ZOVIRAX    15 tablet    TAKE 1 TABLET (400 MG) BY MOUTH 3 TIMES DAILY    Recurrent herpes simplex       acyclovir 5 % ointment    ZOVIRAX    30 g    Apply topically 5 times daily    Cold sore       buPROPion 150 MG 24 hr tablet    WELLBUTRIN XL    90 tablet    Take 1 tablet (150 mg) by mouth every morning    Benign essential hypertension       cetirizine 10 MG tablet    zyrTEC    90 tablet    Take 1 tablet (10 mg) by mouth every evening    Post-nasal drainage       citalopram 40 MG tablet    celeXA    90 tablet    Take 1 tablet (40 mg) by mouth daily    Moderate major depression (H)       clotrimazole-betamethasone cream    LOTRISONE    30 g    Apply topically 2 times daily    Tinea cruris       DECONGESTANT + PO      Reported on 3/24/2017        fluticasone 50 MCG/ACT spray    FLONASE     Spray 2 sprays into both nostrils daily Reported on 3/24/2017        levonorgestrel 20 MCG/24HR IUD    MIRENA     1 each by Intrauterine route once        levothyroxine 75 MCG tablet    SYNTHROID/LEVOTHROID    90 tablet    TAKE 1 TABLET (75 MCG) BY MOUTH DAILY    Hypothyroidism, unspecified type       lisinopril 20 MG tablet    PRINIVIL/ZESTRIL    90 tablet    Take 1 tablet (20 mg) by mouth daily    HTN, goal below 140/90       MULTIVITAMIN ADULT PO           silver sulfADIAZINE 1 % cream    SILVADENE    85 g    Apply thick layer to anus 3-4 times per day for irritation for 5 days.    Anal condyloma       sinus rinse bottle     10 each     Spray 1 packet in nostril 2 times daily    Postoperative pain

## 2018-06-25 DIAGNOSIS — F32.1 MODERATE MAJOR DEPRESSION (H): ICD-10-CM

## 2018-06-25 DIAGNOSIS — I10 HTN, GOAL BELOW 140/90: ICD-10-CM

## 2018-06-25 DIAGNOSIS — I10 BENIGN ESSENTIAL HYPERTENSION: ICD-10-CM

## 2018-06-25 RX ORDER — BUPROPION HYDROCHLORIDE 150 MG/1
150 TABLET ORAL EVERY MORNING
Qty: 90 TABLET | Refills: 1 | Status: SHIPPED | OUTPATIENT
Start: 2018-06-25 | End: 2018-09-22

## 2018-06-25 RX ORDER — CITALOPRAM HYDROBROMIDE 40 MG/1
40 TABLET ORAL DAILY
Qty: 90 TABLET | Refills: 1 | Status: SHIPPED | OUTPATIENT
Start: 2018-06-25 | End: 2018-09-22

## 2018-06-25 RX ORDER — LISINOPRIL 20 MG/1
20 TABLET ORAL DAILY
Qty: 90 TABLET | Refills: 1 | Status: SHIPPED | OUTPATIENT
Start: 2018-06-25 | End: 2018-09-22

## 2018-06-25 NOTE — TELEPHONE ENCOUNTER
Called patient who stated that she had her prescription transferred to [facility] and now she is unable to fill it.     Called [facility]-- [phone] Dean Randolph and Kirit Terry who stated that they have an rx for 20 mg tablet. Stated that the easiest would be a new prescription sent to them.    New prescription for 90 tabs, 1 refill sent to [facility].    Saskia Lawrence RN  [facility]

## 2018-06-25 NOTE — TELEPHONE ENCOUNTER
Pt had her prescriptions sent to a new pharmacy but [facility] had sent the wrong prescription for citalopram (CELEXA) 40 MG tablet and [facility] won't fill it. Information verified with pharmacies. Pt needs a new prescription from our clinic. New pharmacy has been queued.    Pt is out of medication. Please call pt when resolved.    Pt can be reached @ [phone] shamika

## 2018-06-25 NOTE — TELEPHONE ENCOUNTER
"Requested Prescriptions   Pending Prescriptions Disp Refills     buPROPion (WELLBUTRIN XL) 150 MG 24 hr tablet 90 tablet 1    Last Written Prescription Date:  12/04/2017  Last Fill Quantity: 90,  # refills: 1   Last office visit: 3/29/2018 with prescribing provider:  12/04/2017   Future Office Visit:     Sig: Take 1 tablet (150 mg) by mouth every morning    SSRIs Protocol Passed    6/25/2018  3:11 PM       Passed - Recent (12 mo) or future (30 days) visit within the authorizing provider's specialty    Patient had office visit in the last 12 months or has a visit in the next 30 days with authorizing provider or within the authorizing provider's specialty.  See \"Patient Info\" tab in inbasket, or \"Choose Columns\" in Meds & Orders section of the refill encounter.           Passed - Medication is Bupropion    If the medication is Bupropion (Wellbutrin), and the patient is taking for smoking cessation; OK to refill.         Passed - Patient is age 18 or older       Passed - No active pregnancy on record       Passed - No positive pregnancy test in last 12 months        lisinopril (PRINIVIL/ZESTRIL) 20 MG tablet 90 tablet 1    Last Written Prescription Date:  12/12/2017  Last Fill Quantity: 90,  # refills: 1   Last office visit: 3/29/2018 with prescribing provider:  12/04/2017   Future Office Visit:     Sig: Take 1 tablet (20 mg) by mouth daily    ACE Inhibitors (Including Combos) Protocol Passed    6/25/2018  3:11 PM       Passed - Blood pressure under 140/90 in past 12 months    BP Readings from Last 3 Encounters:   03/29/18 136/72   12/20/17 144/87   12/04/17 124/82                Passed - Recent (12 mo) or future (30 days) visit within the authorizing provider's specialty    Patient had office visit in the last 12 months or has a visit in the next 30 days with authorizing provider or within the authorizing provider's specialty.  See \"Patient Info\" tab in inbasket, or \"Choose Columns\" in Meds & Orders section of the " refill encounter.           Passed - Patient is age 18 or older       Passed - No active pregnancy on record       Passed - Normal serum creatinine on file in past 12 months    Recent Labs   Lab Test  12/04/17   0834   CR  0.76            Passed - Normal serum potassium on file in past 12 months    Recent Labs   Lab Test  12/04/17   0834   POTASSIUM  4.2            Passed - No positive pregnancy test in past 12 months

## 2018-06-25 NOTE — TELEPHONE ENCOUNTER
Prescription approved per [facility] Refill Protocol.    Candi Garcia, BSN RN  [facility]

## 2018-06-27 ENCOUNTER — TELEPHONE (OUTPATIENT)
Dept: SURGERY | Facility: CLINIC | Age: 48
End: 2018-06-27

## 2018-06-28 ENCOUNTER — OFFICE VISIT (OUTPATIENT)
Dept: SURGERY | Facility: CLINIC | Age: 48
End: 2018-06-28
Payer: COMMERCIAL

## 2018-06-28 VITALS
WEIGHT: 236.3 LBS | OXYGEN SATURATION: 97 % | HEIGHT: 67 IN | BODY MASS INDEX: 37.09 KG/M2 | HEART RATE: 66 BPM | DIASTOLIC BLOOD PRESSURE: 74 MMHG | TEMPERATURE: 97.7 F | SYSTOLIC BLOOD PRESSURE: 135 MMHG

## 2018-06-28 DIAGNOSIS — K62.82 ANAL DYSPLASIA: Primary | ICD-10-CM

## 2018-06-28 ASSESSMENT — PAIN SCALES - GENERAL: PAINLEVEL: NO PAIN (0)
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
## 2018-06-28 NOTE — MR AVS SNAPSHOT
"              After Visit Summary   6/28/2018    Naomy Friend    MRN: [id number]           Patient Information     Date Of Birth          1970        Visit Information        Provider Department      6/28/2018 1:00 PM Linnette Galarza APRN CNP [facility] Colon and Rectal Surgery        Today's Diagnoses     Anal dysplasia    -  1       Follow-ups after your visit        Who to contact     Please call your clinic at [phone] to:    Ask questions about your health    Make or cancel appointments    Discuss your medicines    Learn about your test results    Speak to your doctor            Additional Information About Your Visit        MyChart Information     [employer] gives you secure access to your electronic health record. If you see a primary care provider, you can also send messages to your care team and make appointments. If you have questions, please call your primary care clinic.  If you do not have a primary care provider, please call [phone] and they will assist you.      [employer] is an electronic gateway that provides easy, online access to your medical records. With [employer], you can request a clinic appointment, read your test results, renew a prescription or communicate with your care team.     To access your existing account, please contact your [facility] or call [phone] for assistance.        Care EveryWhere ID     This is your Care EveryWhere ID. This could be used by other organizations to access your [place] medical records  [id number]        Your Vitals Were     Pulse Temperature Height Pulse Oximetry BMI (Body Mass Index)       66 97.7  F (36.5  C) (Oral) 5' 6.75\" 97% 37.29 kg/m2        Blood Pressure from Last 3 Encounters:   06/28/18 135/74   03/29/18 136/72   12/20/17 144/87    Weight from Last 3 Encounters:   06/28/18 236 lb 4.8 oz   03/29/18 255 lb   12/20/17 248 lb 11.2 oz              We Performed the Following     Cytology non " gyn (Anal PAP)     HC ANOSCOPY DX, HRA, INCL JORGE SPECIMEN, BRUSH/WASH        Primary Care Provider Office Phone # Fax #    JERRICA Church [facility] [phone]       [address] [facility] [address]  [facility] [address]        Equal Access to Services     HODA SOSA : Hadii aad ku hadasho Soomaali, waaxda luqadaha, qaybta kaalmada adeegyada, waxay muluin haytaishan donna jonesafricapa tyson . So [facility] [phone].    ATENCIÓN: Si habla español, tiene a lorenzana disposición servicios gratuitos de asistencia lingüística. [facility] [phone].    We comply with applicable federal civil rights laws and [place] laws. We do not discriminate on the basis of race, color, national origin, age, disability, sex, sexual orientation, or gender identity.            Thank you!     Thank you for choosing [facility] COLON AND RECTAL SURGERY  for your care. Our goal is always to provide you with excellent care. Hearing back from our patients is one way we can continue to improve our services. Please take a few minutes to complete the written survey that you may receive in the mail after your visit with us. Thank you!             Your Updated Medication List - Protect others around you: Learn how to safely use, store and throw away your medicines at www.disposemymeds.org.          This list is accurate as of 6/28/18  1:48 PM.  Always use your most recent med list.                   Brand Name Dispense Instructions for use Diagnosis    acyclovir 400 MG tablet    ZOVIRAX    15 tablet    TAKE 1 TABLET (400 MG) BY MOUTH 3 TIMES DAILY    Recurrent herpes simplex       acyclovir 5 % ointment    ZOVIRAX    30 g    Apply topically 5 times daily    Cold sore       buPROPion 150 MG 24 hr tablet    WELLBUTRIN XL    90 tablet    Take 1 tablet (150 mg) by mouth every morning    Benign essential hypertension       cetirizine 10 MG tablet    zyrTEC    90 tablet    Take 1 tablet (10 mg) by mouth every evening    Post-nasal drainage       citalopram 40  MG tablet    celeXA    90 tablet    Take 1 tablet (40 mg) by mouth daily    Moderate major depression (H)       clotrimazole-betamethasone cream    LOTRISONE    30 g    Apply topically 2 times daily    Tinea cruris       DECONGESTANT + PO      Reported on 3/24/2017        fluticasone 50 MCG/ACT spray    FLONASE     Spray 2 sprays into both nostrils daily Reported on 3/24/2017        levonorgestrel 20 MCG/24HR IUD    MIRENA     1 each by Intrauterine route once        levothyroxine 75 MCG tablet    SYNTHROID/LEVOTHROID    90 tablet    TAKE 1 TABLET (75 MCG) BY MOUTH DAILY    Hypothyroidism, unspecified type       lisinopril 20 MG tablet    PRINIVIL/ZESTRIL    90 tablet    Take 1 tablet (20 mg) by mouth daily    HTN, goal below 140/90       MULTIVITAMIN ADULT PO           silver sulfADIAZINE 1 % cream    SILVADENE    85 g    Apply thick layer to anus 3-4 times per day for irritation for 5 days.    Anal condyloma       sinus rinse bottle     10 each    Spray 1 packet in nostril 2 times daily    Postoperative pain

## 2018-06-28 NOTE — NURSING NOTE
"Chief Complaint   Patient presents with     High Resolution Anoscopy     HRA       Vitals:    06/28/18 1239   BP: 135/74   Pulse: 66   Temp: 97.7  F (36.5  C)   TempSrc: Oral   SpO2: 97%   Weight: 236 lb 4.8 oz   Height: 5' 6.75\"       Body mass index is 37.29 kg/(m^2).      Jessica BAUTISTA LPN                        "
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
## 2018-06-29 LAB — COPATH REPORT: NORMAL
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
## 2018-07-17 ENCOUNTER — OFFICE VISIT (OUTPATIENT)
Dept: FAMILY MEDICINE | Facility: CLINIC | Age: 48
End: 2018-07-17
Payer: COMMERCIAL

## 2018-07-17 VITALS
SYSTOLIC BLOOD PRESSURE: 126 MMHG | HEIGHT: 67 IN | HEART RATE: 78 BPM | OXYGEN SATURATION: 99 % | TEMPERATURE: 98.4 F | BODY MASS INDEX: 38.12 KG/M2 | RESPIRATION RATE: 12 BRPM | WEIGHT: 242.9 LBS | DIASTOLIC BLOOD PRESSURE: 78 MMHG

## 2018-07-17 DIAGNOSIS — Z11.3 ROUTINE SCREENING FOR STI (SEXUALLY TRANSMITTED INFECTION): Primary | ICD-10-CM

## 2018-07-17 DIAGNOSIS — B37.31 YEAST INFECTION OF THE VAGINA: ICD-10-CM

## 2018-07-17 DIAGNOSIS — E03.9 HYPOTHYROIDISM, UNSPECIFIED TYPE: ICD-10-CM

## 2018-07-17 DIAGNOSIS — F41.1 GAD (GENERALIZED ANXIETY DISORDER): ICD-10-CM

## 2018-07-17 DIAGNOSIS — I10 BENIGN ESSENTIAL HYPERTENSION: ICD-10-CM

## 2018-07-17 DIAGNOSIS — F32.1 MODERATE MAJOR DEPRESSION (H): ICD-10-CM

## 2018-07-17 DIAGNOSIS — E66.01 MORBID OBESITY, UNSPECIFIED OBESITY TYPE (H): ICD-10-CM

## 2018-07-17 LAB
SPECIMEN SOURCE: ABNORMAL
WET PREP SPEC: ABNORMAL

## 2018-07-17 PROCEDURE — 87210 SMEAR WET MOUNT SALINE/INK: CPT | Performed by: NURSE PRACTITIONER

## 2018-07-17 PROCEDURE — 87389 HIV-1 AG W/HIV-1&-2 AB AG IA: CPT | Performed by: NURSE PRACTITIONER

## 2018-07-17 PROCEDURE — 87491 CHLMYD TRACH DNA AMP PROBE: CPT | Performed by: NURSE PRACTITIONER

## 2018-07-17 PROCEDURE — 86780 TREPONEMA PALLIDUM: CPT | Performed by: NURSE PRACTITIONER

## 2018-07-17 PROCEDURE — 84443 ASSAY THYROID STIM HORMONE: CPT | Performed by: NURSE PRACTITIONER

## 2018-07-17 PROCEDURE — 87591 N.GONORRHOEAE DNA AMP PROB: CPT | Performed by: NURSE PRACTITIONER

## 2018-07-17 PROCEDURE — 36415 COLL VENOUS BLD VENIPUNCTURE: CPT | Performed by: NURSE PRACTITIONER

## 2018-07-17 PROCEDURE — 99214 OFFICE O/P EST MOD 30 MIN: CPT | Performed by: NURSE PRACTITIONER

## 2018-07-17 PROCEDURE — 80053 COMPREHEN METABOLIC PANEL: CPT | Performed by: NURSE PRACTITIONER

## 2018-07-17 RX ORDER — LORAZEPAM 0.5 MG/1
0.5 TABLET ORAL EVERY 8 HOURS PRN
Qty: 30 TABLET | Refills: 0 | Status: SHIPPED | OUTPATIENT
Start: 2018-07-17 | End: 2019-02-28

## 2018-07-17 NOTE — MR AVS SNAPSHOT
"              After Visit Summary   7/17/2018    Naomy Friend    MRN: [id number]           Patient Information     Date Of Birth          1970        Visit Information        Provider Department      7/17/2018 3:00 PM Jovita Jonas APRN CNP [facility]        Today's Diagnoses     Routine screening for STI (sexually transmitted infection)    -  1    Benign essential hypertension        Hypothyroidism, unspecified type           Follow-ups after your visit        Who to contact     If you have questions or need follow up information about today's clinic visit or your schedule please contact [facility] directly at [phone].  Normal or non-critical lab and imaging results will be communicated to you by MyChart, letter or phone within 4 business days after the clinic has received the results. If you do not hear from us within 7 days, please contact the clinic through [employer] or phone. If you have a critical or abnormal lab result, we will notify you by phone as soon as possible.  Submit refill requests through [employer] or call your pharmacy and they will forward the refill request to us. Please allow 3 business days for your refill to be completed.          Additional Information About Your Visit        MyChart Information     [employer] gives you secure access to your electronic health record. If you see a primary care provider, you can also send messages to your care team and make appointments. If you have questions, please call your primary care clinic.  If you do not have a primary care provider, please call [phone] and they will assist you.        Care EveryWhere ID     This is your Care EveryWhere ID. This could be used by other organizations to access your [place] medical records  [id number]        Your Vitals Were     Pulse Temperature Respirations Height Pulse Oximetry Breastfeeding?    78 98.4  F (36.9  C) (Oral) 12 5' 6.75\" (1.695 m) 99% No    BMI (Body " Mass Index)                   38.33 kg/m2            Blood Pressure from Last 3 Encounters:   07/17/18 126/78   06/28/18 135/74   03/29/18 136/72    Weight from Last 3 Encounters:   07/17/18 242 lb 14.4 oz (110.2 kg)   06/28/18 236 lb 4.8 oz (107.2 kg)   03/29/18 255 lb (115.7 kg)              We Performed the Following     CHLAMYDIA TRACHOMATIS PCR     Comprehensive metabolic panel     HIV Antigen Antibody Combo     NEISSERIA GONORRHOEA PCR     Treponema Abs w Reflex to RPR and Titer     TSH with free T4 reflex     Wet prep        Primary Care Provider Office Phone # Fax #    Jovita Forde Pritesh, APRN [facility] [phone]       [address] [facility] [address]  [facility] [address]        Equal Access to Services     HODA SOSA : Hadii aad ku hadasho Soysabel, waaxda luqadaha, qaybta kaalmada adeegyada, josr tyson . So [facility] [phone].    ATENCIÓN: Si habla español, tiene a lorenzana disposición servicios gratuitos de asistencia lingüística. Llame al [phone].    We comply with applicable federal civil rights laws and [place] laws. We do not discriminate on the basis of race, color, national origin, age, disability, sex, sexual orientation, or gender identity.            Thank you!     Thank you for choosing [facility]  for your care. Our goal is always to provide you with excellent care. Hearing back from our patients is one way we can continue to improve our services. Please take a few minutes to complete the written survey that you may receive in the mail after your visit with us. Thank you!             Your Updated Medication List - Protect others around you: Learn how to safely use, store and throw away your medicines at www.disposemymeds.org.          This list is accurate as of 7/17/18  3:24 PM.  Always use your most recent med list.                   Brand Name Dispense Instructions for use Diagnosis    acyclovir 400 MG tablet    ZOVIRAX    15 tablet    TAKE 1  TABLET (400 MG) BY MOUTH 3 TIMES DAILY    Recurrent herpes simplex       acyclovir 5 % ointment    ZOVIRAX    30 g    Apply topically 5 times daily    Cold sore       buPROPion 150 MG 24 hr tablet    WELLBUTRIN XL    90 tablet    Take 1 tablet (150 mg) by mouth every morning    Benign essential hypertension       cetirizine 10 MG tablet    zyrTEC    90 tablet    Take 1 tablet (10 mg) by mouth every evening    Post-nasal drainage       citalopram 40 MG tablet    celeXA    90 tablet    Take 1 tablet (40 mg) by mouth daily    Moderate major depression (H)       clotrimazole-betamethasone cream    LOTRISONE    30 g    Apply topically 2 times daily    Tinea cruris       DECONGESTANT + PO      Reported on 3/24/2017        fluticasone 50 MCG/ACT spray    FLONASE     Spray 2 sprays into both nostrils daily Reported on 3/24/2017        levonorgestrel 20 MCG/24HR IUD    MIRENA     1 each by Intrauterine route once        levothyroxine 75 MCG tablet    SYNTHROID/LEVOTHROID    90 tablet    TAKE 1 TABLET (75 MCG) BY MOUTH DAILY    Hypothyroidism, unspecified type       lisinopril 20 MG tablet    PRINIVIL/ZESTRIL    90 tablet    Take 1 tablet (20 mg) by mouth daily    HTN, goal below 140/90       MULTIVITAMIN ADULT PO           silver sulfADIAZINE 1 % cream    SILVADENE    85 g    Apply thick layer to anus 3-4 times per day for irritation for 5 days.    Anal condyloma       sinus rinse bottle     10 each    Spray 1 packet in nostril 2 times daily    Postoperative pain

## 2018-07-18 LAB
ALBUMIN SERPL-MCNC: 3.4 G/DL (ref 3.4–5)
ALP SERPL-CCNC: 69 U/L (ref 40–150)
ALT SERPL W P-5'-P-CCNC: 22 U/L (ref 0–50)
ANION GAP SERPL CALCULATED.3IONS-SCNC: 7 MMOL/L (ref 3–14)
AST SERPL W P-5'-P-CCNC: 15 U/L (ref 0–45)
BILIRUB SERPL-MCNC: 0.2 MG/DL (ref 0.2–1.3)
BUN SERPL-MCNC: 16 MG/DL (ref 7–30)
CALCIUM SERPL-MCNC: 8.7 MG/DL (ref 8.5–10.1)
CHLORIDE SERPL-SCNC: 109 MMOL/L (ref 94–109)
CO2 SERPL-SCNC: 29 MMOL/L (ref 20–32)
CREAT SERPL-MCNC: 0.73 MG/DL (ref 0.52–1.04)
GFR SERPL CREATININE-BSD FRML MDRD: 85 ML/MIN/1.7M2
GLUCOSE SERPL-MCNC: 87 MG/DL (ref 70–99)
POTASSIUM SERPL-SCNC: 4.2 MMOL/L (ref 3.4–5.3)
PROT SERPL-MCNC: 7 G/DL (ref 6.8–8.8)
SODIUM SERPL-SCNC: 145 MMOL/L (ref 133–144)
TSH SERPL DL<=0.005 MIU/L-ACNC: 0.82 MU/L (ref 0.4–4)

## 2018-07-18 ASSESSMENT — PATIENT HEALTH QUESTIONNAIRE - PHQ9: SUM OF ALL RESPONSES TO PHQ QUESTIONS 1-9: 20

## 2018-07-19 LAB
C TRACH DNA SPEC QL NAA+PROBE: NEGATIVE
HIV 1+2 AB+HIV1 P24 AG SERPL QL IA: NONREACTIVE
N GONORRHOEA DNA SPEC QL NAA+PROBE: NEGATIVE
SPECIMEN SOURCE: NORMAL
SPECIMEN SOURCE: NORMAL
T PALLIDUM AB SER QL: NONREACTIVE

## 2018-07-19 RX ORDER — FLUCONAZOLE 150 MG/1
150 TABLET ORAL ONCE
Qty: 1 TABLET | Refills: 0 | Status: SHIPPED | OUTPATIENT
Start: 2018-07-19 | End: 2018-07-19

## 2018-07-27 DIAGNOSIS — B35.6 TINEA CRURIS: ICD-10-CM

## 2018-07-27 DIAGNOSIS — B00.9 RECURRENT HERPES SIMPLEX: ICD-10-CM

## 2018-07-27 RX ORDER — CLOTRIMAZOLE AND BETAMETHASONE DIPROPIONATE 10; .64 MG/G; MG/G
CREAM TOPICAL 2 TIMES DAILY
Qty: 30 G | Refills: 0 | Status: SHIPPED | OUTPATIENT
Start: 2018-07-27 | End: 2019-05-11

## 2018-07-27 RX ORDER — ACYCLOVIR 400 MG/1
TABLET ORAL
Qty: 15 TABLET | Refills: 0 | Status: SHIPPED | OUTPATIENT
Start: 2018-07-27 | End: 2018-09-09

## 2018-07-27 NOTE — TELEPHONE ENCOUNTER
Prescription approved per [facility] Refill Protocol.    Saskia Lawrence RN  [facility]

## 2018-09-04 DIAGNOSIS — B35.6 TINEA CRURIS: ICD-10-CM

## 2018-09-04 NOTE — TELEPHONE ENCOUNTER
"Requested Prescriptions   Pending Prescriptions Disp Refills     clotrimazole-betamethasone (LOTRISONE) cream [Pharmacy Med Name: CLOTRIMAZOLE-BETAMETHASONE CRM]    Last Written Prescription Date:  7-27-18  Last Fill Quantity: 30,  # refills: 0   Last office visit: 7/17/2018 with prescribing provider:  Jovita Jonas   Future Office Visit:     30 g 0     Sig: APPLY TOPICALLY 2 TIMES DAILY    Topical Steroids and Nonsteroidals Protocol Passed    9/4/2018 12:17 PM       Passed - Patient is age 6 or older       Passed - Authorizing prescriber's most recent note related to this medication read.    If refill request is for ophthalmic use, please forward request to provider for approval.         Passed - High potency steroid not ordered       Passed - Recent (12 mo) or future (30 days) visit within the authorizing provider's specialty    Patient had office visit in the last 12 months or has a visit in the next 30 days with authorizing provider or within the authorizing provider's specialty.  See \"Patient Info\" tab in inbasket, or \"Choose Columns\" in Meds & Orders section of the refill encounter.              "

## 2018-09-06 RX ORDER — CLOTRIMAZOLE AND BETAMETHASONE DIPROPIONATE 10; .64 MG/G; MG/G
CREAM TOPICAL
Qty: 30 G | Refills: 0 | Status: SHIPPED | OUTPATIENT
Start: 2018-09-06 | End: 2019-05-08

## 2018-09-06 NOTE — TELEPHONE ENCOUNTER
Medication Refill Request    Medication(s) requested:  clotrimazole-betamethasone (LOTRISONE) cream    Last Office Visit: 7/17/18  Labs: up to date   Next Office Visit: none scheduled at this time     Routing to PCP for approval.    Consuelo Fung RN  09/06/18  2:54 PM

## 2018-09-09 DIAGNOSIS — B00.9 RECURRENT HERPES SIMPLEX: ICD-10-CM

## 2018-09-10 NOTE — TELEPHONE ENCOUNTER
"Requested Prescriptions   Pending Prescriptions Disp Refills     acyclovir (ZOVIRAX) 400 MG tablet [Pharmacy Med Name: ACYCLOVIR 400MG TABLETS] 15 tablet 0    Last Written Prescription Date:  07/27/2018  Last Fill Quantity: 15,  # refills: 0   Last office visit: 7/17/2018 with prescribing provider:  07/31/2018   Future Office Visit:     Sig: TAKE 1 TABLET(400 MG) BY MOUTH THREE TIMES DAILY    Antivirals for Herpes Protocol Passed    9/9/2018  1:50 PM       Passed - Patient is age 12 or older       Passed - Recent (12 mo) or future (30 days) visit within the authorizing provider's specialty    Patient had office visit in the last 12 months or has a visit in the next 30 days with authorizing provider or within the authorizing provider's specialty.  See \"Patient Info\" tab in inbasket, or \"Choose Columns\" in Meds & Orders section of the refill encounter.           Passed - Normal serum creatinine on file in past 12 months    Recent Labs   Lab Test  07/17/18   1605   CR  0.73             "

## 2018-09-10 NOTE — TELEPHONE ENCOUNTER
Routing refill request to provider for review/approval because:  Please clarify is patient using as needed for outbreaks?  How many outbreaks treatments are included within a prescription?

## 2018-09-11 RX ORDER — ACYCLOVIR 400 MG/1
TABLET ORAL
Qty: 15 TABLET | Refills: 0 | Status: SHIPPED | OUTPATIENT
Start: 2018-09-11 | End: 2018-12-10

## 2018-09-22 DIAGNOSIS — I10 BENIGN ESSENTIAL HYPERTENSION: ICD-10-CM

## 2018-09-22 DIAGNOSIS — I10 HTN, GOAL BELOW 140/90: ICD-10-CM

## 2018-09-22 DIAGNOSIS — F32.1 MODERATE MAJOR DEPRESSION (H): ICD-10-CM

## 2018-09-24 NOTE — TELEPHONE ENCOUNTER
"Requested Prescriptions   Pending Prescriptions Disp Refills     lisinopril (PRINIVIL/ZESTRIL) 20 MG tablet [Pharmacy Med Name: LISINOPRIL 20MG TABLETS] 90 tablet 0    Last Written Prescription Date:  06/25/2018  Last Fill Quantity: 90,  # refills: 1   Last office visit: 7/17/2018 with prescribing provider:  07/31/2018   Future Office Visit:   Sig: TAKE 1 TABLET(20 MG) BY MOUTH DAILY    ACE Inhibitors (Including Combos) Protocol Passed    9/22/2018 11:27 AM       Passed - Blood pressure under 140/90 in past 12 months    BP Readings from Last 3 Encounters:   07/17/18 126/78   06/28/18 135/74   03/29/18 136/72                Passed - Recent (12 mo) or future (30 days) visit within the authorizing provider's specialty    Patient had office visit in the last 12 months or has a visit in the next 30 days with authorizing provider or within the authorizing provider's specialty.  See \"Patient Info\" tab in inbasket, or \"Choose Columns\" in Meds & Orders section of the refill encounter.           Passed - Patient is age 18 or older       Passed - No active pregnancy on record       Passed - Normal serum creatinine on file in past 12 months    Recent Labs   Lab Test  07/17/18   1605   CR  0.73            Passed - Normal serum potassium on file in past 12 months    Recent Labs   Lab Test  07/17/18   1605   POTASSIUM  4.2            Passed - No positive pregnancy test in past 12 months        citalopram (CELEXA) 40 MG tablet [Pharmacy Med Name: CITALOPRAM 40MG TABLETS] 90 tablet 0    Last Written Prescription Date:  06/25/2018  Last Fill Quantity: 90,  # refills: 1   Last office visit: 7/17/2018 with prescribing provider:  07/31/2018   Future Office Visit:   Sig: TAKE 1 TABLET(40 MG) BY MOUTH DAILY    SSRIs Protocol Failed    9/22/2018 11:27 AM       Failed - PHQ-9 score less than 5 in past 6 months    Please review last PHQ-9 score.          Passed - Medication is Bupropion    If the medication is Bupropion (Wellbutrin), and the " "patient is taking for smoking cessation; OK to refill.         Passed - Patient is age 18 or older       Passed - No active pregnancy on record       Passed - No positive pregnancy test in last 12 months       Passed - Recent (6 mo) or future (30 days) visit within the authorizing provider's specialty    Patient had office visit in the last 6 months or has a visit in the next 30 days with authorizing provider or within the authorizing provider's specialty.  See \"Patient Info\" tab in inbasket, or \"Choose Columns\" in Meds & Orders section of the refill encounter.            buPROPion (WELLBUTRIN XL) 150 MG 24 hr tablet [Pharmacy Med Name: BUPROPION XL 150MG TABLETS (24 H)] 90 tablet 0    Last Written Prescription Date:  06/25/2018  Last Fill Quantity: 90,  # refills: 1   Last office visit: 7/17/2018 with prescribing provider:  07/31/2018   Future Office Visit:   Sig: TAKE 1 TABLET(150 MG) BY MOUTH EVERY MORNING    SSRIs Protocol Failed    9/22/2018 11:27 AM       Failed - PHQ-9 score less than 5 in past 6 months    Please review last PHQ-9 score.          Passed - Medication is Bupropion    If the medication is Bupropion (Wellbutrin), and the patient is taking for smoking cessation; OK to refill.         Passed - Patient is age 18 or older       Passed - No active pregnancy on record       Passed - No positive pregnancy test in last 12 months       Passed - Recent (6 mo) or future (30 days) visit within the authorizing provider's specialty    Patient had office visit in the last 6 months or has a visit in the next 30 days with authorizing provider or within the authorizing provider's specialty.  See \"Patient Info\" tab in inbasket, or \"Choose Columns\" in Meds & Orders section of the refill encounter.              "

## 2018-09-25 RX ORDER — LISINOPRIL 20 MG/1
TABLET ORAL
Qty: 90 TABLET | Refills: 0 | Status: SHIPPED | OUTPATIENT
Start: 2018-09-25 | End: 2019-02-28

## 2018-09-25 NOTE — TELEPHONE ENCOUNTER
Jovita  Routing refill request to provider for review/approval because:    Spoke with the pt did the PHQ9 her score was 15 with thoughts of harm/suicide but no plan or intend to act    She scheduled an appointment to be seen with you     Prescription approved per [facility] Refill Protocol.  lisinopril    Anabella Cheung RN   [facility]

## 2018-09-26 RX ORDER — BUPROPION HYDROCHLORIDE 150 MG/1
TABLET ORAL
Qty: 90 TABLET | Refills: 0 | Status: SHIPPED | OUTPATIENT
Start: 2018-09-26 | End: 2019-05-08

## 2018-09-26 RX ORDER — CITALOPRAM HYDROBROMIDE 40 MG/1
TABLET ORAL
Qty: 90 TABLET | Refills: 0 | Status: SHIPPED | OUTPATIENT
Start: 2018-09-26 | End: 2018-12-13

## 2018-09-26 ASSESSMENT — PATIENT HEALTH QUESTIONNAIRE - PHQ9: SUM OF ALL RESPONSES TO PHQ QUESTIONS 1-9: 15

## 2018-11-15 ENCOUNTER — TELEPHONE (OUTPATIENT)
Dept: FAMILY MEDICINE | Facility: CLINIC | Age: 48
End: 2018-11-15

## 2018-11-15 NOTE — TELEPHONE ENCOUNTER
Panel Management Review      Patient has the following on her problem list:     Depression / Dysthymia review    Measure:  Needs PHQ-9 score of 4 or less during index window.  Administer PHQ-9 and if score is 5 or more, send encounter to provider for next steps.    5 - 7 month window range: 11/17/18-3/17/19    PHQ-9 SCORE 11/10/2017 7/17/2018 9/25/2018   Total Score - - -   Total Score MyChart - - -   Total Score 8 20 15       If PHQ-9 recheck is 5 or more, route to provider for next steps.    Patient is due for:  PHQ9 and DAP    Hypertension   Last three blood pressure readings:  BP Readings from Last 3 Encounters:   07/17/18 126/78   06/28/18 135/74   03/29/18 136/72     Blood pressure: Passed    HTN Guidelines:  Age 18-59 BP range:  Less than 140/90  Age 60-85 with Diabetes:  Less than 140/90  Age 60-85 without Diabetes:  less than 150/90      Composite cancer screening  Chart review shows that this patient is due/due soon for the following Pap Smear  Summary:    Patient is due/failing the following:   DAP, PAP and PHQ9    Action needed:   Patient needs office visit for physical/pap  Patient needs to do PHQ9.    Type of outreach:    Sent [employer] message.    Questions for provider review:    None                                                                                                                                    Rosario Mckeon CMA       Chart routed to Care Team.

## 2018-12-10 DIAGNOSIS — B00.9 RECURRENT HERPES SIMPLEX: ICD-10-CM

## 2018-12-10 RX ORDER — ACYCLOVIR 400 MG/1
TABLET ORAL
Qty: 15 TABLET | Refills: 0 | Status: SHIPPED | OUTPATIENT
Start: 2018-12-10 | End: 2019-10-22

## 2018-12-10 NOTE — TELEPHONE ENCOUNTER
Prescription approved per [facility] [facility].    LOV: 07/17/2018    Denise Heck, RN  Triage Nurse

## 2018-12-10 NOTE — TELEPHONE ENCOUNTER
"Requested Prescriptions   Pending Prescriptions Disp Refills     acyclovir (ZOVIRAX) 400 MG tablet [Pharmacy Med Name: ACYCLOVIR 400MG TABLETS] 15 tablet 0    Last Written Prescription Date:  09/11/2018  Last Fill Quantity: 15,  # refills: 0   Last office visit: 7/17/2018 with prescribing provider:  07/17/2018   Future Office Visit:   Next 5 appointments (look out 90 days)    Dec 13, 2018  8:00 AM CST  Office Visit with JERRICA Church CNP  [facility] [place] ([address] [address] [phone]        Sig: TAKE 1 TABLET(400 MG) BY MOUTH THREE TIMES DAILY    Antivirals for Herpes Protocol Passed - 12/10/2018  9:18 AM       Passed - Patient is age 12 or older       Passed - Recent (12 mo) or future (30 days) visit within the authorizing provider's specialty    Patient had office visit in the last 12 months or has a visit in the next 30 days with authorizing provider or within the authorizing provider's specialty.  See \"Patient Info\" tab in inbasket, or \"Choose Columns\" in Meds & Orders section of the refill encounter.             Passed - Normal serum creatinine on file in past 12 months    Recent Labs   Lab Test 07/17/18  1605   CR 0.73             "

## 2018-12-12 NOTE — PROGRESS NOTES
SUBJECTIVE:   Naomy Friend is a 48 year old female who presents to clinic today for the following health issues:    Depression Followup    Status since last visit: Worsened     See PHQ-9 for current symptoms.  Other associated symptoms: Fatigue     Complicating factors:   Significant life event:  Yes-  Divorce, son relapsed    Current substance abuse:  None  Anxiety or Panic symptoms:  Yes    PHQ 9/25/2018 12/3/2018 12/13/2018   PHQ-9 Total Score 15 18 16   Q9: Suicide Ideation Several days Several days Several days   F/U: Thoughts of suicide or self-harm Yes No -   F/U: Self harm-plan No - -   F/U: Self-harm action No - -   F/U: Safety concerns Yes No -   -Has established with a new therapist, which has been helpful. Still having trouble sleeping due to anxiety, and working two jobs.       PHQ-9  English  PHQ-9   Any Language  Suicide Assessment Five-step Evaluation and Treatment (SAFE-T)    Amount of exercise or physical activity: 4-5 days/week for an average of greater than 60 minutes    Problems taking medications regularly: No    Medication side effects: none    Diet: regular (no restrictions)    Hypertension Follow-up      Outpatient blood pressures are not being checked.    Low Salt Diet: not monitoring salt        -------------------------------------    Problem list and histories reviewed & adjusted, as indicated.  Additional history: as documented    Patient Active Problem List   Diagnosis     Hypothyroidism     CARDIOVASCULAR SCREENING; LDL GOAL LESS THAN 160     Moderate episode of recurrent major depressive disorder (H)     Obesity     Menorrhagia, treated with mirena IUD (inserted July 2013)      Family history of diabetes mellitus     Dysfunction of eustachian tube     HTN, goal below 140/90     Morbid obesity, unspecified obesity type (H)     Tonsillitis     Past Surgical History:   Procedure Laterality Date     BREAST SURGERY      breast reduction bilat     COLONOSCOPY WITH CO2 INSUFFLATION N/A  9/1/2017    Procedure: COLONOSCOPY WITH CO2 INSUFFLATION;  Colonoscopy, Abdominal pain, left lower quadrant, Parenteau, BMI 42.93, [phone];  Surgeon: Anuel Rodríguez MD;  Location:  OR     ENDOSCOPIC SINUS SURGERY Right 3/15/2017    Procedure: ENDOSCOPIC SINUS SURGERY;;  Surgeon: Vicki Zurita MD;  Location: [facility] OR     EXAM UNDER ANESTHESIA, FULGURATE CONDYLOMA ANUS, COMBINED N/A 9/26/2017    Procedure: COMBINED EXAM UNDER ANESTHESIA, FULGURATE CONDYLOMA ANUS;  Examination Under anethesia, Excision And Fulguration Of Anal Condyloma;  Surgeon: Td Garvey MD;  Location: [facility] OR     LAPAROSCOPIC CHOLECYSTECTOMY  08/05/99     TONSILLECTOMY Bilateral 3/15/2017    Procedure: TONSILLECTOMY;  Bilateral Tonsillectomy, Right Functional Endoscopic Sinus Surgery ;  Surgeon: Vicki Zurita MD;  Location: [facility] OR       Social History     Tobacco Use     Smoking status: Never Smoker     Smokeless tobacco: Never Used   Substance Use Topics     Alcohol use: No     Family History   Problem Relation Age of Onset     Diabetes Mother      Hypertension Mother      Cancer Father         pancreatic     Heart Disease Maternal Grandmother      Diabetes Maternal Grandmother      Cerebrovascular Disease Maternal Grandfather      Cancer - colorectal Paternal Grandfather      Diabetes Brother      Asthma Son      Depression Son      Asthma Daughter            Reviewed and updated as needed this visit by clinical staff  Allergies       Reviewed and updated as needed this visit by Provider         ROS:  Constitutional, HEENT, cardiovascular, pulmonary, gi and gu systems are negative, except as otherwise noted.    OBJECTIVE:     /84 (BP Location: Right arm, Patient Position: Sitting, Cuff Size: Adult Large)   Pulse 77   Temp 98  F (36.7  C) (Oral)   Wt 120.3 kg (265 lb 4.8 oz)   SpO2 98%   BMI 41.86 kg/m    Body mass index is 41.86 kg/m .   GENERAL: healthy, alert and no  distress  RESP: lungs clear to auscultation - no rales, rhonchi or wheezes  CV: regular rate and rhythm, normal S1 S2, no S3 or S4, no murmur, click or rub, no peripheral edema and peripheral pulses strong  ABDOMEN: soft, nontender, no hepatosplenomegaly, no masses and bowel sounds normal  MS: no gross musculoskeletal defects noted, no edema  PSYCH: mentation appears normal, affect normal/bright    Diagnostic Test Results:  No results found for this or any previous visit (from the past 24 hour(s)).    ASSESSMENT/PLAN:     Problem List Items Addressed This Visit     Morbid obesity, unspecified obesity type (H)    Moderate episode of recurrent major depressive disorder (H) - Primary    Relevant Medications    DULoxetine (CYMBALTA) 20 MG capsule    HTN, goal below 140/90         -worsening depression; changed Citalopram to Cymbalta, and discussed self-care. Follow up 1 month,  -HTN stable  -weight increased, due to recent stress; will address weight loss strategies at future visits    No change to current medications for HTN  Advised to monitor diet as able right now, concentrate on mental health  See Patient Instructions    JERRICA Church [facility]

## 2018-12-13 ENCOUNTER — OFFICE VISIT (OUTPATIENT)
Dept: FAMILY MEDICINE | Facility: CLINIC | Age: 48
End: 2018-12-13
Payer: COMMERCIAL

## 2018-12-13 VITALS
TEMPERATURE: 98 F | OXYGEN SATURATION: 98 % | WEIGHT: 265.3 LBS | SYSTOLIC BLOOD PRESSURE: 116 MMHG | BODY MASS INDEX: 41.86 KG/M2 | HEART RATE: 77 BPM | DIASTOLIC BLOOD PRESSURE: 84 MMHG

## 2018-12-13 DIAGNOSIS — I10 HTN, GOAL BELOW 140/90: ICD-10-CM

## 2018-12-13 DIAGNOSIS — E66.01 MORBID OBESITY, UNSPECIFIED OBESITY TYPE (H): ICD-10-CM

## 2018-12-13 DIAGNOSIS — F33.1 MODERATE EPISODE OF RECURRENT MAJOR DEPRESSIVE DISORDER (H): Primary | ICD-10-CM

## 2018-12-13 PROCEDURE — 99214 OFFICE O/P EST MOD 30 MIN: CPT | Performed by: NURSE PRACTITIONER

## 2018-12-13 RX ORDER — DULOXETIN HYDROCHLORIDE 20 MG/1
20 CAPSULE, DELAYED RELEASE ORAL 2 TIMES DAILY
Qty: 180 CAPSULE | Refills: 3 | Status: SHIPPED | OUTPATIENT
Start: 2018-12-13 | End: 2019-05-14

## 2018-12-13 ASSESSMENT — ANXIETY QUESTIONNAIRES
5. BEING SO RESTLESS THAT IT IS HARD TO SIT STILL: SEVERAL DAYS
6. BECOMING EASILY ANNOYED OR IRRITABLE: SEVERAL DAYS
GAD7 TOTAL SCORE: 15
3. WORRYING TOO MUCH ABOUT DIFFERENT THINGS: NEARLY EVERY DAY
7. FEELING AFRAID AS IF SOMETHING AWFUL MIGHT HAPPEN: NEARLY EVERY DAY
2. NOT BEING ABLE TO STOP OR CONTROL WORRYING: NEARLY EVERY DAY
1. FEELING NERVOUS, ANXIOUS, OR ON EDGE: MORE THAN HALF THE DAYS

## 2018-12-13 ASSESSMENT — PATIENT HEALTH QUESTIONNAIRE - PHQ9
SUM OF ALL RESPONSES TO PHQ QUESTIONS 1-9: 16
5. POOR APPETITE OR OVEREATING: MORE THAN HALF THE DAYS

## 2018-12-14 ASSESSMENT — ANXIETY QUESTIONNAIRES: GAD7 TOTAL SCORE: 15

## 2019-02-22 ENCOUNTER — TELEPHONE (OUTPATIENT)
Dept: FAMILY MEDICINE | Facility: CLINIC | Age: 49
End: 2019-02-22

## 2019-02-22 DIAGNOSIS — J01.90 ACUTE SINUSITIS, RECURRENCE NOT SPECIFIED, UNSPECIFIED LOCATION: Primary | ICD-10-CM

## 2019-02-22 RX ORDER — AZITHROMYCIN 250 MG/1
TABLET, FILM COATED ORAL
Qty: 6 TABLET | Refills: 0 | Status: CANCELLED | OUTPATIENT
Start: 2019-02-22 | End: 2019-02-27

## 2019-02-22 NOTE — TELEPHONE ENCOUNTER
Called patient to give message: declined medication, we no longer recommend antibiotic for sinus infections.    Left detailed VM.    Saskia Lawrence RN  [facility]

## 2019-02-22 NOTE — TELEPHONE ENCOUNTER
Reason for call:  Other   Patient called regarding (reason for call): call back  Additional comments: The patient called and stated that she has been having sinus issues for almost a month now. She stated that she works two jobs so it's been very hard for her to try to get in for an appointment. She is wondering if Jovita Jonas could possibly prescribe her medication if she is able to talk to a nurse about the symptoms she has been having. She would like a call back to discuss if this would be possible or not.     Phone number to reach patient:  Cell number on file:    Telephone Information:   Mobile [phone]       Best Time:  Before 3:30 as she is heading to her other job    Can we leave a detailed message on this number?  YES

## 2019-02-28 ENCOUNTER — OFFICE VISIT (OUTPATIENT)
Dept: FAMILY MEDICINE | Facility: CLINIC | Age: 49
End: 2019-02-28
Payer: COMMERCIAL

## 2019-02-28 VITALS
RESPIRATION RATE: 14 BRPM | HEART RATE: 78 BPM | SYSTOLIC BLOOD PRESSURE: 132 MMHG | TEMPERATURE: 99.1 F | WEIGHT: 276.9 LBS | HEIGHT: 68 IN | OXYGEN SATURATION: 97 % | BODY MASS INDEX: 41.97 KG/M2 | DIASTOLIC BLOOD PRESSURE: 88 MMHG

## 2019-02-28 DIAGNOSIS — J32.9 CHRONIC SINUSITIS, UNSPECIFIED LOCATION: Primary | ICD-10-CM

## 2019-02-28 DIAGNOSIS — I10 HTN, GOAL BELOW 140/90: ICD-10-CM

## 2019-02-28 DIAGNOSIS — F41.1 GAD (GENERALIZED ANXIETY DISORDER): ICD-10-CM

## 2019-02-28 DIAGNOSIS — E03.9 HYPOTHYROIDISM, UNSPECIFIED TYPE: ICD-10-CM

## 2019-02-28 PROCEDURE — 99214 OFFICE O/P EST MOD 30 MIN: CPT | Performed by: NURSE PRACTITIONER

## 2019-02-28 RX ORDER — LEVOTHYROXINE SODIUM 75 UG/1
TABLET ORAL
Qty: 90 TABLET | Refills: 0 | Status: SHIPPED | OUTPATIENT
Start: 2019-02-28 | End: 2019-06-12

## 2019-02-28 RX ORDER — LISINOPRIL 40 MG/1
40 TABLET ORAL DAILY
Qty: 90 TABLET | Refills: 3 | Status: SHIPPED | OUTPATIENT
Start: 2019-02-28 | End: 2019-06-12

## 2019-02-28 RX ORDER — CYCLOBENZAPRINE HCL 10 MG
TABLET ORAL
Refills: 0 | COMMUNITY
Start: 2019-01-01 | End: 2021-11-24

## 2019-02-28 RX ORDER — LORAZEPAM 0.5 MG/1
0.5 TABLET ORAL EVERY 8 HOURS PRN
Qty: 15 TABLET | Refills: 0 | Status: SHIPPED | OUTPATIENT
Start: 2019-02-28 | End: 2019-05-20

## 2019-02-28 RX ORDER — FLUCONAZOLE 150 MG/1
TABLET ORAL
Refills: 0 | COMMUNITY
Start: 2018-07-19 | End: 2019-05-24

## 2019-02-28 ASSESSMENT — MIFFLIN-ST. JEOR: SCORE: 1931.26

## 2019-02-28 NOTE — TELEPHONE ENCOUNTER
"Requested Prescriptions   Pending Prescriptions Disp Refills     levothyroxine (SYNTHROID/LEVOTHROID) 75 MCG tablet [Pharmacy Med Name: LEVOTHYROXINE 0.075MG (75MCG) TABS] 90 tablet 0    Last Written Prescription Date:  8/2/18  Last Fill Quantity: 90,  # refills: 1   Last office visit: No previous visit found with prescribing provider:  2/28/19   Future Office Visit:     Sig: TAKE 1 TABLET(75 MCG) BY MOUTH DAILY    Thyroid Protocol Passed - 2/28/2019  2:08 PM       Passed - Patient is 12 years or older       Passed - Recent (12 mo) or future (30 days) visit within the authorizing provider's specialty    Patient had office visit in the last 12 months or has a visit in the next 30 days with authorizing provider or within the authorizing provider's specialty.  See \"Patient Info\" tab in inbasket, or \"Choose Columns\" in Meds & Orders section of the refill encounter.             Passed - Medication is active on med list       Passed - Normal TSH on file in past 12 months    Recent Labs   Lab Test 07/17/18  1605   TSH 0.82             Passed - No active pregnancy on record    If patient is pregnant or has had a positive pregnancy test, please check TSH.         Passed - No positive pregnancy test in past 12 months    If patient is pregnant or has had a positive pregnancy test, please check TSH.            "

## 2019-02-28 NOTE — TELEPHONE ENCOUNTER
Prescription approved per [facility] Refill Protocol.  LOV: 02/28/2019  Labs: up to date    Denise Heck, RN  Triage Nurse

## 2019-02-28 NOTE — PROGRESS NOTES
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     Therapies Tried and outcome: sinus rinse and flonase, mucinex      Depression and Anxiety Follow-Up    Status since last visit: Worsened, divorce has been finalized but now in process of moving out of house, which has been very emotional    Other associated symptoms: panic attacks;     Complicating factors: dealing with     Significant life event: Yes-       Current substance abuse: None    PHQ 9/25/2018 12/3/2018 12/13/2018   PHQ-9 Total Score 15 18 16   Q9: Suicide Ideation Several days Several days Several days   F/U: Thoughts of suicide or self-harm Yes No -   F/U: Self harm-plan No - -   F/U: Self-harm action No - -   F/U: Safety concerns Yes No -     JACINTO-7 SCORE 4/10/2015 12/13/2018   Total Score 4 -   Total Score - 15       PHQ-9  English  PHQ-9   Any Language  JACINTO-7  Suicide Assessment Five-step Evaluation and Treatment (SAFE-T)    -------------------------------------    Problem list and histories reviewed & adjusted, as indicated.  Additional history: as documented    Patient Active Problem List   Diagnosis     Hypothyroidism     CARDIOVASCULAR SCREENING; LDL GOAL LESS THAN 160     Moderate episode of recurrent major depressive disorder (H)     Obesity     Menorrhagia, treated with mirena IUD (inserted July 2013)      Family history of diabetes mellitus     Dysfunction of eustachian  tube     HTN, goal below 140/90     Morbid obesity, unspecified obesity type (H)     Tonsillitis     Past Surgical History:   Procedure Laterality Date     BREAST SURGERY      breast reduction bilat     COLONOSCOPY WITH CO2 INSUFFLATION N/A 9/1/2017    Procedure: COLONOSCOPY WITH CO2 INSUFFLATION;  Colonoscopy, Abdominal pain, left lower quadrant, Parenteau, BMI 42.93, -[phone];  Surgeon: Anuel Rodríguez MD;  Location:  OR     ENDOSCOPIC SINUS SURGERY Right 3/15/2017    Procedure: ENDOSCOPIC SINUS SURGERY;;  Surgeon: Vicki Zurita MD;  Location: [facility] OR     EXAM UNDER ANESTHESIA, FULGURATE CONDYLOMA ANUS, COMBINED N/A 9/26/2017    Procedure: COMBINED EXAM UNDER ANESTHESIA, FULGURATE CONDYLOMA ANUS;  Examination Under anethesia, Excision And Fulguration Of Anal Condyloma;  Surgeon: Td Garvey MD;  Location: [facility] OR     LAPAROSCOPIC CHOLECYSTECTOMY  08/05/99     TONSILLECTOMY Bilateral 3/15/2017    Procedure: TONSILLECTOMY;  Bilateral Tonsillectomy, Right Functional Endoscopic Sinus Surgery ;  Surgeon: Vicki Zurita MD;  Location: [facility] OR       Social History     Tobacco Use     Smoking status: Never Smoker     Smokeless tobacco: Never Used   Substance Use Topics     Alcohol use: No     Family History   Problem Relation Age of Onset     Diabetes Mother      Hypertension Mother      Cancer Father         pancreatic     Heart Disease Maternal Grandmother      Diabetes Maternal Grandmother      Cerebrovascular Disease Maternal Grandfather      Cancer - colorectal Paternal Grandfather      Diabetes Brother      Asthma Son      Depression Son      Asthma Daughter            Reviewed and updated as needed this visit by clinical staff  Tobacco  Allergies  Meds  Med Hx  Surg Hx  Fam Hx  Soc Hx      Reviewed and updated as needed this visit by Provider  Allergies         ROS:  Constitutional, HEENT, cardiovascular, pulmonary, GI, , musculoskeletal, neuro, skin,  "endocrine and psych systems are negative, except as otherwise noted.    OBJECTIVE:     BP (!) 132/104   Pulse 78   Temp 99.1  F (37.3  C) (Temporal)   Resp 14   Ht 1.73 m (5' 8.11\")   Wt 125.6 kg (276 lb 14.4 oz)   LMP 02/27/2019   SpO2 97%   BMI 41.97 kg/m    Body mass index is 41.97 kg/m .   GENERAL: healthy, alert and no distress  NECK: no adenopathy, no asymmetry, masses, or scars and thyroid normal to palpation  RESP: lungs clear to auscultation - no rales, rhonchi or wheezes  CV: regular rate and rhythm, normal S1 S2, no S3 or S4, no murmur, click or rub, no peripheral edema and peripheral pulses strong  ABDOMEN: soft, nontender, no hepatosplenomegaly, no masses and bowel sounds normal  MS: no gross musculoskeletal defects noted, no edema  PSYCH: mentation appears normal, affect normal/bright    Diagnostic Test Results:  No results found for this or any previous visit (from the past 24 hour(s)).    ASSESSMENT/PLAN:     Problem List Items Addressed This Visit     HTN, goal below 140/90    Relevant Medications    lisinopril (PRINIVIL/ZESTRIL) 40 MG tablet      Other Visit Diagnoses     Chronic sinusitis, unspecified location    -  Primary    Relevant Medications    fluconazole (DIFLUCAN) 150 MG tablet    amoxicillin-clavulanate (AUGMENTIN) 875-125 MG tablet    JACINTO (generalized anxiety disorder)        Relevant Medications    LORazepam (ATIVAN) 0.5 MG tablet         -increase dose of Lisinopril, HTN unstable  -treat sinusitis  -JACINTO not stable, short-term ativan #15, follow -up in 6 weeks after insurance has changed; will address medications at that point.      JERRICA Church [facility] [place]  Please note greater than 50% of this 30 minute appointment were spent in face to face counseling with the patient of the issues described above in the history of present illness and in the plan, including changing medications    "

## 2019-04-19 ENCOUNTER — HEALTH MAINTENANCE LETTER (OUTPATIENT)
Age: 49
End: 2019-04-19

## 2019-05-07 ENCOUNTER — TELEPHONE (OUTPATIENT)
Dept: FAMILY MEDICINE | Facility: CLINIC | Age: 49
End: 2019-05-07

## 2019-05-07 NOTE — TELEPHONE ENCOUNTER
Panel Management Review      Patient has the following on her problem list:     Depression / Dysthymia review    Measure:  Needs PHQ-9 score of 4 or less during index window.  Administer PHQ-9 and if score is 5 or more, send encounter to provider for next steps.    5 - 7 month window range: 05/07/2019    PHQ-9 SCORE 9/25/2018 12/3/2018 12/13/2018   PHQ-9 Total Score - - -   PHQ-9 Total Score MyChart - 18 (Moderately severe depression) -   PHQ-9 Total Score 15 18 16       If PHQ-9 recheck is 5 or more, route to provider for next steps.    Patient is due for:  PHQ9      Composite cancer screening  Chart review shows that this patient is due/due soon for the following Pap Smear  Summary:    Patient is due/failing the following:   PAP and PHQ9    Action needed:   Patient needs office visit for pap smear and depression follow up.    Type of outreach:    Sent [employer] message.    Questions for provider review:    None                                                                                                                                    Jeyson Mcfarlane MA     Chart routed to none.

## 2019-05-08 DIAGNOSIS — I10 BENIGN ESSENTIAL HYPERTENSION: ICD-10-CM

## 2019-05-08 DIAGNOSIS — B35.6 TINEA CRURIS: ICD-10-CM

## 2019-05-08 DIAGNOSIS — I10 HTN, GOAL BELOW 140/90: ICD-10-CM

## 2019-05-08 RX ORDER — BUPROPION HYDROCHLORIDE 150 MG/1
TABLET ORAL
Qty: 90 TABLET | Refills: 0 | Status: SHIPPED | OUTPATIENT
Start: 2019-05-08 | End: 2019-08-13

## 2019-05-08 RX ORDER — CLOTRIMAZOLE AND BETAMETHASONE DIPROPIONATE 10; .64 MG/G; MG/G
CREAM TOPICAL
Qty: 15 G | Refills: 0 | Status: SHIPPED | OUTPATIENT
Start: 2019-05-08 | End: 2019-09-06

## 2019-05-08 RX ORDER — LISINOPRIL 20 MG/1
TABLET ORAL
Qty: 90 TABLET | Refills: 0 | OUTPATIENT
Start: 2019-05-08

## 2019-05-08 RX ORDER — BUPROPION HYDROCHLORIDE 150 MG/1
TABLET ORAL
Qty: 90 TABLET | Refills: 0 | OUTPATIENT
Start: 2019-05-08

## 2019-05-08 NOTE — TELEPHONE ENCOUNTER
Prescription approved per [facility] Refill Protocol.  LOV: 02/28/2019    Denise Heck, RN  Triage Nurse

## 2019-05-08 NOTE — TELEPHONE ENCOUNTER
"Requested Prescriptions   Pending Prescriptions Disp Refills     clotrimazole-betamethasone (LOTRISONE) 1-0.05 % external cream [Pharmacy Med Name: CLOTRIMAZOLE-BETAMETHASONE CRM 15GM] 30 g 0     Sig: APPLY EXTERNALLY TO THE AFFECTED AREA TWICE DAILY       Topical Steroids and Nonsteroidals Protocol Passed - 5/8/2019 12:52 PM        Passed - Patient is age 6 or older        Passed - Authorizing prescriber's most recent note related to this medication read.     If refill request is for ophthalmic use, please forward request to provider for approval.          Passed - High potency steroid not ordered        Passed - Recent (12 mo) or future (30 days) visit within the authorizing provider's specialty     Patient had office visit in the last 12 months or has a visit in the next 30 days with authorizing provider or within the authorizing provider's specialty.  See \"Patient Info\" tab in inbasket, or \"Choose Columns\" in Meds & Orders section of the refill encounter.              Passed - Medication is active on med list            "

## 2019-05-08 NOTE — TELEPHONE ENCOUNTER
"Requested Prescriptions   Pending Prescriptions Disp Refills     buPROPion (WELLBUTRIN XL) 150 MG 24 hr tablet [Pharmacy Med Name: BUPROPION XL 150MG TABLETS (24 H)] 90 tablet 0     Sig: TAKE 1 TABLET(150 MG) BY MOUTH EVERY MORNING       SSRIs Protocol Passed - 5/8/2019  2:35 PM        Passed - Recent (12 mo) or future (30 days) visit within the authorizing provider's specialty     Patient had office visit in the last 12 months or has a visit in the next 30 days with authorizing provider or within the authorizing provider's specialty.  See \"Patient Info\" tab in inbasket, or \"Choose Columns\" in Meds & Orders section of the refill encounter.              Passed - Medication is Bupropion     If the medication is Bupropion (Wellbutrin), and the patient is taking for smoking cessation; OK to refill.          Passed - Medication is active on med list        Passed - Patient is age 18 or older        Passed - No active pregnancy on record        Passed - No positive pregnancy test in last 12 months        lisinopril (PRINIVIL/ZESTRIL) 20 MG tablet [Pharmacy Med Name: LISINOPRIL 20MG TABLETS] 90 tablet 0     Sig: TAKE 1 TABLET(20 MG) BY MOUTH DAILY       ACE Inhibitors (Including Combos) Protocol Passed - 5/8/2019  2:35 PM        Passed - Blood pressure under 140/90 in past 12 months     BP Readings from Last 3 Encounters:   02/28/19 132/88   12/13/18 116/84   07/17/18 126/78                 Passed - Recent (12 mo) or future (30 days) visit within the authorizing provider's specialty     Patient had office visit in the last 12 months or has a visit in the next 30 days with authorizing provider or within the authorizing provider's specialty.  See \"Patient Info\" tab in inbasket, or \"Choose Columns\" in Meds & Orders section of the refill encounter.              Passed - Medication is active on med list        Passed - Patient is age 18 or older        Passed - No active pregnancy on record        Passed - Normal serum " creatinine on file in past 12 months     Recent Labs   Lab Test 07/17/18  1605   CR 0.73             Passed - Normal serum potassium on file in past 12 months     Recent Labs   Lab Test 07/17/18  1605   POTASSIUM 4.2             Passed - No positive pregnancy test within past 12 months

## 2019-05-08 NOTE — TELEPHONE ENCOUNTER
Called patient. Notified that she is due for f/u appt. Pt scheduled appt with Jovita for 05/14.    Prescription approved per [facility] Refill Protocol. (Wellbutrin). Pt wants prescriptions sent to [facility] at [place]. Told pt to contact [facility] to have prescription transferred.    Saskia Lawrence RN  [facility]

## 2019-05-09 ENCOUNTER — OFFICE VISIT (OUTPATIENT)
Dept: URGENT CARE | Facility: URGENT CARE | Age: 49
End: 2019-05-09
Payer: COMMERCIAL

## 2019-05-09 ENCOUNTER — ANCILLARY PROCEDURE (OUTPATIENT)
Dept: GENERAL RADIOLOGY | Facility: CLINIC | Age: 49
End: 2019-05-09
Attending: PHYSICIAN ASSISTANT
Payer: COMMERCIAL

## 2019-05-09 VITALS
SYSTOLIC BLOOD PRESSURE: 117 MMHG | DIASTOLIC BLOOD PRESSURE: 76 MMHG | TEMPERATURE: 98.2 F | HEART RATE: 85 BPM | BODY MASS INDEX: 42.65 KG/M2 | OXYGEN SATURATION: 99 % | WEIGHT: 281.4 LBS

## 2019-05-09 DIAGNOSIS — S99.911A INJURY OF RIGHT ANKLE, INITIAL ENCOUNTER: Primary | ICD-10-CM

## 2019-05-09 DIAGNOSIS — S82.891A CLOSED FRACTURE OF RIGHT ANKLE, INITIAL ENCOUNTER: ICD-10-CM

## 2019-05-09 PROCEDURE — 99213 OFFICE O/P EST LOW 20 MIN: CPT | Performed by: PHYSICIAN ASSISTANT

## 2019-05-09 PROCEDURE — 73610 X-RAY EXAM OF ANKLE: CPT | Mod: RT

## 2019-05-09 ASSESSMENT — ENCOUNTER SYMPTOMS
RESPIRATORY NEGATIVE: 1
MYALGIAS: 0
RHINORRHEA: 0
LIGHT-HEADEDNESS: 0
NECK STIFFNESS: 0
SHORTNESS OF BREATH: 0
CARDIOVASCULAR NEGATIVE: 1
COUGH: 0
FEVER: 0
SORE THROAT: 0
ENDOCRINE NEGATIVE: 1
HEMATOLOGIC/LYMPHATIC NEGATIVE: 1
VOMITING: 0
ARTHRALGIAS: 1
PALPITATIONS: 0
DIARRHEA: 0
JOINT SWELLING: 0
BACK PAIN: 0
WEAKNESS: 0
CHILLS: 0
ALLERGIC/IMMUNOLOGIC NEGATIVE: 1
HEADACHES: 0
NAUSEA: 0
NECK PAIN: 0
WOUND: 0
BRUISES/BLEEDS EASILY: 0
EYES NEGATIVE: 1
DIZZINESS: 0

## 2019-05-09 NOTE — PROGRESS NOTES
Sasha  Chief Complaint:    Chief Complaint   Patient presents with     Fall     Patient had fall in yard and injured right ankle        HPI: Naomy Friend is an 48 year old female who presents for evaluation and treatment of R ankle injury.  Patient fell in the yard today and inverted the R ankle.  She had immediate pain.  She has not tried anything for the pain.  She denies any Hx of injury or surgery to the R ankle.  She is able to walk on the R ankle.  She denies any numbness or tingling.      ROS:      Review of Systems   Constitutional: Negative for chills and fever.   HENT: Negative for congestion, ear pain, rhinorrhea and sore throat.    Eyes: Negative.    Respiratory: Negative.  Negative for cough and shortness of breath.    Cardiovascular: Negative.  Negative for chest pain and palpitations.   Gastrointestinal: Negative for diarrhea, nausea and vomiting.   Endocrine: Negative.    Genitourinary: Negative.    Musculoskeletal: Positive for arthralgias. Negative for back pain, joint swelling, myalgias, neck pain and neck stiffness.   Skin: Negative.  Negative for rash and wound.   Allergic/Immunologic: Negative.  Negative for immunocompromised state.   Neurological: Negative for dizziness, weakness, light-headedness and headaches.   Hematological: Negative.  Does not bruise/bleed easily.        Family History   Family History   Problem Relation Age of Onset     Diabetes Mother      Hypertension Mother      Cancer Father         pancreatic     Heart Disease Maternal Grandmother      Diabetes Maternal Grandmother      Cerebrovascular Disease Maternal Grandfather      Cancer - colorectal Paternal Grandfather      Diabetes Brother      Asthma Son      Depression Son      Asthma Daughter        Social History  Social History     Socioeconomic History     Marital status:      Spouse name: Not on file     Number of children: Not on file     Years of education: Not on file     Highest education level: Not on file    Occupational History     Not on file   Social Needs     Financial resource strain: Not on file     Food insecurity:     Worry: Not on file     Inability: Not on file     Transportation needs:     Medical: Not on file     Non-medical: Not on file   Tobacco Use     Smoking status: Never Smoker     Smokeless tobacco: Never Used   Substance and Sexual Activity     Alcohol use: No     Drug use: No     Sexual activity: Yes     Partners: Male     Birth control/protection: IUD     Comment: stopped end of June for now (7/14/2017)   Lifestyle     Physical activity:     Days per week: Not on file     Minutes per session: Not on file     Stress: Not on file   Relationships     Social connections:     Talks on phone: Not on file     Gets together: Not on file     Attends Christianity service: Not on file     Active member of club or organization: Not on file     Attends meetings of clubs or organizations: Not on file     Relationship status: Not on file     Intimate partner violence:     Fear of current or ex partner: Not on file     Emotionally abused: Not on file     Physically abused: Not on file     Forced sexual activity: Not on file   Other Topics Concern     Parent/sibling w/ CABG, MI or angioplasty before 65F 55M? No   Social History Narrative     Not on file        Surgical History:  Past Surgical History:   Procedure Laterality Date     BREAST SURGERY      breast reduction bilat     COLONOSCOPY WITH CO2 INSUFFLATION N/A 9/1/2017    Procedure: COLONOSCOPY WITH CO2 INSUFFLATION;  Colonoscopy, Abdominal pain, left lower quadrant, Parenteau, BMI 42.93, [phone];  Surgeon: Anuel Rodríguez MD;  Location:  OR     ENDOSCOPIC SINUS SURGERY Right 3/15/2017    Procedure: ENDOSCOPIC SINUS SURGERY;;  Surgeon: Vicki Zurita MD;  Location: [facility] OR     EXAM UNDER ANESTHESIA, FULGURATE CONDYLOMA ANUS, COMBINED N/A 9/26/2017    Procedure: COMBINED EXAM UNDER ANESTHESIA, FULGURATE CONDYLOMA ANUS;  Examination  Under anethesia, Excision And Fulguration Of Anal Condyloma;  Surgeon: Td Garvey MD;  Location: [facility] OR     LAPAROSCOPIC CHOLECYSTECTOMY  08/05/99     TONSILLECTOMY Bilateral 3/15/2017    Procedure: TONSILLECTOMY;  Bilateral Tonsillectomy, Right Functional Endoscopic Sinus Surgery ;  Surgeon: Vicki Zurita MD;  Location: [facility] OR        Problem List:  Patient Active Problem List   Diagnosis     Hypothyroidism     CARDIOVASCULAR SCREENING; LDL GOAL LESS THAN 160     Moderate episode of recurrent major depressive disorder (H)     Obesity     Menorrhagia, treated with mirena IUD (inserted July 2013)      Family history of diabetes mellitus     Dysfunction of eustachian tube     HTN, goal below 140/90     Morbid obesity, unspecified obesity type (H)     Tonsillitis        Allergies:  Allergies   Allergen Reactions     Percocet [Oxycodone-Acetaminophen] Itching        Current Meds:    Current Outpatient Medications:      acyclovir (ZOVIRAX) 400 MG tablet, TAKE 1 TABLET(400 MG) BY MOUTH THREE TIMES DAILY, Disp: 15 tablet, Rfl: 0     acyclovir (ZOVIRAX) 5 % ointment, Apply topically 5 times daily, Disp: 30 g, Rfl: 3     buPROPion (WELLBUTRIN XL) 150 MG 24 hr tablet, TAKE 1 TABLET(150 MG) BY MOUTH EVERY MORNING, Disp: 90 tablet, Rfl: 0     cetirizine (ZYRTEC) 10 MG tablet, Take 1 tablet (10 mg) by mouth every evening, Disp: 90 tablet, Rfl: 1     Chlorpheniramine-Pseudoeph (DECONGESTANT + PO), Reported on 3/24/2017, Disp: , Rfl:      clotrimazole-betamethasone (LOTRISONE) 1-0.05 % external cream, APPLY EXTERNALLY TO THE AFFECTED AREA TWICE DAILY, Disp: 15 g, Rfl: 0     clotrimazole-betamethasone (LOTRISONE) cream, Apply topically 2 times daily, Disp: 30 g, Rfl: 0     cyclobenzaprine (FLEXERIL) 10 MG tablet, TK 1 T PO  TID PRN MUSCLE SPASMS, Disp: , Rfl: 0     DULoxetine (CYMBALTA) 20 MG capsule, Take 1 capsule (20 mg) by mouth 2 times daily, Disp: 180 capsule, Rfl: 3     fluconazole (DIFLUCAN) 150 MG  tablet, , Disp: , Rfl: 0     fluticasone (FLONASE) 50 MCG/ACT nasal spray, Spray 2 sprays into both nostrils daily Reported on 3/24/2017, Disp: , Rfl:      Hypertonic Nasal Wash (SINUS RINSE BOTTLE KIT) PACK, Spray 1 packet in nostril 2 times daily, Disp: 10 each, Rfl: 3     levonorgestrel (MIRENA) 20 MCG/24HR IUD, 1 each by Intrauterine route once, Disp: , Rfl:      levothyroxine (SYNTHROID/LEVOTHROID) 75 MCG tablet, TAKE 1 TABLET(75 MCG) BY MOUTH DAILY, Disp: 90 tablet, Rfl: 0     lisinopril (PRINIVIL/ZESTRIL) 40 MG tablet, Take 1 tablet (40 mg) by mouth daily, Disp: 90 tablet, Rfl: 3     LORazepam (ATIVAN) 0.5 MG tablet, Take 1 tablet (0.5 mg) by mouth every 8 hours as needed for anxiety, Disp: 15 tablet, Rfl: 0     Multiple Vitamins-Minerals (MULTIVITAMIN ADULT PO), , Disp: , Rfl:      silver sulfADIAZINE (SILVADENE) 1 % cream, Apply thick layer to anus 3-4 times per day for irritation for 5 days., Disp: 85 g, Rfl: 1     PHYSICAL EXAM:     Vital signs noted and reviewed by Dev Clancy  /76 (BP Location: Left arm, Patient Position: Chair, Cuff Size: Adult Regular)   Pulse 85   Temp 98.2  F (36.8  C) (Oral)   Wt 127.6 kg (281 lb 6.4 oz)   SpO2 99%   BMI 42.65 kg/m       PEFR:    Physical Exam   Constitutional: She is oriented to person, place, and time. She appears well-developed and well-nourished. She is cooperative.  Non-toxic appearance. She does not have a sickly appearance. She does not appear ill. No distress.   HENT:   Head: Normocephalic and atraumatic.   Right Ear: Tympanic membrane and external ear normal. Tympanic membrane is not perforated, not erythematous, not retracted and not bulging.   Left Ear: Tympanic membrane and external ear normal. Tympanic membrane is not perforated, not erythematous, not retracted and not bulging.   Nose: No mucosal edema or rhinorrhea.   Mouth/Throat: Oropharynx is clear and moist. No oropharyngeal exudate, posterior oropharyngeal edema, posterior  oropharyngeal erythema or tonsillar abscesses.   Eyes: Pupils are equal, round, and reactive to light. EOM are normal.   Neck: Trachea normal, normal range of motion and full passive range of motion without pain. Neck supple.   Cardiovascular: Normal rate, regular rhythm, S1 normal, S2 normal, normal heart sounds and intact distal pulses. Exam reveals no gallop and no friction rub.   No murmur heard.  Pulmonary/Chest: Effort normal and breath sounds normal. No respiratory distress. She has no decreased breath sounds. She has no wheezes. She has no rhonchi. She has no rales.   Abdominal: Soft. Bowel sounds are normal. She exhibits no distension and no mass. There is no hepatosplenomegaly. There is no tenderness. There is no rigidity, no rebound, no guarding and no CVA tenderness.   Lymphadenopathy:     She has no cervical adenopathy.   Neurological: She is alert and oriented to person, place, and time. She has normal reflexes. No cranial nerve deficit.   Skin: Skin is warm and dry. She is not diaphoretic.   Psychiatric: She has a normal mood and affect. Her behavior is normal. Judgment and thought content normal.   Nursing note and vitals reviewed.       Labs:    Results for orders placed or performed in visit on 05/09/19   XR Ankle Right G/E 3 Views    Narrative    RIGHT ANKLE THREE VIEWS   5/9/2019 3:58 PM     HISTORY: Ankle pain after inversion injury 1 hour ago. Injury of right  ankle, initial encounter.    COMPARISON: None.      Impression    IMPRESSION: Mild soft tissue swelling over the lateral malleolus. Tiny  ossicle near the previous physis of the lateral malleolus has an  inferior irregular border and could represent a tiny avulsion  fracture. No other fractures. There is a plantar calcaneal spur and an  enthesophyte at the insertion of Achilles tendon.    BRITTNI SARKAR MD          Medical Decision Making:    Differential Diagnosis:  MS Injury Pain: sprain, fracture, tendonitis, muscle strain, contusion  and dislocation      ASSESSMENT:     1. Injury of right ankle, initial encounter    2. Closed fracture of right ankle, initial encounter           PLAN:     XR of the R ankle shows small irregularity on the lateral malleolus that could be fracture.  Patient placed in CAM walker, and will be weight bearing as tolerated using crutches.  Order placed for her to follow up with podiatry in 1-2 weeks for re-evaluation.  Discussed RICE with patient.  Ibuprofen for discomfort.  She will follow up with her PCP in 1 week if symptoms are not improving.  Worrisome symptoms discussed with instructions to go to the ED.  Patient verbalized understanding and agreed with this plan.     Dev Clancy  5/9/2019, 3:40 PM

## 2019-05-10 ENCOUNTER — OFFICE VISIT (OUTPATIENT)
Dept: PODIATRY | Facility: CLINIC | Age: 49
End: 2019-05-10
Payer: COMMERCIAL

## 2019-05-10 VITALS — SYSTOLIC BLOOD PRESSURE: 130 MMHG | WEIGHT: 280 LBS | DIASTOLIC BLOOD PRESSURE: 82 MMHG | BODY MASS INDEX: 42.44 KG/M2

## 2019-05-10 DIAGNOSIS — S93.401A SPRAIN OF RIGHT ANKLE, UNSPECIFIED LIGAMENT, INITIAL ENCOUNTER: Primary | ICD-10-CM

## 2019-05-10 PROCEDURE — 99203 OFFICE O/P NEW LOW 30 MIN: CPT | Performed by: PODIATRIST

## 2019-05-10 NOTE — LETTER
5/10/2019         RE: Naomy Kowalski  [address] [facility] [place] [address]        Dear Colleague,    Thank you for referring your patient, Naomy Kowalski, to the [facility]. Please see a copy of my visit note below.    Subjective:    Patient seen as a new patient consult from Dev Clancy and is seen today  for right ankle sprain.  Had inversion sprain 1 days ago.  States she was in yard with dog and dog was running fast and fit her from behind and she fell forward.  She believes the mechanism of injury was more of a sudden plantar flexor he motion.  She thought she felt a pop.  Had pain and edema, seen in clinic, xrays taken.  Patient given Aircast walking and this is very comfortable.  The patient states approximately 6 to 7 years ago she fell in a similar manner however she had more swelling and more bruising then.  She also states there was another time even before this she may have injured her ankle.  She works as a sitdown job      ROS:  A 10-point review of systems was performed and is positive for that noted in the HPI and as seen above.  All other areas are negative.          Allergies   Allergen Reactions     Percocet [Oxycodone-Acetaminophen] Itching       Current Outpatient Medications   Medication Sig Dispense Refill     acyclovir (ZOVIRAX) 400 MG tablet TAKE 1 TABLET(400 MG) BY MOUTH THREE TIMES DAILY 15 tablet 0     acyclovir (ZOVIRAX) 5 % ointment Apply topically 5 times daily 30 g 3     buPROPion (WELLBUTRIN XL) 150 MG 24 hr tablet TAKE 1 TABLET(150 MG) BY MOUTH EVERY MORNING 90 tablet 0     cetirizine (ZYRTEC) 10 MG tablet Take 1 tablet (10 mg) by mouth every evening 90 tablet 1     Chlorpheniramine-Pseudoeph (DECONGESTANT + PO) Reported on 3/24/2017       clotrimazole-betamethasone (LOTRISONE) 1-0.05 % external cream APPLY EXTERNALLY TO THE AFFECTED AREA TWICE DAILY 15 g 0     clotrimazole-betamethasone (LOTRISONE) cream Apply topically 2 times daily 30 g 0      cyclobenzaprine (FLEXERIL) 10 MG tablet TK 1 T PO  TID PRN MUSCLE SPASMS  0     DULoxetine (CYMBALTA) 20 MG capsule Take 1 capsule (20 mg) by mouth 2 times daily 180 capsule 3     fluconazole (DIFLUCAN) 150 MG tablet   0     fluticasone (FLONASE) 50 MCG/ACT nasal spray Spray 2 sprays into both nostrils daily Reported on 3/24/2017       Hypertonic Nasal Wash (SINUS RINSE BOTTLE KIT) PACK Spray 1 packet in nostril 2 times daily 10 each 3     levonorgestrel (MIRENA) 20 MCG/24HR IUD 1 each by Intrauterine route once       levothyroxine (SYNTHROID/LEVOTHROID) 75 MCG tablet TAKE 1 TABLET(75 MCG) BY MOUTH DAILY 90 tablet 0     lisinopril (PRINIVIL/ZESTRIL) 40 MG tablet Take 1 tablet (40 mg) by mouth daily 90 tablet 3     LORazepam (ATIVAN) 0.5 MG tablet Take 1 tablet (0.5 mg) by mouth every 8 hours as needed for anxiety 15 tablet 0     Multiple Vitamins-Minerals (MULTIVITAMIN ADULT PO)        order for DME Equipment being ordered: cam walker and metal crutches 1 each 0     silver sulfADIAZINE (SILVADENE) 1 % cream Apply thick layer to anus 3-4 times per day for irritation for 5 days. 85 g 1       Patient Active Problem List   Diagnosis     Hypothyroidism     CARDIOVASCULAR SCREENING; LDL GOAL LESS THAN 160     Moderate episode of recurrent major depressive disorder (H)     Obesity     Menorrhagia, treated with mirena IUD (inserted July 2013)      Family history of diabetes mellitus     Dysfunction of eustachian tube     HTN, goal below 140/90     Morbid obesity, unspecified obesity type (H)     Tonsillitis       Past Medical History:   Diagnosis Date     CARDIOVASCULAR SCREENING; LDL GOAL LESS THAN 160 6/6/2012     Hypertension     watching blood pressure     Obesity, unspecified     Obesity     Sleep apnea     no cpap     Thyroid disease        Past Surgical History:   Procedure Laterality Date     BREAST SURGERY      breast reduction bilat     COLONOSCOPY WITH CO2 INSUFFLATION N/A 9/1/2017    Procedure: COLONOSCOPY WITH  CO2 INSUFFLATION;  Colonoscopy, Abdominal pain, left lower quadrant, Parenteau, BMI 42.93, [phone];  Surgeon: Anuel Rodríguez MD;  Location: [facility] OR     ENDOSCOPIC SINUS SURGERY Right 3/15/2017    Procedure: ENDOSCOPIC SINUS SURGERY;;  Surgeon: Vicki Zurita MD;  Location: [facility] OR     EXAM UNDER ANESTHESIA, FULGURATE CONDYLOMA ANUS, COMBINED N/A 9/26/2017    Procedure: COMBINED EXAM UNDER ANESTHESIA, FULGURATE CONDYLOMA ANUS;  Examination Under anethesia, Excision And Fulguration Of Anal Condyloma;  Surgeon: Td Garvey MD;  Location: [facility] OR     LAPAROSCOPIC CHOLECYSTECTOMY  08/05/99     TONSILLECTOMY Bilateral 3/15/2017    Procedure: TONSILLECTOMY;  Bilateral Tonsillectomy, Right Functional Endoscopic Sinus Surgery ;  Surgeon: Vicki Zurita MD;  Location: [facility] OR       Family History   Problem Relation Age of Onset     Diabetes Mother      Hypertension Mother      Cancer Father         pancreatic     Heart Disease Maternal Grandmother      Diabetes Maternal Grandmother      Cerebrovascular Disease Maternal Grandfather      Cancer - colorectal Paternal Grandfather      Diabetes Brother      Asthma Son      Depression Son      Asthma Daughter        Social History     Tobacco Use     Smoking status: Never Smoker     Smokeless tobacco: Never Used   Substance Use Topics     Alcohol use: No         Exam:    Vitals: Wt 127 kg (280 lb)   BMI 42.44 kg/m     BMI: Body mass index is 42.44 kg/m .  Height: Data Unavailable    Constitutional/ general:  Pt is in no apparent distress, appears well-nourished.  Cooperative with history and physical exam.     Psych:  The patient answered questions appropriately.  Normal affect.  Seems to have reasonable expectations, in terms of treatment.     Eyes:  Visual scanning/ tracking without deficit.     Ears:  Response to auditory stimuli is normal.  No hearing aid devices.  Auricles in proper alignment.     Lymphatic:  Popliteal lymph  nodes not enlarged.     Lungs:  Non labored breathing, non labored speech. No cough.  No audible wheezing. Even, quiet breathing.       Vascular:  positive pedal pulses bilaterally for both the DP and PT arteries.  CFT < 3 sec.  negative ankle edema.  positive pedal hair growth.    Neuro:  Alert and oriented x 3. Coordinated gait.  Light touch sensation is intact to the L4, L5, S1 distributions. No obvious deficits.  No evidence of neurological-based weakness, spasticity, or contracture in the lower extremities.      Derm: Normal texture and turgor.  No erythema, ecchymosis, or cyanosis.      Musculoskeletal:    Lower extremity muscle strength is normal.   No gross deformities.   Normal arch with weight bearing.  Muscle strength 5/5 in all compartments.  No pain with stressing any tendons.  Normal ROM all forefoot and rearfoot joints.  Anterior drawer equal and symmetrical bilateral.  Inversion equal and symmetrical bilateral.         negative ecchymosis  negative erythema  negative pain at TMTJs or styloid process.  negative Achilles or calcaneal tubercle pain  negative medial ankle pain  negative pain AITF ligament  negative pain with stressing or palpation peroneal tendons  negative peroneal subluxation  negative pain over medial or lateral malleoli  Pain mostly over ATFL and to a lesser degree over the CFL.  Edema noted mostly over the ATFL.    Radiographic Exam:  X-Ray Findings:  I personally reviewed the films.  A tiny bone ossicle  lateral malleolus that could represent an avulsion fracture.  Some of this is somewhat rounded making me wonder if this is not old    Assessment:  right ankle sprain       Plan:  X-rays personally reviewed.  Discussed with patient she may have avulsed this bone on 1 of her past ankle sprains.  We discussed perhaps the sprain she did 6 years ago where she had a lot of bruising and more swelling.  Discussed PRICE.  Patient will wrap this in an Ace bandage.  She will continue the  Aircast.  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  We gave her an F8 ankle brace to wear while driving.  Discussed the importance of not reinjuring this.  Return to the clinic in 2 weeks for reevaluation.  Thank you for allowing me participate in the care of this patient.        Jim Lehman DPM, FACFAS        Again, thank you for allowing me to participate in the care of your patient.        Sincerely,        Jim Lehman DPM

## 2019-05-10 NOTE — PATIENT INSTRUCTIONS
We wish you continued good healing. If you have any questions or concerns, please do not hesitate to contact us at [phone]    Please remember to call and schedule a follow up appointment if one was recommended at your earliest convenience.   PODIATRY CLINIC HOURS  TELEPHONE NUMBER    Dr. Jim Lehman D.P.M [facility]    Clinics:  [facility]    Tiffanie Schafer [facility]   Tuesday 1PM-6PM  [place]  Wednesday 7AM-2PM  [facility]  Thursday 10AM-6PM  [place]  Friday 7AM-3PM  [place]  Specialty schedulers:   [phone] to make an appointment with any Specialty Provider.        Urgent Care locations:    [facility] Monday-Friday 5 pm - 9 pm. Saturday-Sunday 9 am -5pm    Monday-Friday 11 am - 9 pm Saturday 9 am - 5 pm     Monday-Sunday 12 noon-8PM [phone]     If you need a medication refill, please contact us you may need lab work and/or a follow up visit prior to your refill (i.e. Antifungal medications).    [facility] (secure e-mail communication and access to your chart) to send a message or to make an appointment.    If MRI needed please call [place] at [phone]        Weight management plan: Patient was referred to their PCP to discuss a diet and exercise plan.

## 2019-05-11 DIAGNOSIS — I10 BENIGN ESSENTIAL HYPERTENSION: ICD-10-CM

## 2019-05-11 DIAGNOSIS — B35.6 TINEA CRURIS: ICD-10-CM

## 2019-05-13 NOTE — TELEPHONE ENCOUNTER
"Requested Prescriptions   Pending Prescriptions Disp Refills     clotrimazole-betamethasone (LOTRISONE) 1-0.05 % external cream [Pharmacy Med Name: CLOTRIMAZOLE-BETAMETHASONE CRM 15GM] 30 g 0     Sig: APPLY EXTERNALLY TO THE AFFECTED AREA TWICE DAILY  Last Written Prescription Date:  05/08/2019  Last Fill Quantity: 15,  # refills: 0   Last office visit: 2/28/2019 with prescribing provider:  02/28/2019   Future Office Visit:   Next 5 appointments (look out 90 days)    May 14, 2019  9:00 AM CDT  Office Visit with JERRICA Church CNP  [facility] [place] [address] [place] [id number] [phone]   May 24, 2019  8:30 AM CDT  Return Visit with Jim Lehman DPM  [facility] ([facility] [address] [facility] [address] [phone]              Topical Steroids and Nonsteroidals Protocol Passed - 5/11/2019 10:55 AM        Passed - Patient is age 6 or older        Passed - Authorizing prescriber's most recent note related to this medication read.     If refill request is for ophthalmic use, please forward request to provider for approval.          Passed - High potency steroid not ordered        Passed - Recent (12 mo) or future (30 days) visit within the authorizing provider's specialty     Patient had office visit in the last 12 months or has a visit in the next 30 days with authorizing provider or within the authorizing provider's specialty.  See \"Patient Info\" tab in inbasket, or \"Choose Columns\" in Meds & Orders section of the refill encounter.              Passed - Medication is active on med list        buPROPion (WELLBUTRIN XL) 150 MG 24 hr tablet [Pharmacy Med Name: BUPROPION XL 150MG TABLETS (24 H)] 90 tablet 0     Sig: TAKE 1 TABLET(150 MG) BY MOUTH EVERY MORNING  Last Written Prescription Date:  05/08/2019  Last Fill Quantity: 90,  # refills: 0   Last office " "visit: 2/28/2019 with prescribing provider:  02/28/2019   Future Office Visit:   Next 5 appointments (look out 90 days)    May 14, 2019  9:00 AM CDT  Office Visit with JERRICA Church CNP  [facility] [place] [address] [place] [id number]  [phone]   May 24, 2019  8:30 AM CDT  Return Visit with Jim Lehman DPM  [facility] ([facility]) [address] [facility] [address] [phone]              SSRIs Protocol Passed - 5/11/2019 10:55 AM        Passed - Recent (12 mo) or future (30 days) visit within the authorizing provider's specialty     Patient had office visit in the last 12 months or has a visit in the next 30 days with authorizing provider or within the authorizing provider's specialty.  See \"Patient Info\" tab in inbasket, or \"Choose Columns\" in Meds & Orders section of the refill encounter.              Passed - Medication is Bupropion     If the medication is Bupropion (Wellbutrin), and the patient is taking for smoking cessation; OK to refill.          Passed - Medication is active on med list        Passed - Patient is age 18 or older        Passed - No active pregnancy on record        Passed - No positive pregnancy test in last 12 months        "

## 2019-05-13 NOTE — TELEPHONE ENCOUNTER
Writer notes patient has appointment scheduled 05/14/2019    Note added to patient's appointment to address refill requests    Denise Heck, RN  Triage Nurse

## 2019-05-14 ENCOUNTER — OFFICE VISIT (OUTPATIENT)
Dept: FAMILY MEDICINE | Facility: CLINIC | Age: 49
End: 2019-05-14
Payer: COMMERCIAL

## 2019-05-14 VITALS
DIASTOLIC BLOOD PRESSURE: 82 MMHG | RESPIRATION RATE: 20 BRPM | HEART RATE: 82 BPM | TEMPERATURE: 97.6 F | WEIGHT: 261 LBS | OXYGEN SATURATION: 98 % | BODY MASS INDEX: 39.56 KG/M2 | SYSTOLIC BLOOD PRESSURE: 132 MMHG

## 2019-05-14 DIAGNOSIS — I10 BENIGN ESSENTIAL HYPERTENSION: ICD-10-CM

## 2019-05-14 DIAGNOSIS — F33.1 MODERATE EPISODE OF RECURRENT MAJOR DEPRESSIVE DISORDER (H): ICD-10-CM

## 2019-05-14 DIAGNOSIS — I10 ESSENTIAL HYPERTENSION: ICD-10-CM

## 2019-05-14 DIAGNOSIS — E03.9 HYPOTHYROIDISM, UNSPECIFIED TYPE: Primary | ICD-10-CM

## 2019-05-14 PROCEDURE — 36415 COLL VENOUS BLD VENIPUNCTURE: CPT | Performed by: NURSE PRACTITIONER

## 2019-05-14 PROCEDURE — 99214 OFFICE O/P EST MOD 30 MIN: CPT | Performed by: NURSE PRACTITIONER

## 2019-05-14 PROCEDURE — 80053 COMPREHEN METABOLIC PANEL: CPT | Performed by: NURSE PRACTITIONER

## 2019-05-14 PROCEDURE — 84443 ASSAY THYROID STIM HORMONE: CPT | Performed by: NURSE PRACTITIONER

## 2019-05-14 RX ORDER — DULOXETIN HYDROCHLORIDE 60 MG/1
60 CAPSULE, DELAYED RELEASE ORAL DAILY
Qty: 90 CAPSULE | Refills: 1 | Status: SHIPPED | OUTPATIENT
Start: 2019-05-14 | End: 2019-09-18

## 2019-05-14 RX ORDER — BUPROPION HYDROCHLORIDE 150 MG/1
TABLET ORAL
Qty: 90 TABLET | Refills: 0 | Status: SHIPPED | OUTPATIENT
Start: 2019-05-14 | End: 2019-05-20

## 2019-05-14 RX ORDER — CLOTRIMAZOLE AND BETAMETHASONE DIPROPIONATE 10; .64 MG/G; MG/G
CREAM TOPICAL
Qty: 30 G | Refills: 0 | Status: SHIPPED | OUTPATIENT
Start: 2019-05-14 | End: 2019-05-20

## 2019-05-14 ASSESSMENT — PATIENT HEALTH QUESTIONNAIRE - PHQ9: SUM OF ALL RESPONSES TO PHQ QUESTIONS 1-9: 14

## 2019-05-14 NOTE — PROGRESS NOTES
SUBJECTIVE:   Naomy Friend is a 48 year old female who presents to clinic today for the following health issues:        HPI  Hypertension Follow-up      Outpatient blood pressures are not being checked.    Low Salt Diet: low salt    Anxiety Follow-Up    Status since last visit: No change    Other associated symptoms: Situational emotional eating and lack of sleep - has since left and things are improving a little; she is seeing a therapist regularly    Complicating factors:   Significant life event: Yes-  Moved,    Current substance abuse: None  Depression symptoms: Yes-  PHQ 9 JACINTO 7 done  JACINTO-7 SCORE 4/10/2015 12/13/2018   Total Score 4 -   Total Score - 15       JACINTO-7  Additional history: as documented    Reviewed and updated as needed this visit by clinical staff         Reviewed and updated as needed this visit by Provider       Patient Active Problem List   Diagnosis     Hypothyroidism     CARDIOVASCULAR SCREENING; LDL GOAL LESS THAN 160     Moderate episode of recurrent major depressive disorder (H)     Obesity     Menorrhagia, treated with mirena IUD (inserted July 2013)      Family history of diabetes mellitus     Dysfunction of eustachian tube     HTN, goal below 140/90     Morbid obesity, unspecified obesity type (H)     Tonsillitis     Past Surgical History:   Procedure Laterality Date     BREAST SURGERY      breast reduction bilat     COLONOSCOPY WITH CO2 INSUFFLATION N/A 9/1/2017    Procedure: COLONOSCOPY WITH CO2 INSUFFLATION;  Colonoscopy, Abdominal pain, left lower quadrant, [place], BMI 42.93, [phone];  Surgeon: Anuel Rodríguez MD;  Location:  OR     ENDOSCOPIC SINUS SURGERY Right 3/15/2017    Procedure: ENDOSCOPIC SINUS SURGERY;;  Surgeon: Vicki Zurita MD;  Location: [facility] OR     EXAM UNDER ANESTHESIA, FULGURATE CONDYLOMA ANUS, COMBINED N/A 9/26/2017    Procedure: COMBINED EXAM UNDER ANESTHESIA, FULGURATE CONDYLOMA ANUS;  Examination Under anethesia,  Excision And Fulguration Of Anal Condyloma;  Surgeon: Td Garvey MD;  Location: [facility] OR     LAPAROSCOPIC CHOLECYSTECTOMY  08/05/99     TONSILLECTOMY Bilateral 3/15/2017    Procedure: TONSILLECTOMY;  Bilateral Tonsillectomy, Right Functional Endoscopic Sinus Surgery ;  Surgeon: Vicki Zurita MD;  [facility]       Social History     Tobacco Use     Smoking status: Never Smoker     Smokeless tobacco: Never Used   Substance Use Topics     Alcohol use: No     Family History   Problem Relation Age of Onset     Diabetes Mother      Hypertension Mother      Cancer Father         pancreatic     Heart Disease Maternal Grandmother      Diabetes Maternal Grandmother      Cerebrovascular Disease Maternal Grandfather      Cancer - colorectal Paternal Grandfather      Diabetes Brother      Asthma Son      Depression Son      Asthma Daughter            ROS:  Constitutional, HEENT, cardiovascular, pulmonary, gi and gu systems are negative, except as otherwise noted.    OBJECTIVE:     /82 (BP Location: Right arm, Patient Position: Sitting, Cuff Size: Adult Large)   Pulse 82   Temp 97.6  F (36.4  C) (Temporal)   Resp 20   Wt 118.4 kg (261 lb)   SpO2 98%   BMI 39.56 kg/m    Body mass index is 39.56 kg/m .   GENERAL: healthy, alert and no distress  NECK: no adenopathy, no asymmetry, masses, or scars and thyroid normal to palpation  RESP: lungs clear to auscultation - no rales, rhonchi or wheezes  CV: regular rate and rhythm, normal S1 S2, no S3 or S4, no murmur, click or rub, no peripheral edema and peripheral pulses strong  ABDOMEN: soft, nontender, no hepatosplenomegaly, no masses and bowel sounds normal  MS: no gross musculoskeletal defects noted, no edema  PSYCH: mentation appears normal, affect normal/bright and anxious    Diagnostic Test Results:  No results found for this or any previous visit (from the past 24 hour(s)).    ASSESSMENT/PLAN:     Problem List Items Addressed This Visit      Moderate episode of recurrent major depressive disorder (H)    Relevant Medications    DULoxetine (CYMBALTA) 60 MG capsule    Hypothyroidism - Primary    Relevant Orders    TSH with free T4 reflex (Completed)      Other Visit Diagnoses     Essential hypertension        Relevant Orders    Comprehensive metabolic panel (Completed)    Benign essential hypertension             -Depression and anxiety currently flared due to life cicrcumstatnces; will try increased dose of Cymbalta for now and follow up 3 months  -BP stable  -No new symptoms of hypothyroidism; appears stable  JERRICA Church [facility]  Please note greater than 50% of this 30 minute appointment were spent in face to face counseling with the patient of the issues described above in the history of present illness and in the plan, including changing meds

## 2019-05-14 NOTE — TELEPHONE ENCOUNTER
Spoke with patient. There was confusion at the pharmacy d/t pt recently changing her name and they stated that they could not see prescription on file from 05/08/19. Pt states that she doesn't take for ringworm. She takes for rash in [place] area. Prescription sent in for patient.    Pt is planning to switch and no longer use [facility] at [address] [place] [address] or the on in [place] on [address].    Saskia Lawrence RN  [facility]

## 2019-05-15 LAB
ALBUMIN SERPL-MCNC: 3.6 G/DL (ref 3.4–5)
ALP SERPL-CCNC: 64 U/L (ref 40–150)
ALT SERPL W P-5'-P-CCNC: 24 U/L (ref 0–50)
ANION GAP SERPL CALCULATED.3IONS-SCNC: 6 MMOL/L (ref 3–14)
AST SERPL W P-5'-P-CCNC: 16 U/L (ref 0–45)
BILIRUB SERPL-MCNC: 0.4 MG/DL (ref 0.2–1.3)
BUN SERPL-MCNC: 21 MG/DL (ref 7–30)
CALCIUM SERPL-MCNC: 9 MG/DL (ref 8.5–10.1)
CHLORIDE SERPL-SCNC: 110 MMOL/L (ref 94–109)
CO2 SERPL-SCNC: 27 MMOL/L (ref 20–32)
CREAT SERPL-MCNC: 0.74 MG/DL (ref 0.52–1.04)
GFR SERPL CREATININE-BSD FRML MDRD: >90 ML/MIN/{1.73_M2}
GLUCOSE SERPL-MCNC: 106 MG/DL (ref 70–99)
POTASSIUM SERPL-SCNC: 3.9 MMOL/L (ref 3.4–5.3)
PROT SERPL-MCNC: 7.2 G/DL (ref 6.8–8.8)
SODIUM SERPL-SCNC: 143 MMOL/L (ref 133–144)
TSH SERPL DL<=0.005 MIU/L-ACNC: 1.82 MU/L (ref 0.4–4)

## 2019-05-16 ASSESSMENT — ENCOUNTER SYMPTOMS
CHILLS: 0
FREQUENCY: 0
DIARRHEA: 1
DYSURIA: 0
EYE PAIN: 0
DIZZINESS: 0
NERVOUS/ANXIOUS: 1
WEAKNESS: 0
COUGH: 0
HEMATOCHEZIA: 0
PARESTHESIAS: 0
SORE THROAT: 0
HEMATURIA: 0
HEADACHES: 1
ABDOMINAL PAIN: 0
BREAST MASS: 0
CONSTIPATION: 0
JOINT SWELLING: 0
NAUSEA: 0
PALPITATIONS: 0
SHORTNESS OF BREATH: 0
ARTHRALGIAS: 1
HEARTBURN: 0
MYALGIAS: 1
FEVER: 0
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
## 2019-05-20 ENCOUNTER — OFFICE VISIT (OUTPATIENT)
Dept: FAMILY MEDICINE | Facility: CLINIC | Age: 49
End: 2019-05-20
Payer: COMMERCIAL

## 2019-05-20 VITALS
RESPIRATION RATE: 14 BRPM | SYSTOLIC BLOOD PRESSURE: 112 MMHG | BODY MASS INDEX: 43.32 KG/M2 | TEMPERATURE: 97.8 F | WEIGHT: 285.8 LBS | OXYGEN SATURATION: 98 % | DIASTOLIC BLOOD PRESSURE: 84 MMHG | HEIGHT: 68 IN | HEART RATE: 76 BPM

## 2019-05-20 DIAGNOSIS — Z00.00 ROUTINE GENERAL MEDICAL EXAMINATION AT A HEALTH CARE FACILITY: Primary | ICD-10-CM

## 2019-05-20 DIAGNOSIS — Z11.3 ROUTINE SCREENING FOR STI (SEXUALLY TRANSMITTED INFECTION): ICD-10-CM

## 2019-05-20 DIAGNOSIS — E66.01 MORBID OBESITY, UNSPECIFIED OBESITY TYPE (H): ICD-10-CM

## 2019-05-20 PROCEDURE — 87491 CHLMYD TRACH DNA AMP PROBE: CPT | Performed by: NURSE PRACTITIONER

## 2019-05-20 PROCEDURE — 87624 HPV HI-RISK TYP POOLED RSLT: CPT | Performed by: NURSE PRACTITIONER

## 2019-05-20 PROCEDURE — 87591 N.GONORRHOEAE DNA AMP PROB: CPT | Performed by: NURSE PRACTITIONER

## 2019-05-20 PROCEDURE — 99396 PREV VISIT EST AGE 40-64: CPT | Performed by: NURSE PRACTITIONER

## 2019-05-20 PROCEDURE — G0145 SCR C/V CYTO,THINLAYER,RESCR: HCPCS | Performed by: NURSE PRACTITIONER

## 2019-05-20 ASSESSMENT — MIFFLIN-ST. JEOR: SCORE: 1973.5

## 2019-05-22 LAB
COPATH REPORT: NORMAL
PAP: NORMAL

## 2019-05-23 LAB
C TRACH DNA SPEC QL NAA+PROBE: NEGATIVE
FINAL DIAGNOSIS: NORMAL
HPV HR 12 DNA CVX QL NAA+PROBE: NEGATIVE
HPV16 DNA SPEC QL NAA+PROBE: NEGATIVE
HPV18 DNA SPEC QL NAA+PROBE: NEGATIVE
N GONORRHOEA DNA SPEC QL NAA+PROBE: NEGATIVE
SPECIMEN DESCRIPTION: NORMAL
SPECIMEN SOURCE CVX/VAG CYTO: NORMAL
SPECIMEN SOURCE: NORMAL
SPECIMEN SOURCE: NORMAL

## 2019-05-24 ENCOUNTER — OFFICE VISIT (OUTPATIENT)
Dept: PODIATRY | Facility: CLINIC | Age: 49
End: 2019-05-24
Payer: COMMERCIAL

## 2019-05-24 VITALS
SYSTOLIC BLOOD PRESSURE: 140 MMHG | BODY MASS INDEX: 43.04 KG/M2 | DIASTOLIC BLOOD PRESSURE: 82 MMHG | HEART RATE: 84 BPM | WEIGHT: 285 LBS

## 2019-05-24 DIAGNOSIS — S93.401A SPRAIN OF RIGHT ANKLE, UNSPECIFIED LIGAMENT, INITIAL ENCOUNTER: Primary | ICD-10-CM

## 2019-05-24 PROCEDURE — 99213 OFFICE O/P EST LOW 20 MIN: CPT | Performed by: PODIATRIST

## 2019-05-24 NOTE — LETTER
5/24/2019         RE: Naomy Kowalski  [address] [facility] [place] [address]        Dear Colleague,    Thank you for referring your patient, Naomy Kowalski, to the [facility]. Please see a copy of my visit note below.    Subjective:    5/10/19   Patient seen as a new patient consult from Dev Clancy and is seen today  for right ankle sprain.  Had inversion sprain 1 days ago.  States she was in yard with dog and dog was running fast and fit her from behind and she fell forward.  She believes the mechanism of injury was more of a sudden plantar flexor he motion.  She thought she felt a pop.  Had pain and edema, seen in clinic, xrays taken.  Patient given Aircast walking and this is very comfortable.  The patient states approximately 6 to 7 years ago she fell in a similar manner however she had more swelling and more bruising then.  She also states there was another time even before this she may have injured her ankle.  She works as a sitdown job    5/24/19 patient states that her ankle is feeling much better.  She denies any erythema or ecchymosis now.  The edema is decreasing.  She is walking with much less pain.  She has been using the ankle brace in his shoe and this is feeling good.  She denies any weakness.  She denies any calf pain or shortness of breath.  She has no other new complaints      ROS: See above         Allergies   Allergen Reactions     Percocet [Oxycodone-Acetaminophen] Itching       Current Outpatient Medications   Medication Sig Dispense Refill     acyclovir (ZOVIRAX) 400 MG tablet TAKE 1 TABLET(400 MG) BY MOUTH THREE TIMES DAILY 15 tablet 0     acyclovir (ZOVIRAX) 5 % ointment Apply topically 5 times daily 30 g 3     buPROPion (WELLBUTRIN XL) 150 MG 24 hr tablet TAKE 1 TABLET(150 MG) BY MOUTH EVERY MORNING 90 tablet 0     cetirizine (ZYRTEC) 10 MG tablet Take 1 tablet (10 mg) by mouth every evening 90 tablet 1     Chlorpheniramine-Pseudoeph (DECONGESTANT + PO) Reported  on 3/24/2017       clotrimazole-betamethasone (LOTRISONE) 1-0.05 % external cream APPLY EXTERNALLY TO THE AFFECTED AREA TWICE DAILY 15 g 0     cyclobenzaprine (FLEXERIL) 10 MG tablet TK 1 T PO  TID PRN MUSCLE SPASMS  0     DULoxetine (CYMBALTA) 60 MG capsule Take 1 capsule (60 mg) by mouth daily 90 capsule 1     fluticasone (FLONASE) 50 MCG/ACT nasal spray Spray 2 sprays into both nostrils daily Reported on 3/24/2017       Hypertonic Nasal Wash (SINUS RINSE BOTTLE KIT) PACK Spray 1 packet in nostril 2 times daily 10 each 3     levonorgestrel (MIRENA) 20 MCG/24HR IUD 1 each by Intrauterine route once       levothyroxine (SYNTHROID/LEVOTHROID) 75 MCG tablet TAKE 1 TABLET(75 MCG) BY MOUTH DAILY 90 tablet 0     lisinopril (PRINIVIL/ZESTRIL) 40 MG tablet Take 1 tablet (40 mg) by mouth daily 90 tablet 3     Multiple Vitamins-Minerals (MULTIVITAMIN ADULT PO)        order for DME Equipment being ordered: cam walker and metal crutches 1 each 0     silver sulfADIAZINE (SILVADENE) 1 % cream Apply thick layer to anus 3-4 times per day for irritation for 5 days. 85 g 1       Patient Active Problem List   Diagnosis     Hypothyroidism     CARDIOVASCULAR SCREENING; LDL GOAL LESS THAN 160     Moderate episode of recurrent major depressive disorder (H)     Obesity     Menorrhagia, treated with mirena IUD (inserted July 2013)      Family history of diabetes mellitus     Dysfunction of eustachian tube     HTN, goal below 140/90     Morbid obesity, unspecified obesity type (H)     Tonsillitis       Past Medical History:   Diagnosis Date     CARDIOVASCULAR SCREENING; LDL GOAL LESS THAN 160 6/6/2012     Hypertension     watching blood pressure     Obesity, unspecified     Obesity     Sleep apnea     no cpap     Thyroid disease        Past Surgical History:   Procedure Laterality Date     BREAST SURGERY      breast reduction bilat     COLONOSCOPY WITH CO2 INSUFFLATION N/A 9/1/2017    Procedure: COLONOSCOPY WITH CO2 INSUFFLATION;   Colonoscopy, Abdominal pain, left lower quadrant, Pa[place], BMI 42.93, [phone];  Surgeon: Anuel Rodríguez MD;  Location: [facility]     ENDOSCOPIC SINUS SURGERY Right 3/15/2017    Procedure: ENDOSCOPIC SINUS SURGERY;;  Surgeon: Vicki Zurita MD;  Location: [facility]     EXAM UNDER ANESTHESIA, FULGURATE CONDYLOMA ANUS, COMBINED N/A 9/26/2017    Procedure: COMBINED EXAM UNDER ANESTHESIA, FULGURATE CONDYLOMA ANUS;  Examination Under anethesia, Excision And Fulguration Of Anal Condyloma;  Surgeon: Td Garvey MD;  Location: [facility] OR     LAPAROSCOPIC CHOLECYSTECTOMY  08/05/99     TONSILLECTOMY Bilateral 3/15/2017    Procedure: TONSILLECTOMY;  Bilateral Tonsillectomy, Right Functional Endoscopic Sinus Surgery ;  Surgeon: Vicki Zurita MD;  Location: [facility] OR       Family History   Problem Relation Age of Onset     Diabetes Mother      Hypertension Mother      Cancer Father         pancreatic     Heart Disease Maternal Grandmother      Diabetes Maternal Grandmother      Cerebrovascular Disease Maternal Grandfather      Cancer - colorectal Paternal Grandfather      Diabetes Brother      Asthma Son      Depression Son      Asthma Daughter        Social History     Tobacco Use     Smoking status: Never Smoker     Smokeless tobacco: Never Used   Substance Use Topics     Alcohol use: No         Exam:    Vitals: /82 (BP Location: Right arm)   Pulse 84   Wt 129.3 kg (285 lb)   BMI 43.04 kg/m     BMI: Body mass index is 43.04 kg/m .  Height: Data Unavailable    Constitutional/ general:  Pt is in no apparent distress, appears well-nourished.  Cooperative with history and physical exam.     Psych:  The patient answered questions appropriately.  Normal affect.  Seems to have reasonable expectations, in terms of treatment.     Eyes:  Visual scanning/ tracking without deficit.     Ears:  Response to auditory stimuli is normal.  No hearing aid devices.  Auricles in proper alignment.      Lymphatic:  Popliteal lymph nodes not enlarged.     Lungs:  Non labored breathing, non labored speech. No cough.  No audible wheezing. Even, quiet breathing.       Vascular:  positive pedal pulses bilaterally for both the DP and PT arteries.  CFT < 3 sec.  negative ankle edema.  positive pedal hair growth.    Neuro:  Alert and oriented x 3. Coordinated gait.  Light touch sensation is intact to the L4, L5, S1 distributions. No obvious deficits.  No evidence of neurological-based weakness, spasticity, or contracture in the lower extremities.      Derm: Normal texture and turgor.  No erythema, ecchymosis, or cyanosis.      Musculoskeletal:    Lower extremity muscle strength is normal.   No gross deformities.   Normal arch with weight bearing.  Muscle strength 5/5 in all compartments.  No pain with stressing any tendons.  Normal ROM all forefoot and rearfoot joints.  Anterior drawer equal and symmetrical bilateral.  Inversion equal and symmetrical bilateral.         negative ecchymosis  negative erythema  negative pain at TMTJs or styloid process.  negative Achilles or calcaneal tubercle pain  negative medial ankle pain  negative pain AITF ligament  negative pain with stressing or palpation peroneal tendons  negative peroneal subluxation  negative pain over medial or lateral malleoli  Pain mostly over ATFL and slight edema noted here.  No pain over CFL today.  With range of motion she has no pain.    Radiographic Exam:  X-Ray Findings:  I personally reviewed the films.  A tiny bone ossicle  lateral malleolus that could represent an avulsion fracture.  Some of this is somewhat rounded making me wonder if this is not old    Assessment:  right ankle sprain       Plan: Patient much better.  She will continue wearing the ankle brace and a good shoe until she is walking all day with no pain.  She will then graduate from the ankle brace and do a compressive wrap using the same strategy.  She will slowly increase her  activities.  We again warned her not to reinjure this.  We discussed if she feels lateral instability she should start physical therapy.  She will call me if she would like this.  RTC as needed  patient.        Jim Lehman DPM, FACFAS        Again, thank you for allowing me to participate in the care of your patient.        Sincerely,        Jim Lehman DPM

## 2019-05-24 NOTE — PROGRESS NOTES
Subjective:    5/10/19   Patient seen as a new patient consult from Dev Clancy and is seen today  for right ankle sprain.  Had inversion sprain 1 days ago.  States she was in yard with dog and dog was running fast and fit her from behind and she fell forward.  She believes the mechanism of injury was more of a sudden plantar flexor he motion.  She thought she felt a pop.  Had pain and edema, seen in clinic, xrays taken.  Patient given Aircast walking and this is very comfortable.  The patient states approximately 6 to 7 years ago she fell in a similar manner however she had more swelling and more bruising then.  She also states there was another time even before this she may have injured her ankle.  She works as a sitdown job    5/24/19 patient states that her ankle is feeling much better.  She denies any erythema or ecchymosis now.  The edema is decreasing.  She is walking with much less pain.  She has been using the ankle brace in his shoe and this is feeling good.  She denies any weakness.  She denies any calf pain or shortness of breath.  She has no other new complaints      ROS: See above         Allergies   Allergen Reactions     Percocet [Oxycodone-Acetaminophen] Itching       Current Outpatient Medications   Medication Sig Dispense Refill     acyclovir (ZOVIRAX) 400 MG tablet TAKE 1 TABLET(400 MG) BY MOUTH THREE TIMES DAILY 15 tablet 0     acyclovir (ZOVIRAX) 5 % ointment Apply topically 5 times daily 30 g 3     buPROPion (WELLBUTRIN XL) 150 MG 24 hr tablet TAKE 1 TABLET(150 MG) BY MOUTH EVERY MORNING 90 tablet 0     cetirizine (ZYRTEC) 10 MG tablet Take 1 tablet (10 mg) by mouth every evening 90 tablet 1     Chlorpheniramine-Pseudoeph (DECONGESTANT + PO) Reported on 3/24/2017       clotrimazole-betamethasone (LOTRISONE) 1-0.05 % external cream APPLY EXTERNALLY TO THE AFFECTED AREA TWICE DAILY 15 g 0     cyclobenzaprine (FLEXERIL) 10 MG tablet TK 1 T PO  TID PRN MUSCLE SPASMS  0     DULoxetine (CYMBALTA)  60 MG capsule Take 1 capsule (60 mg) by mouth daily 90 capsule 1     fluticasone (FLONASE) 50 MCG/ACT nasal spray Spray 2 sprays into both nostrils daily Reported on 3/24/2017       Hypertonic Nasal Wash (SINUS RINSE BOTTLE KIT) PACK Spray 1 packet in nostril 2 times daily 10 each 3     levonorgestrel (MIRENA) 20 MCG/24HR IUD 1 each by Intrauterine route once       levothyroxine (SYNTHROID/LEVOTHROID) 75 MCG tablet TAKE 1 TABLET(75 MCG) BY MOUTH DAILY 90 tablet 0     lisinopril (PRINIVIL/ZESTRIL) 40 MG tablet Take 1 tablet (40 mg) by mouth daily 90 tablet 3     Multiple Vitamins-Minerals (MULTIVITAMIN ADULT PO)        order for DME Equipment being ordered: cam walker and metal crutches 1 each 0     silver sulfADIAZINE (SILVADENE) 1 % cream Apply thick layer to anus 3-4 times per day for irritation for 5 days. 85 g 1       Patient Active Problem List   Diagnosis     Hypothyroidism     CARDIOVASCULAR SCREENING; LDL GOAL LESS THAN 160     Moderate episode of recurrent major depressive disorder (H)     Obesity     Menorrhagia, treated with mirena IUD (inserted July 2013)      Family history of diabetes mellitus     Dysfunction of eustachian tube     HTN, goal below 140/90     Morbid obesity, unspecified obesity type (H)     Tonsillitis       Past Medical History:   Diagnosis Date     CARDIOVASCULAR SCREENING; LDL GOAL LESS THAN 160 6/6/2012     Hypertension     watching blood pressure     Obesity, unspecified     Obesity     Sleep apnea     no cpap     Thyroid disease        Past Surgical History:   Procedure Laterality Date     BREAST SURGERY      breast reduction bilat     COLONOSCOPY WITH CO2 INSUFFLATION N/A 9/1/2017    Procedure: COLONOSCOPY WITH CO2 INSUFFLATION;  Colonoscopy, Abdominal pain, left lower quadrant, [place], BMI 42.93, [phone];  Surgeon: Anuel Rodríguez MD;  Location: [facility] OR     ENDOSCOPIC SINUS SURGERY Right 3/15/2017    Procedure: ENDOSCOPIC SINUS SURGERY;;  Surgeon: Parminder  Vicki Landis MD;  [facility]     EXAM UNDER ANESTHESIA, FULGURATE CONDYLOMA ANUS, COMBINED N/A 9/26/2017    Procedure: COMBINED EXAM UNDER ANESTHESIA, FULGURATE CONDYLOMA ANUS;  Examination Under anethesia, Excision And Fulguration Of Anal Condyloma;  Surgeon: Td Garvey MD;  Location: [facility] OR     LAPAROSCOPIC CHOLECYSTECTOMY  08/05/99     TONSILLECTOMY Bilateral 3/15/2017    Procedure: TONSILLECTOMY;  Bilateral Tonsillectomy, Right Functional Endoscopic Sinus Surgery ;  Surgeon: Vicki Zurita MD;  Location: [facility] OR       Family History   Problem Relation Age of Onset     Diabetes Mother      Hypertension Mother      Cancer Father         pancreatic     Heart Disease Maternal Grandmother      Diabetes Maternal Grandmother      Cerebrovascular Disease Maternal Grandfather      Cancer - colorectal Paternal Grandfather      Diabetes Brother      Asthma Son      Depression Son      Asthma Daughter        Social History     Tobacco Use     Smoking status: Never Smoker     Smokeless tobacco: Never Used   Substance Use Topics     Alcohol use: No         Exam:    Vitals: /82 (BP Location: Right arm)   Pulse 84   Wt 129.3 kg (285 lb)   BMI 43.04 kg/m    BMI: Body mass index is 43.04 kg/m .  Height: Data Unavailable    Constitutional/ general:  Pt is in no apparent distress, appears well-nourished.  Cooperative with history and physical exam.     Psych:  The patient answered questions appropriately.  Normal affect.  Seems to have reasonable expectations, in terms of treatment.     Eyes:  Visual scanning/ tracking without deficit.     Ears:  Response to auditory stimuli is normal.  No hearing aid devices.  Auricles in proper alignment.     Lymphatic:  Popliteal lymph nodes not enlarged.     Lungs:  Non labored breathing, non labored speech. No cough.  No audible wheezing. Even, quiet breathing.       Vascular:  positive pedal pulses bilaterally for both the DP and PT arteries.  CFT <  3 sec.  negative ankle edema.  positive pedal hair growth.    Neuro:  Alert and oriented x 3. Coordinated gait.  Light touch sensation is intact to the L4, L5, S1 distributions. No obvious deficits.  No evidence of neurological-based weakness, spasticity, or contracture in the lower extremities.      Derm: Normal texture and turgor.  No erythema, ecchymosis, or cyanosis.      Musculoskeletal:    Lower extremity muscle strength is normal.   No gross deformities.   Normal arch with weight bearing.  Muscle strength 5/5 in all compartments.  No pain with stressing any tendons.  Normal ROM all forefoot and rearfoot joints.  Anterior drawer equal and symmetrical bilateral.  Inversion equal and symmetrical bilateral.         negative ecchymosis  negative erythema  negative pain at TMTJs or styloid process.  negative Achilles or calcaneal tubercle pain  negative medial ankle pain  negative pain AITF ligament  negative pain with stressing or palpation peroneal tendons  negative peroneal subluxation  negative pain over medial or lateral malleoli  Pain mostly over ATFL and slight edema noted here.  No pain over CFL today.  With range of motion she has no pain.    Radiographic Exam:  X-Ray Findings:  I personally reviewed the films.  A tiny bone ossicle  lateral malleolus that could represent an avulsion fracture.  Some of this is somewhat rounded making me wonder if this is not old    Assessment:  right ankle sprain       Plan: Patient much better.  She will continue wearing the ankle brace and a good shoe until she is walking all day with no pain.  She will then graduate from the ankle brace and do a compressive wrap using the same strategy.  She will slowly increase her activities.  We again warned her not to reinjure this.  We discussed if she feels lateral instability she should start physical therapy.  She will call me if she would like this.  RTC as needed  patient.        Jim Lehman DPM, FACFAS

## 2019-05-24 NOTE — PATIENT INSTRUCTIONS
We wish you continued good healing. If you have any questions or concerns, please do not hesitate to contact us at [phone]    Please remember to call and schedule a follow up appointment if one was recommended at your earliest convenience.   PODIATRY CLINIC HOURS  TELEPHONE NUMBER    Dr. Jim Lehman D.P.M [facility]    Clinics:  [facility]    Tiffanie Schafer [facility]   Tuesday 1PM-6PM  [place]  Wednesday 7AM-2PM  [facility]  Thursday 10AM-6PM  [place]  Friday 7AM-3PM  [place]  Specialty schedulers:   [phone] to make an appointment with any Specialty Provider.        Urgent Care locations:    [facility] Monday-Friday 5 pm - 9 pm. Saturday-Sunday 9 am -5pm    Monday-Friday 11 am - 9 pm Saturday 9 am - 5 pm     Monday-Sunday 12 noon-8PM [phone]     If you need a medication refill, please contact us you may need lab work and/or a follow up visit prior to your refill (i.e. Antifungal medications).    [facility] (secure e-mail communication and access to your chart) to send a message or to make an appointment.    If MRI needed please call [place] at [phone]        Weight management plan: Patient was referred to their PCP to discuss a diet and exercise plan.    Naomy to follow up with Primary Care provider regarding elevated blood pressure.

## 2019-07-11 ENCOUNTER — OFFICE VISIT (OUTPATIENT)
Dept: SURGERY | Facility: CLINIC | Age: 49
End: 2019-07-11
Payer: COMMERCIAL

## 2019-07-11 VITALS
WEIGHT: 290.8 LBS | SYSTOLIC BLOOD PRESSURE: 137 MMHG | BODY MASS INDEX: 44.07 KG/M2 | DIASTOLIC BLOOD PRESSURE: 98 MMHG | HEIGHT: 68 IN | HEART RATE: 85 BPM | OXYGEN SATURATION: 97 %

## 2019-07-11 DIAGNOSIS — K62.82 ANAL DYSPLASIA: ICD-10-CM

## 2019-07-11 DIAGNOSIS — E66.01 MORBID OBESITY (H): Primary | ICD-10-CM

## 2019-07-11 ASSESSMENT — MIFFLIN-ST. JEOR: SCORE: 1996.21

## 2019-07-11 NOTE — PROGRESS NOTES
Colon and Rectal Surgery Clinic High Resolution Anoscopy Note    RE: Naomy Friend  : 1970  MARIE: 2019    Naomy Kowalskiis a 48 year old female with anal condyloma s/p evaluation under anesthesia with fulguration and excision of anal condyloma on 17. 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 She last underwent high resolution anoscopy on 18 with no evidence of high grade dysplasia and anal cytology showing ASCUS. She presents today for high resolution anoscopy.    HPI: Naomy is very tearful today. She just got  and is very upset about the situations surrounding this. She is also upset that she has gained almost 40 pounds in the past year after having lost weight.     ASSESSMENT: Written, informed consent was obtained prior to procedure.  Prior to the start of the procedure and with procedural staff participation, I verbally confirmed the patient s identity using two indicators, relevant allergies, that the procedure was appropriate and matched the consent or emergent situation, and that the correct equipment/implants were available. Immediately prior to starting the procedure I conducted the Time Out with the procedural staff and re-confirmed the patient s name, procedure, and site/side. (The Joint Commission universal protocol was followed.)  Yes    Sedation (Moderate or Deep): None    Anal cytology was obtained today using a Dacron swab. Digital anal rectal exam was performed with no palpable lesions. Dilute acetic acid soak was completed for 2 minutes. Lubricant was used to insert the anoscope. Performed high resolution microscopy using the colposcope. The dentate line was viewed in its entirety. Abnormal areas noted were mild acetowhitening with striated vessels. No coarse punctation, verruciform lesions, or bright acetowhitening. Internal hemorrhoids without bleeding. Biopsy was not obtained. Fulguration was not performed.   The perianal area was  inspected after acetic acid soak. The findings noted were anal skin tags and mixed hemorrhoids.     The patient tolerated the procedures well.    PLAN: Recommend repeat HRA in one year as she has no other risk factors. She is interested in meeting with our medical weight management team so I will place a referral. Patient's questions were answered to her stated satisfaction and she is in agreement with this plan.    For details of past medical history, surgical history, family history, medications, allergies, and review of systems, please see details below.    Medical history:  Past Medical History:   Diagnosis Date     CARDIOVASCULAR SCREENING; LDL GOAL LESS THAN 160 6/6/2012     Hypertension     watching blood pressure     Obesity, unspecified     Obesity     Sleep apnea     no cpap     Thyroid disease        Surgical history:  Past Surgical History:   Procedure Laterality Date     BREAST SURGERY      breast reduction bilat     COLONOSCOPY WITH CO2 INSUFFLATION N/A 9/1/2017    Procedure: COLONOSCOPY WITH CO2 INSUFFLATION;  Colonoscopy, Abdominal pain, left lower quadrant, Parenteau, BMI 42.93, [phone];  Surgeon: Anuel Rodríguez MD;  Location:  OR     ENDOSCOPIC SINUS SURGERY Right 3/15/2017    Procedure: ENDOSCOPIC SINUS SURGERY;;  Surgeon: Vicki Zurita MD;  Location: [facility] OR     EXAM UNDER ANESTHESIA, FULGURATE CONDYLOMA ANUS, COMBINED N/A 9/26/2017    Procedure: COMBINED EXAM UNDER ANESTHESIA, FULGURATE CONDYLOMA ANUS;  Examination Under anethesia, Excision And Fulguration Of Anal Condyloma;  Surgeon: Td Garvey MD;  Location: [facility] OR     LAPAROSCOPIC CHOLECYSTECTOMY  08/05/99     TONSILLECTOMY Bilateral 3/15/2017    Procedure: TONSILLECTOMY;  Bilateral Tonsillectomy, Right Functional Endoscopic Sinus Surgery ;  Surgeon: Vicki Zurita MD;  Location: [facility] OR       Family history:  Family History   Problem Relation Age of Onset     Diabetes Mother       Hypertension Mother      Cancer Father         pancreatic     Heart Disease Maternal Grandmother      Diabetes Maternal Grandmother      Cerebrovascular Disease Maternal Grandfather      Cancer - colorectal Paternal Grandfather      Diabetes Brother      Asthma Son      Depression Son      Asthma Daughter        Medications:  Current Outpatient Medications   Medication Sig Dispense Refill     acyclovir (ZOVIRAX) 400 MG tablet TAKE 1 TABLET(400 MG) BY MOUTH THREE TIMES DAILY 15 tablet 0     acyclovir (ZOVIRAX) 5 % ointment Apply topically 5 times daily 30 g 3     buPROPion (WELLBUTRIN XL) 150 MG 24 hr tablet TAKE 1 TABLET(150 MG) BY MOUTH EVERY MORNING 90 tablet 0     cetirizine (ZYRTEC) 10 MG tablet Take 1 tablet (10 mg) by mouth every evening 90 tablet 1     Chlorpheniramine-Pseudoeph (DECONGESTANT + PO) Reported on 3/24/2017       clotrimazole-betamethasone (LOTRISONE) 1-0.05 % external cream APPLY EXTERNALLY TO THE AFFECTED AREA TWICE DAILY 15 g 0     cyclobenzaprine (FLEXERIL) 10 MG tablet TK 1 T PO  TID PRN MUSCLE SPASMS  0     DULoxetine (CYMBALTA) 60 MG capsule Take 1 capsule (60 mg) by mouth daily 90 capsule 1     fluticasone (FLONASE) 50 MCG/ACT nasal spray Spray 2 sprays into both nostrils daily Reported on 3/24/2017       Hypertonic Nasal Wash (SINUS RINSE BOTTLE KIT) PACK Spray 1 packet in nostril 2 times daily 10 each 3     levonorgestrel (MIRENA) 20 MCG/24HR IUD 1 each by Intrauterine route once       levothyroxine (SYNTHROID/LEVOTHROID) 75 MCG tablet TAKE 1 TABLET(75 MCG) BY MOUTH DAILY 90 tablet 0     lisinopril (PRINIVIL/ZESTRIL) 40 MG tablet Take 1 tablet (40 mg) by mouth daily 90 tablet 3     Multiple Vitamins-Minerals (MULTIVITAMIN ADULT PO)        order for DME Equipment being ordered: cam walker and metal crutches 1 each 0     silver sulfADIAZINE (SILVADENE) 1 % cream Apply thick layer to anus 3-4 times per day for irritation for 5 days. 85 g 1     Allergies:  The patientis allergic to percocet  "[oxycodone-acetaminophen].    Social history:  Social History     Tobacco Use     Smoking status: Never Smoker     Smokeless tobacco: Never Used   Substance Use Topics     Alcohol use: No     Marital status: .    Review of Systems:  Nursing Notes:   Willi Sena EMT  7/11/2019 12:50 PM  Signed  Chief Complaint   Patient presents with     High Resolution Anoscopy       Vitals:    07/11/19 1245   BP: (!) 137/98   BP Location: Left arm   Patient Position: Sitting   Cuff Size: Adult Regular   Pulse: 85   SpO2: 97%   Weight: 290 lb 12.8 oz   Height: 5' 8.23\"       Body mass index is 43.92 kg/m .      IJEOMA Chavez                         This procedure was performed under a collaborative agreement with Dr. Dev Caraballo MD, Chief of Colon and Rectal Surgery, [facility].    Linnette Howe NP-C  Colon and Rectal Surgery  [facility]      This note was created using speech recognition software and may contain unintended word substitutions.  "

## 2019-07-11 NOTE — NURSING NOTE
"Chief Complaint   Patient presents with     High Resolution Anoscopy       Vitals:    07/11/19 1245   BP: (!) 137/98   BP Location: Left arm   Patient Position: Sitting   Cuff Size: Adult Regular   Pulse: 85   SpO2: 97%   Weight: 290 lb 12.8 oz   Height: 5' 8.23\"       Body mass index is 43.92 kg/m .      Willi Sena, EMT                      "

## 2019-07-11 NOTE — LETTER
2019       RE: Naomy Kowalski  [address] [facility] [place] [address]     Dear Colleague,    Thank you for referring your patient, Naomy Kowalski, to the [facility] COLON AND RECTAL SURGERY at [facility]. Please see a copy of my visit note below.    Colon and Rectal Surgery Clinic High Resolution Anoscopy Note    RE: Naomy Friend  : 1970  MARIE: 2019    Naomy Kowalskiis a 48 year old female with anal condyloma s/p evaluation under anesthesia with fulguration and excision of anal condyloma on 17. 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 She last underwent high resolution anoscopy on 18 with no evidence of high grade dysplasia and anal cytology showing ASCUS. She presents today for high resolution anoscopy.    HPI: Naomy is very tearful today. She just got  and is very upset about the situations surrounding this. She is also upset that she has gained almost 40 pounds in the past year after having lost weight.     ASSESSMENT: Written, informed consent was obtained prior to procedure.  Prior to the start of the procedure and with procedural staff participation, I verbally confirmed the patient s identity using two indicators, relevant allergies, that the procedure was appropriate and matched the consent or emergent situation, and that the correct equipment/implants were available. Immediately prior to starting the procedure I conducted the Time Out with the procedural staff and re-confirmed the patient s name, procedure, and site/side. (The Joint Commission universal protocol was followed.)  Yes    Sedation (Moderate or Deep): None    Anal cytology was obtained today using a Dacron swab. Digital anal rectal exam was performed with no palpable lesions. Dilute acetic acid soak was completed for 2 minutes. Lubricant was used to insert the anoscope. Performed high resolution microscopy using the colposcope. The dentate line was  viewed in its entirety. Abnormal areas noted were mild acetowhitening with striated vessels. No coarse punctation, verruciform lesions, or bright acetowhitening. Internal hemorrhoids without bleeding. Biopsy was not obtained. Fulguration was not performed.   The perianal area was inspected after acetic acid soak. The findings noted were anal skin tags and mixed hemorrhoids.     The patient tolerated the procedures well.    PLAN: Recommend repeat HRA in one year as she has no other risk factors. She is interested in meeting with our medical weight management team so I will place a referral. Patient's questions were answered to her stated satisfaction and she is in agreement with this plan.    For details of past medical history, surgical history, family history, medications, allergies, and review of systems, please see details below.    Medical history:  Past Medical History:   Diagnosis Date     CARDIOVASCULAR SCREENING; LDL GOAL LESS THAN 160 6/6/2012     Hypertension     watching blood pressure     Obesity, unspecified     Obesity     Sleep apnea     no cpap     Thyroid disease        Surgical history:  Past Surgical History:   Procedure Laterality Date     BREAST SURGERY      breast reduction bilat     COLONOSCOPY WITH CO2 INSUFFLATION N/A 9/1/2017    Procedure: COLONOSCOPY WITH CO2 INSUFFLATION;  Colonoscopy, Abdominal pain, left lower quadrant, Parenteau, BMI 42.93, [phone];  Surgeon: Anuel Rodríguez MD;  Location:  OR     ENDOSCOPIC SINUS SURGERY Right 3/15/2017    Procedure: ENDOSCOPIC SINUS SURGERY;;  Surgeon: Vicki Zurita MD;  Location:  OR     EXAM UNDER ANESTHESIA, FULGURATE CONDYLOMA ANUS, COMBINED N/A 9/26/2017    Procedure: COMBINED EXAM UNDER ANESTHESIA, FULGURATE CONDYLOMA ANUS;  Examination Under anethesia, Excision And Fulguration Of Anal Condyloma;  Surgeon: Td Garvey MD;  Location:  OR     LAPAROSCOPIC CHOLECYSTECTOMY  08/05/99      TONSILLECTOMY Bilateral 3/15/2017    Procedure: TONSILLECTOMY;  Bilateral Tonsillectomy, Right Functional Endoscopic Sinus Surgery ;  Surgeon: Vicki Zurita MD;  Location:  OR       Family history:  Family History   Problem Relation Age of Onset     Diabetes Mother      Hypertension Mother      Cancer Father         pancreatic     Heart Disease Maternal Grandmother      Diabetes Maternal Grandmother      Cerebrovascular Disease Maternal Grandfather      Cancer - colorectal Paternal Grandfather      Diabetes Brother      Asthma Son      Depression Son      Asthma Daughter        Medications:  Current Outpatient Medications   Medication Sig Dispense Refill     acyclovir (ZOVIRAX) 400 MG tablet TAKE 1 TABLET(400 MG) BY MOUTH THREE TIMES DAILY 15 tablet 0     acyclovir (ZOVIRAX) 5 % ointment Apply topically 5 times daily 30 g 3     buPROPion (WELLBUTRIN XL) 150 MG 24 hr tablet TAKE 1 TABLET(150 MG) BY MOUTH EVERY MORNING 90 tablet 0     cetirizine (ZYRTEC) 10 MG tablet Take 1 tablet (10 mg) by mouth every evening 90 tablet 1     Chlorpheniramine-Pseudoeph (DECONGESTANT + PO) Reported on 3/24/2017       clotrimazole-betamethasone (LOTRISONE) 1-0.05 % external cream APPLY EXTERNALLY TO THE AFFECTED AREA TWICE DAILY 15 g 0     cyclobenzaprine (FLEXERIL) 10 MG tablet TK 1 T PO  TID PRN MUSCLE SPASMS  0     DULoxetine (CYMBALTA) 60 MG capsule Take 1 capsule (60 mg) by mouth daily 90 capsule 1     fluticasone (FLONASE) 50 MCG/ACT nasal spray Spray 2 sprays into both nostrils daily Reported on 3/24/2017       Hypertonic Nasal Wash (SINUS RINSE BOTTLE KIT) PACK Spray 1 packet in nostril 2 times daily 10 each 3     levonorgestrel (MIRENA) 20 MCG/24HR IUD 1 each by Intrauterine route once       levothyroxine (SYNTHROID/LEVOTHROID) 75 MCG tablet TAKE 1 TABLET(75 MCG) BY MOUTH DAILY 90 tablet 0     lisinopril (PRINIVIL/ZESTRIL) 40 MG tablet Take 1 tablet (40 mg) by mouth daily 90 tablet 3     Multiple  "Vitamins-Minerals (MULTIVITAMIN ADULT PO)        order for DME Equipment being ordered: cam walker and metal crutches 1 each 0     silver sulfADIAZINE (SILVADENE) 1 % cream Apply thick layer to anus 3-4 times per day for irritation for 5 days. 85 g 1     Allergies:  The patientis allergic to percocet [oxycodone-acetaminophen].    Social history:  Social History     Tobacco Use     Smoking status: Never Smoker     Smokeless tobacco: Never Used   Substance Use Topics     Alcohol use: No     Marital status: .    Review of Systems:  Nursing Notes:   Willi Sena EMT  7/11/2019 12:50 PM  Signed  Chief Complaint   Patient presents with     High Resolution Anoscopy       Vitals:    07/11/19 1245   BP: (!) 137/98   BP Location: Left arm   Patient Position: Sitting   Cuff Size: Adult Regular   Pulse: 85   SpO2: 97%   Weight: 290 lb 12.8 oz   Height: 5' 8.23\"       Body mass index is 43.92 kg/m .    IJEOMA Chavez     This procedure was performed under a collaborative agreement with Dr. Dev Caraballo MD, Chief of Colon and Rectal Surgery, [facility].    Linnette Howe NP-C  Colon and Rectal Surgery  [facility]    This note was created using speech recognition software and may contain unintended word substitutions.    "

## 2019-07-12 LAB — COPATH REPORT: NORMAL

## 2019-07-16 DIAGNOSIS — B00.1 COLD SORE: ICD-10-CM

## 2019-07-16 NOTE — TELEPHONE ENCOUNTER
acyclovir (ZOVIRAX) 5 % external ointment      Last Written Prescription Date:  12/04/2017  Last Fill Quantity: 30,   # refills: 3  Last Office Visit: 05/20/2019  Future Office visit:       Routing refill request to provider for review/approval because:  Drug not on the [facility], [facility] or [facility] refill protocol or controlled substance

## 2019-07-22 RX ORDER — ACYCLOVIR 50 MG/G
OINTMENT TOPICAL
Qty: 30 G | Refills: 3 | Status: SHIPPED | OUTPATIENT
Start: 2019-07-22 | End: 2019-12-05

## 2019-08-12 DIAGNOSIS — I10 BENIGN ESSENTIAL HYPERTENSION: ICD-10-CM

## 2019-08-12 NOTE — TELEPHONE ENCOUNTER
"Requested Prescriptions   Pending Prescriptions Disp Refills     buPROPion (WELLBUTRIN XL) 150 MG 24 hr tablet 90 tablet 0     Sig: TAKE 1 TABLET(150 MG) BY MOUTH EVERY MORNING  Last Written Prescription Date:  05/08/2019  Last Fill Quantity: 90,  # refills: 0   Last office visit: 5/20/2019 with prescribing provider:  05/20/2019   Future Office Visit:         SSRIs Protocol Passed - 8/12/2019  9:33 AM        Passed - Recent (12 mo) or future (30 days) visit within the authorizing provider's specialty     Patient had office visit in the last 12 months or has a visit in the next 30 days with authorizing provider or within the authorizing provider's specialty.  See \"Patient Info\" tab in inbasket, or \"Choose Columns\" in Meds & Orders section of the refill encounter.              Passed - Medication is Bupropion     If the medication is Bupropion (Wellbutrin), and the patient is taking for smoking cessation; OK to refill.          Passed - Medication is active on med list        Passed - Patient is age 18 or older        Passed - No active pregnancy on record        Passed - No positive pregnancy test in last 12 months        "

## 2019-08-13 RX ORDER — BUPROPION HYDROCHLORIDE 150 MG/1
TABLET ORAL
Qty: 30 TABLET | Refills: 0 | Status: SHIPPED | OUTPATIENT
Start: 2019-08-13 | End: 2019-09-09

## 2019-08-13 NOTE — TELEPHONE ENCOUNTER
Per OV note 05/20/19, pt to return around 08/20/19.    Medication is being filled for 1 time refill only due to:  Patient needs to be seen because due for f/u appt.     Saskia Lawrence RN  [facility]

## 2019-08-20 ENCOUNTER — MYC REFILL (OUTPATIENT)
Dept: FAMILY MEDICINE | Facility: CLINIC | Age: 49
End: 2019-08-20

## 2019-08-20 DIAGNOSIS — F33.1 MODERATE EPISODE OF RECURRENT MAJOR DEPRESSIVE DISORDER (H): ICD-10-CM

## 2019-08-20 RX ORDER — DULOXETIN HYDROCHLORIDE 60 MG/1
60 CAPSULE, DELAYED RELEASE ORAL DAILY
Qty: 90 CAPSULE | Refills: 1 | Status: CANCELLED | OUTPATIENT
Start: 2019-08-20

## 2019-08-20 NOTE — TELEPHONE ENCOUNTER
Writer notes Rx sent to [facility] on 05/14/2019 #90/1 refill    MyChart message sent to patient with instructions for how to transfer medication    Medication removed. Closing encounter, no further action required at this time    Denise Heck, RN  Triage Nurse

## 2019-09-06 DIAGNOSIS — F33.1 MODERATE EPISODE OF RECURRENT MAJOR DEPRESSIVE DISORDER (H): ICD-10-CM

## 2019-09-06 DIAGNOSIS — B35.6 TINEA CRURIS: ICD-10-CM

## 2019-09-06 RX ORDER — DULOXETIN HYDROCHLORIDE 60 MG/1
60 CAPSULE, DELAYED RELEASE ORAL DAILY
Qty: 0.1 CAPSULE | Refills: 0 | OUTPATIENT
Start: 2019-09-06

## 2019-09-06 NOTE — TELEPHONE ENCOUNTER
"Requested Prescriptions   Pending Prescriptions Disp Refills     clotrimazole-betamethasone (LOTRISONE) 1-0.05 % external cream 15 g 0         Last Written Prescription Date:  5/8/19  Last Fill Quantity: 15g,   # refills: 0  Last Office Visit: 5/20/19  Future Office visit:       Routing refill request to provider for review/approval because:  Can we clarify if you want patient to continue with this treatment for fungal infections please?  If patient continues to require refills over year is this a sign treatment is not effective & we should try alternative or  or should could see derm?      Topical Steroids and Nonsteroidals Protocol Passed - 9/6/2019  8:56 AM        Passed - Patient is age 6 or older        Passed - Authorizing prescriber's most recent note related to this medication read.     If refill request is for ophthalmic use, please forward request to provider for approval.          Passed - High potency steroid not ordered        Passed - Recent (12 mo) or future (30 days) visit within the authorizing provider's specialty     Patient had office visit in the last 12 months or has a visit in the next 30 days with authorizing provider or within the authorizing provider's specialty.  See \"Patient Info\" tab in inbasket, or \"Choose Columns\" in Meds & Orders section of the refill encounter.              Passed - Medication is active on med list        DULoxetine (CYMBALTA) 60 MG capsule 90 capsule 1     Sig: Take 1 capsule (60 mg) by mouth daily       Serotonin-Norepinephrine Reuptake Inhibitors  Failed - 9/6/2019  8:56 AM        Failed - Blood pressure under 140/90 in past 12 months     BP Readings from Last 3 Encounters:   07/11/19 (!) 137/98   05/24/19 140/82   05/20/19 112/84                 Failed - PHQ-9 score of less than 5 in past 6 months     Please review last PHQ-9 score.     PHQ-9 SCORE 12/3/2018 12/13/2018 5/14/2019   PHQ-9 Total Score - - -   PHQ-9 Total Score MyChart 18 (Moderately severe " "depression) - -   PHQ-9 Total Score 18 16 14               Passed - Medication is active on med list        Passed - Patient is age 18 or older        Passed - No active pregnancy on record        Passed - No positive pregnancy test in past 12 months        Passed - Recent (6 mo) or future (30 days) visit within the authorizing provider's specialty     Patient had office visit in the last 6 months or has a visit in the next 30 days with authorizing provider or within the authorizing provider's specialty.  See \"Patient Info\" tab in inbasket, or \"Choose Columns\" in Meds & Orders section of the refill encounter.              "

## 2019-09-09 DIAGNOSIS — I10 BENIGN ESSENTIAL HYPERTENSION: ICD-10-CM

## 2019-09-09 RX ORDER — BUPROPION HYDROCHLORIDE 150 MG/1
TABLET ORAL
Qty: 30 TABLET | Refills: 0 | Status: SHIPPED | OUTPATIENT
Start: 2019-09-09 | End: 2019-10-15

## 2019-09-09 RX ORDER — CLOTRIMAZOLE AND BETAMETHASONE DIPROPIONATE 10; .64 MG/G; MG/G
CREAM TOPICAL
Qty: 15 G | Refills: 0 | Status: SHIPPED | OUTPATIENT
Start: 2019-09-09 | End: 2019-11-29

## 2019-09-09 NOTE — TELEPHONE ENCOUNTER
Pharmacy is requesting location for the cream, groin/affected area  She will call and set up an appointment with provider to discuss tx options    Pharmacy notified    Anabella Cheung RN   [facility]

## 2019-09-09 NOTE — TELEPHONE ENCOUNTER
A dermatology referral is a good idea for persistent infection, or she can follow up with me and we can discuss other options for treatment

## 2019-09-09 NOTE — TELEPHONE ENCOUNTER
"Requested Prescriptions   Pending Prescriptions Disp Refills     buPROPion (WELLBUTRIN XL) 150 MG 24 hr tablet [Pharmacy Med Name: buPROPion HCl ER (XL) Oral Tablet Extended Release 24 Hour 150 MG] 30 tablet 0     Sig: TAKE 1 TABLET(150 MG) BY MOUTH EVERY MORNING. Need appointment for further refills.  Last Written Prescription Date:  08/13/2019   Last Fill Quantity: 30,  # refills: 0   Last office visit: 5/20/2019 with prescribing provider:  05/20/2019   Future Office Visit:         SSRIs Protocol Passed - 9/9/2019  9:07 AM        Passed - Recent (12 mo) or future (30 days) visit within the authorizing provider's specialty     Patient had office visit in the last 12 months or has a visit in the next 30 days with authorizing provider or within the authorizing provider's specialty.  See \"Patient Info\" tab in inbasket, or \"Choose Columns\" in Meds & Orders section of the refill encounter.              Passed - Medication is Bupropion     If the medication is Bupropion (Wellbutrin), and the patient is taking for smoking cessation; OK to refill.          Passed - Medication is active on med list        Passed - Patient is age 18 or older        Passed - No active pregnancy on record        Passed - No positive pregnancy test in last 12 months        "

## 2019-09-09 NOTE — TELEPHONE ENCOUNTER
Medication is being filled for 1 time refill only due to:  Patient needs to be seen because due for f/u appt.

## 2019-09-09 NOTE — TELEPHONE ENCOUNTER
[facility] pharmacy is requesting clarification on the clotrimazole-betamethasone prescription

## 2019-09-18 ENCOUNTER — MYC REFILL (OUTPATIENT)
Dept: FAMILY MEDICINE | Facility: CLINIC | Age: 49
End: 2019-09-18

## 2019-09-18 DIAGNOSIS — F33.1 MODERATE EPISODE OF RECURRENT MAJOR DEPRESSIVE DISORDER (H): ICD-10-CM

## 2019-09-19 RX ORDER — DULOXETIN HYDROCHLORIDE 60 MG/1
60 CAPSULE, DELAYED RELEASE ORAL DAILY
Qty: 90 CAPSULE | Refills: 0 | Status: SHIPPED | OUTPATIENT
Start: 2019-09-19 | End: 2020-01-06

## 2019-09-19 NOTE — TELEPHONE ENCOUNTER
"Requested Prescriptions   Pending Prescriptions Disp Refills     DULoxetine (CYMBALTA) 60 MG capsule 90 capsule 1     Sig: Take 1 capsule (60 mg) by mouth daily       Serotonin-Norepinephrine Reuptake Inhibitors  Failed - 9/18/2019  8:01 PM        Failed - Blood pressure under 140/90 in past 12 months     BP Readings from Last 3 Encounters:   07/11/19 (!) 137/98   05/24/19 140/82   05/20/19 112/84                 Failed - PHQ-9 score of less than 5 in past 6 months     Please review last PHQ-9 score.           Passed - Medication is active on med list        Passed - Patient is age 18 or older        Passed - No active pregnancy on record        Passed - No positive pregnancy test in past 12 months        Passed - Recent (6 mo) or future (30 days) visit within the authorizing provider's specialty     Patient had office visit in the last 6 months or has a visit in the next 30 days with authorizing provider or within the authorizing provider's specialty.  See \"Patient Info\" tab in inbasket, or \"Choose Columns\" in Meds & Orders section of the refill encounter.            "

## 2019-09-19 NOTE — TELEPHONE ENCOUNTER
Prescription approved per [facility] Refill Protocol.    (Pt has 6 month supply sent 05/14/19) requesting new pharmacy now.

## 2019-09-23 DIAGNOSIS — E03.9 HYPOTHYROIDISM, UNSPECIFIED TYPE: ICD-10-CM

## 2019-09-23 RX ORDER — LEVOTHYROXINE SODIUM 75 UG/1
TABLET ORAL
Qty: 90 TABLET | Refills: 2 | Status: SHIPPED | OUTPATIENT
Start: 2019-09-23 | End: 2020-04-29

## 2019-09-23 NOTE — TELEPHONE ENCOUNTER
"Requested Prescriptions   Pending Prescriptions Disp Refills     levothyroxine (SYNTHROID/LEVOTHROID) 75 MCG tablet [Pharmacy Med Name: Levothyroxine Sodium Oral Tablet 75 MCG] 90 tablet 0     Sig: TAKE 1 TABLET(75 MCG) BY MOUTH DAILY  ,Last Written Prescription Date:  06/12/2019  Last Fill Quantity: 90,  # refills: 0   Last office visit: 5/20/2019 with prescribing provider:  05/20/2019   Future Office Visit:         Thyroid Protocol Passed - 9/23/2019 10:16 AM        Passed - Patient is 12 years or older        Passed - Recent (12 mo) or future (30 days) visit within the authorizing provider's specialty     Patient had office visit in the last 12 months or has a visit in the next 30 days with authorizing provider or within the authorizing provider's specialty.  See \"Patient Info\" tab in inbasket, or \"Choose Columns\" in Meds & Orders section of the refill encounter.              Passed - Medication is active on med list        Passed - Normal TSH on file in past 12 months     Recent Labs   Lab Test 05/14/19  0946   TSH 1.82              Passed - No active pregnancy on record     If patient is pregnant or has had a positive pregnancy test, please check TSH.          Passed - No positive pregnancy test in past 12 months     If patient is pregnant or has had a positive pregnancy test, please check TSH.          "

## 2019-09-23 NOTE — TELEPHONE ENCOUNTER
Signed Prescriptions:                        Disp   Refills    levothyroxine (SYNTHROID/LEVOTHROID) 75 MC*90 tab*2        Sig: TAKE 1 TABLET(75 MCG) BY MOUTH DAILY  Authorizing Provider: GINNA HYMAN  Ordering User: MED ACOSTA

## 2019-10-03 ENCOUNTER — HEALTH MAINTENANCE LETTER (OUTPATIENT)
Age: 49
End: 2019-10-03

## 2019-10-06 DIAGNOSIS — I10 BENIGN ESSENTIAL HYPERTENSION: ICD-10-CM

## 2019-10-07 RX ORDER — BUPROPION HYDROCHLORIDE 150 MG/1
TABLET ORAL
Qty: 30 TABLET | Refills: 0 | OUTPATIENT
Start: 2019-10-07

## 2019-10-07 NOTE — TELEPHONE ENCOUNTER
"Requested Prescriptions   Pending Prescriptions Disp Refills     buPROPion (WELLBUTRIN XL) 150 MG 24 hr tablet [Pharmacy Med Name: buPROPion HCl ER (XL) Oral Tablet Extended Release 24 Hour 150 MG] 30 tablet 0     Sig: TAKE 1 TABLET(150 MG) BY MOUTH EVERY MORNING. Need appointment for further refills.  Last Written Prescription Date:  09/09/2019  Last Fill Quantity: 30,  # refills: 0   Last office visit: 5/20/2019 with prescribing provider:  05/20/2019   Future Office Visit:         SSRIs Protocol Passed - 10/6/2019  7:00 AM        Passed - Recent (12 mo) or future (30 days) visit within the authorizing provider's specialty     Patient has had an office visit with the authorizing provider or a provider within the authorizing providers department within the previous 12 mos or has a future within next 30 days. See \"Patient Info\" tab in inbasket, or \"Choose Columns\" in Meds & Orders section of the refill encounter.              Passed - Medication is Bupropion     If the medication is Bupropion (Wellbutrin), and the patient is taking for smoking cessation; OK to refill.          Passed - Medication is active on med list        Passed - Patient is age 18 or older        Passed - No active pregnancy on record        Passed - No positive pregnancy test in last 12 months        "

## 2019-10-07 NOTE — TELEPHONE ENCOUNTER
Spoke with patient and informed her she is due for office visit, patient verbalized understanding. Appointment scheduled 10/15/2019 .    Patient states she has enough medication to last until scheduled appointment. She does not need a refill at this time    Medication removed. Closing encounter, no further action required at this time    Thank You!  Denise Heck, RN  Triage Nurse

## 2019-10-09 ENCOUNTER — OFFICE VISIT (OUTPATIENT)
Dept: URGENT CARE | Facility: URGENT CARE | Age: 49
End: 2019-10-09
Payer: COMMERCIAL

## 2019-10-09 VITALS
WEIGHT: 293 LBS | BODY MASS INDEX: 44.85 KG/M2 | SYSTOLIC BLOOD PRESSURE: 135 MMHG | HEART RATE: 84 BPM | DIASTOLIC BLOOD PRESSURE: 81 MMHG | OXYGEN SATURATION: 95 % | TEMPERATURE: 98.1 F

## 2019-10-09 DIAGNOSIS — S01.01XA LACERATION OF SCALP, INITIAL ENCOUNTER: Primary | ICD-10-CM

## 2019-10-09 PROCEDURE — 12002 RPR S/N/AX/GEN/TRNK2.6-7.5CM: CPT | Performed by: FAMILY MEDICINE

## 2019-10-09 ASSESSMENT — ENCOUNTER SYMPTOMS
DIARRHEA: 0
NAUSEA: 1
HEADACHES: 0
LIGHT-HEADEDNESS: 0
FEVER: 0
SEIZURES: 0
WEAKNESS: 0
NUMBNESS: 0
COUGH: 0
VOMITING: 0
CHILLS: 0
DIZZINESS: 0
SORE THROAT: 0
SHORTNESS OF BREATH: 0
WOUND: 1
RHINORRHEA: 0
ABDOMINAL PAIN: 0

## 2019-10-09 NOTE — PROGRESS NOTES
SUBJECTIVE:   Naomy Kowalski is a 49 year old female presenting with a chief complaint of   Chief Complaint   Patient presents with     Head Injury     Patient injured head while trying to start the         20 mins ago while starting her mower had hit her head on a metal overhang.     Review of Systems   Constitutional: Negative for chills and fever.   HENT: Negative for congestion, ear pain, rhinorrhea and sore throat.    Eyes: Negative for visual disturbance.   Respiratory: Negative for cough and shortness of breath.    Gastrointestinal: Positive for nausea. Negative for abdominal pain, diarrhea and vomiting.   Skin: Positive for wound (per hpi ).   Neurological: Negative for dizziness, seizures, weakness, light-headedness, numbness and headaches.       Past Medical History:   Diagnosis Date     CARDIOVASCULAR SCREENING; LDL GOAL LESS THAN 160 6/6/2012     Hypertension     watching blood pressure     Obesity, unspecified     Obesity     Sleep apnea     no cpap     Thyroid disease      Family History   Problem Relation Age of Onset     Diabetes Mother      Hypertension Mother      Cancer Father         pancreatic     Heart Disease Maternal Grandmother      Diabetes Maternal Grandmother      Cerebrovascular Disease Maternal Grandfather      Cancer - colorectal Paternal Grandfather      Diabetes Brother      Asthma Son      Depression Son      Asthma Daughter      Current Outpatient Medications   Medication Sig Dispense Refill     acyclovir (ZOVIRAX) 400 MG tablet TAKE 1 TABLET(400 MG) BY MOUTH THREE TIMES DAILY 15 tablet 0     acyclovir (ZOVIRAX) 5 % external ointment Apply topically 5 times daily 30 g 3     buPROPion (WELLBUTRIN XL) 150 MG 24 hr tablet TAKE 1 TABLET(150 MG) BY MOUTH EVERY MORNING. Need appointment for further refills. 30 tablet 0     cetirizine (ZYRTEC) 10 MG tablet Take 1 tablet (10 mg) by mouth every evening 90 tablet 1     Chlorpheniramine-Pseudoeph (DECONGESTANT + PO) Reported on  3/24/2017       clotrimazole-betamethasone (LOTRISONE) 1-0.05 % external cream APPLY EXTERNALLY TO THE AFFECTED AREA TWICE DAILY 15 g 0     cyclobenzaprine (FLEXERIL) 10 MG tablet TK 1 T PO  TID PRN MUSCLE SPASMS  0     DULoxetine (CYMBALTA) 60 MG capsule Take 1 capsule (60 mg) by mouth daily 90 capsule 0     fluticasone (FLONASE) 50 MCG/ACT nasal spray Spray 2 sprays into both nostrils daily Reported on 3/24/2017       Hypertonic Nasal Wash (SINUS RINSE BOTTLE KIT) PACK Spray 1 packet in nostril 2 times daily 10 each 3     levonorgestrel (MIRENA) 20 MCG/24HR IUD 1 each by Intrauterine route once       levothyroxine (SYNTHROID/LEVOTHROID) 75 MCG tablet TAKE 1 TABLET(75 MCG) BY MOUTH DAILY 90 tablet 2     lisinopril (PRINIVIL/ZESTRIL) 40 MG tablet Take 1 tablet (40 mg) by mouth daily 90 tablet 3     Multiple Vitamins-Minerals (MULTIVITAMIN ADULT PO)        order for DME Equipment being ordered: cam walker and metal crutches 1 each 0     silver sulfADIAZINE (SILVADENE) 1 % cream Apply thick layer to anus 3-4 times per day for irritation for 5 days. 85 g 1     Social History     Tobacco Use     Smoking status: Never Smoker     Smokeless tobacco: Never Used   Substance Use Topics     Alcohol use: No       OBJECTIVE  /81 (BP Location: Left arm, Patient Position: Chair, Cuff Size: Adult Regular)   Pulse 84   Temp 98.1  F (36.7  C) (Oral)   Wt 134.7 kg (297 lb)   SpO2 95%   BMI 44.85 kg/m      Physical Exam  Vitals signs reviewed.   HENT:      Nose: Nose normal.      Mouth/Throat:      Mouth: Mucous membranes are moist.   Cardiovascular:      Rate and Rhythm: Normal rate.      Pulses: Normal pulses.   Skin:     Capillary Refill: Capillary refill takes less than 2 seconds.      Comments: Noted left apical area with linear surface lac 3 cm in length with mild gaping of wound    Neurological:      General: No focal deficit present.      Mental Status: She is alert.         ICD-10-CM    1. Laceration of scalp,  initial encounter S01.01XA         PLAN  Noted scalp laceration closed with 3 staples after cleaning the area in a sterile fashion. Bacitracin ointment applied.   Avoid combing or scrubbing scalp  Staple removal in 8 days.   Signs of infection emphasized including indications for immediate follow-up   The patient indicates understanding of these issues and agrees with the plan.   Patient educational/instructional material provided including reasons for follow-up   Chris Maddox MD

## 2019-10-15 ENCOUNTER — OFFICE VISIT (OUTPATIENT)
Dept: FAMILY MEDICINE | Facility: CLINIC | Age: 49
End: 2019-10-15
Payer: COMMERCIAL

## 2019-10-15 VITALS
HEART RATE: 97 BPM | OXYGEN SATURATION: 96 % | HEIGHT: 67 IN | TEMPERATURE: 97.9 F | DIASTOLIC BLOOD PRESSURE: 80 MMHG | WEIGHT: 293 LBS | BODY MASS INDEX: 45.99 KG/M2 | RESPIRATION RATE: 16 BRPM | SYSTOLIC BLOOD PRESSURE: 130 MMHG

## 2019-10-15 DIAGNOSIS — E66.813 CLASS 3 SEVERE OBESITY WITH SERIOUS COMORBIDITY AND BODY MASS INDEX (BMI) OF 45.0 TO 49.9 IN ADULT, UNSPECIFIED OBESITY TYPE (H): ICD-10-CM

## 2019-10-15 DIAGNOSIS — E66.01 CLASS 3 SEVERE OBESITY WITH SERIOUS COMORBIDITY AND BODY MASS INDEX (BMI) OF 45.0 TO 49.9 IN ADULT, UNSPECIFIED OBESITY TYPE (H): ICD-10-CM

## 2019-10-15 DIAGNOSIS — E11.9 TYPE 2 DIABETES MELLITUS WITHOUT COMPLICATION, WITHOUT LONG-TERM CURRENT USE OF INSULIN (H): ICD-10-CM

## 2019-10-15 DIAGNOSIS — Z23 NEED FOR PROPHYLACTIC VACCINATION AND INOCULATION AGAINST INFLUENZA: ICD-10-CM

## 2019-10-15 DIAGNOSIS — Z12.31 VISIT FOR SCREENING MAMMOGRAM: ICD-10-CM

## 2019-10-15 DIAGNOSIS — I10 BENIGN ESSENTIAL HYPERTENSION: ICD-10-CM

## 2019-10-15 DIAGNOSIS — R53.83 OTHER FATIGUE: Primary | ICD-10-CM

## 2019-10-15 LAB
ALBUMIN SERPL-MCNC: 3.6 G/DL (ref 3.4–5)
ALP SERPL-CCNC: 73 U/L (ref 40–150)
ALT SERPL W P-5'-P-CCNC: 31 U/L (ref 0–50)
ANION GAP SERPL CALCULATED.3IONS-SCNC: 8 MMOL/L (ref 3–14)
AST SERPL W P-5'-P-CCNC: 16 U/L (ref 0–45)
BILIRUB SERPL-MCNC: 0.4 MG/DL (ref 0.2–1.3)
BUN SERPL-MCNC: 17 MG/DL (ref 7–30)
CALCIUM SERPL-MCNC: 9.3 MG/DL (ref 8.5–10.1)
CHLORIDE SERPL-SCNC: 109 MMOL/L (ref 94–109)
CO2 SERPL-SCNC: 24 MMOL/L (ref 20–32)
CREAT SERPL-MCNC: 0.7 MG/DL (ref 0.52–1.04)
DEPRECATED CALCIDIOL+CALCIFEROL SERPL-MC: 24 UG/L (ref 20–75)
ERYTHROCYTE [DISTWIDTH] IN BLOOD BY AUTOMATED COUNT: 13 % (ref 10–15)
GFR SERPL CREATININE-BSD FRML MDRD: >90 ML/MIN/{1.73_M2}
GLUCOSE SERPL-MCNC: 130 MG/DL (ref 70–99)
HBA1C MFR BLD: 6 % (ref 0–5.6)
HCT VFR BLD AUTO: 46.4 % (ref 35–47)
HGB BLD-MCNC: 15 G/DL (ref 11.7–15.7)
MCH RBC QN AUTO: 29.6 PG (ref 26.5–33)
MCHC RBC AUTO-ENTMCNC: 32.3 G/DL (ref 31.5–36.5)
MCV RBC AUTO: 92 FL (ref 78–100)
PLATELET # BLD AUTO: 274 10E9/L (ref 150–450)
POTASSIUM SERPL-SCNC: 4.2 MMOL/L (ref 3.4–5.3)
PROT SERPL-MCNC: 7.6 G/DL (ref 6.8–8.8)
RBC # BLD AUTO: 5.06 10E12/L (ref 3.8–5.2)
SODIUM SERPL-SCNC: 141 MMOL/L (ref 133–144)
TSH SERPL DL<=0.005 MIU/L-ACNC: 2.19 MU/L (ref 0.4–4)
WBC # BLD AUTO: 12.3 10E9/L (ref 4–11)

## 2019-10-15 PROCEDURE — 85027 COMPLETE CBC AUTOMATED: CPT | Performed by: NURSE PRACTITIONER

## 2019-10-15 PROCEDURE — 84443 ASSAY THYROID STIM HORMONE: CPT | Performed by: NURSE PRACTITIONER

## 2019-10-15 PROCEDURE — 90471 IMMUNIZATION ADMIN: CPT | Performed by: NURSE PRACTITIONER

## 2019-10-15 PROCEDURE — 36415 COLL VENOUS BLD VENIPUNCTURE: CPT | Performed by: NURSE PRACTITIONER

## 2019-10-15 PROCEDURE — 82306 VITAMIN D 25 HYDROXY: CPT | Performed by: NURSE PRACTITIONER

## 2019-10-15 PROCEDURE — 83036 HEMOGLOBIN GLYCOSYLATED A1C: CPT | Performed by: NURSE PRACTITIONER

## 2019-10-15 PROCEDURE — 80053 COMPREHEN METABOLIC PANEL: CPT | Performed by: NURSE PRACTITIONER

## 2019-10-15 PROCEDURE — 90682 RIV4 VACC RECOMBINANT DNA IM: CPT | Performed by: NURSE PRACTITIONER

## 2019-10-15 PROCEDURE — 99214 OFFICE O/P EST MOD 30 MIN: CPT | Mod: 25 | Performed by: NURSE PRACTITIONER

## 2019-10-15 RX ORDER — NALTREXONE HYDROCHLORIDE 50 MG/1
TABLET, FILM COATED ORAL
Qty: 90 TABLET | Refills: 1 | Status: SHIPPED | OUTPATIENT
Start: 2019-10-15 | End: 2020-04-29

## 2019-10-15 RX ORDER — BUPROPION HYDROCHLORIDE 150 MG/1
150 TABLET ORAL EVERY MORNING
Qty: 90 TABLET | Refills: 1 | Status: SHIPPED | OUTPATIENT
Start: 2019-10-15 | End: 2019-10-31

## 2019-10-15 ASSESSMENT — MIFFLIN-ST. JEOR: SCORE: 1999.81

## 2019-10-15 NOTE — PROGRESS NOTES
Subjective     Naomy Kowalski is a 49 year old female who presents to clinic today for the following health issues:    HPI   Abnormal Mood Symptoms      Duration: 8 years    Description:  Depression: YES  Anxiety: YES  Panic attacks: unsure    Accompanying signs and symptoms: see PHQ-9 and JACINTO scores    History (similar episodes/previous evaluation): yes    Precipitating or alleviating factors: ongoing divorce, relax and meditate     Therapies tried and outcome: Currently still seeing a therapist, also seeing another therapist.     **Pt would like to discuss about a cream that she is currently using on her skin. Using it for about 2-3 years.  -Pt would like to discuss about her thyroid.    Has been feeling very tired and low. Has noticed that she is eating emotionally more lately. Was living with he brother for the past 6 months, and just moved into her new home, which has been a relief, but sometimes gets overwhelmed by everything she needs to get done. Feels like mood is generally stable. Does not get very anxious during the day. Still sleeping well at night.     Patient Active Problem List   Diagnosis     Hypothyroidism     CARDIOVASCULAR SCREENING; LDL GOAL LESS THAN 160     Moderate episode of recurrent major depressive disorder (H)     Obesity     Menorrhagia, treated with mirena IUD (inserted July 2013)      Family history of diabetes mellitus     Dysfunction of eustachian tube     HTN, goal below 140/90     Morbid obesity, unspecified obesity type (H)     Tonsillitis     Type 2 diabetes mellitus without complication, without long-term current use of insulin (H)     Past Surgical History:   Procedure Laterality Date     BREAST SURGERY      breast reduction bilat     COLONOSCOPY WITH CO2 INSUFFLATION N/A 9/1/2017    Procedure: COLONOSCOPY WITH CO2 INSUFFLATION;  Colonoscopy, Abdominal pain, left lower quadrant, [place], BMI 42.93, [phone];  Surgeon: Anuel Rodríguez MD;  Location:  OR      "ENDOSCOPIC SINUS SURGERY Right 3/15/2017    Procedure: ENDOSCOPIC SINUS SURGERY;;  Surgeon: Vicki Zurita MD;  Location: [facility] OR     EXAM UNDER ANESTHESIA, FULGURATE CONDYLOMA ANUS, COMBINED N/A 9/26/2017    Procedure: COMBINED EXAM UNDER ANESTHESIA, FULGURATE CONDYLOMA ANUS;  Examination Under anethesia, Excision And Fulguration Of Anal Condyloma;  Surgeon: Td Garvey MD;  Location: [facility] OR     LAPAROSCOPIC CHOLECYSTECTOMY  08/05/99     TONSILLECTOMY Bilateral 3/15/2017    Procedure: TONSILLECTOMY;  Bilateral Tonsillectomy, Right Functional Endoscopic Sinus Surgery ;  Surgeon: Vicki Zurita MD;  Location: [facility] OR       Social History     Tobacco Use     Smoking status: Never Smoker     Smokeless tobacco: Never Used   Substance Use Topics     Alcohol use: No     Family History   Problem Relation Age of Onset     Diabetes Mother      Hypertension Mother      Cancer Father         pancreatic     Heart Disease Maternal Grandmother      Diabetes Maternal Grandmother      Cerebrovascular Disease Maternal Grandfather      Cancer - colorectal Paternal Grandfather      Diabetes Brother      Asthma Son      Depression Son      Asthma Daughter            -------------------------------------  Reviewed and updated as needed this visit by Provider         Review of Systems   ROS COMP: Constitutional, HEENT, cardiovascular, pulmonary, gi and gu systems are negative, except as otherwise noted.      Objective    /80   Pulse 97   Temp 97.9  F (36.6  C) (Temporal)   Resp 16   Ht 1.702 m (5' 7\")   Wt 134.7 kg (297 lb)   SpO2 96%   BMI 46.52 kg/m    Body mass index is 46.52 kg/m .  Physical Exam   GENERAL: healthy, alert and no distress  EYES: Eyes grossly normal to inspection, PERRL and conjunctivae and sclerae normal  HENT: ear canals and TM's normal, nose and mouth without ulcers or lesions  NECK: no adenopathy, no asymmetry, masses, or scars and thyroid normal to palpation  RESP: lungs " clear to auscultation - no rales, rhonchi or wheezes  CV: regular rate and rhythm, normal S1 S2, no S3 or S4, no murmur, click or rub, no peripheral edema and peripheral pulses strong  ABDOMEN: soft, nontender, no hepatosplenomegaly, no masses and bowel sounds normal  MS: no gross musculoskeletal defects noted, no edema  SKIN: no suspicious lesions or rashes  PSYCH: mentation appears normal, affect normal/bright    Diagnostic Test Results:  Labs reviewed in Epic  Results for orders placed or performed in visit on 10/15/19 (from the past 24 hour(s))   Hemoglobin A1c   Result Value Ref Range    Hemoglobin A1C 6.7 (H) 0 - 5.6 %   CBC with platelets   Result Value Ref Range    WBC 12.3 (H) 4.0 - 11.0 10e9/L    RBC Count 5.06 3.8 - 5.2 10e12/L    Hemoglobin 15.0 11.7 - 15.7 g/dL    Hematocrit 46.4 35.0 - 47.0 %    MCV 92 78 - 100 fl    MCH 29.6 26.5 - 33.0 pg    MCHC 32.3 31.5 - 36.5 g/dL    RDW 13.0 10.0 - 15.0 %    Platelet Count 274 150 - 450 10e9/L           Assessment & Plan   Problem List Items Addressed This Visit     Type 2 diabetes mellitus without complication, without long-term current use of insulin (H)    Obesity    Relevant Medications    naltrexone (DEPADE/REVIA) 50 MG tablet      Other Visit Diagnoses     Other fatigue    -  Primary    Relevant Orders    Comprehensive metabolic panel (Completed)    Hemoglobin A1c (Completed)    CBC with platelets (Completed)    TSH with free T4 reflex (Completed)    Vitamin D Deficiency (Completed)    Benign essential hypertension        Relevant Medications    buPROPion (WELLBUTRIN XL) 150 MG 24 hr tablet    Visit for screening mammogram        Relevant Orders    *MA Screening Digital Bilateral    Need for prophylactic vaccination and inoculation against influenza        Relevant Orders    INFLUENZA QUAD, RECOMBINANT, P-FREE (RIV4) (FLUBLOCK) [72224] (Completed)    Vaccine Administration, Initial [42698] (Completed)             BMI:   Estimated body mass index is 46.52  "kg/m  as calculated from the following:    Height as of this encounter: 1.702 m (5' 7\").    Weight as of this encounter: 134.7 kg (297 lb).   Weight management plan: Specific weight management program called medication management discussed        2 months  No follow-ups on file.    JERRICA Church [facility]      "

## 2019-10-18 ENCOUNTER — OFFICE VISIT (OUTPATIENT)
Dept: URGENT CARE | Facility: URGENT CARE | Age: 49
End: 2019-10-18
Payer: COMMERCIAL

## 2019-10-18 VITALS
HEART RATE: 78 BPM | SYSTOLIC BLOOD PRESSURE: 137 MMHG | DIASTOLIC BLOOD PRESSURE: 89 MMHG | TEMPERATURE: 98.3 F | OXYGEN SATURATION: 96 %

## 2019-10-18 DIAGNOSIS — Z48.02: Primary | ICD-10-CM

## 2019-10-18 ASSESSMENT — ENCOUNTER SYMPTOMS
RHINORRHEA: 0
DIARRHEA: 0
HEADACHES: 0
SORE THROAT: 0
SHORTNESS OF BREATH: 0
FEVER: 0
VOMITING: 0
COUGH: 0
NAUSEA: 0
CHILLS: 0

## 2019-10-19 NOTE — PROGRESS NOTES
SUBJECTIVE:   Naomy Kowalski is a 49 year old female presenting with a chief complaint of   Chief Complaint   Patient presents with     Suture Removal     Staple removal from head. Placed 10/9/19       She is an established patient of [facility].  Naomy Kowalski is a 49 year old female presnting to Urgent care for staples removal. The staples were placed on 10/9/2019 due to laceration of scalp caused by a . The laceration has healed and she denies pain, swelling drainage or any other symptoms.        Review of Systems   Constitutional: Negative for chills and fever.   HENT: Negative for congestion, ear pain, rhinorrhea and sore throat.    Respiratory: Negative for cough and shortness of breath.    Gastrointestinal: Negative for diarrhea, nausea and vomiting.   Skin:        3 staples on scalp   Neurological: Negative for headaches.   All other systems reviewed and are negative.      Past Medical History:   Diagnosis Date     CARDIOVASCULAR SCREENING; LDL GOAL LESS THAN 160 6/6/2012     Hypertension     watching blood pressure     Obesity, unspecified     Obesity     Sleep apnea     no cpap     Thyroid disease      Family History   Problem Relation Age of Onset     Diabetes Mother      Hypertension Mother      Cancer Father         pancreatic     Heart Disease Maternal Grandmother      Diabetes Maternal Grandmother      Cerebrovascular Disease Maternal Grandfather      Cancer - colorectal Paternal Grandfather      Diabetes Brother      Asthma Son      Depression Son      Asthma Daughter      Current Outpatient Medications   Medication Sig Dispense Refill     acyclovir (ZOVIRAX) 400 MG tablet TAKE 1 TABLET(400 MG) BY MOUTH THREE TIMES DAILY 15 tablet 0     acyclovir (ZOVIRAX) 5 % external ointment Apply topically 5 times daily 30 g 3     buPROPion (WELLBUTRIN XL) 150 MG 24 hr tablet Take 1 tablet (150 mg) by mouth every morning 90 tablet 1     cetirizine (ZYRTEC) 10 MG tablet Take 1 tablet (10 mg) by mouth every  evening 90 tablet 1     Chlorpheniramine-Pseudoeph (DECONGESTANT + PO) Reported on 3/24/2017       clotrimazole-betamethasone (LOTRISONE) 1-0.05 % external cream APPLY EXTERNALLY TO THE AFFECTED AREA TWICE DAILY 15 g 0     cyclobenzaprine (FLEXERIL) 10 MG tablet TK 1 T PO  TID PRN MUSCLE SPASMS  0     DULoxetine (CYMBALTA) 60 MG capsule Take 1 capsule (60 mg) by mouth daily 90 capsule 0     fluticasone (FLONASE) 50 MCG/ACT nasal spray Spray 2 sprays into both nostrils daily Reported on 3/24/2017       Hypertonic Nasal Wash (SINUS RINSE BOTTLE KIT) PACK Spray 1 packet in nostril 2 times daily 10 each 3     levonorgestrel (MIRENA) 20 MCG/24HR IUD 1 each by Intrauterine route once       levothyroxine (SYNTHROID/LEVOTHROID) 75 MCG tablet TAKE 1 TABLET(75 MCG) BY MOUTH DAILY 90 tablet 2     lisinopril (PRINIVIL/ZESTRIL) 40 MG tablet Take 1 tablet (40 mg) by mouth daily 90 tablet 3     Multiple Vitamins-Minerals (MULTIVITAMIN ADULT PO)        naltrexone (DEPADE/REVIA) 50 MG tablet Take one-half tablet daily for two weeks, then increase to one tablet daily 90 tablet 1     order for DME Equipment being ordered: cam walker and metal crutches (Patient not taking: Reported on 10/15/2019) 1 each 0     silver sulfADIAZINE (SILVADENE) 1 % cream Apply thick layer to anus 3-4 times per day for irritation for 5 days. (Patient not taking: Reported on 10/15/2019) 85 g 1     Social History     Tobacco Use     Smoking status: Never Smoker     Smokeless tobacco: Never Used   Substance Use Topics     Alcohol use: No       OBJECTIVE  /89   Pulse 78   Temp 98.3  F (36.8  C) (Oral)   SpO2 96%     Physical Exam  Vitals signs and nursing note reviewed.   Constitutional:       General: She is not in acute distress.     Appearance: She is well-developed. She is not diaphoretic.   HENT:      Head: Normocephalic and atraumatic.      Right Ear: Tympanic membrane and external ear normal.      Left Ear: Tympanic membrane and external ear  normal.   Eyes:      Pupils: Pupils are equal, round, and reactive to light.   Neck:      Musculoskeletal: Normal range of motion and neck supple.   Pulmonary:      Effort: Pulmonary effort is normal. No respiratory distress.      Breath sounds: Normal breath sounds.   Lymphadenopathy:      Cervical: No cervical adenopathy.   Skin:     General: Skin is warm and dry.      Comments: 3 staples on top of scalp, no signs of infection like redness, swelling, pain or drainage. the laceration has healed.   Neurological:      Mental Status: She is alert.      Cranial Nerves: No cranial nerve deficit.     ASSESSMENT:      ICD-10-CM    1. Encounter for removal of staples Z48.02       PLAN:  3 staples are removed. Patient tolerated the procedure well. We discussed signs of infections, to follow up if they occur  Patient educational/instructional material provided including reasons for follow-up    The patient indicates understanding of these issues and agrees with the plan.        Patient Instructions     Patient Education     Stitches or Staple Removal  You were seen today for removal of your stitches or staples. Your wound is healing as expected. The wound has healed well enough that the stitches or staples can be removed. The wound will continue to heal for the next few months.  At this time there is no sign of infection.   Home care    If you have pain, take pain medicine as advised by your healthcare provider.     Keep your wound clean and protected by covering it with a bandage for the next week or so.     Wash your hands with soap and warm water before and after caring for your wound. This helps prevent infection.    Clean the wound gently with soap and warm water daily or as directed by your healthcare provider. Don't use iodine, alcohol, or other cleansers on the wound.  Gently pat it dry. Put on a new bandage, if needed. Don't reuse bandages.    If the area gets wet, gently pat it dry with a clean cloth. Replace the  "wet bandage with a dry one.    Check the wound daily for signs of infection. (These are listed under \"When to seek medical advice\" below.)    You may shower and bathe as usual. Swimming may be allowed, but check with your healthcare provider first.    Keep the wound out of prolonged direct sunlight. The new skin is very sensitive and can easily sunburn causing worse scarring.    Ask your provider about using over-the-counter scar removing creams if your wound is highly visible.  Follow-up care  Follow up with your healthcare provider as advised.  When to seek medical advice   Call your healthcare provider if any of the following occur:    Wound reopens or bleeds    Signs of an infection, such as:  ? Increasing redness or swelling around the wound  ? Increased warmth from the wound  ? Worsening pain  ? Red streaking lines away from the wound  ? Fluid draining from the wound    Fever of 100.4 F (38 C) or higher, or as directed by your healthcare provider  Date Last Reviewed: 4/1/2018 2000-2018 [employer]. [address] [place] [address], [place] [address]. All rights reserved. This information is not intended as a substitute for professional medical care. Always follow your healthcare professional's instructions.                 "

## 2019-10-19 NOTE — PATIENT INSTRUCTIONS
"  Patient Education     Stitches or Staple Removal  You were seen today for removal of your stitches or staples. Your wound is healing as expected. The wound has healed well enough that the stitches or staples can be removed. The wound will continue to heal for the next few months.  At this time there is no sign of infection.   Home care    If you have pain, take pain medicine as advised by your healthcare provider.     Keep your wound clean and protected by covering it with a bandage for the next week or so.     Wash your hands with soap and warm water before and after caring for your wound. This helps prevent infection.    Clean the wound gently with soap and warm water daily or as directed by your healthcare provider. Don't use iodine, alcohol, or other cleansers on the wound.  Gently pat it dry. Put on a new bandage, if needed. Don't reuse bandages.    If the area gets wet, gently pat it dry with a clean cloth. Replace the wet bandage with a dry one.    Check the wound daily for signs of infection. (These are listed under \"When to seek medical advice\" below.)    You may shower and bathe as usual. Swimming may be allowed, but check with your healthcare provider first.    Keep the wound out of prolonged direct sunlight. The new skin is very sensitive and can easily sunburn causing worse scarring.    Ask your provider about using over-the-counter scar removing creams if your wound is highly visible.  Follow-up care  Follow up with your healthcare provider as advised.  When to seek medical advice   Call your healthcare provider if any of the following occur:    Wound reopens or bleeds    Signs of an infection, such as:  ? Increasing redness or swelling around the wound  ? Increased warmth from the wound  ? Worsening pain  ? Red streaking lines away from the wound  ? Fluid draining from the wound    Fever of 100.4 F (38 C) or higher, or as directed by your healthcare provider  Date Last Reviewed: 4/1/2018    " 5645-4125 [employer]. [address] [place] [address], [place] [address]. All rights reserved. This information is not intended as a substitute for professional medical care. Always follow your healthcare professional's instructions.

## 2019-10-25 ENCOUNTER — ANCILLARY PROCEDURE (OUTPATIENT)
Dept: MAMMOGRAPHY | Facility: CLINIC | Age: 49
End: 2019-10-25
Attending: NURSE PRACTITIONER
Payer: COMMERCIAL

## 2019-10-25 DIAGNOSIS — Z12.31 VISIT FOR SCREENING MAMMOGRAM: ICD-10-CM

## 2019-10-25 PROCEDURE — 77063 BREAST TOMOSYNTHESIS BI: CPT | Mod: TC

## 2019-10-25 PROCEDURE — 77067 SCR MAMMO BI INCL CAD: CPT | Mod: TC

## 2019-10-31 ENCOUNTER — OFFICE VISIT (OUTPATIENT)
Dept: URGENT CARE | Facility: URGENT CARE | Age: 49
End: 2019-10-31
Payer: COMMERCIAL

## 2019-10-31 ENCOUNTER — HEALTH MAINTENANCE LETTER (OUTPATIENT)
Age: 49
End: 2019-10-31

## 2019-10-31 ENCOUNTER — NURSE TRIAGE (OUTPATIENT)
Dept: FAMILY MEDICINE | Facility: CLINIC | Age: 49
End: 2019-10-31

## 2019-10-31 VITALS
BODY MASS INDEX: 46.67 KG/M2 | WEIGHT: 293 LBS | DIASTOLIC BLOOD PRESSURE: 96 MMHG | SYSTOLIC BLOOD PRESSURE: 166 MMHG | TEMPERATURE: 98.3 F | OXYGEN SATURATION: 98 % | RESPIRATION RATE: 16 BRPM | HEART RATE: 88 BPM

## 2019-10-31 DIAGNOSIS — R20.0 NUMBNESS OF FEET: ICD-10-CM

## 2019-10-31 DIAGNOSIS — M79.661 BILATERAL CALF PAIN: Primary | ICD-10-CM

## 2019-10-31 DIAGNOSIS — M79.662 BILATERAL CALF PAIN: Primary | ICD-10-CM

## 2019-10-31 DIAGNOSIS — R25.2 LEG CRAMPS: ICD-10-CM

## 2019-10-31 PROCEDURE — 99214 OFFICE O/P EST MOD 30 MIN: CPT | Performed by: PHYSICIAN ASSISTANT

## 2019-10-31 ASSESSMENT — ENCOUNTER SYMPTOMS
NECK STIFFNESS: 0
FEVER: 0
SHORTNESS OF BREATH: 0
WHEEZING: 0
CHILLS: 0
ARTHRALGIAS: 1
WOUND: 0
COUGH: 0
RESPIRATORY NEGATIVE: 1
NECK PAIN: 0
JOINT SWELLING: 1
CARDIOVASCULAR NEGATIVE: 1
COLOR CHANGE: 0
BACK PAIN: 0
PALPITATIONS: 0
MYALGIAS: 1
CONSTITUTIONAL NEGATIVE: 1
FATIGUE: 0

## 2019-10-31 NOTE — PROGRESS NOTES
Subjective   Naomy Kowalski is a 49 year old female who presents to clinic today for the following health issues:  HPI   Musculoskeletal problem/pain    Duration: 1week    Description  Location: bilateral calf pain and foot numbness    Intensity:  moderate    Accompanying signs and symptoms:  Describes leg cramps in her calves along with numbness in the foot bilaterally.  No back pain, radicular pain, tingling or weakness.  No bladder or bowel dysfunction.  No swelling, redness, drainage or fevers.    Reports mild swelling but no redness, drainage or fevers.  No recent airplane/car ride, surgery, or prolonged immobilization.    History  Previous similar problem: no   Previous evaluation:  Had labs done recently which revealed borderline diabetes.  Otherwise, CBC, COMP, TSH and vitamin D levels were normal/acceptable.    Precipitating or alleviating factors:  Trauma or overuse: YES-has been moving stuff at home.  No trauma or injuries.  Aggravating factors include: none    Therapies tried and outcome: rest/inactivity with minimal relief.    Patient Active Problem List   Diagnosis     Hypothyroidism     CARDIOVASCULAR SCREENING; LDL GOAL LESS THAN 160     Moderate episode of recurrent major depressive disorder (H)     Obesity     Menorrhagia, treated with mirena IUD (inserted July 2013)      Family history of diabetes mellitus     Dysfunction of eustachian tube     HTN, goal below 140/90     Morbid obesity, unspecified obesity type (H)     Tonsillitis     Past Surgical History:   Procedure Laterality Date     BREAST SURGERY      breast reduction bilat     COLONOSCOPY WITH CO2 INSUFFLATION N/A 9/1/2017    Procedure: COLONOSCOPY WITH CO2 INSUFFLATION;  Colonoscopy, Abdominal pain, left lower quadrant, Parenteau, BMI 42.93, [phone];  Surgeon: Anuel Rodríguez MD;  Location: [facility]     ENDOSCOPIC SINUS SURGERY Right 3/15/2017    Procedure: ENDOSCOPIC SINUS SURGERY;;  Surgeon: Vicki Zurita MD;   Location: [facility] OR     EXAM UNDER ANESTHESIA, FULGURATE CONDYLOMA ANUS, COMBINED N/A 9/26/2017    Procedure: COMBINED EXAM UNDER ANESTHESIA, FULGURATE CONDYLOMA ANUS;  Examination Under anethesia, Excision And Fulguration Of Anal Condyloma;  Surgeon: Td Garvey MD;  Location: [facility] OR     LAPAROSCOPIC CHOLECYSTECTOMY  08/05/99     TONSILLECTOMY Bilateral 3/15/2017    Procedure: TONSILLECTOMY;  Bilateral Tonsillectomy, Right Functional Endoscopic Sinus Surgery ;  Surgeon: Vicki Zurita MD;  Location: [facility] OR       Social History     Tobacco Use     Smoking status: Never Smoker     Smokeless tobacco: Never Used   Substance Use Topics     Alcohol use: No     Family History   Problem Relation Age of Onset     Diabetes Mother      Hypertension Mother      Cancer Father         pancreatic     Heart Disease Maternal Grandmother      Diabetes Maternal Grandmother      Cerebrovascular Disease Maternal Grandfather      Cancer - colorectal Paternal Grandfather      Diabetes Brother      Asthma Son      Depression Son      Asthma Daughter          Current Outpatient Medications   Medication Sig Dispense Refill     acyclovir (ZOVIRAX) 400 MG tablet Take 1 tablet (400 mg) by mouth 3 times daily as needed (symptoms) 15 tablet 0     acyclovir (ZOVIRAX) 5 % external ointment Apply topically 5 times daily 30 g 3     cetirizine (ZYRTEC) 10 MG tablet Take 1 tablet (10 mg) by mouth every evening 90 tablet 1     Chlorpheniramine-Pseudoeph (DECONGESTANT + PO) Reported on 3/24/2017       clotrimazole-betamethasone (LOTRISONE) 1-0.05 % external cream APPLY EXTERNALLY TO THE AFFECTED AREA TWICE DAILY 15 g 0     cyclobenzaprine (FLEXERIL) 10 MG tablet TK 1 T PO  TID PRN MUSCLE SPASMS  0     DULoxetine (CYMBALTA) 60 MG capsule Take 1 capsule (60 mg) by mouth daily 90 capsule 0     fluticasone (FLONASE) 50 MCG/ACT nasal spray Spray 2 sprays into both nostrils daily Reported on 3/24/2017       Hypertonic Nasal Wash (SINUS  RINSE BOTTLE KIT) PACK Spray 1 packet in nostril 2 times daily 10 each 3     levonorgestrel (MIRENA) 20 MCG/24HR IUD 1 each by Intrauterine route once       levothyroxine (SYNTHROID/LEVOTHROID) 75 MCG tablet TAKE 1 TABLET(75 MCG) BY MOUTH DAILY 90 tablet 2     lisinopril (PRINIVIL/ZESTRIL) 40 MG tablet Take 1 tablet (40 mg) by mouth daily 90 tablet 3     Multiple Vitamins-Minerals (MULTIVITAMIN ADULT PO)        naltrexone (DEPADE/REVIA) 50 MG tablet Take one-half tablet daily for two weeks, then increase to one tablet daily 90 tablet 1     Allergies   Allergen Reactions     Percocet [Oxycodone-Acetaminophen] Itching     Reviewed and updated as needed this visit by Provider       Review of Systems   Constitutional: Negative.  Negative for chills, fatigue and fever.   Respiratory: Negative.  Negative for cough, shortness of breath and wheezing.    Cardiovascular: Negative.  Negative for chest pain, palpitations and peripheral edema.   Genitourinary: Negative.    Musculoskeletal: Positive for arthralgias, gait problem, joint swelling and myalgias. Negative for back pain, neck pain and neck stiffness.   Skin: Negative for color change, pallor, rash and wound.   All other systems reviewed and are negative.           Objective    BP (!) 166/96   Pulse 88   Temp 98.3  F (36.8  C) (Oral)   Resp 16   Wt 135.2 kg (298 lb)   SpO2 98%   BMI 46.67 kg/m    Body mass index is 46.67 kg/m .  Physical Exam  Vitals signs and nursing note reviewed.   Constitutional:       General: She is not in acute distress.     Appearance: Normal appearance. She is well-developed. She is obese. She is ill-appearing.   Musculoskeletal:      Lumbar back: Normal. She exhibits normal range of motion, no tenderness, no bony tenderness, no swelling, no edema, no deformity, no laceration, no spasm and normal pulse.      Right lower leg: She exhibits swelling. She exhibits no tenderness, no bony tenderness, no deformity and no laceration. No edema.       Left lower leg: She exhibits swelling. She exhibits no tenderness, no bony tenderness, no deformity and no laceration. No edema.      Right foot: Normal. Normal range of motion. No bony tenderness, swelling, crepitus, deformity or laceration.      Left foot: Normal. Normal range of motion and normal capillary refill. No bony tenderness, swelling, crepitus, deformity or laceration.   Skin:     General: Skin is warm and dry.      Capillary Refill: Capillary refill takes less than 2 seconds.      Comments: Negative homans sign bilaterally.  No peripheral edema.   Neurological:      Mental Status: She is alert and oriented to person, place, and time.      Sensory: Sensation is intact.      Motor: Motor function is intact.      Gait: Gait is intact. Gait normal.      Deep Tendon Reflexes: Reflexes are normal and symmetric.      Comments: Negative SLR test.   Psychiatric:         Mood and Affect: Mood normal.         Behavior: Behavior normal.         Thought Content: Thought content normal.         Judgement: Judgment normal.              Assessment & Plan   Bilateral calf pain:  Along with swelling and numbness in the feet.  H&P is concerning for DVT vs neuropathy vs pinched nerve vs electrolyte imbalance.  Due to the severity of her symptoms, recommend further evaluation and management in the ER.  Will most likely need further workup with labs and/or imaging.  Patient has declined transportation via ambulance and will have family drive her.  Understands risks and benefits of ambulance transfer and she has declined.  Call 911 if worsening symptoms.  She plans to go to [facility].  She left in stable condition with AVS in hand.  F/u with PCP after ER visit.     Leg cramps    Numbness of feet           Radhika See KAMRAN Jensen  [facility]

## 2019-10-31 NOTE — TELEPHONE ENCOUNTER
NATALI Hussein    Patient called in with concerns of leg cramping and numbness in toes on right side. Her bilateral leg cramping has been for 2 weeks until this last Sunday, the numbness in her toes started on Sunday and has been going on all week.    Patient also has a history where she stretched ligament and chipped the bone on her right ankle in May.    Patient had a visit with Jovita Jonas NP on 10/15 in clinic. She had a medication change at that time. Instructions were to go off of buproprion and start on naltrexone.    Patient advised to go see urgent care after work. Verbalized understanding.    Jessica Moeller RN   [facility]

## 2019-11-20 ENCOUNTER — TELEPHONE (OUTPATIENT)
Dept: FAMILY MEDICINE | Facility: CLINIC | Age: 49
End: 2019-11-20

## 2019-11-20 ENCOUNTER — MYC MEDICAL ADVICE (OUTPATIENT)
Dept: FAMILY MEDICINE | Facility: CLINIC | Age: 49
End: 2019-11-20

## 2019-11-20 ASSESSMENT — PATIENT HEALTH QUESTIONNAIRE - PHQ9
10. IF YOU CHECKED OFF ANY PROBLEMS, HOW DIFFICULT HAVE THESE PROBLEMS MADE IT FOR YOU TO DO YOUR WORK, TAKE CARE OF THINGS AT HOME, OR GET ALONG WITH OTHER PEOPLE: SOMEWHAT DIFFICULT
SUM OF ALL RESPONSES TO PHQ QUESTIONS 1-9: 14
SUM OF ALL RESPONSES TO PHQ QUESTIONS 1-9: 14

## 2019-11-20 NOTE — TELEPHONE ENCOUNTER
Left vm for pt to return call to clinic    Anabella Cheung RN   [facility] [place]

## 2019-11-20 NOTE — TELEPHONE ENCOUNTER
Patient Quality Outreach      Patient has the following on her problem list:     Depression / Dysthymia review       PHQ-9 SCORE 12/3/2018 12/13/2018 5/14/2019   PHQ-9 Total Score - - -   PHQ-9 Total Score MyChart 18 (Moderately severe depression) - -   PHQ-9 Total Score 18 16 14       If PHQ-9 recheck is 5 or more, route to provider for next steps.    Composite cancer screening  Chart review shows that this patient is due/due soon for the following None  Patient declined cancer screening. No  Summary:    Patient is due/failing the following:   Eye Exam and Depression - PHQ-9 Needed    Type of outreach:    Sent [employer] message.    Questions for provider review:    None                                                                                                                                    Chelo Linton    Care Management Coordinator - [facility]    [facility]  [facility]

## 2019-11-21 ASSESSMENT — PATIENT HEALTH QUESTIONNAIRE - PHQ9: SUM OF ALL RESPONSES TO PHQ QUESTIONS 1-9: 14

## 2019-11-29 ENCOUNTER — TELEPHONE (OUTPATIENT)
Dept: FAMILY MEDICINE | Facility: CLINIC | Age: 49
End: 2019-11-29

## 2019-11-29 DIAGNOSIS — B35.6 TINEA CRURIS: ICD-10-CM

## 2019-11-29 RX ORDER — CLOTRIMAZOLE AND BETAMETHASONE DIPROPIONATE 10; .64 MG/G; MG/G
CREAM TOPICAL
Qty: 15 G | Refills: 0 | Status: SHIPPED | OUTPATIENT
Start: 2019-11-29 | End: 2019-12-05

## 2019-11-29 NOTE — TELEPHONE ENCOUNTER
Pharmacy needs clarification about where the medication needs to be applied. Please call [phone] and ask for Veronica

## 2019-11-29 NOTE — TELEPHONE ENCOUNTER
"Requested Prescriptions   Pending Prescriptions Disp Refills     clotrimazole-betamethasone (LOTRISONE) 1-0.05 % external cream [Pharmacy Med Name: Clotrimazole-Betamethasone External Cream 1-0.05 %] 15 g 0     Sig: APPLY EXTERNALLY TO THE AFFECTED AREA TWICE DAILY       Topical Steroids and Nonsteroidals Protocol Passed - 11/29/2019  9:58 AM        Passed - Patient is age 6 or older        Passed - Authorizing prescriber's most recent note related to this medication read.     If refill request is for ophthalmic use, please forward request to provider for approval.          Passed - High potency steroid not ordered        Passed - Recent (12 mo) or future (30 days) visit within the authorizing provider's specialty     Patient has had an office visit with the authorizing provider or a provider within the authorizing providers department within the previous 12 mos or has a future within next 30 days. See \"Patient Info\" tab in inbasket, or \"Choose Columns\" in Meds & Orders section of the refill encounter.              Passed - Medication is active on med list        Last Written Prescription Date:  9/9/19  Last Fill Quantity: 15 g,  # refills: 0   Last office visit: 10/15/2019 with prescribing provider:  Jovita Jonas APRN CNP    Future Office Visit:      Prescription approved per [facility] Refill Protocol.  Cherie Del Rio RN on 11/29/2019 at 10:34 AM    "

## 2019-12-05 ENCOUNTER — OFFICE VISIT (OUTPATIENT)
Dept: URGENT CARE | Facility: URGENT CARE | Age: 49
End: 2019-12-05
Payer: COMMERCIAL

## 2019-12-05 ENCOUNTER — TELEPHONE (OUTPATIENT)
Dept: FAMILY MEDICINE | Facility: CLINIC | Age: 49
End: 2019-12-05

## 2019-12-05 VITALS
OXYGEN SATURATION: 97 % | BODY MASS INDEX: 46.89 KG/M2 | HEART RATE: 73 BPM | SYSTOLIC BLOOD PRESSURE: 151 MMHG | TEMPERATURE: 98 F | WEIGHT: 293 LBS | RESPIRATION RATE: 24 BRPM | DIASTOLIC BLOOD PRESSURE: 98 MMHG

## 2019-12-05 DIAGNOSIS — Z11.3 SCREEN FOR STD (SEXUALLY TRANSMITTED DISEASE): ICD-10-CM

## 2019-12-05 DIAGNOSIS — R30.0 DYSURIA: Primary | ICD-10-CM

## 2019-12-05 LAB
ALBUMIN UR-MCNC: 100 MG/DL
APPEARANCE UR: ABNORMAL
BACTERIA #/AREA URNS HPF: ABNORMAL /HPF
BILIRUB UR QL STRIP: ABNORMAL
COLOR UR AUTO: YELLOW
GLUCOSE UR STRIP-MCNC: NEGATIVE MG/DL
HGB UR QL STRIP: ABNORMAL
KETONES UR STRIP-MCNC: NEGATIVE MG/DL
LEUKOCYTE ESTERASE UR QL STRIP: ABNORMAL
NITRATE UR QL: NEGATIVE
NON-SQ EPI CELLS #/AREA URNS LPF: ABNORMAL /LPF
PH UR STRIP: 6 PH (ref 5–7)
RBC #/AREA URNS AUTO: >100 /HPF
SOURCE: ABNORMAL
SP GR UR STRIP: 1.02 (ref 1–1.03)
UROBILINOGEN UR STRIP-ACNC: 0.2 EU/DL (ref 0.2–1)
WBC #/AREA URNS AUTO: ABNORMAL /HPF

## 2019-12-05 PROCEDURE — 87591 N.GONORRHOEAE DNA AMP PROB: CPT | Performed by: PHYSICIAN ASSISTANT

## 2019-12-05 PROCEDURE — 87086 URINE CULTURE/COLONY COUNT: CPT | Performed by: PHYSICIAN ASSISTANT

## 2019-12-05 PROCEDURE — 87186 SC STD MICRODIL/AGAR DIL: CPT | Performed by: PHYSICIAN ASSISTANT

## 2019-12-05 PROCEDURE — 87491 CHLMYD TRACH DNA AMP PROBE: CPT | Performed by: PHYSICIAN ASSISTANT

## 2019-12-05 PROCEDURE — 81001 URINALYSIS AUTO W/SCOPE: CPT | Performed by: PHYSICIAN ASSISTANT

## 2019-12-05 PROCEDURE — 87088 URINE BACTERIA CULTURE: CPT | Performed by: PHYSICIAN ASSISTANT

## 2019-12-05 PROCEDURE — 99213 OFFICE O/P EST LOW 20 MIN: CPT | Performed by: PHYSICIAN ASSISTANT

## 2019-12-05 RX ORDER — PHENAZOPYRIDINE HYDROCHLORIDE 200 MG/1
200 TABLET, FILM COATED ORAL 3 TIMES DAILY PRN
Qty: 15 TABLET | Refills: 0 | Status: SHIPPED | OUTPATIENT
Start: 2019-12-05 | End: 2019-12-10

## 2019-12-05 RX ORDER — NITROFURANTOIN 25; 75 MG/1; MG/1
100 CAPSULE ORAL 2 TIMES DAILY
Qty: 14 CAPSULE | Refills: 0 | Status: SHIPPED | OUTPATIENT
Start: 2019-12-05 | End: 2019-12-12

## 2019-12-05 ASSESSMENT — ENCOUNTER SYMPTOMS
GASTROINTESTINAL NEGATIVE: 1
CARDIOVASCULAR NEGATIVE: 1
CHEST TIGHTNESS: 0
VOMITING: 0
RESPIRATORY NEGATIVE: 1
NAUSEA: 0
CONSTIPATION: 0
WHEEZING: 0
FEVER: 0
PALPITATIONS: 0
SHORTNESS OF BREATH: 0
ABDOMINAL PAIN: 0
FATIGUE: 0
DYSURIA: 1
HEARTBURN: 0
FREQUENCY: 1
DIARRHEA: 0
FLANK PAIN: 0
HEMATURIA: 1
COUGH: 0
HEMATOCHEZIA: 0
CHILLS: 0

## 2019-12-05 ASSESSMENT — PAIN SCALES - GENERAL: PAINLEVEL: EXTREME PAIN (8)

## 2019-12-05 NOTE — TELEPHONE ENCOUNTER
Spoke with patient and she is having frequent urination and pain with urination. No other s/sx. Told her there are no openings at the clinic today, so she should go to urgent care to be checked out and have tests done. Verbalized understanding.    Jessica Moeller RN   [facility] [place]

## 2019-12-05 NOTE — TELEPHONE ENCOUNTER
Reason for call:  Symptom   Symptom or request: uti symptoms    Duration (how long have symptoms been present): 1 day  Have you been treated for this before? No    Additional comments: n/a    Phone number to reach patient:  Home number on file [phone] (home)    Best Time:  n/a    Can we leave a detailed message on this number?  YES

## 2019-12-06 NOTE — PROGRESS NOTES
Subjective   Naomy Kowalski is a 49 year old female who presents to clinic today for the following health issues:  HPI   URINARY TRACT SYMPTOMS    Duration: today    Description  dysuria, frequency, urgency and hematuria    Intensity:  moderate    Accompanying signs and symptoms:  Fever/chills: no   Flank pain no   Nausea and vomiting: no   Vaginal symptoms: No vaginal d/c, bleeding, rashes and irritation.  No new partners.   Abdominal/Pelvic Pain: No abdominal pain, n/v, constipation, diarrhea, bloody or black tarry stools.  No fever, chills or sweats.    History  History of frequent UTI's: no   History of kidney stones: no   Sexually Active: YES  Possibility of pregnancy: No    Precipitating or alleviating factors: None    Therapies tried and outcome: increase fluid intake   Outcome: no relief    Patient Active Problem List   Diagnosis     Hypothyroidism     CARDIOVASCULAR SCREENING; LDL GOAL LESS THAN 160     Moderate episode of recurrent major depressive disorder (H)     Obesity     Menorrhagia, treated with mirena IUD (inserted July 2013)      Family history of diabetes mellitus     Dysfunction of eustachian tube     HTN, goal below 140/90     Morbid obesity, unspecified obesity type (H)     Tonsillitis     Past Surgical History:   Procedure Laterality Date     BREAST SURGERY      breast reduction bilat     COLONOSCOPY WITH CO2 INSUFFLATION N/A 9/1/2017    Procedure: COLONOSCOPY WITH CO2 INSUFFLATION;  Colonoscopy, Abdominal pain, left lower quadrant, Parenteau, BMI 42.93, [phone];  Surgeon: Anuel Rodríguez MD;  Location:  OR     ENDOSCOPIC SINUS SURGERY Right 3/15/2017    Procedure: ENDOSCOPIC SINUS SURGERY;;  Surgeon: Vicki Zurita MD;  Location: [facility] OR     EXAM UNDER ANESTHESIA, FULGURATE CONDYLOMA ANUS, COMBINED N/A 9/26/2017    Procedure: COMBINED EXAM UNDER ANESTHESIA, FULGURATE CONDYLOMA ANUS;  Examination Under anethesia, Excision And Fulguration Of Anal Condyloma;   Surgeon: Td Garvey MD;  Location: [facility] OR     LAPAROSCOPIC CHOLECYSTECTOMY  08/05/99     TONSILLECTOMY Bilateral 3/15/2017    Procedure: TONSILLECTOMY;  Bilateral Tonsillectomy, Right Functional Endoscopic Sinus Surgery ;  Surgeon: Vicki Zurita MD;  Location:  OR       Social History     Tobacco Use     Smoking status: Never Smoker     Smokeless tobacco: Never Used   Substance Use Topics     Alcohol use: No     Family History   Problem Relation Age of Onset     Diabetes Mother      Hypertension Mother      Cancer Father         pancreatic     Heart Disease Maternal Grandmother      Diabetes Maternal Grandmother      Cerebrovascular Disease Maternal Grandfather      Cancer - colorectal Paternal Grandfather      Diabetes Brother      Asthma Son      Depression Son      Asthma Daughter          Current Outpatient Medications   Medication Sig Dispense Refill     cetirizine (ZYRTEC) 10 MG tablet Take 1 tablet (10 mg) by mouth every evening 90 tablet 1     Chlorpheniramine-Pseudoeph (DECONGESTANT + PO) Reported on 3/24/2017       clotrimazole-betamethasone (LOTRISONE) 1-0.05 % external cream APPLY EXTERNALLY TO THE AFFECTED AREA TWICE DAILY 15 g 0     cyclobenzaprine (FLEXERIL) 10 MG tablet TK 1 T PO  TID PRN MUSCLE SPASMS  0     DULoxetine (CYMBALTA) 60 MG capsule Take 1 capsule (60 mg) by mouth daily 90 capsule 0     fluticasone (FLONASE) 50 MCG/ACT nasal spray Spray 2 sprays into both nostrils daily Reported on 3/24/2017       Hypertonic Nasal Wash (SINUS RINSE BOTTLE KIT) PACK Spray 1 packet in nostril 2 times daily 10 each 3     levonorgestrel (MIRENA) 20 MCG/24HR IUD 1 each by Intrauterine route once       levothyroxine (SYNTHROID/LEVOTHROID) 75 MCG tablet TAKE 1 TABLET(75 MCG) BY MOUTH DAILY 90 tablet 2     lisinopril (PRINIVIL/ZESTRIL) 40 MG tablet Take 1 tablet (40 mg) by mouth daily 90 tablet 3     Multiple Vitamins-Minerals (MULTIVITAMIN ADULT PO)        naltrexone (DEPADE/REVIA) 50  MG tablet Take one-half tablet daily for two weeks, then increase to one tablet daily 90 tablet 1     Allergies   Allergen Reactions     Percocet [Oxycodone-Acetaminophen] Itching     Reviewed and updated as needed this visit by Provider         Review of Systems   Constitutional: Negative for chills, fatigue and fever.   Respiratory: Negative.  Negative for cough, chest tightness, shortness of breath and wheezing.    Cardiovascular: Negative.  Negative for chest pain, palpitations and peripheral edema.   Gastrointestinal: Negative.  Negative for abdominal pain, constipation, diarrhea, heartburn, hematochezia, nausea and vomiting.   Genitourinary: Positive for dysuria, frequency, hematuria and urgency. Negative for flank pain, pelvic pain, vaginal bleeding and vaginal discharge.   All other systems reviewed and are negative.           Objective    BP (!) 151/98 (BP Location: Left arm, Patient Position: Sitting, Cuff Size: Adult Large)   Pulse 73   Temp 98  F (36.7  C) (Oral)   Resp 24   Wt 135.8 kg (299 lb 6.4 oz)   SpO2 97%   BMI 46.89 kg/m    Body mass index is 46.89 kg/m .  Physical Exam  Vitals signs and nursing note reviewed.   Constitutional:       General: She is not in acute distress.     Appearance: Normal appearance. She is well-developed. She is obese. She is not ill-appearing.   Cardiovascular:      Rate and Rhythm: Normal rate and regular rhythm.      Pulses: Normal pulses.      Heart sounds: Normal heart sounds, S1 normal and S2 normal. No murmur. No friction rub. No gallop.    Pulmonary:      Effort: Pulmonary effort is normal. No accessory muscle usage or respiratory distress.      Breath sounds: Normal breath sounds and air entry. No decreased breath sounds, wheezing, rhonchi or rales.   Abdominal:      General: Abdomen is protuberant. Bowel sounds are normal.      Palpations: Abdomen is soft. There is no hepatomegaly, splenomegaly or mass.      Tenderness: There is no abdominal tenderness.  There is no right CVA tenderness, left CVA tenderness, guarding or rebound. Negative signs include Rockwell's sign, Rovsing's sign, McBurney's sign, psoas sign and obturator sign.      Hernia: No hernia is present.   Genitourinary:     Comments: Declined pelvic exam.  Skin:     General: Skin is warm and dry.   Neurological:      Mental Status: She is alert and oriented to person, place, and time.   Psychiatric:         Mood and Affect: Mood normal.         Behavior: Behavior normal.         Thought Content: Thought content normal.         Judgment: Judgment normal.     Diagnostic Test Results:  Labs reviewed in Epic  Results for orders placed or performed in visit on 12/05/19 (from the past 24 hour(s))   *UA reflex to Microscopic and Culture ([place] and [facility] (except [place] and [place])   Result Value Ref Range    Color Urine Yellow     Appearance Urine Slightly Cloudy     Glucose Urine Negative NEG^Negative mg/dL    Bilirubin Urine Small (A) NEG^Negative    Ketones Urine Negative NEG^Negative mg/dL    Specific Gravity Urine 1.025 1.003 - 1.035    Blood Urine Large (A) NEG^Negative    pH Urine 6.0 5.0 - 7.0 pH    Protein Albumin Urine 100 (A) NEG^Negative mg/dL    Urobilinogen Urine 0.2 0.2 - 1.0 EU/dL    Nitrite Urine Negative NEG^Negative    Leukocyte Esterase Urine Trace (A) NEG^Negative    Source Midstream Urine    Urine Microscopic   Result Value Ref Range    WBC Urine 10-25 (A) OTO5^0 - 5 /HPF    RBC Urine >100 (A) OTO2^O - 2 /HPF    Squamous Epithelial /LPF Urine Moderate (A) FEW^Few /LPF    Bacteria Urine Many (A) NEG^Negative /HPF           Assessment & Plan   Dysuria:  UA and micro is suspicious for UTI and will empirically treat with [id number].  Will send for UC to help guide abx treatment. Will also give pyridium as needed for burning/pain. Recommend increase fluids, regular voids, proper wiping techniques and voiding after intercourse.  Educated patient on warning signs of kidney  infection and to go to the ER if she develops any of these symptoms.  Recheck in clinic if symptoms worsen or if symptoms do not improve  -     *UA reflex to Microscopic and Culture ([place] and [facility] (except [place] and [place])  -     Urine Microscopic  -     nitroFURantoin macrocrystal-monohydrate (MACROBID) 100 MG capsule; Take 1 capsule (100 mg) by mouth 2 times daily for 7 days  -     phenazopyridine (PYRIDIUM) 200 MG tablet; Take 1 tablet (200 mg) by mouth 3 times daily as needed for pain or irritation  -     Urine Culture Aerobic Bacterial    Screen for STD (sexually transmitted disease):  Will also check for gc/chlamydia per patient request.  -     NEISSERIA GONORRHOEA PCR  -     CHLAMYDIA TRACHOMATIS PCR           Radhika See MORALES JensenC  [facility]

## 2019-12-07 LAB
BACTERIA SPEC CULT: ABNORMAL
C TRACH DNA SPEC QL NAA+PROBE: NEGATIVE
N GONORRHOEA DNA SPEC QL NAA+PROBE: NEGATIVE
SPECIMEN SOURCE: ABNORMAL
SPECIMEN SOURCE: NORMAL
SPECIMEN SOURCE: NORMAL

## 2020-01-02 DIAGNOSIS — F33.1 MODERATE EPISODE OF RECURRENT MAJOR DEPRESSIVE DISORDER (H): ICD-10-CM

## 2020-01-03 NOTE — TELEPHONE ENCOUNTER
"Requested Prescriptions   Pending Prescriptions Disp Refills     DULoxetine (CYMBALTA) 60 MG capsule [Pharmacy Med Name: DULoxetine HCl Oral Capsule Delayed Release Particles 60 MG] 90 capsule 0     Sig: Take 1 capsule (60 mg) by mouth daily.   Last Written Prescription Date:  9/19/19  Last Fill Quantity: 90,  # refills: 0   Last office visit: 10/15/2019 with prescribing provider:     Future Office Visit:        Serotonin-Norepinephrine Reuptake Inhibitors  Failed - 1/2/2020  7:24 PM        Failed - Blood pressure under 140/90 in past 12 months     BP Readings from Last 3 Encounters:   12/05/19 (!) 151/98   10/31/19 (!) 166/96   10/18/19 137/89                 Failed - PHQ-9 score of less than 5 in past 6 months     Please review last PHQ-9 score.           Passed - Medication is active on med list        Passed - Patient is age 18 or older        Passed - No active pregnancy on record        Passed - No positive pregnancy test in past 12 months        Passed - Recent (6 mo) or future (30 days) visit within the authorizing provider's specialty     Patient had office visit in the last 6 months or has a visit in the next 30 days with authorizing provider or within the authorizing provider's specialty.  See \"Patient Info\" tab in inbasket, or \"Choose Columns\" in Meds & Orders section of the refill encounter.            "

## 2020-01-06 RX ORDER — DULOXETIN HYDROCHLORIDE 60 MG/1
60 CAPSULE, DELAYED RELEASE ORAL DAILY
Qty: 90 CAPSULE | Refills: 0 | Status: SHIPPED | OUTPATIENT
Start: 2020-01-06 | End: 2020-04-15

## 2020-01-12 ENCOUNTER — OFFICE VISIT (OUTPATIENT)
Dept: URGENT CARE | Facility: URGENT CARE | Age: 50
End: 2020-01-12
Payer: COMMERCIAL

## 2020-01-12 VITALS
DIASTOLIC BLOOD PRESSURE: 94 MMHG | TEMPERATURE: 98.6 F | SYSTOLIC BLOOD PRESSURE: 145 MMHG | WEIGHT: 293 LBS | BODY MASS INDEX: 47.55 KG/M2 | HEART RATE: 91 BPM | OXYGEN SATURATION: 99 %

## 2020-01-12 DIAGNOSIS — J06.9 UPPER RESPIRATORY TRACT INFECTION, UNSPECIFIED TYPE: Primary | ICD-10-CM

## 2020-01-12 PROCEDURE — 99213 OFFICE O/P EST LOW 20 MIN: CPT | Performed by: FAMILY MEDICINE

## 2020-01-12 NOTE — PROGRESS NOTES
Subjective     Naomy Kowalski is a 49 year old female who presents to clinic today for the following health issues:    HPI   Chief Complaint   Patient presents with     Ear Problem       Duration: 2 days     Description (location/character/radiation): Left ear pressure, nasal congestion    Intensity:  mild    Accompanying signs and symptoms: none    History (similar episodes/previous evaluation): None    Precipitating or alleviating factors: None    Therapies tried and outcome: Mucinex         Patient Active Problem List   Diagnosis     Hypothyroidism     CARDIOVASCULAR SCREENING; LDL GOAL LESS THAN 160     Moderate episode of recurrent major depressive disorder (H)     Obesity     Menorrhagia, treated with mirena IUD (inserted July 2013)      Family history of diabetes mellitus     Dysfunction of eustachian tube     HTN, goal below 140/90     Morbid obesity, unspecified obesity type (H)     Tonsillitis     Past Surgical History:   Procedure Laterality Date     BREAST SURGERY      breast reduction bilat     COLONOSCOPY WITH CO2 INSUFFLATION N/A 9/1/2017    Procedure: COLONOSCOPY WITH CO2 INSUFFLATION;  Colonoscopy, Abdominal pain, left lower quadrant, Parenteau, BMI 42.93, [phone];  Surgeon: Anuel Rodríguez MD;  Location:  OR     ENDOSCOPIC SINUS SURGERY Right 3/15/2017    Procedure: ENDOSCOPIC SINUS SURGERY;;  Surgeon: Vicki Zurita MD;  Location: [facility] OR     EXAM UNDER ANESTHESIA, FULGURATE CONDYLOMA ANUS, COMBINED N/A 9/26/2017    Procedure: COMBINED EXAM UNDER ANESTHESIA, FULGURATE CONDYLOMA ANUS;  Examination Under anethesia, Excision And Fulguration Of Anal Condyloma;  Surgeon: Td Garvey MD;  Location: [facility] OR     LAPAROSCOPIC CHOLECYSTECTOMY  08/05/99     TONSILLECTOMY Bilateral 3/15/2017    Procedure: TONSILLECTOMY;  Bilateral Tonsillectomy, Right Functional Endoscopic Sinus Surgery ;  Surgeon: Vicik Zurita MD;  Location: [facility] OR       Social History      Tobacco Use     Smoking status: Never Smoker     Smokeless tobacco: Never Used   Substance Use Topics     Alcohol use: No     Family History   Problem Relation Age of Onset     Diabetes Mother      Hypertension Mother      Cancer Father         pancreatic     Heart Disease Maternal Grandmother      Diabetes Maternal Grandmother      Cerebrovascular Disease Maternal Grandfather      Cancer - colorectal Paternal Grandfather      Diabetes Brother      Asthma Son      Depression Son      Asthma Daughter          Current Outpatient Medications   Medication Sig Dispense Refill     cetirizine (ZYRTEC) 10 MG tablet Take 1 tablet (10 mg) by mouth every evening 90 tablet 1     Chlorpheniramine-Pseudoeph (DECONGESTANT + PO) Reported on 3/24/2017       clotrimazole-betamethasone (LOTRISONE) 1-0.05 % external cream APPLY EXTERNALLY TO THE AFFECTED AREA TWICE DAILY 15 g 0     cyclobenzaprine (FLEXERIL) 10 MG tablet TK 1 T PO  TID PRN MUSCLE SPASMS  0     DULoxetine (CYMBALTA) 60 MG capsule Take 1 capsule (60 mg) by mouth daily. 90 capsule 0     fluticasone (FLONASE) 50 MCG/ACT nasal spray Spray 2 sprays into both nostrils daily Reported on 3/24/2017       Hypertonic Nasal Wash (SINUS RINSE BOTTLE KIT) PACK Spray 1 packet in nostril 2 times daily 10 each 3     levonorgestrel (MIRENA) 20 MCG/24HR IUD 1 each by Intrauterine route once       levothyroxine (SYNTHROID/LEVOTHROID) 75 MCG tablet TAKE 1 TABLET(75 MCG) BY MOUTH DAILY 90 tablet 2     lisinopril (PRINIVIL/ZESTRIL) 40 MG tablet Take 1 tablet (40 mg) by mouth daily 90 tablet 3     Multiple Vitamins-Minerals (MULTIVITAMIN ADULT PO)        naltrexone (DEPADE/REVIA) 50 MG tablet Take one-half tablet daily for two weeks, then increase to one tablet daily 90 tablet 1     Allergies   Allergen Reactions     Percocet [Oxycodone-Acetaminophen] Itching     Recent Labs   Lab Test 10/15/19  0857 05/14/19  0946 07/17/18  1605 12/04/17  0834  04/26/16  0912 01/25/16  0835   A1C 6.0*  --    --   --   --  6.2* 6.3*   LDL  --   --   --  105*  --   --  104*   HDL  --   --   --  39*  --   --  35*   TRIG  --   --   --  102  --   --  167*   ALT 31 24 22 25  --  25 23   CR 0.70 0.74 0.73 0.76   < > 0.74 0.62   GFRESTIMATED >90 >90 85 82   < > 84 >90  Non  GFR Calc     GFRESTBLACK >90 >90 >90 >90   < > >90   GFR Calc   >90   GFR Calc     POTASSIUM 4.2 3.9 4.2 4.2   < > 4.0 4.1   TSH 2.19 1.82 0.82 2.06   < > 5.41* 5.16*    < > = values in this interval not displayed.      BP Readings from Last 3 Encounters:   01/12/20 (!) 145/94   12/05/19 (!) 151/98   10/31/19 (!) 166/96    Wt Readings from Last 3 Encounters:   01/12/20 137.7 kg (303 lb 9.6 oz)   12/05/19 135.8 kg (299 lb 6.4 oz)   10/31/19 135.2 kg (298 lb)                 Reviewed and updated as needed this visit by Provider         Review of Systems   ROS COMP: Constitutional, HEENT, cardiovascular, pulmonary, gi and gu systems are negative, except as otherwise noted.      Objective    BP (!) 145/94 (BP Location: Left arm, Patient Position: Chair, Cuff Size: Adult Regular)   Pulse 91   Temp 98.6  F (37  C) (Oral)   Wt 137.7 kg (303 lb 9.6 oz)   SpO2 99%   BMI 47.55 kg/m    Body mass index is 47.55 kg/m .  Physical Exam   GENERAL: alert and no distress  EYES: Eyes grossly normal to inspection, PERRL and conjunctivae and sclerae normal  HENT: normal cephalic/atraumatic, right ear: normal: no effusions, no erythema, normal landmarks, left ear: clear effusion, nose and mouth without ulcers or lesions, oropharynx clear and oral mucous membranes moist  NECK: no adenopathy, no asymmetry, masses, or scars and thyroid normal to palpation  RESP: lungs clear to auscultation - no rales, rhonchi or wheezes  CV: regular rates and rhythm, normal S1 S2, no S3 or S4 and no murmur, click or rub  ABDOMEN: soft, nontender  MS: no gross musculoskeletal defects noted, no edema        Assessment & Plan     (J06.9) Upper  respiratory tract infection, unspecified type  (primary encounter diagnosis)  Comment: Suspect symptoms secondary to URI along with left ear serous otitis media.  Reassurance provided.  Suggested to continue well hydration, warm fluids, over-the-counter analgesia and humidifier use.  Return criteria discussed in detail.  Patient understood and in agreement with above plan.  All questions answered.        Chago Fernandes MD  [facility]

## 2020-01-21 ENCOUNTER — TELEPHONE (OUTPATIENT)
Dept: FAMILY MEDICINE | Facility: CLINIC | Age: 50
End: 2020-01-21

## 2020-01-21 NOTE — TELEPHONE ENCOUNTER
Called patient back, no answer, left VMM for patient to call and make office appointment. Patient has last seen in clinic in October, per chart review should have follow-up to see Provider Pin in December. Patient still has not followed up yet and has been seen in ER once and UC twice. Cherie Del Rio RN on 1/21/2020 at 5:33 PM

## 2020-01-21 NOTE — TELEPHONE ENCOUNTER
Reason for call:  Symptom     Symptom or request:   -headache,body aches and pains, and consistent sinus pressure    Duration (how long have symptoms been present): 5 wks    Have you been treated for this before? No    Additional comments:a week ago pt went to urgent care - found some fluid in ear but not ear infection so pt was send home    Phone number to reach patient:  [phone]    Best Time:  anytime    Can we leave a detailed message on this number?  YES

## 2020-02-08 ENCOUNTER — HEALTH MAINTENANCE LETTER (OUTPATIENT)
Age: 50
End: 2020-02-08

## 2020-02-28 ENCOUNTER — NURSE TRIAGE (OUTPATIENT)
Dept: NURSING | Facility: CLINIC | Age: 50
End: 2020-02-28

## 2020-02-28 ENCOUNTER — NURSE TRIAGE (OUTPATIENT)
Dept: FAMILY MEDICINE | Facility: CLINIC | Age: 50
End: 2020-02-28

## 2020-02-28 NOTE — TELEPHONE ENCOUNTER
Pregnant daughter who is due soon has influenza A.  Patient hasn't been exposed yet, but she is planning to go help her daughter and is wondering if she can get a prophylactic prescription for Tamiflu.   She uses [facility] pharmacy in [place], [place].  Patient's best contact [phone].    Routed to  patient care team Pod D    Kemi Schuster RN  [facility]         Reason for Disposition    Caller requesting a NON-URGENT new prescription or refill and triager unable to refill per unit policy    Protocols used: MEDICATION QUESTION CALL-A-AH

## 2020-02-28 NOTE — TELEPHONE ENCOUNTER
Pregnant daughter who is due soon has influenza A.  Patient hasn't been exposed yet, but she is planning to go help her daughter and is wondering if she can get a prophylactic prescription for Tamiflu.   She uses [facility] pharmacy in [place], [place].  Patient's best contact [phone].    Routed to RD patient care team Pod D    Kemi Schuster RN  [facility]

## 2020-02-28 NOTE — TELEPHONE ENCOUNTER
Attempted to reach, unable. Left message  to call back.    Has pt had flu vaccine this year? If so  , no prophylaxis is being used    Is she has not been vaccinated she may be considered for prophylaxis as she is high risk as diabetic.    Need to ask her what is timeframe when she will be with daughter      Elli Broderick RN

## 2020-03-05 ENCOUNTER — VIRTUAL VISIT (OUTPATIENT)
Dept: FAMILY MEDICINE | Facility: OTHER | Age: 50
End: 2020-03-05

## 2020-03-06 NOTE — PROGRESS NOTES
"Date: 2020 18:15:33  Clinician: Rajni Rivas  Clinician NPI: [id number]  Patient: Naomy Kowalski  Patient : 1970  Patient Address: [address] [place] [address] [place] [address]  Patient Phone: [phone]  Visit Protocol: Influenza prophylaxis  Patient Summary:  Naomy is a 49 year old ( : 1970 ) female who initiated a Visit to receive prophylaxis for influenza. When asked the question \"Please sign me up to receive news, health information and promotions from [employer].\", Naomy responded \"No\".    Precipitating events  Naomy reports that she has been in close contact with someone who has influenza and was exposed more than 72 hours ago. The influenza diagnosis was not confirmed by a lab test. The close contact is not taking antiviral medications, such as Tamiflu or Relenza.   Naomy denies having influenza symptoms.   Naomy has received the influenza vaccine. She received the vaccine more than 2 weeks ago.   Medical history  Naomy does not have a history of asthma.   Weight: 290 lbs  She does not smoke or use smokeless tobacco.   Additional health information pertinent to this Visit as reported by the patient (free text): My co worker was in on Monday and left early because her son tested positive for Influenza A. My roommate came home early Monday from work and I'm sure she has it to. No not tested she has no insurance but she is extremely sick. My daughter is due to have a baby any minute any day now. Can I get a prescription for Tamiflu so I do not give it to the new baby? [place]daughter while she is in labor. I have been exposed to it by 2 people. One living with me.   Weight: 290 lbs    MEDICATIONS: Multiple Vitamin-Minerals oral, lisinopril oral, duloxetine oral, naltrexone-bupropion oral, levothyroxine oral, ALLERGIES: NKDA  Clinician Response:  Dear Naomy,   Your health is our priority. To determine the most appropriate care for you, I would " like you to be seen in person to further discuss your health and exposure history before this treatment can be provided to you.  You will not be charged for this Visit. Thank you for trusting us with your care.   Diagnosis: Refer for additional evaluation  Diagnosis ICD: R69

## 2020-04-14 DIAGNOSIS — F33.1 MODERATE EPISODE OF RECURRENT MAJOR DEPRESSIVE DISORDER (H): ICD-10-CM

## 2020-04-15 RX ORDER — DULOXETIN HYDROCHLORIDE 60 MG/1
CAPSULE, DELAYED RELEASE ORAL
Qty: 30 CAPSULE | Refills: 0 | Status: SHIPPED | OUTPATIENT
Start: 2020-04-15 | End: 2020-04-29

## 2020-04-15 NOTE — TELEPHONE ENCOUNTER
RD Reception - Medication is being filled for 1 time refill only due to:  Patient needs to be seen because due for 2 month f/u - please offer virtual appointment.       Signed Prescriptions:                        Disp   Refills    DULoxetine (CYMBALTA) 60 MG capsule        30 cap*0        Sig: TAKE 1 CAPSULE (60 MG) BY MOUTH ONE TIME DAILY   Authorizing Provider: GINNA HYMAN  Ordering User: MED [facility]COSTA

## 2020-04-15 NOTE — TELEPHONE ENCOUNTER
"Requested Prescriptions   Pending Prescriptions Disp Refills     DULoxetine (CYMBALTA) 60 MG capsule [Pharmacy Med Name: DULoxetine HCl Oral Capsule Delayed Release Particles 60 MG] 90 capsule 0     Sig: TAKE 1 CAPSULE (60 MG) BY MOUTH ONE TIME DAILY           Last Office Visit: 10/15/19 -f/u 2 months  Future Office visit:             Serotonin-Norepinephrine Reuptake Inhibitors  Failed - 4/14/2020  7:00 PM        Failed - Blood pressure under 140/90 in past 12 months     BP Readings from Last 3 Encounters:   01/12/20 (!) 145/94   12/05/19 (!) 151/98   10/31/19 (!) 166/96                 Failed - PHQ-9 score of less than 5 in past 6 months     Please review last PHQ-9 score.     PHQ 12/13/2018 5/14/2019 11/20/2019   PHQ-9 Total Score 16 14 14   Q9: Thoughts of better off dead/self-harm past 2 weeks Several days Several days Several days   F/U: Thoughts of suicide or self-harm - - Yes   F/U: Self harm-plan - - No   F/U: Self-harm action - - No   F/U: Safety concerns - - No               Failed - Recent (6 mo) or future (30 days) visit within the authorizing provider's specialty     Patient had office visit in the last 6 months or has a visit in the next 30 days with authorizing provider or within the authorizing provider's specialty.  See \"Patient Info\" tab in inbasket, or \"Choose Columns\" in Meds & Orders section of the refill encounter.            Passed - Medication is active on med list        Passed - Patient is age 18 or older        Passed - No active pregnancy on record        Passed - No positive pregnancy test in past 12 months             "

## 2020-04-29 ENCOUNTER — VIRTUAL VISIT (OUTPATIENT)
Dept: FAMILY MEDICINE | Facility: CLINIC | Age: 50
End: 2020-04-29
Payer: COMMERCIAL

## 2020-04-29 DIAGNOSIS — E66.813 CLASS 3 SEVERE OBESITY WITH SERIOUS COMORBIDITY AND BODY MASS INDEX (BMI) OF 45.0 TO 49.9 IN ADULT, UNSPECIFIED OBESITY TYPE (H): ICD-10-CM

## 2020-04-29 DIAGNOSIS — F33.1 MODERATE EPISODE OF RECURRENT MAJOR DEPRESSIVE DISORDER (H): ICD-10-CM

## 2020-04-29 DIAGNOSIS — I10 HTN, GOAL BELOW 140/90: ICD-10-CM

## 2020-04-29 DIAGNOSIS — E03.9 HYPOTHYROIDISM, UNSPECIFIED TYPE: ICD-10-CM

## 2020-04-29 DIAGNOSIS — E66.01 CLASS 3 SEVERE OBESITY WITH SERIOUS COMORBIDITY AND BODY MASS INDEX (BMI) OF 45.0 TO 49.9 IN ADULT, UNSPECIFIED OBESITY TYPE (H): ICD-10-CM

## 2020-04-29 PROCEDURE — 99214 OFFICE O/P EST MOD 30 MIN: CPT | Mod: 95 | Performed by: NURSE PRACTITIONER

## 2020-04-29 RX ORDER — LISINOPRIL 40 MG/1
40 TABLET ORAL DAILY
Qty: 90 TABLET | Refills: 3 | Status: SHIPPED | OUTPATIENT
Start: 2020-04-29 | End: 2021-08-03

## 2020-04-29 RX ORDER — NALTREXONE HYDROCHLORIDE 50 MG/1
50 TABLET, FILM COATED ORAL DAILY
Qty: 90 TABLET | Refills: 3 | Status: SHIPPED | OUTPATIENT
Start: 2020-04-29 | End: 2021-06-09

## 2020-04-29 RX ORDER — DULOXETIN HYDROCHLORIDE 60 MG/1
60 CAPSULE, DELAYED RELEASE ORAL DAILY
Qty: 90 CAPSULE | Refills: 3 | Status: SHIPPED | OUTPATIENT
Start: 2020-04-29 | End: 2021-06-28

## 2020-04-29 RX ORDER — LEVOTHYROXINE SODIUM 75 UG/1
75 TABLET ORAL DAILY
Qty: 90 TABLET | Refills: 1 | Status: SHIPPED | OUTPATIENT
Start: 2020-04-29 | End: 2021-01-12

## 2020-04-29 NOTE — PROGRESS NOTES
"Naomy Kowalski is a 49 year old female who is being evaluated via a billable telephone visit.      The patient has been notified of following:     \"This telephone visit will be conducted via a call between you and your physician/provider. We have found that certain health care needs can be provided without the need for a physical exam.  This service lets us provide the care you need with a short phone conversation. If a prescription is necessary we can send it directly to your pharmacy. If lab work is needed we can place an order for that and you can then stop by our lab to have the test done at a later time.    Telephone visits are billed at different rates depending on your insurance coverage. During this emergency period, for some insurers they may be billed the same as an in-person visit.  Please reach out to your insurance provider with any questions.    If during the course of the call the physician/provider feels a telephone visit is not appropriate, you will not be charged for this service.\"    Patient has given verbal consent for Telephone visit?  Yes    [facility]     Patient reports no concerns regarding her health during the initial phone screening.     Mahsa Rios MA      Subjective     Naomy Kowalski is a 49 year old female who presents to clinic today for the following health issues:    HPI    Hypertension Follow-up      Do you check your blood pressure regularly outside of the clinic? No     Are you following a low salt diet? Yes    Are your blood pressures ever more than 140 on the top number (systolic) OR more   than 90 on the bottom number (diastolic), for example 140/90? No    Depression and Anxiety Follow-Up    How are you doing with your depression since your last visit? Worsened     How are you doing with your anxiety since your last visit?  No change    Are you having other symptoms that might be associated with depression or anxiety? No    Have you had a " significant life event? Relationship Concerns and Grief or Loss     Do you have any concerns with your use of alcohol or other drugs? No     Hypothyroidism Follow-up      Since last visit, patient describes the following symptoms: Weight stable, no hair loss, no skin changes, no constipation, no loose stools        Social History     Tobacco Use     Smoking status: Never Smoker     Smokeless tobacco: Never Used   Substance Use Topics     Alcohol use: No     Drug use: No     PHQ 12/13/2018 5/14/2019 11/20/2019   PHQ-9 Total Score 16 14 14   Q9: Thoughts of better off dead/self-harm past 2 weeks Several days Several days Several days   F/U: Thoughts of suicide or self-harm - - Yes   F/U: Self harm-plan - - No   F/U: Self-harm action - - No   F/U: Safety concerns - - No     JACINTO-7 SCORE 4/10/2015 12/13/2018   Total Score 4 -   Total Score - 15     Last PHQ-9 11/20/2019   1.  Little interest or pleasure in doing things 1   2.  Feeling down, depressed, or hopeless 2   3.  Trouble falling or staying asleep, or sleeping too much 2   4.  Feeling tired or having little energy 2   5.  Poor appetite or overeating 3   6.  Feeling bad about yourself 2   7.  Trouble concentrating 1   8.  Moving slowly or restless 0   Q9: Thoughts of better off dead/self-harm past 2 weeks 1   PHQ-9 Total Score 14   Difficulty at work, home, or with people -   In the past two weeks have you had thoughts of suicide or self harm? Yes   Do you have concerns about your personal safety or the safety of others? No   In the past 2 weeks have you thought about a plan or had intention to harm yourself? No   In the past 2 weeks have you acted on these thoughts in any way? No         Suicide Assessment Five-step Evaluation and Treatment (SAFE-T)      Patient Active Problem List   Diagnosis     Hypothyroidism     CARDIOVASCULAR SCREENING; LDL GOAL LESS THAN 160     Moderate episode of recurrent major depressive disorder (H)     Obesity     Menorrhagia, treated  with mirena IUD (inserted July 2013)      Family history of diabetes mellitus     Dysfunction of eustachian tube     HTN, goal below 140/90     Morbid obesity, unspecified obesity type (H)     Tonsillitis     Past Surgical History:   Procedure Laterality Date     BREAST SURGERY      breast reduction bilat     COLONOSCOPY WITH CO2 INSUFFLATION N/A 9/1/2017    Procedure: COLONOSCOPY WITH CO2 INSUFFLATION;  Colonoscopy, Abdominal pain, left lower quadrant, Parenteau, BMI 42.93, [phone];  Surgeon: Anuel Rodríguez MD;  Location: [facility] OR     ENDOSCOPIC SINUS SURGERY Right 3/15/2017    Procedure: ENDOSCOPIC SINUS SURGERY;;  Surgeon: Vicki Zurita MD;  Location: [facility] OR     EXAM UNDER ANESTHESIA, FULGURATE CONDYLOMA ANUS, COMBINED N/A 9/26/2017    Procedure: COMBINED EXAM UNDER ANESTHESIA, FULGURATE CONDYLOMA ANUS;  Examination Under anethesia, Excision And Fulguration Of Anal Condyloma;  Surgeon: Td Garvey MD;  Location: [facility] OR     LAPAROSCOPIC CHOLECYSTECTOMY  08/05/99     TONSILLECTOMY Bilateral 3/15/2017    Procedure: TONSILLECTOMY;  Bilateral Tonsillectomy, Right Functional Endoscopic Sinus Surgery ;  Surgeon: Vicki Zurita MD;  Location: [facility] OR       Social History     Tobacco Use     Smoking status: Never Smoker     Smokeless tobacco: Never Used   Substance Use Topics     Alcohol use: No     Family History   Problem Relation Age of Onset     Diabetes Mother      Hypertension Mother      Cancer Father         pancreatic     Heart Disease Maternal Grandmother      Diabetes Maternal Grandmother      Cerebrovascular Disease Maternal Grandfather      Cancer - colorectal Paternal Grandfather      Diabetes Brother      Asthma Son      Depression Son      Asthma Daughter            Reviewed and updated as needed this visit by Provider         Review of Systems   ROS COMP: Constitutional, HEENT, cardiovascular, pulmonary, gi and gu systems are negative, except as otherwise  noted.       Objective   Reported vitals:  There were no vitals taken for this visit.   healthy, alert and no distress  PSYCH: Alert and oriented times 3; coherent speech, normal   rate and volume, able to articulate logical thoughts, able   to abstract reason, no tangential thoughts, no hallucinations   or delusions  Her affect is normal  RESP: No cough, no audible wheezing, able to talk in full sentences  Remainder of exam unable to be completed due to telephone visits    Diagnostic Test Results:  Labs reviewed in Epic        Assessment/Plan:  1. Moderate episode of recurrent major depressive disorder (H)  -stable; discussed recent stressors. Naomy plans to pursue counseling again when she is able to do so in-person.  - DULoxetine (CYMBALTA) 60 MG capsule; Take 1 capsule (60 mg) by mouth daily  Dispense: 90 capsule; Refill: 3  - **Comprehensive metabolic panel FUTURE anytime; Future    2. Hypothyroidism, unspecified type    - levothyroxine (SYNTHROID/LEVOTHROID) 75 MCG tablet; Take 1 tablet (75 mcg) by mouth daily  Dispense: 90 tablet; Refill: 1  - **TSH with free T4 reflex FUTURE anytime; Future    3. HTN, goal below 140/90  -discussed checking some blood pressures at work; follow up in a few weeks  - lisinopril (ZESTRIL) 40 MG tablet; Take 1 tablet (40 mg) by mouth daily  Dispense: 90 tablet; Refill: 3  - **Comprehensive metabolic panel FUTURE anytime; Future    4. Class 3 severe obesity with serious comorbidity and body mass index (BMI) of 45.0 to 49.9 in adult, unspecified obesity type (H)    - naltrexone (DEPADE/REVIA) 50 MG tablet; Take 1 tablet (50 mg) by mouth daily  Dispense: 90 tablet; Refill: 3  - **A1C FUTURE 1yr; Future    No follow-ups on file.      Phone call duration:  30 minutes    JERRICA Church CNP

## 2020-05-04 DIAGNOSIS — E66.01 CLASS 3 SEVERE OBESITY WITH SERIOUS COMORBIDITY AND BODY MASS INDEX (BMI) OF 45.0 TO 49.9 IN ADULT, UNSPECIFIED OBESITY TYPE (H): ICD-10-CM

## 2020-05-04 DIAGNOSIS — I10 HTN, GOAL BELOW 140/90: ICD-10-CM

## 2020-05-04 DIAGNOSIS — E66.813 CLASS 3 SEVERE OBESITY WITH SERIOUS COMORBIDITY AND BODY MASS INDEX (BMI) OF 45.0 TO 49.9 IN ADULT, UNSPECIFIED OBESITY TYPE (H): ICD-10-CM

## 2020-05-04 DIAGNOSIS — F33.1 MODERATE EPISODE OF RECURRENT MAJOR DEPRESSIVE DISORDER (H): ICD-10-CM

## 2020-05-04 DIAGNOSIS — E03.9 HYPOTHYROIDISM, UNSPECIFIED TYPE: ICD-10-CM

## 2020-05-04 LAB
ALBUMIN SERPL-MCNC: 3.6 G/DL (ref 3.4–5)
ALP SERPL-CCNC: 76 U/L (ref 40–150)
ALT SERPL W P-5'-P-CCNC: 29 U/L (ref 0–50)
ANION GAP SERPL CALCULATED.3IONS-SCNC: 8 MMOL/L (ref 3–14)
AST SERPL W P-5'-P-CCNC: 18 U/L (ref 0–45)
BILIRUB SERPL-MCNC: 0.2 MG/DL (ref 0.2–1.3)
BUN SERPL-MCNC: 11 MG/DL (ref 7–30)
CALCIUM SERPL-MCNC: 9.1 MG/DL (ref 8.5–10.1)
CHLORIDE SERPL-SCNC: 106 MMOL/L (ref 94–109)
CO2 SERPL-SCNC: 28 MMOL/L (ref 20–32)
CREAT SERPL-MCNC: 0.63 MG/DL (ref 0.52–1.04)
GFR SERPL CREATININE-BSD FRML MDRD: >90 ML/MIN/{1.73_M2}
GLUCOSE SERPL-MCNC: 95 MG/DL (ref 70–99)
HBA1C MFR BLD: 6.5 % (ref 0–5.6)
POTASSIUM SERPL-SCNC: 3.8 MMOL/L (ref 3.4–5.3)
PROT SERPL-MCNC: 7.6 G/DL (ref 6.8–8.8)
SODIUM SERPL-SCNC: 142 MMOL/L (ref 133–144)
TSH SERPL DL<=0.005 MIU/L-ACNC: 1.88 MU/L (ref 0.4–4)

## 2020-05-04 PROCEDURE — 36415 COLL VENOUS BLD VENIPUNCTURE: CPT | Performed by: NURSE PRACTITIONER

## 2020-05-04 PROCEDURE — 83036 HEMOGLOBIN GLYCOSYLATED A1C: CPT | Performed by: NURSE PRACTITIONER

## 2020-05-04 PROCEDURE — 84443 ASSAY THYROID STIM HORMONE: CPT | Performed by: NURSE PRACTITIONER

## 2020-05-04 PROCEDURE — 80053 COMPREHEN METABOLIC PANEL: CPT | Performed by: NURSE PRACTITIONER

## 2020-11-05 ENCOUNTER — MYC MEDICAL ADVICE (OUTPATIENT)
Dept: FAMILY MEDICINE | Facility: CLINIC | Age: 50
End: 2020-11-05

## 2020-11-05 NOTE — TELEPHONE ENCOUNTER
Providers,    Should patient go to [email or web address] or do virtual if symptoms develop since we do not know where daughter was exposed, as her symptoms did not start until 10/31?    Thanks  Jessica Moeller RN   [facility]

## 2020-11-07 ENCOUNTER — HEALTH MAINTENANCE LETTER (OUTPATIENT)
Age: 50
End: 2020-11-07

## 2020-12-08 ENCOUNTER — MYC MEDICAL ADVICE (OUTPATIENT)
Dept: FAMILY MEDICINE | Facility: CLINIC | Age: 50
End: 2020-12-08

## 2020-12-08 DIAGNOSIS — B00.9 RECURRENT HERPES SIMPLEX: ICD-10-CM

## 2020-12-08 RX ORDER — ACYCLOVIR 400 MG/1
400 TABLET ORAL 3 TIMES DAILY PRN
Qty: 15 TABLET | Refills: 0 | Status: SHIPPED | OUTPATIENT
Start: 2020-12-08 | End: 2021-01-26

## 2020-12-08 NOTE — LETTER
2017       RE: Naomy Friend  [address] [facility] [address]     Dear Colleague,    Thank you for referring your patient, Naomy Friend, to the [facility] COLON AND RECTAL SURGERY at [facility]. Please see a copy of my visit note below.    Colon and Rectal Surgery Consult Clinic Note    Date: 2017     Referring provider:  Anuel Rodríguez MD  [address]  [place] [address]     RE: Naomy Friend  : 1970  MARIE: 2017    Naomy Friend is a very pleasant 46 year old female without a significant past medical history with a recent diagnosis of anal lesion.  Given these findings they were subsequently sent to the Colon and Rectal Surgery Clinic for an opinion on this and a new patient consultation.     Ms. Friend underwent a colonoscopy on 17 with Dr. Rodríguez and was noted to have a lesion in her anal canal suspicious for a condyloma. She reports that she had genital condyloma many years ago but has never had anal condyloma. She is otherwise healthy. She is not a smoker. She denies any pain or bleeding.    Assessment/Plan: 46 year old female with verruciform lesion in the posterior anal canal. Recommended biopsy of this in clinic today as it is fairly large. Will set her up for EUA with fulguration/excision of anal condyloma. Discussed the nature of condyloma, the HPV virus, and that they can be recurrent. Would want to see her every 6 months for a wart check. Patient's questions were answered to her stated satisfaction and she is in agreement with this plan.    Medical history:  Past Medical History:   Diagnosis Date     CARDIOVASCULAR SCREENING; LDL GOAL LESS THAN 160 2012     Hypertension     watching blood pressure     Obesity, unspecified     Obesity     Sleep apnea     no cpap     Thyroid disease        Surgical history:  Past Surgical History:   Procedure Laterality Date     BREAST SURGERY      breast reduction  [facility]  HISTORY AND PHYSICAL      Patient: April Briggs Date: 12/8/2020   female, 80 year old  Admit Date: 12/8/2020   Attending: Yevgeniy Echevarria MD        PRIMARY CARE PROVIDER:  Jose Alfredo Guardado MD     ATTENDING PHYSICIAN    Yevgeniy Echevarria MD      CHIEF COMPLAINT    Chest pain    HPI    April Briggs is a 80 year old female  with past medical history significant for AV block status post pacemaker placement, COPD, history of previous TIA on Coumadin recent aortogram with right anterior tibial artery intervention by vascular surgery presenting to the emergency department today after an RRT was called and patient headache chest pain walking from the parking lot to her outpatient ultrasound appointment.  Chest pain was 10/10 radiating from the chest into the back.  Patient had elevated troponin but normal EKG.  Emergency department patient also was hypertensive with  systolic pressure in the 190s.  Given elevated troponin with chest pain there is concern for unstable angina.  Patient was started on nitroglycerin drip, per Cardiology recommendations  Patient was taken from the emergency department to the cardiac cath lab for further evaluation.  Cardiac catheterization showed no significant coronary artery disease.  There is a nonobstructive med mild RCA 20-30% lesion with proximal and distal LAD 20% lesion.  Cardiology recommended better blood pressure control.  Post cardiac catheterization patient was admitted to the hospitalist service for further management of hypertension and post catheterization care.     On my evaluation patient was resting comfortably denied any chest pain.  Denied any headache.  Denied any nausea, vomiting, abdominal pain or any other concerns.    PAST MEDICAL HISTORY    Past Medical History:   Diagnosis Date   • Anxiety    • AV block     (high grade) s/p permanent pacemaker placement   • CAD (coronary artery disease)     posterior circulation, stable on coumadin   • COPD  (chronic obstructive pulmonary disease) (CMS/[facility])    • CVA (cerebrovascular accident) (CMS/[facility])     in the past with no residual dysfunction   • GERD (gastroesophageal reflux disease)    • HTN (hypertension)    • Hyperlipidemia    • Hypokalemia    • Insomnia    • Lumbar radiculitis 8/9/2019   • Neurogenic claudication due to lumbar spinal stenosis 8/9/2019   • Osteoporosis 09/13/2011   • PAF (paroxysmal atrial fibrillation) (CMS/[facility])     intermittent    • PVD (peripheral vascular disease) (CMS/[facility])    • Raynaud's phenomenon    • Sacroiliitis, not elsewhere classified (CMS/[facility]) 8/9/2019   • SSS (sick sinus syndrome) (CMS/[facility])     with pauses and syncope   • TIA (transient ischemic attack)        SURGICAL HISTORY    Past Surgical History:   Procedure Laterality Date   • Ablation av node - cv  02/16/2015   • Appendectomy  age 19   • Cardiac pacemaker placement      MEDT DUAL CHAMBER   • Carpal tunnel release      left   • Echocardiogram  02/20/2015   • Egd  02/06/2020   • Esophagogastroduodenoscopy  1/11/2006   • Eye surgery      left cataract   • Eye surgery  2010    left - retinal peel   • Hysterectomy  1969   • Joint replacement      left   • Nephrectomy  1968    right   • Pulmonary function test  3-5-15    Performed at [facility]   • Removal of tonsils,<10 y/o     • Rotator cuff repair      right   • Stress test  02/20/2015   • Trigger finger release      left   • Tumor excision      palm of left hand       FAMILY HISTORY    Family History   Problem Relation Age of Onset   • Congestive Heart Failure Mother    • Arthritis Mother    • Stroke Mother 80   • Congestive Heart Failure Father    • Stroke Father 80        55   • Cancer, Lung Brother    • Cancer Brother         Bone and Brain   • Hypertension Brother    • Stroke Brother    • No CAD Neg Hx    • No Sudden Cardiac Arrest Neg Hx        SOCIAL HISTORY    Social History     Tobacco Use   • Smoking status: Never Smoker   • Smokeless tobacco: Never Used   Substance  bilat     COLONOSCOPY WITH CO2 INSUFFLATION N/A 9/1/2017    Procedure: COLONOSCOPY WITH CO2 INSUFFLATION;  Colonoscopy, Abdominal pain, left lower quadrant, Parenteau, BMI 42.93, [phone];  Surgeon: Anuel Rodríguez MD;  Location: [facility] OR     ENDOSCOPIC SINUS SURGERY Right 3/15/2017    Procedure: ENDOSCOPIC SINUS SURGERY;;  Surgeon: Vicki Zurita MD;  Location: [facility] OR     LAPAROSCOPIC CHOLECYSTECTOMY  08/05/99     TONSILLECTOMY Bilateral 3/15/2017    Procedure: TONSILLECTOMY;  Bilateral Tonsillectomy, Right Functional Endoscopic Sinus Surgery ;  Surgeon: Vicki Zurita MD;  Location: [facility] OR       Problem list:  Patient Active Problem List    Diagnosis Date Noted     Tonsillitis 03/15/2017     Priority: Medium     Morbid obesity, unspecified obesity type (H) 10/03/2016     Priority: Medium     HTN, goal below 140/90 06/15/2015     Priority: Medium     Dysfunction of eustachian tube 01/03/2014     Priority: Medium     Menorrhagia, treated with mirena IUD (inserted July 2013)  12/02/2013     Priority: Medium     Family history of diabetes mellitus 12/02/2013     Priority: Medium     Mild recurrent major depression (H) 10/01/2012     Priority: Medium     Obesity      Priority: Medium     Obesity  Problem list name updated by automated process. Provider to review       CARDIOVASCULAR SCREENING; LDL GOAL LESS THAN 160 06/06/2012     Priority: Medium     Hypothyroidism 07/18/2011     Priority: Medium       Medications:  Current Outpatient Prescriptions   Medication Sig Dispense Refill     Multiple Vitamins-Minerals (MULTIVITAMIN ADULT PO)        acyclovir (ZOVIRAX) 400 MG tablet TAKE 1 TABLET (400 MG) BY MOUTH 3 TIMES DAILY 15 tablet 0     NAPROXEN PO Take 375 mg by mouth       levothyroxine (SYNTHROID/LEVOTHROID) 75 MCG tablet TAKE 1 TABLET (75 MCG) BY MOUTH DAILY 90 tablet 1     lisinopril (PRINIVIL/ZESTRIL) 20 MG tablet Take 1 tablet (20 mg) by mouth daily 90 tablet 1     citalopram  (CELEXA) 10 MG tablet TAKE 1 TAB BY MOUTH DAILY WITH 20MG TAB FOR TOTAL DAILY DOSE OF 30MG 30 tablet 3     clotrimazole-betamethasone (LOTRISONE) lotion        clotrimazole-betamethasone (LOTRISONE) cream Apply topically 2 times daily 30 g 1     Hypertonic Nasal Wash (SINUS RINSE BOTTLE KIT) PACK Spray 1 packet in nostril 2 times daily 10 each 3     citalopram (CELEXA) 20 MG tablet Take 1.5 tablets (30 mg) by mouth daily 135 tablet 1     cetirizine (ZYRTEC) 10 MG tablet Take 1 tablet (10 mg) by mouth every evening 90 tablet 1     levonorgestrel (MIRENA) 20 MCG/24HR IUD 1 each by Intrauterine route once       mupirocin (BACTROBAN) 2 % ointment Apply topically 2 times daily Use 2 times a day to affected area. 15 g 0     fluticasone (FLONASE) 50 MCG/ACT nasal spray Spray 2 sprays into both nostrils daily Reported on 3/24/2017       Chlorpheniramine-Pseudoeph (DECONGESTANT + PO) Reported on 3/24/2017         Allergies:  Allergies   Allergen Reactions     Percocet [Oxycodone-Acetaminophen] Itching       Family history:  Family History   Problem Relation Age of Onset     DIABETES Mother      Hypertension Mother      CANCER Father      pancreatic     HEART DISEASE Maternal Grandmother      DIABETES Maternal Grandmother      CEREBROVASCULAR DISEASE Maternal Grandfather      Cancer - colorectal Paternal Grandfather      DIABETES Brother      Asthma Son      Depression Son      Asthma Daughter        Social history:  Social History   Substance Use Topics     Smoking status: Never Smoker     Smokeless tobacco: Never Used     Alcohol use No    Marital status: .  Occupation: works for a [employer] and is a .    Nursing Notes:   Erin Mcfarlane LPN  9/7/2017  7:25 AM  Signed  Chief Complaint   Patient presents with     Clinic Care Coordination - Initial     New pt consult, anal lesion.        Vitals:    09/07/17 0722   BP: 154/88   BP Location: Left arm   Patient Position: Chair   Cuff Size: Adult Large  Use Topics   • Alcohol use: Yes     Alcohol/week: 5.0 standard drinks     Types: 2 Glasses of wine, 3 Cans of beer per week   • Drug use: No       CURRENT MEDICATIONS    Outpatient Medications Marked as Taking for the 12/8/20 encounter (Hospital Encounter)   Medication Sig Dispense Refill   • primidone (MYSOLINE) 50 MG tablet TAKE TWO TABLETS BY MOUTH EVERY DAY IN THE EVENING 180 tablet 1   • HYDROcodone-acetaminophen (NORCO) 5-325 MG per tablet Take 1 tablet by mouth daily as needed for Pain. 30 tablet 0   • gabapentin (NEURONTIN) 300 MG capsule Take 1 capsule by mouth 3 times daily. 270 capsule 0   • pravastatin (PRAVACHOL) 20 MG tablet Take 1 tablet by mouth daily. 90 tablet 1   • aspirin 81 MG chewable tablet Chew 1 tablet by mouth daily. Do not start before October 15, 2020. 30 tablet 0   • warfarin (COUMADIN) 4 MG tablet On 10/14, take 1.5 tablets (=6 mg).  On 10/15, take 1 tablet (=4 mg). Recheck INR on 10/16, further dosing to be determined at that time.    Previous dosing: Take 1 tablet (= 4 mg) by mouth on Sunday, Tuesday, Thursday and take 1/2 tablet (= 2 mg) by mouth on Monday, Wednesday, Friday, Saturday     • cyanocobalamin 100 MCG tablet Take 100 mcg by mouth daily.     • Melatonin 3 MG Cap Take 3 mg by mouth at bedtime as needed. Indications: Trouble Sleeping      • Acetaminophen (TYLENOL EXTRA STRENGTH PO) Take 2 tablets by mouth daily as needed (OVERALL PAIN).     • losartan (COZAAR) 100 MG tablet TAKE ONE TABLET BY MOUTH EVERY DAY 90 tablet 1   • amLODIPine (NORVASC) 2.5 MG tablet Take 1 tablet by mouth daily. 90 tablet 1   • ondansetron (ZOFRAN ODT) 4 MG disintegrating tablet DISSOLVE ONE TABLET UNDER THE TONGUE EVERY 8 HOURS AS NEEDED FOR NAUSEA 30 tablet 0   • pantoprazole (PROTONIX) 40 MG tablet TAKE ONE TABLET BY MOUTH EVERY DAY 90 tablet 1   • solifenacin (VESICARE) 10 MG tablet Take 10 mg by mouth daily.     • potassium chloride (K-DUR,KLOR-CON) 20 MEQ CR tablet Take 20 mEq by mouth 2 times  "  Pulse: 58   Temp: 97.8  F (36.6  C)   TempSrc: Oral   SpO2: 96%   Weight: 265 lb 12.8 oz   Height: 5' 6\"       Body mass index is 42.9 kg/(m^2).    Zong X, LPN                           Physical Examination:  /88 (BP Location: Left arm, Patient Position: Chair, Cuff Size: Adult Large)  Pulse 58  Temp 97.8  F (36.6  C) (Oral)  Ht 5' 6\"  Wt 265 lb 12.8 oz  SpO2 96%  BMI 42.9 kg/m2  General: alert, oriented, in no acute distress, sitting comfortably  HEENT: mucous membranes moist  Perianal external examination:  Perianal skin: Intact with no excoriation or lichenification.  Lesions: No evidence of an external lesion, nodularity, or induration in the perianal region.  Eversion of buttocks: There was not evidence of an anal fissure. Details: N/A.  Skin tags or external hemorrhoids: None  Digital rectal examination: Was performed.   Sphincter tone: Good.  Palpable lesions: Yes - Location: lesion palpable in the posterior anal canal.  Other: None.  Bimanual examination: was not performed      Anoscopy: Was performed.   Hemorrhoids: Yes. Grade 1-2 internal hemorrhoids without bleednig  Lesions: Yes: verruciform lesion in the posterior anal canal, approximately 2 cm in size      Procedures:  After injection with 1.5% lidocaine with epinephrine, biopsy was obtained in the posterior anal canal using baby Tischler forceps and hemostasis was obtained using Monsel and silver nitrate.     Total face to face time was 20 minutes, outside the procedure time, >50% counseling.    JERRICA Hamm, NP-C  Colon and Rectal Surgery   [facility]    This note was created using speech recognition software and may contain unintended word substitutions.    Again, thank you for allowing me to participate in the care of your patient.      Sincerely,    JERRICA Hamm CNP      " daily.      • furosemide (LASIX) 20 MG tablet Take 20 mg by mouth 2 times daily.      • Magnesium 100 MG Tab Take 1 tablet by mouth daily.     • Biotin 1000 MCG TABS Take 1,000 mcg by mouth daily.     • cholecalciferol (VITAMIN D3) 1000 UNITS tablet Take 1,000 Units by mouth daily.       • triamcinolone (KENALOG) 0.025 % cream Apply  topically 2 times daily.          Medications Prior to Admission   Medication Sig Dispense Refill   • primidone (MYSOLINE) 50 MG tablet TAKE TWO TABLETS BY MOUTH EVERY DAY IN THE EVENING 180 tablet 1   • HYDROcodone-acetaminophen (NORCO) 5-325 MG per tablet Take 1 tablet by mouth daily as needed for Pain. 30 tablet 0   • gabapentin (NEURONTIN) 300 MG capsule Take 1 capsule by mouth 3 times daily. 270 capsule 0   • pravastatin (PRAVACHOL) 20 MG tablet Take 1 tablet by mouth daily. 90 tablet 1   • aspirin 81 MG chewable tablet Chew 1 tablet by mouth daily. Do not start before October 15, 2020. 30 tablet 0   • warfarin (COUMADIN) 4 MG tablet On 10/14, take 1.5 tablets (=6 mg).  On 10/15, take 1 tablet (=4 mg). Recheck INR on 10/16, further dosing to be determined at that time.    Previous dosing: Take 1 tablet (= 4 mg) by mouth on Sunday, Tuesday, Thursday and take 1/2 tablet (= 2 mg) by mouth on Monday, Wednesday, Friday, Saturday     • cyanocobalamin 100 MCG tablet Take 100 mcg by mouth daily.     • Melatonin 3 MG Cap Take 3 mg by mouth at bedtime as needed. Indications: Trouble Sleeping      • Acetaminophen (TYLENOL EXTRA STRENGTH PO) Take 2 tablets by mouth daily as needed (OVERALL PAIN).     • losartan (COZAAR) 100 MG tablet TAKE ONE TABLET BY MOUTH EVERY DAY 90 tablet 1   • amLODIPine (NORVASC) 2.5 MG tablet Take 1 tablet by mouth daily. 90 tablet 1   • ondansetron (ZOFRAN ODT) 4 MG disintegrating tablet DISSOLVE ONE TABLET UNDER THE TONGUE EVERY 8 HOURS AS NEEDED FOR NAUSEA 30 tablet 0   • pantoprazole (PROTONIX) 40 MG tablet TAKE ONE TABLET BY MOUTH EVERY DAY 90 tablet 1   •  solifenacin (VESICARE) 10 MG tablet Take 10 mg by mouth daily.     • potassium chloride (K-DUR,KLOR-CON) 20 MEQ CR tablet Take 20 mEq by mouth 2 times daily.      • furosemide (LASIX) 20 MG tablet Take 20 mg by mouth 2 times daily.      • Magnesium 100 MG Tab Take 1 tablet by mouth daily.     • Biotin 1000 MCG TABS Take 1,000 mcg by mouth daily.     • cholecalciferol (VITAMIN D3) 1000 UNITS tablet Take 1,000 Units by mouth daily.       • triamcinolone (KENALOG) 0.025 % cream Apply  topically 2 times daily.       • enoxaparin (LOVENOX) 60 MG/0.6ML injectable solution Inject 0.6 mLs into the skin every 12 hours. Do not start before November 22, 2020. Procedure 11/24/20. To follow bridge plan outline. Has self injected in past. Wt:62.1kg; Creatinine 0.49. Please deliver. 6 Syringe 1   • predniSONE (DELTASONE) 20 MG tablet Take 3 tablets by mouth in the morning for 2 days, then 2 tablets in the morning for 2 days, then 1 tablet in the morning until gone. 12 tablet 0   • diphenoxylate-atropine (LOMOTIL) 2.5-0.025 MG tablet Take 1 tablet by mouth 4 times daily as needed for Diarrhea. 30 tablet 0       ALLERGIES    ALLERGIES:   Allergen Reactions   • Amiodarone SWELLING and Other (See Comments)     Swelling of hands, legs and slightly of face, no SOB  Swelling   • Iodine   (Environmental Or Med) RASH, DIARRHEA and SHORTNESS OF BREATH   • Latex   (Environmental) RASH   • Bacitracin RASH   • Demerol VOMITING   • Lisinopril Cough   • Neosporin [Neomycin-Bacitracin-Polymyxin] RASH   • Sulfa Drugs Cross Reactors HIVES       REVIEW OF SYSTEMS    All 13 Review of Systems negative except for what's listed in the HPI.      PHYSICAL EXAM    Vital 24 Hour Range Most Recent Value   Temperature Temp  Min: 98.7 °F (37.1 °C)  Max: 98.7 °F (37.1 °C) 98.7 °F (37.1 °C)   Pulse Pulse  Min: 69  Max: 103 71   Respiratory Resp  Min: 11  Max: 52 15   Blood Pressure BP  Min: 135/69  Max: 192/78 (!) 173/72   Pulse Oximetry SpO2  Min: 74 %  Max: 99  % 94 %   Arterial BP No data recorded     O2 O2 Flow Rate (L/min)  Av L/min  Min: 2 L/min   Min taken time: 20 1348  Max: 2 L/min   Max taken time: 20 1348       Vital Most Recent Value First Value   Weight 65.3 kg Weight: 65.3 kg   Height       BMI   N/A       Examination:    General:  well developed, well nourished, no apparent distress and Resting comfortably in the bed post cardiac catheterization  HEENT: normocephalic, atraumatic, EOMI and normal lips, teeth, and gums  Neck:  trachea midline  CV: regular rate and rhythm and no murmurs, rubs, or thrills  Resp: clear to auscultation bilaterally, no accessory muscle use  and Good air entry without wheezing, rales, rhonchi  Abd: soft, nontender, nondistended and bowel sounds  present  Ext: no rashes, lesions, or ulcers noted, no clubbing, cyanosis, or ischemia and edema absent   Neuro: CN 2-12 grossly intact, normal sensation  and no focal deficits noted  Psych: normal judgement and insight, oriented to time, place and person and normal mood and affect        LABS    Recent Labs   Lab 20  1024 20  1000   SODIUM  --  135   POTASSIUM  --  4.0   CHLORIDE  --  100   CO2  --  27   BUN  --  11   CREATININE 0.60 0.62   GLUCOSE  --  118*   WBC  --  8.6   HGB  --  11.2*   HCT  --  35.7*   PLT  --  356   PT  --  23.0*   INR  --  2.2   PTT  --  37*     Recent Labs   Lab 20  1112 20  1000   RAPDTR 0.13* 0.14*     No results found for this or any previous visit.   No results found for this or any previous visit.  Lab Results   Component Value Date    USPG 1.016 2020    UPROT Negative 2020    UWBC Trace (A) 2020    URBC Negative 2020    UPH 5.0 2020       IMAGING & OTHER STUDIES    Ct Chest Abdomen    Result Date: 2020  Narrative: EXAM: CT CHEST ABDOMEN WO CONTRAST DATE OF EXAM: 2020 10:34 AM. COMPARISON: None. CLINICAL INFORMATION: Chest/back pain, acute, aortic dissection suspect. FINDINGS: CT  CHEST: Heart: Moderately enlarged. Cardiac pacemaker in place Thoracic Aorta: Ascending aorta measures 3.1 cm. Proximal descending aorta 2.3 cm Distal descending thoracic aorta 2.3 cm. Mild atherosclerotic calcination evident. No evidence of a mural hematoma. No aneurysm dilatation. Main Pulmonary Outflow Tract: Unremarkable Mediastinum/Alicia: Occasional small mediastinal lymph node. No mediastinal hematoma. Lungs: No dominant focal consolidation. Mild patchy bibasilar groundglass infiltrative changes evident. Pleura: Minimal right-sided pleural fluid present. CT ABDOMEN AND PELVIS: Liver: Unremarkable. Gallbladder: Unremarkable. Spleen: Unremarkable. Pancreas: Unremarkable. Adrenal Glands: No adrenal lesions evident. Kidneys: Status post right nephrectomy. Hypertrophy left kidney no renal mass or calcification. No hydronephrosis Abdominal Aorta: The abdominal aorta is normal in caliber. Atherosclerotic calcifications evident. No para-aortic hematoma evident. No evidence of mural hematoma. No para-aortic adenopathy. Osseous structures. There are changes of kyphoplasty of L3 with mild the compression. There are fractures of the anterior arc of the left eighth through 11th ribs. There are fractures of the anterior arc  right eighth and ninth ribs     Impression: IMPRESSION: No aneurysm dilatation of the thoracic or abdominal aorta. No mural hematoma evident. No findings to suggest aortic dissection. Graft nondisplaced fractures of the bilateral ribs anteriorly Cardiac enlargement Mild patchy bilateral groundglass infiltrates     Xr Chest Ap Or Pa    Result Date: 12/8/2020  Narrative: PROCEDURE: CHEST, 1 VIEW, FRONTAL COMPARISONS: 10/13/2020 plain film and CT performed today CLINICAL INDICATION: chest pain FINDINGS: Stable mild chronic interstitial coarsening with no new sites of lung opacity. Stable cardiomegaly with dual-lead pacer in place.     Impression: IMPRESSION: Stable cardiomegaly. Chronic interstitial  coarsening. No acute process.     Xr Thoracolumbar Spine Ap Lat 2 View    Result Date: 12/1/2020  Narrative: XR THORACOLUMBAR SPINE 2 VW INDICATION:  Low back pain COMPARISON:  4/1/2016. FINDINGS: Levocurvature of the lumbar spine. Mild (grade 1) retrolisthesis of T11 on T12, T12 on L1, and L1 on L2. Stable compression deformities at L2 and L3 with cement material at L3. No new compression deformities. No osseous masses. Stable degenerative changes of the lumbar and visualized thoracic spine. Diffuse osteopenia. Arterial vascular calcifications.     Impression: IMPRESSION: 1. Levocurvature of the lumbar spine. Mild (grade 1) retrolisthesis of T11 on T12, T12 on L1, and L1 on L2. 2. Stable compression deformities at L2 and L3 with cement material at L3. No new compression deformities. 3. Stable degenerative changes and diffuse osteopenia.     Xr Lumbar Spine 2 Or 3 Views    Result Date: 12/6/2020  Narrative: XR LUMBAR SPINE 2 OR 3 VIEWS HISTORY: pain, radiculopathy, recent vertebroplasty. COMPARISON: X-ray December 1 FINDINGS: Frontal, lateral, and lumbosacral views are provided.There are 5 nonrib-bearing lumbar type vertebral bodies present.Compression fracture and kyphoplasty treatment of L3 is again noted. Mild superior endplate height loss of L2 is unchanged. There is no new acute appearing compression fracture.There is multilevel disc height loss throughout the lumbar spine and also facet arthropathy.There is calcific plaque of aorta.     Impression: IMPRESSION: No new compression deformity.     Us Carotid Duplex Bilateral    Result Date: 11/18/2020  Narrative: DUPLEX ULTRASOUND OF THE CAROTID ARTERIES HISTORY: Carotid stenosis. COMPARISON: 7/20/2017. TECHNIQUE: Gray-scale, color flow and spectral Doppler imaging of the bilateral carotid and vertebral arteries was performed, with segmental recordings of velocity spectra. FINDINGS: Right:  Longitudinal color-flow images reveal all vessels to be patent, with a  normal flow direction. There is mild to moderate plaque involving the common carotid, carotid bulb and proximal internal carotid artery. The vertebral artery is patent and has normal flow direction. Left: Longitudinal color-flow images reveal all vessels to be patent, with a normal flow direction. There is mild plaque involving the common carotid, carotid bulb and proximal internal carotid artery. The vertebral artery is patent and has normal flow direction. The following peak systolic velocities were measured (units given in cm/s), end diastolic velocities are listed when abnormal: Right: Internal carotid:   132, EDV 11 Common carotid: 77 External carotid: 283 Systolic ratio: 1.7 Left: Internal carotid:   84, EDV 15 Common carotid: 107 External carotid: 95 Systolic ratio: 0.8 CONSENSUS PANEL GRAYSCALE AND DOPPLER ULTRASOUND CRITERIA FOR DIAGNOSIS OF ICA STENOSIS. Primary parameters:                                                                                   Additional parameters: Degree of Stenosis (%)      ICA PSV (cm/s)       Plaque Estimate (%)          ICA/CCA ratio (PSV)        ICA EDV (cm/s) Normal                                  < 125                         none                                          < 2.0                            <40 <50%                                    < 125                         < 50                                           < 2.0                           <40 50-69                                     125-230                     > or = 50                                    2.0-4.0                          > or = 70%                            >230                         > or = 50                                     >4.0                            >100     Impression: IMPRESSION: 1. RIGHT CAROTID: There is between 50-69% stenosis using SRU criteria. Mild to moderate plaque formation. 2. LEFT CAROTID: There is less than 50% stenosis using SRU criteria. Mild plaque  formation. 3. The vertebral arteries are patent with normal flow direction.     Ct Lumbar Spine Wo Contrast    Result Date: 11/19/2020  Narrative: EXAM: CT LUMBAR SPINE WO CONTRAST HISTORY: Back pain, red flag (pain>4wks), initial exam, lumbar radiculopathy, complete incontinence. COMPARISON: 8/22/2019. TECHNIQUE:  2.5 mm helical axial images were obtained of the lumbar spine. Coronal and sagittal reformats were created. FINDINGS: There is an acute L3 compression fracture with approximate 30% loss of vertebral body height. No healing callus is yet visible along the fracture cleft (along the superior endplate). There is a stable chronic L2 compression fracture. The remaining vertebral body heights are preserved. The bones are diffusely demineralized. Stable marginal osteophytes are noted throughout the lumbar spine. There is stable disc space narrowing throughout as well, most prominent at L5-S1. Vacuum phenomenon is identified at all of the lumbar spine levels with the exception of L1-L2. There are 5 nonrib-bearing lumbar vertebrae (there are hypoplastic ribs at T12. There is mild exit scoliosis. There is stable disc bulges and posterior osteophyte ridges at all of the lumbar spine levels. The spinal canal does not appear significantly narrowed. There is stable diffuse facet arthropathy. There is stable multilevel neural foraminal narrowing. In particular, the left neural foramina is moderately narrowed at L1-L2. Mild to moderate foraminal narrowing is identified at the remaining levels. Calcified atherosclerotic plaque is noted in the aorta.     Impression: IMPRESSION: 1.  Acute L3 compression fracture. No osseous retropulsion is identified. 2.  Chronic L2 compression fracture. 3.  Stable diffuse osseous demineralization and degenerative changes.       Fl Pacs Record Nr    Impression: Non-reportable by Radiologist.    Cath/pv Case    Result Date: 12/8/2020  Narrative: Cardiac catheterization report 12/8/2020  Conclusion and recommendation Mild nonobstructive mid dominant RCA disease of 30% No coronary artery disease in the rest of other vessel left main, lad, left circumflex Noncardiac chest pain the likely related to poorly controlled blood pressure Recommend Blood pressure control EDP is 15 mm of mercury Procedures Selective left and right coronary angiogram Left heart catheterization Mynx closure daily Indications Chest pain with acute onset this morning.  Suspicious for unstable angina.  Troponin 0.14.  Chest pain as pressure-like across the chest radiated to her jaw.  Was ongoing when she was in the emergency department.  She was started on nitroglycerin drip.  The pain subsided.  History of significant PVD.  Paced rhythm.  No ECG ST changes. Patient has single kidney.  Her creatinine is 1.0.  Discussed with her the contrast induced nephropathy.  Discussed with her potential of dialysis post cardiac angiograms. She also has a history long time ago of a rash along her neck when she used contrast.  She received methylprednisolone IV and Benadryl IV in the emergency department as prep. Technique Right common femoral artery was cannulated with 5 Indonesian sheath.  Single stick.  The anterior wall only.  The modified Seldinger technique.  Selective left and right coronary angiograms performed with 5 Indonesian Floridalma catheters.  Right heart catheterization performed with the 5 Indonesian JR4 catheter inserted into the left ventricle across the aortic valve.  No immediate complications.  Minimal blood loss less than 5 cc.  Site closed very well.  No hematoma. Findings Left main is large.  No angiographic evidence of coronary artery disease LAD is large.  It gives in the mid large diagonal branch.  The vessels are tortuous.  No angiographic evidence of coronary artery disease Left circumflex is large.  It gives large OM.  No angiographic evidence of coronary artery disease. RCA is large.  Dominant.  In the mid there is mild  nonobstructive disease of 20-30% LVEDP is 15 mm of mercury.  No aortic stenosis on pullback of the pigtail catheter. Minimal contrast was used while taking the images because patient is known to have single kidney.  Total of less than 35 cc of contrast used.  For further details please see below, H&P, consult notes, progress note. Event Log   Event Details User 10:00 AM 12/8/20 In Facility Pre-proc: Arrived  1:10 PM 12/8/20 Gabrielle Salinas RN - In Role: Monitor  1:10 PM 12/8/20 Ani Fofana RN - In Role: Nurse  1:10 PM 12/8/20 DAVID Vieyra - In Role: Scrub/Panner  1:10 PM 12/8/20 In Room Intra-proc: Procedure  1:10 PM 12/8/20 Bed Request Ready to Plan   1:15 PM 12/8/20 Pre-Procedure Teaching Reinforced Pre-procedure teaching reinforced.  RK 1:15 PM 12/8/20 Routine Standards Initiated Routine safety standards initiated. Routine nursing standards initiated.   1:15 PM 12/8/20 Patient Positioning Patient is in the supine position.  The head was positioned using the following devices: regular pillow. The left arm was positioned using the following devices: arm board. The right arm was positioned using the following devices: arm board. The patient was positioned by Gabrielle Salinas RN, RT JarrellR, Ani Fofana RN   1:15 PM 12/8/20 ECG Rhythm The ECG revealed a sinus rhythm. The ECG rate was 72 bpm.   1:15 PM 12/8/20 EKG, BP, O2 monitors applied (peds) EKG, BP, O2 monitors applied as per protocol.    1:15 PM 12/8/20 Site Preparation (SR) The skin of the bilateral groins was clipped, prepped and draped in the usual sterile manner. (If not otherwise specified, skin prep was bilateral.)   1:16 PM 12/8/20 Routine Standards Initiated Routine safety standards initiated. Routine nursing standards initiated.  RK 1:16 PM 12/8/20 Patient Positioning Patient is in the supine position.  The right arm was positioned using the following devices: arm board.  RK 1:20 PM 12/8/20 Ellie Monterroso  MD - In Role: Primary Service: Cardiovascular (Panel 1)  1:20 PM 12/8/20 Patient Ready   1:22 PM 12/8/20 Pressure Channel Zeroed Pressure Channel 1 zeroed.    1:25 PM 12/8/20 Timeout: Preprocedure Verified by Gabrielle Salinas RN at 12/8/2020  1:25 PM  1:25 PM 12/8/20 Timeout: Fire Safety Verified by Gabrielle Salinas RN at 12/8/2020  1:25 PM  1:26 PM 12/8/20 lidocaine HCl (XYLOCAINE) 1 % injection 7 mL Given Rate: 0 Route: Subcutaneous Site: Groin, Right  MD 1:27 PM 12/8/20 Procedure Start Intra-proc  1:28 PM 12/8/20 Access (SR) Sheath 5fr 10cm 2.5cm Intro Kink Rst Snap On Dil - The skin at the access site was anesthetized. Using a flashback needle with the Modified Seldinger technique the right femoral artery was succesfully accessed in a retrograde fashion over the guidewire, under fluoroscopic guidance using a Sheath 5fr 10cm 2.5cm Intro Kink Rst Snap On Dil.   1:29 PM 12/8/20 Sheath/Introducer 12/08/20 Right Femoral artery Placed Placement Date/Time: 12/08/20 1329   Inserted By: Loc  Present at Time of Admission: No  Insertion Approach: Retrograde  Laterality: Right  Arterial location: Femoral artery  Prep: Chlorhexidine with alcohol;Local anesthetic - injectable  Size Fr: 5...  1:29 PM 12/8/20 Catheter Inserted (SR) Catheter 5fr Jl4 Crv 100cm Lg Inner Lum Radopq - A (Catheter 5fr Jl4 Crv 100cm Lg Inner Lum Radopq) catheter was inserted.    1:31 PM 12/8/20 Coronaries Injected (SR) Catheter 5fr Jl4 Crv 100cm Lg Inner Lum Radopq - The left coronary artery was selectively engaged and injected. Multiple views of the injected vessel were taken.   1:33 PM 12/8/20 Catheter Exchanged (SR) Catheter 5fr Jr4 Crv 100cm Lg Inner Lum Radopq - A catheter was exchanged for a (Catheter 5fr Jr4 Crv 100cm Lg Inner Lum Radopq) catheter.   1:35 PM 12/8/20 Coronaries Injected (SR) Catheter 5fr Jr4 Crv 100cm Lg Inner Lum Radopq - The left coronary artery was selectively engaged and injected. Multiple views of the  injected vessel were taken.   1:36 PM 12/8/20 Catheter Removed (SR) Catheter 5fr Jr4 Crv 100cm Lg Inner Lum Radopq - Catheter removed intact.   1:36 PM 12/8/20 Site Closure Angiogram (SR) Sheath 5fr 10cm 2.5cm Intro Kink Rst Snap On Dil - Right femoral artery angiogram was performed  using a Sheath 5fr 10cm 2.5cm Intro Kink Rst Snap On Dil by hand injection.  The access site was deemed to be appropriate for closure.   1:39 PM 12/8/20 Sheath Removed (SR) Sheath 5fr 10cm 2.5cm Intro Kink Rst Snap On Dil - Sheath removed intact.    1:39 PM 12/8/20 Access Site Closure (SR) Device Cordis Mynxgrip 5fr Bln Cath Integrate Slnt - The access site was successfully closed using a (Device Cordis Mynxgrip 5fr Bln Cath Integrate Slnt) closure device.  1:42 PM 12/8/20 nitroGLYcerin 50 mg in dextrose 5% 250 mL infusion 30 mcg/min Rate/Dose Change Rate: 9 mL/hr Route: Intravenous  1:46 PM 12/8/20 Procedure Finish Intra-proc  1:46 PM 12/8/20 ECG Rhythm The ECG revealed a sinus rhythm. The ECG rate was 69 bpm.   1:46 PM 12/8/20 Radiation Tracking Panel 1: Ellie Monterroso MD Radiation (mGy) = 192.000; Fluoro Time (min) = 2.2; DAP (Gy-cm2) = 22.220  1:46 PM 12/8/20 iopamidol (ISOVUE-370) 76 % injection 35 mL Given Rate: 0 Route: Intra-arterial MD 1:46 PM 12/8/20 Technique Note (SR) Total contrast used 35mLs of Isovue-370. Total Fluoro time 2.2 minutes.  AO pressure 164/73/111. LV pressure 173/7/20.   1:47 PM 12/8/20 Implant device info sent with patient Ryan  1:47 PM 12/8/20 Pain Assessment Documented  RK 1:47 PM 12/8/20 Ellie Monterroso MD - Out Role: Primary Service: Cardiovascular (Panel 1)  1:48 PM 12/8/20 Post procedure teaching performed  RK 1:48 PM 12/8/20 Handoff Report Patient Transferred to: Surg 3 Handoff Report Given to: Dennise Stratton  RK 1:10 PM 12/8/20 Imaging Exam Started  MH Medications (Filter: Administrations occurring from 12/08/20 1310 to 12/09/20 1310) (important)  Continuous medications are totaled by the  amount administered until 12/09/20 1310. Medication Rate/Dose/Volume Action  Date Time  lidocaine HCl (XYLOCAINE) 1 % injection (mL) 7 mL Given 12/08/20 1326  Total dose as of 12/08/20 1356       7 mL       iopamidol (ISOVUE-370) 76 % injection (mL) 35 mL Given 12/08/20 1346  Total dose as of 12/08/20 1356       35 mL       nitroGLYcerin 50 mg in dextrose 5% 250 mL infusion (mcg/min) 30 mcg/min - 9 mL/hr Rate/Dose Change 12/08/20 1342  Total dose as of 12/08/20 1356       423.5 mcg       Timeouts Gabrielle Salinas RN at Tue Dec 8, 2020 1325 CST Timeout Details Timeout type: Fire Safety Staff Present Physicians Ellie Monterroso MD Staff ELDER Devlin, ELDER Galan, RTR Verification History Staff Performed Verified Gabrielle Salinas RN Tue Dec 8, 2020 1325 CST Tue Dec 8, 2020 1325 CST Gabrielle Salinas RN at Tue Dec 8, 2020 1325 CST Timeout Details Timeout type: Preprocedure Staff Present Physicians Ellie Monterroso MD Staff ELDER Devlin, ELDER Galan, RTR Verification History Staff Performed Verified Gabrielle Salinas RN Tue Dec 8, 2020 1325 CST Tue Dec 8, 2020 1325 CST Counts No counts needed.       Counts by Panel No counts needed.        Assessment/Plan:     Reason for Test:  SARS CoV-2 test: ordered for procedure/placement (non PUI)    Chest pain -   Patient presented with exertional chest pain with elevated troponin EKG showing no acute ST changes.  Patient started on nitroglycerin drip and taken to cardiac cath lab by Dr. Monterroso.   No Significant coronary artery disease lesion found.  Recommended better control the blood pressure.  Continue nitroglycerin drip to maintain systolic pressure around 120. Started patient on Coreg 3.125 b.i.d. increase the dose of amlodipine to 5 mg daily.  Continue with aspirin, statin and losartan.     Hypertensive urgency -   Patient presented with systolic pressure in the 190s.  Takes Cozaar 100 mg daily, Norvasc 2.5,  Lasix 40 b.i.d. continue with home dose of Cozaar, increase the dose of Norvasc from 2.5-5 mg and started patient on Coreg at 3.125 b.i.d..  Patient presently on nitroglycerin drip which can be titrated down to keep systolic pressure between 120 and 140.     Persistent AFib -   Monitor on telemetry.  Continue with Coumadin per pharmacy dosing.  Patient started on beta-blocker by Cardiology.      Complete heart block status post dual-chamber pacemaker placement -   Patient with the pacemaker in place continue to monitor.  Monitor on telemetr      Essential hypertension -   Patient takes losartan, amlodipine, Lasix at home.  Continue with those medications.  Started patient on low-dose b.i.d. Coreg.         Smoking status- non smoker    Nutrition status- appropriate    Body mass index is 27.2 kg/m². - appropriate    Code status- Full Resuscitation     DVT Prophylaxis - Coumadin    The patients treatment plans were discussed with patient, RN and consultant(s)      Is the patient expected to require a two midnight stay in the hospital? No I certify that I expect inpatient services for greater than two midnights are medically necessary for this patient. Please see H&P and MD Progress Notes for additional information about the patient's course of treatment.          Yevgeniy Echevarria MD    12/8/2020    3:02 PM

## 2020-12-08 NOTE — TELEPHONE ENCOUNTER
My chart message sent to patient    Jessica Moeller RN   [facility]– [place][note 2]

## 2020-12-09 DIAGNOSIS — Z12.31 ENCOUNTER FOR SCREENING MAMMOGRAM FOR BREAST CANCER: ICD-10-CM

## 2020-12-10 ENCOUNTER — OFFICE VISIT (OUTPATIENT)
Dept: SURGERY | Facility: CLINIC | Age: 50
End: 2020-12-10
Payer: COMMERCIAL

## 2020-12-10 ENCOUNTER — MYC MEDICAL ADVICE (OUTPATIENT)
Dept: FAMILY MEDICINE | Facility: CLINIC | Age: 50
End: 2020-12-10

## 2020-12-10 VITALS
TEMPERATURE: 98.4 F | WEIGHT: 289.4 LBS | OXYGEN SATURATION: 95 % | HEART RATE: 82 BPM | DIASTOLIC BLOOD PRESSURE: 87 MMHG | HEIGHT: 66 IN | SYSTOLIC BLOOD PRESSURE: 148 MMHG | BODY MASS INDEX: 46.51 KG/M2

## 2020-12-10 DIAGNOSIS — K62.82 ANAL DYSPLASIA: Primary | ICD-10-CM

## 2020-12-10 PROBLEM — E11.9 DIABETES MELLITUS, TYPE 2 (H): Status: ACTIVE | Noted: 2020-12-10

## 2020-12-10 PROCEDURE — 88112 CYTOPATH CELL ENHANCE TECH: CPT | Mod: GC | Performed by: PATHOLOGY

## 2020-12-10 PROCEDURE — 46601 DIAGNOSTIC ANOSCOPY: CPT | Performed by: NURSE PRACTITIONER

## 2020-12-10 ASSESSMENT — MIFFLIN-ST. JEOR: SCORE: 1944.46

## 2020-12-10 ASSESSMENT — PAIN SCALES - GENERAL: PAINLEVEL: NO PAIN (0)
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
## 2020-12-11 LAB — COPATH REPORT: NORMAL

## 2021-01-11 DIAGNOSIS — E03.9 HYPOTHYROIDISM, UNSPECIFIED TYPE: ICD-10-CM

## 2021-01-12 RX ORDER — LEVOTHYROXINE SODIUM 75 UG/1
75 TABLET ORAL DAILY
Qty: 90 TABLET | Refills: 1 | Status: SHIPPED | OUTPATIENT
Start: 2021-01-12 | End: 2021-08-03

## 2021-01-13 NOTE — TELEPHONE ENCOUNTER
"Requested Prescriptions   Pending Prescriptions Disp Refills     levothyroxine (SYNTHROID/LEVOTHROID) 75 MCG tablet [Pharmacy Med Name: Levothyroxine Sodium Oral Tablet 75 MCG] 90 tablet 0     Sig: Take 1 tablet (75 mcg) by mouth daily       Thyroid Protocol Passed - 1/11/2021  8:21 PM        Passed - Patient is 12 years or older        Passed - Recent (12 mo) or future (30 days) visit within the authorizing provider's specialty     Patient has had an office visit with the authorizing provider or a provider within the authorizing providers department within the previous 12 mos or has a future within next 30 days. See \"Patient Info\" tab in inbasket, or \"Choose Columns\" in Meds & Orders section of the refill encounter.              Passed - Medication is active on med list        Passed - Normal TSH on file in past 12 months     Recent Labs   Lab Test 05/04/20  1712   TSH 1.88              Passed - No active pregnancy on record     If patient is pregnant or has had a positive pregnancy test, please check TSH.          Passed - No positive pregnancy test in past 12 months     If patient is pregnant or has had a positive pregnancy test, please check TSH.             Signed Prescriptions:                        Disp   Refills    levothyroxine (SYNTHROID/LEVOTHROID) 75 MC*90 tab*1        Sig: Take 1 tablet (75 mcg) by mouth daily   Authorizing Provider: GINNA HYMAN  Ordering User: MED ACOSTA      "

## 2021-01-26 ENCOUNTER — MYC REFILL (OUTPATIENT)
Dept: FAMILY MEDICINE | Facility: CLINIC | Age: 51
End: 2021-01-26

## 2021-01-26 DIAGNOSIS — B00.9 RECURRENT HERPES SIMPLEX: ICD-10-CM

## 2021-01-27 RX ORDER — ACYCLOVIR 400 MG/1
400 TABLET ORAL 3 TIMES DAILY PRN
Qty: 15 TABLET | Refills: 0 | Status: SHIPPED | OUTPATIENT
Start: 2021-01-27 | End: 2022-01-10

## 2021-01-27 NOTE — TELEPHONE ENCOUNTER
Signed Prescriptions:                        Disp   Refills    acyclovir (ZOVIRAX) 400 MG tablet          15 tab*0        Sig: Take 1 tablet (400 mg) by mouth 3 times daily as           needed (symptoms)  Authorizing Provider: GINNA HYMAN  Ordering User: ANGELICA KIM RN  [facility]

## 2021-03-27 ENCOUNTER — HEALTH MAINTENANCE LETTER (OUTPATIENT)
Age: 51
End: 2021-03-27

## 2021-06-05 DIAGNOSIS — E66.813 CLASS 3 SEVERE OBESITY WITH SERIOUS COMORBIDITY AND BODY MASS INDEX (BMI) OF 45.0 TO 49.9 IN ADULT, UNSPECIFIED OBESITY TYPE (H): ICD-10-CM

## 2021-06-05 DIAGNOSIS — E66.01 CLASS 3 SEVERE OBESITY WITH SERIOUS COMORBIDITY AND BODY MASS INDEX (BMI) OF 45.0 TO 49.9 IN ADULT, UNSPECIFIED OBESITY TYPE (H): ICD-10-CM

## 2021-06-09 RX ORDER — NALTREXONE HYDROCHLORIDE 50 MG/1
50 TABLET, FILM COATED ORAL DAILY
Qty: 90 TABLET | Refills: 0 | Status: SHIPPED | OUTPATIENT
Start: 2021-06-09 | End: 2021-09-20

## 2021-06-23 DIAGNOSIS — F33.1 MODERATE EPISODE OF RECURRENT MAJOR DEPRESSIVE DISORDER (H): ICD-10-CM

## 2021-06-25 NOTE — TELEPHONE ENCOUNTER
Prescription refill requested  Pending Prescriptions:                       Disp   Refills    DULoxetine (CYMBALTA) 60 MG capsule [Phar*90 cap*0            Sig: Take 1 capsule (60 mg) by mouth daily     Last office visit: 4/29/20  Last refill: 4/29/20    Routing refill request to provider for review/approval because:  Patient needs to be seen because it has been more than 1 year since last office visit.      Darvin Lei RN   [facility]

## 2021-06-28 RX ORDER — DULOXETIN HYDROCHLORIDE 60 MG/1
60 CAPSULE, DELAYED RELEASE ORAL DAILY
Qty: 90 CAPSULE | Refills: 0 | Status: SHIPPED | OUTPATIENT
Start: 2021-06-28 | End: 2021-09-27

## 2021-07-11 ENCOUNTER — HEALTH MAINTENANCE LETTER (OUTPATIENT)
Age: 51
End: 2021-07-11

## 2021-07-23 ENCOUNTER — OFFICE VISIT (OUTPATIENT)
Dept: URGENT CARE | Facility: URGENT CARE | Age: 51
End: 2021-07-23
Payer: COMMERCIAL

## 2021-07-23 VITALS
BODY MASS INDEX: 45.77 KG/M2 | HEART RATE: 88 BPM | DIASTOLIC BLOOD PRESSURE: 88 MMHG | OXYGEN SATURATION: 96 % | WEIGHT: 283.6 LBS | SYSTOLIC BLOOD PRESSURE: 134 MMHG | TEMPERATURE: 99.1 F

## 2021-07-23 DIAGNOSIS — J06.9 VIRAL UPPER RESPIRATORY TRACT INFECTION WITH COUGH: ICD-10-CM

## 2021-07-23 DIAGNOSIS — E11.9 TYPE 2 DIABETES MELLITUS WITHOUT COMPLICATION, UNSPECIFIED WHETHER LONG TERM INSULIN USE (H): ICD-10-CM

## 2021-07-23 DIAGNOSIS — R06.02 SOB (SHORTNESS OF BREATH): ICD-10-CM

## 2021-07-23 DIAGNOSIS — Z20.822 SUSPECTED COVID-19 VIRUS INFECTION: ICD-10-CM

## 2021-07-23 DIAGNOSIS — R07.9 CHEST PAIN, UNSPECIFIED TYPE: Primary | ICD-10-CM

## 2021-07-23 PROCEDURE — U0005 INFEC AGEN DETEC AMPLI PROBE: HCPCS | Performed by: PHYSICIAN ASSISTANT

## 2021-07-23 PROCEDURE — 93000 ELECTROCARDIOGRAM COMPLETE: CPT | Performed by: PHYSICIAN ASSISTANT

## 2021-07-23 PROCEDURE — 99215 OFFICE O/P EST HI 40 MIN: CPT | Performed by: PHYSICIAN ASSISTANT

## 2021-07-23 PROCEDURE — U0003 INFECTIOUS AGENT DETECTION BY NUCLEIC ACID (DNA OR RNA); SEVERE ACUTE RESPIRATORY SYNDROME CORONAVIRUS 2 (SARS-COV-2) (CORONAVIRUS DISEASE [COVID-19]), AMPLIFIED PROBE TECHNIQUE, MAKING USE OF HIGH THROUGHPUT TECHNOLOGIES AS DESCRIBED BY CMS-2020-01-R: HCPCS | Performed by: PHYSICIAN ASSISTANT

## 2021-07-23 ASSESSMENT — ENCOUNTER SYMPTOMS
WEAKNESS: 0
HEADACHES: 0
LIGHT-HEADEDNESS: 0
SORE THROAT: 0
SHORTNESS OF BREATH: 1
NAUSEA: 0
NECK STIFFNESS: 0
EYES NEGATIVE: 1
NECK PAIN: 0
ALLERGIC/IMMUNOLOGIC NEGATIVE: 1
DIZZINESS: 0
ENDOCRINE NEGATIVE: 1
MYALGIAS: 0
COUGH: 0
CHILLS: 0
WHEEZING: 1
ARTHRALGIAS: 0
FEVER: 0
JOINT SWELLING: 0
PALPITATIONS: 0
MUSCULOSKELETAL NEGATIVE: 1
VOMITING: 0
DIARRHEA: 0
RHINORRHEA: 0
WOUND: 0
BACK PAIN: 0

## 2021-07-23 ASSESSMENT — PAIN SCALES - GENERAL: PAINLEVEL: MILD PAIN (3)

## 2021-07-23 NOTE — PROGRESS NOTES
Chief Complaint:    Chief Complaint   Patient presents with     Chest Pain     for past couple days      URI     Asthma     wheeze      Shortness of Breath         Medical Decision Making:    Vital signs reviewed by Dev Clancy PA-C  /88 (BP Location: Left arm, Patient Position: Sitting, Cuff Size: Adult Large)   Pulse 88   Temp 99.1  F (37.3  C) (Tympanic)   Wt 128.6 kg (283 lb 9.6 oz)   SpO2 96%   Breastfeeding No   BMI 45.77 kg/m      Differential Diagnosis:  URI Adult/Peds:  Bronchitis-viral, Pneumonia and Viral upper respiratory illness  Cardiac: Acute MI  PE      ASSESSMENT:     1. Chest pain, unspecified type    2. SOB (shortness of breath)    3. Viral upper respiratory tract infection with cough    4. Type 2 diabetes mellitus without complication, unspecified whether long term insulin use (H)    5. Suspected COVID-19 virus infection           PLAN:    Patient has elevated temp of 99.1, and is hypertensive in clinic today.  Lung sounds were clear and O2 sats at 96% on RA.   EKG was negative for any acute ST or ischemic changes per my read.  COVID swab collected in clinic tonight.  At this time I can not rule out acute coronary syndrome, or PE.  Patient instructed to go to the ED now for further evaluation, cardiac workup, labs, imaging, and possible admission.  Patient is hypertensive in clinic today.  Patient verbalized understanding and agreed with this plan.  Patient was discharged in stable condition.    Labs:       EKG Interpretation:      Interpreted by Dev Clancy PA-C  Time reviewed: 18:53  Symptoms at time of EKG: chest pain and SOB.   Rhythm: Sinus rythm  Rate: 73  Axis: Normal  Ectopy: None  Conduction: Normal  ST Segments/ T Waves: No ST-T wave changes and No acute ischemic changes  Q Waves: None  Comparison to prior: EKG on 9/20/2017 was normal.    Clinical Impression: non-specific EKG       Results for orders placed or performed in visit on 07/23/21   Symptomatic COVID-19  Virus (Coronavirus) by PCR Nasopharyngeal     Status: None (In process)    Specimen: Nasopharyngeal; Swab    Narrative    The following orders were created for panel order Symptomatic COVID-19 Virus (Coronavirus) by PCR Nasopharyngeal.  Procedure                               Abnormality         Status                     ---------                               -----------         ------                     SARS-COV2 (COVID-19) Vir...[[id number]]                      In process                   Please view results for these tests on the individual orders.       Current Meds:    Current Outpatient Medications:      acyclovir (ZOVIRAX) 400 MG tablet, Take 1 tablet (400 mg) by mouth 3 times daily as needed (symptoms), Disp: 15 tablet, Rfl: 0     clotrimazole-betamethasone (LOTRISONE) 1-0.05 % external cream, APPLY EXTERNALLY TO THE AFFECTED AREA TWICE DAILY, Disp: 15 g, Rfl: 0     cyclobenzaprine (FLEXERIL) 10 MG tablet, TK 1 T PO  TID PRN MUSCLE SPASMS, Disp: , Rfl: 0     DULoxetine (CYMBALTA) 60 MG capsule, Take 1 capsule (60 mg) by mouth daily , Disp: 90 capsule, Rfl: 0     fluticasone (FLONASE) 50 MCG/ACT nasal spray, Spray 2 sprays into both nostrils daily Reported on 3/24/2017, Disp: , Rfl:      levonorgestrel (MIRENA) 20 MCG/24HR IUD, 1 each by Intrauterine route once, Disp: , Rfl:      levothyroxine (SYNTHROID/LEVOTHROID) 75 MCG tablet, Take 1 tablet (75 mcg) by mouth daily , Disp: 90 tablet, Rfl: 1     lisinopril (ZESTRIL) 40 MG tablet, Take 1 tablet (40 mg) by mouth daily, Disp: 90 tablet, Rfl: 3     Multiple Vitamins-Minerals (MULTIVITAMIN ADULT PO), , Disp: , Rfl:      naltrexone (DEPADE/REVIA) 50 MG tablet, Take 1 tablet (50 mg) by mouth daily , Disp: 90 tablet, Rfl: 0    Allergies:  Allergies   Allergen Reactions     Percocet [Oxycodone-Acetaminophen] Itching       SUBJECTIVE    HPI: Namoy Kowalski is an 50 year old female who presents for evaluation and treatment of chest pain, SOB, wheezing.  Symptoms  started 2 days ago and have not changed.  She has sick contacts at home.  She denies any palpitations or dizziness.      ROS:      Review of Systems   Constitutional: Negative for chills and fever.   HENT: Negative for congestion, ear pain, rhinorrhea and sore throat.    Eyes: Negative.    Respiratory: Positive for shortness of breath and wheezing. Negative for cough.    Cardiovascular: Positive for chest pain. Negative for palpitations.   Gastrointestinal: Negative for diarrhea, nausea and vomiting.   Endocrine: Negative.    Genitourinary: Negative.    Musculoskeletal: Negative.  Negative for arthralgias, back pain, joint swelling, myalgias, neck pain and neck stiffness.   Skin: Negative.  Negative for rash and wound.   Allergic/Immunologic: Negative.  Negative for immunocompromised state.   Neurological: Negative for dizziness, weakness, light-headedness and headaches.        Family History   Family History   Problem Relation Age of Onset     Diabetes Mother      Hypertension Mother      Cancer Father         pancreatic     Heart Disease Maternal Grandmother      Diabetes Maternal Grandmother      Cerebrovascular Disease Maternal Grandfather      Cancer - colorectal Paternal Grandfather      Diabetes Brother      Asthma Son      Depression Son      Asthma Daughter        Social History  Social History     Socioeconomic History     Marital status:      Spouse name: Not on file     Number of children: Not on file     Years of education: Not on file     Highest education level: Not on file   Occupational History     Not on file   Tobacco Use     Smoking status: Never Smoker     Smokeless tobacco: Never Used   Substance and Sexual Activity     Alcohol use: No     Drug use: No     Sexual activity: Yes     Partners: Male     Birth control/protection: I.U.D.     Comment: stopped end of June for now (7/14/2017)   Other Topics Concern     Parent/sibling w/ CABG, MI or angioplasty before 65F 55M? No   Social History  Narrative     Not on file     Social Determinants of Health     Financial Resource Strain:      Difficulty of Paying Living Expenses:    Food Insecurity:      Worried About Running Out of Food in the Last Year:      Ran Out of Food in the Last Year:    Transportation Needs:      Lack of Transportation (Medical):      Lack of Transportation (Non-Medical):    Physical Activity:      Days of Exercise per Week:      Minutes of Exercise per Session:    Stress:      Feeling of Stress :    Social Connections:      Frequency of Communication with Friends and Family:      Frequency of Social Gatherings with Friends and Family:      Attends [employer] Services:      Active Member of Clubs or Organizations:      Attends Club or Organization Meetings:      Marital Status:    Intimate Partner Violence:      Fear of Current or Ex-Partner:      Emotionally Abused:      Physically Abused:      Sexually Abused:         Surgical History:  Past Surgical History:   Procedure Laterality Date     BREAST SURGERY      breast reduction bilat     COLONOSCOPY WITH CO2 INSUFFLATION N/A 9/1/2017    Procedure: COLONOSCOPY WITH CO2 INSUFFLATION;  Colonoscopy, Abdominal pain, left lower quadrant, Parenteau, BMI 42.93, [phone];  Surgeon: Anuel Rodríguez MD;  Location:  OR     ENDOSCOPIC SINUS SURGERY Right 3/15/2017    Procedure: ENDOSCOPIC SINUS SURGERY;;  Surgeon: Vicki Zurita MD;  Location: [facility] OR     EXAM UNDER ANESTHESIA, FULGURATE CONDYLOMA ANUS, COMBINED N/A 9/26/2017    Procedure: COMBINED EXAM UNDER ANESTHESIA, FULGURATE CONDYLOMA ANUS;  Examination Under anethesia, Excision And Fulguration Of Anal Condyloma;  Surgeon: Td Garvey MD;  Location:  OR     LAPAROSCOPIC CHOLECYSTECTOMY  08/05/99     TONSILLECTOMY Bilateral 3/15/2017    Procedure: TONSILLECTOMY;  Bilateral Tonsillectomy, Right Functional Endoscopic Sinus Surgery ;  Surgeon: Vicki Zurita MD;  Location:  OR         Problem List:  Patient Active Problem List   Diagnosis     Hypothyroidism     CARDIOVASCULAR SCREENING; LDL GOAL LESS THAN 160     Moderate episode of recurrent major depressive disorder (H)     Obesity     Menorrhagia, treated with mirena IUD (inserted July 2013)      Family history of diabetes mellitus     Dysfunction of eustachian tube     HTN, goal below 140/90     Morbid obesity, unspecified obesity type (H)     Tonsillitis     Diabetes mellitus, type 2 (H)           OBJECTIVE:     Vital signs noted and reviewed by Dev Clancy PA-C  /88 (BP Location: Left arm, Patient Position: Sitting, Cuff Size: Adult Large)   Pulse 88   Temp 99.1  F (37.3  C) (Tympanic)   Wt 128.6 kg (283 lb 9.6 oz)   SpO2 96%   Breastfeeding No   BMI 45.77 kg/m       PEFR:    Physical Exam  Vitals and nursing note reviewed.   Constitutional:       General: She is not in acute distress.     Appearance: She is well-developed. She is not ill-appearing, toxic-appearing or diaphoretic.   HENT:      Head: Normocephalic and atraumatic.      Right Ear: Hearing, tympanic membrane, ear canal and external ear normal. Tympanic membrane is not perforated, erythematous, retracted or bulging.      Left Ear: Hearing, tympanic membrane, ear canal and external ear normal. Tympanic membrane is not perforated, erythematous, retracted or bulging.      Nose: Mucosal edema and congestion present. No rhinorrhea.      Right Sinus: No maxillary sinus tenderness or frontal sinus tenderness.      Left Sinus: No maxillary sinus tenderness or frontal sinus tenderness.      Mouth/Throat:      Pharynx: No pharyngeal swelling, oropharyngeal exudate, posterior oropharyngeal erythema or uvula swelling.      Tonsils: No tonsillar exudate or tonsillar abscesses. 0 on the right. 0 on the left.   Eyes:      General:         Right eye: No discharge.         Left eye: No discharge.      Pupils: Pupils are equal, round, and reactive to light.   Cardiovascular:       Rate and Rhythm: Normal rate and regular rhythm.      Heart sounds: Normal heart sounds. No murmur heard.   No friction rub. No gallop.    Pulmonary:      Effort: Pulmonary effort is normal. No respiratory distress.      Breath sounds: Normal breath sounds. No decreased breath sounds, wheezing, rhonchi or rales.   Chest:      Chest wall: No tenderness.   Abdominal:      General: Bowel sounds are normal. There is no distension.      Palpations: Abdomen is soft. There is no mass.      Tenderness: There is no abdominal tenderness. There is no guarding.   Musculoskeletal:      Cervical back: Normal range of motion and neck supple.   Lymphadenopathy:      Head:      Right side of head: No submental, submandibular, tonsillar, preauricular or posterior auricular adenopathy.      Left side of head: No submental, submandibular, tonsillar, preauricular or posterior auricular adenopathy.      Cervical: No cervical adenopathy.      Right cervical: No superficial or posterior cervical adenopathy.     Left cervical: No superficial or posterior cervical adenopathy.   Skin:     General: Skin is warm and dry.      Findings: No rash.   Neurological:      Mental Status: She is alert and oriented to person, place, and time.      Cranial Nerves: No cranial nerve deficit.      Deep Tendon Reflexes: Reflexes are normal and symmetric.   Psychiatric:         Behavior: Behavior normal. Behavior is cooperative.         Thought Content: Thought content normal.         Judgment: Judgment normal.             Dev Clancy PA-C  7/23/2021, 6:37 PM

## 2021-07-25 LAB — SARS-COV-2 RNA RESP QL NAA+PROBE: NEGATIVE

## 2021-08-01 ENCOUNTER — OFFICE VISIT (OUTPATIENT)
Dept: URGENT CARE | Facility: URGENT CARE | Age: 51
End: 2021-08-01
Payer: COMMERCIAL

## 2021-08-01 ENCOUNTER — ANCILLARY PROCEDURE (OUTPATIENT)
Dept: GENERAL RADIOLOGY | Facility: CLINIC | Age: 51
End: 2021-08-01
Attending: FAMILY MEDICINE
Payer: COMMERCIAL

## 2021-08-01 VITALS
OXYGEN SATURATION: 96 % | DIASTOLIC BLOOD PRESSURE: 87 MMHG | SYSTOLIC BLOOD PRESSURE: 145 MMHG | BODY MASS INDEX: 44.95 KG/M2 | WEIGHT: 278.5 LBS | HEART RATE: 81 BPM | RESPIRATION RATE: 16 BRPM | TEMPERATURE: 98.3 F

## 2021-08-01 DIAGNOSIS — I10 HTN, GOAL BELOW 140/90: ICD-10-CM

## 2021-08-01 DIAGNOSIS — J40 SINOBRONCHITIS: Primary | ICD-10-CM

## 2021-08-01 DIAGNOSIS — J32.9 SINOBRONCHITIS: Primary | ICD-10-CM

## 2021-08-01 DIAGNOSIS — R05.9 COUGH: ICD-10-CM

## 2021-08-01 DIAGNOSIS — E03.9 HYPOTHYROIDISM, UNSPECIFIED TYPE: ICD-10-CM

## 2021-08-01 PROCEDURE — U0003 INFECTIOUS AGENT DETECTION BY NUCLEIC ACID (DNA OR RNA); SEVERE ACUTE RESPIRATORY SYNDROME CORONAVIRUS 2 (SARS-COV-2) (CORONAVIRUS DISEASE [COVID-19]), AMPLIFIED PROBE TECHNIQUE, MAKING USE OF HIGH THROUGHPUT TECHNOLOGIES AS DESCRIBED BY CMS-2020-01-R: HCPCS | Performed by: FAMILY MEDICINE

## 2021-08-01 PROCEDURE — 99214 OFFICE O/P EST MOD 30 MIN: CPT | Performed by: FAMILY MEDICINE

## 2021-08-01 PROCEDURE — 71046 X-RAY EXAM CHEST 2 VIEWS: CPT | Performed by: RADIOLOGY

## 2021-08-01 PROCEDURE — U0005 INFEC AGEN DETEC AMPLI PROBE: HCPCS | Performed by: FAMILY MEDICINE

## 2021-08-01 RX ORDER — BENZONATATE 100 MG/1
100 CAPSULE ORAL 3 TIMES DAILY PRN
Qty: 30 CAPSULE | Refills: 0 | Status: SHIPPED | OUTPATIENT
Start: 2021-08-01 | End: 2022-02-15

## 2021-08-01 RX ORDER — DOXYCYCLINE 100 MG/1
100 CAPSULE ORAL 2 TIMES DAILY
Qty: 20 CAPSULE | Refills: 0 | Status: SHIPPED | OUTPATIENT
Start: 2021-08-01 | End: 2021-08-11

## 2021-08-01 NOTE — NURSING NOTE
The patient has not received any vaccination for COVID-19. She has an appointment in 2 weeks to get her first dose.  Taylor Sharp MA

## 2021-08-01 NOTE — PROGRESS NOTES
Assessment & Plan     Sinobronchitis  Differentials discussed in detail including sinobronchitis.  X-ray findings reviewed independently which showed no acute infiltrate or other significant abnormality.  COVID-19 test ordered.  Doxycycline and Tessalon prescribed, common side effects discussed.  Suggested to continue well hydration, warm fluids, over-the-counter analgesia.  Follow-up if symptoms persist or worsen.  Patient understood and agreement with above plan.  All questions answered.  - benzonatate (TESSALON) 100 MG capsule; Take 1 capsule (100 mg) by mouth 3 times daily as needed for cough  - doxycycline hyclate (VIBRAMYCIN) 100 MG capsule; Take 1 capsule (100 mg) by mouth 2 times daily for 10 days    Cough  - XR Chest 2 Views; Future  - Symptomatic COVID-19 Virus (Coronavirus) by PCR Nasopharyngeal      Chago Fernandes MD  [facility] [place] LAITH Moralez is a 50 year old who presents for the following health issues    HPI     Concern -   Onset: 1 week   Description: cough, chest congestion, sinus pressure, post nasal drainage, some blood in phlegm   Intensity: moderate  Progression of Symptoms:  worsening  Accompanying Signs & Symptoms: fever, chills, no sob, chest pain, palpitation, sore throat   Previous history of similar problem: no  Therapies tried and outcome: sudafed, mucinex   Not vaccinated against covid 19      Review of Systems   Constitutional, HEENT, cardiovascular, pulmonary, gi and gu systems are negative, except as otherwise noted.      Objective    BP (!) 145/87 (Patient Position: Sitting, Cuff Size: Adult Regular)   Pulse 81   Temp 98.3  F (36.8  C) (Oral)   Resp 16   Wt 126.3 kg (278 lb 8 oz)   SpO2 96%   Breastfeeding No   BMI 44.95 kg/m    Body mass index is 44.95 kg/m .  Physical Exam   GENERAL: alert and no distress  EYES: Eyes grossly normal to inspection, PERRL and conjunctivae and sclerae normal  HENT: normal cephalic/atraumatic, ear  canals and TM's normal, nasal mucosa edematous , oropharynx clear, oral mucous membranes moist and sinuses: not tender  NECK: no adenopathy, no asymmetry, masses, or scars and thyroid normal to palpation  RESP: lungs clear to auscultation - no rales, rhonchi or wheezes  CV: regular rate and rhythm, normal S1 S2, no S3 or S4, no murmur, click or rub  ABDOMEN: soft, nontender, no hepatosplenomegaly  MS: no gross musculoskeletal defects noted, no edema  SKIN: no suspicious lesions or rashes  NEURO: Normal strength and tone, mentation intact and speech normal  PSYCH: mentation appears normal, affect normal/bright        Results for orders placed or performed in visit on 08/01/21   Symptomatic COVID-19 Virus (Coronavirus) by PCR Nasopharyngeal     Status: None ()    Specimen: Nasopharyngeal; Swab    Narrative    The following orders were created for panel order Symptomatic COVID-19 Virus (Coronavirus) by PCR Nasopharyngeal.  Procedure                               Abnormality         Status                     ---------                               -----------         ------                     SARS-COV2 (COVID-19) Vir...[[id number]]                                                   Please view results for these tests on the individual orders.

## 2021-08-02 LAB — SARS-COV-2 RNA RESP QL NAA+PROBE: NEGATIVE

## 2021-08-03 RX ORDER — LISINOPRIL 40 MG/1
40 TABLET ORAL DAILY
Qty: 90 TABLET | Refills: 0 | Status: SHIPPED | OUTPATIENT
Start: 2021-08-03 | End: 2021-11-10

## 2021-08-03 RX ORDER — LEVOTHYROXINE SODIUM 75 UG/1
TABLET ORAL
Qty: 90 TABLET | Refills: 0 | Status: SHIPPED | OUTPATIENT
Start: 2021-08-03 | End: 2021-11-10

## 2021-08-03 NOTE — TELEPHONE ENCOUNTER
"Jovita    Pt has visit with you on 8/18.    Requested Prescriptions   Pending Prescriptions Disp Refills     lisinopril (ZESTRIL) 40 MG tablet [Pharmacy Med Name: Lisinopril Oral Tablet 40 MG] 90 tablet 0     Sig: Take 1 tablet (40 mg) by mouth daily       ACE Inhibitors (Including Combos) Protocol Failed - 8/1/2021  3:31 PM        Failed - Blood pressure under 140/90 in past 12 months     BP Readings from Last 3 Encounters:   08/01/21 (!) 145/87   07/23/21 134/88   12/10/20 (!) 148/87                 Failed - Normal serum creatinine on file in past 12 months     Recent Labs   Lab Test 05/04/20  1712   CR 0.63       Ok to refill medication if creatinine is low          Failed - Normal serum potassium on file in past 12 months     Recent Labs   Lab Test 05/04/20  1712   POTASSIUM 3.8             Passed - Recent (12 mo) or future (30 days) visit within the authorizing provider's specialty     Patient has had an office visit with the authorizing provider or a provider within the authorizing providers department within the previous 12 mos or has a future within next 30 days. See \"Patient Info\" tab in inbasket, or \"Choose Columns\" in Meds & Orders section of the refill encounter.              Passed - Medication is active on med list        Passed - Patient is age 18 or older        Passed - No active pregnancy on record        Passed - No positive pregnancy test within past 12 months           levothyroxine (SYNTHROID/LEVOTHROID) 75 MCG tablet [Pharmacy Med Name: Levothyroxine Sodium Oral Tablet 75 MCG] 90 tablet 0     Sig: TAKE ONE TABLET BY MOUTH ONE TIME DAILY       Thyroid Protocol Failed - 8/1/2021  3:31 PM        Failed - Normal TSH on file in past 12 months     Recent Labs   Lab Test 05/04/20  1712   TSH 1.88              Passed - Patient is 12 years or older        Passed - Recent (12 mo) or future (30 days) visit within the authorizing provider's specialty     Patient has had an office visit with the " "authorizing provider or a provider within the authorizing providers department within the previous 12 mos or has a future within next 30 days. See \"Patient Info\" tab in inbasket, or \"Choose Columns\" in Meds & Orders section of the refill encounter.              Passed - Medication is active on med list        Passed - No active pregnancy on record     If patient is pregnant or has had a positive pregnancy test, please check TSH.          Passed - No positive pregnancy test in past 12 months     If patient is pregnant or has had a positive pregnancy test, please check TSH.             Routing refill request to provider for review/approval because:  Labs not current:  TSH, K+, Cr    Hannah Jnae, RN  [facility]          "

## 2021-08-10 ENCOUNTER — NURSE TRIAGE (OUTPATIENT)
Dept: FAMILY MEDICINE | Facility: CLINIC | Age: 51
End: 2021-08-10

## 2021-08-10 NOTE — TELEPHONE ENCOUNTER
Pt calling to report that she has not any changes from when she was seen in the ED and what to expect.     ED notes:   patient complaining of right thigh pain that radiates up into her lower back and down her leg    Pain in right thigh to knee and across low back.   Icing and taking Tylenol 1000mg every 6 hours.       Pt is located in [place] area. Encouraged Pt to be re-evaluated in  or ortho center near where se is located.       Red flag symptoms require ED immedately   Weakness in leg ot unable to supporting weight. Loss sensation in groin, loss of bowel and bladder control.     Patient verbalized understanding and agree's with plan.          Reason for Disposition    MODERATE pain (e.g., interferes with normal activities, limping) and present > 3 days    Protocols used: LEG PAIN-A-OH      Darvin Lei RN   [facility]

## 2021-09-05 ENCOUNTER — HEALTH MAINTENANCE LETTER (OUTPATIENT)
Age: 51
End: 2021-09-05

## 2021-09-18 DIAGNOSIS — E66.01 CLASS 3 SEVERE OBESITY WITH SERIOUS COMORBIDITY AND BODY MASS INDEX (BMI) OF 45.0 TO 49.9 IN ADULT, UNSPECIFIED OBESITY TYPE (H): ICD-10-CM

## 2021-09-18 DIAGNOSIS — E66.813 CLASS 3 SEVERE OBESITY WITH SERIOUS COMORBIDITY AND BODY MASS INDEX (BMI) OF 45.0 TO 49.9 IN ADULT, UNSPECIFIED OBESITY TYPE (H): ICD-10-CM

## 2021-09-20 RX ORDER — NALTREXONE HYDROCHLORIDE 50 MG/1
50 TABLET, FILM COATED ORAL DAILY
Qty: 90 TABLET | Refills: 0 | Status: SHIPPED | OUTPATIENT
Start: 2021-09-20 | End: 2021-12-28

## 2021-09-20 NOTE — TELEPHONE ENCOUNTER
Requested Prescriptions   Pending Prescriptions Disp Refills     naltrexone (DEPADE/REVIA) 50 MG tablet [Pharmacy Med Name: Naltrexone HCl Oral Tablet 50 MG] 90 tablet 0     Sig: Take 1 tablet (50 mg) by mouth daily       There is no refill protocol information for this order        Routing refill request to provider for review/approval because:  Drug not on the [facility] refill protocol     ThanksNancy RN  [facility]

## 2021-10-08 ENCOUNTER — TELEPHONE (OUTPATIENT)
Dept: SURGERY | Facility: CLINIC | Age: 51
End: 2021-10-08

## 2021-10-08 NOTE — TELEPHONE ENCOUNTER
With: Linnette Galarza NP     Referring Provider: self     For / Appt Notes: micro     Appt Type: UMP Mirco     Appt Date/Time: December- Thursday between 11a-2p

## 2021-10-31 ENCOUNTER — HEALTH MAINTENANCE LETTER (OUTPATIENT)
Age: 51
End: 2021-10-31

## 2021-11-15 ENCOUNTER — MYC MEDICAL ADVICE (OUTPATIENT)
Dept: FAMILY MEDICINE | Facility: CLINIC | Age: 51
End: 2021-11-15
Payer: COMMERCIAL

## 2021-11-15 NOTE — TELEPHONE ENCOUNTER
HA starting yesterday. Fatigue. Tylenol is not helping. Coughing.     I recommended visit given 3 weeks of congestion and cough. Transferred to scheduling line.    Alondra Cordova RN

## 2021-11-16 ENCOUNTER — OFFICE VISIT (OUTPATIENT)
Dept: URGENT CARE | Facility: URGENT CARE | Age: 51
End: 2021-11-16
Payer: COMMERCIAL

## 2021-11-16 VITALS
RESPIRATION RATE: 24 BRPM | OXYGEN SATURATION: 95 % | DIASTOLIC BLOOD PRESSURE: 86 MMHG | TEMPERATURE: 97.7 F | SYSTOLIC BLOOD PRESSURE: 132 MMHG | HEART RATE: 80 BPM

## 2021-11-16 DIAGNOSIS — J01.90 ACUTE SINUSITIS WITH SYMPTOMS > 10 DAYS: Primary | ICD-10-CM

## 2021-11-16 PROCEDURE — 99213 OFFICE O/P EST LOW 20 MIN: CPT | Performed by: NURSE PRACTITIONER

## 2021-11-16 PROCEDURE — U0003 INFECTIOUS AGENT DETECTION BY NUCLEIC ACID (DNA OR RNA); SEVERE ACUTE RESPIRATORY SYNDROME CORONAVIRUS 2 (SARS-COV-2) (CORONAVIRUS DISEASE [COVID-19]), AMPLIFIED PROBE TECHNIQUE, MAKING USE OF HIGH THROUGHPUT TECHNOLOGIES AS DESCRIBED BY CMS-2020-01-R: HCPCS | Performed by: NURSE PRACTITIONER

## 2021-11-16 PROCEDURE — U0005 INFEC AGEN DETEC AMPLI PROBE: HCPCS | Performed by: NURSE PRACTITIONER

## 2021-11-16 RX ORDER — FLUTICASONE PROPIONATE 50 MCG
1-2 SPRAY, SUSPENSION (ML) NASAL DAILY
Qty: 9.9 ML | Refills: 0 | Status: SHIPPED | OUTPATIENT
Start: 2021-11-16 | End: 2021-11-23

## 2021-11-16 ASSESSMENT — ENCOUNTER SYMPTOMS
COUGH: 1
SINUS PAIN: 1
SINUS PRESSURE: 1
RHINORRHEA: 1
HEADACHES: 1

## 2021-11-16 ASSESSMENT — PAIN SCALES - GENERAL: PAINLEVEL: MODERATE PAIN (4)

## 2021-11-17 NOTE — PROGRESS NOTES
SUBJECTIVE:   Naomy Kowalski is a 51 year old female presenting with a chief complaint of   Chief Complaint   Patient presents with     Sinus Problem     Patient started with cough about 3 weeks ago- seemed like it was getting better but then sunday 11/14 patient got a severe headache- having a lot of congestion and mucous.        She is an established patient of [facility].    URI Adult    Onset of symptoms was 3 week(s) ago.  Course of illness is worsening.    Severity moderate  Current and Associated symptoms: runny nose, stuffy nose, cough - productive, facial pain/pressure and headache  Treatment measures tried include OTC Cough med.  Predisposing factors include seasonal allergies.        Review of Systems   HENT: Positive for congestion, rhinorrhea, sinus pressure and sinus pain.    Respiratory: Positive for cough.    Neurological: Positive for headaches.   All other systems reviewed and are negative.      Past Medical History:   Diagnosis Date     CARDIOVASCULAR SCREENING; LDL GOAL LESS THAN 160 6/6/2012     Hypertension     watching blood pressure     Obesity, unspecified     Obesity     Sleep apnea     no cpap     Thyroid disease      Family History   Problem Relation Age of Onset     Diabetes Mother      Hypertension Mother      Cancer Father         pancreatic     Heart Disease Maternal Grandmother      Diabetes Maternal Grandmother      Cerebrovascular Disease Maternal Grandfather      Cancer - colorectal Paternal Grandfather      Diabetes Brother      Asthma Son      Depression Son      Asthma Daughter      Current Outpatient Medications   Medication Sig Dispense Refill     amoxicillin-clavulanate (AUGMENTIN) 875-125 MG tablet Take 1 tablet by mouth 2 times daily for 10 days 20 tablet 0     DULoxetine (CYMBALTA) 60 MG capsule Take 1 capsule (60 mg) by mouth daily   90 capsule 3     fluticasone (FLONASE) 50 MCG/ACT nasal spray Spray 1-2 sprays into both nostrils daily for 7 days 9.9 mL 0     fluticasone  (FLONASE) 50 MCG/ACT nasal spray Spray 2 sprays into both nostrils daily Reported on 3/24/2017       levonorgestrel (MIRENA) 20 MCG/24HR IUD 1 each by Intrauterine route once       levothyroxine (SYNTHROID/LEVOTHROID) 75 MCG tablet Take 1 tablet (75 mcg) by mouth daily 30 tablet 0     lisinopril (ZESTRIL) 40 MG tablet Take 1 tablet (40 mg) by mouth daily 30 tablet 0     Multiple Vitamins-Minerals (MULTIVITAMIN ADULT PO)        naltrexone (DEPADE/REVIA) 50 MG tablet Take 1 tablet (50 mg) by mouth daily   90 tablet 0     Phenylephrine-APAP-guaiFENesin (MUCINEX SINUS-MAX PO)        acyclovir (ZOVIRAX) 400 MG tablet Take 1 tablet (400 mg) by mouth 3 times daily as needed (symptoms) (Patient not taking: Reported on 8/1/2021) 15 tablet 0     benzonatate (TESSALON) 100 MG capsule Take 1 capsule (100 mg) by mouth 3 times daily as needed for cough (Patient not taking: Reported on 11/16/2021) 30 capsule 0     clotrimazole-betamethasone (LOTRISONE) 1-0.05 % external cream APPLY EXTERNALLY TO THE AFFECTED AREA TWICE DAILY (Patient not taking: Reported on 8/1/2021) 15 g 0     cyclobenzaprine (FLEXERIL) 10 MG tablet TK 1 T PO  TID PRN MUSCLE SPASMS (Patient not taking: Reported on 8/1/2021)  0     Pseudoephedrine HCl (SUDAFED 12 HOUR PO)  (Patient not taking: Reported on 11/16/2021)       Social History     Tobacco Use     Smoking status: Never Smoker     Smokeless tobacco: Never Used   Substance Use Topics     Alcohol use: No       OBJECTIVE  /86   Pulse 80   Temp 97.7  F (36.5  C) (Oral)   Resp 24   SpO2 95%     Physical Exam  Vitals and nursing note reviewed.   Constitutional:       General: She is not in acute distress.     Appearance: She is well-developed. She is not diaphoretic.   HENT:      Head: Normocephalic and atraumatic.      Right Ear: Tympanic membrane and external ear normal.      Left Ear: Tympanic membrane and external ear normal.      Nose: Congestion and rhinorrhea present.      Right Sinus:  Maxillary sinus tenderness and frontal sinus tenderness present.      Left Sinus: Maxillary sinus tenderness and frontal sinus tenderness present.   Eyes:      Pupils: Pupils are equal, round, and reactive to light.   Pulmonary:      Effort: Pulmonary effort is normal. No respiratory distress.      Breath sounds: Normal breath sounds.   Musculoskeletal:      Cervical back: Normal range of motion and neck supple.   Lymphadenopathy:      Cervical: No cervical adenopathy.   Skin:     General: Skin is warm and dry.   Neurological:      Mental Status: She is alert.      Cranial Nerves: No cranial nerve deficit.         ASSESSMENT:      ICD-10-CM    1. Acute sinusitis with symptoms > 10 days  J01.90 amoxicillin-clavulanate (AUGMENTIN) 875-125 MG tablet     fluticasone (FLONASE) 50 MCG/ACT nasal spray   2. Headache  R51.9 Symptomatic COVID-19 Virus (Coronavirus) by PCR Nose        PLAN:  Patient educational/instructional material provided including reasons for follow-up    The patient indicates understanding of these issues and agrees with the plan.

## 2021-11-17 NOTE — PATIENT INSTRUCTIONS
Patient Education     Acute Bacterial Rhinosinusitis (ABRS)    Acute bacterial rhinosinusitis (ABRS) is an infection of your nasal cavity and sinuses. It s caused by bacteria. Acute means that you ve had symptoms for less than 4 weeks, but possibly up to 12 weeks.  Understanding your sinuses  The nasal cavity is the large air-filled space behind your nose. The sinuses are a group of spaces formed by the bones of your face. They connect with your nasal cavity. ABRS causes the tissue lining these spaces to become inflamed. Mucus may not drain normally. This leads to facial pain and other symptoms.  What causes ABRS?  ABRS most often follows an upper respiratory infection caused by a virus. Bacteria then infect the lining of your nasal cavity and sinuses. But you can also get ABRS if you have:    Nasal allergies    Long-term nasal swelling and congestion not caused by allergies    Blockage in the nose  Symptoms of ABRS  The symptoms of ABRS may be different for each person and include:    Nasal congestion or blockage    Pain or pressure in the face    Thick, colored drainage from the nose  Other symptoms may include:    Runny nose    Fluid draining from the nose down the throat (postnasal drip)    Headache    Cough    Pain    Fever  Diagnosing ABRS  ABRS may be diagnosed if you ve had an upper respiratory infection like a cold and cough for 10 or more days without improvement or with worsening symptoms. Your healthcare provider will ask about your symptoms and your medical history. The provider will check your vital signs, including your temperature. You ll have a physical exam. The healthcare provider will check your ears, nose, and throat. You likely won t need any tests. If ABRS comes back, you may have a culture or other tests.  Treatment for ABRS  Treatment may include:    Antibiotic medicine. This is for symptoms that last for at least 10 to 14 days.    Nasal corticosteroid medicine. Drops or spray used in the  nose can lessen swelling and congestion.    Over-the-counter pain medicine. This is to lessen sinus pain and pressure.    Nasal decongestant medicine. Spray or drops may help to lessen congestion. Do not use them for more than a few days.    Salt wash (saline irrigation). This can help to loosen mucus.  Possible complications of ABRS  ABRS may come back or become long-term (chronic). In rare cases, ABRS may cause complications such as:     Inflamed tissue around the brain and spinal cord (meningitis)    Inflamed tissue around the eyes (orbital cellulitis)    Inflamed bones around the sinuses (osteitis)  These problems may need to be treated in a hospital with intravenous (IV) antibiotic medicine or surgery.  When to call the healthcare provider  Call your healthcare provider if you have any of the following:    Symptoms that don t get better, or get worse    Symptoms that don t get better after 3 to 5 days on antibiotics    Trouble seeing    Swelling around your eyes    Confusion or trouble staying awake   Sumit last reviewed this educational content on 5/1/2017 2000-2021 [employer]. All rights reserved. This information is not intended as a substitute for professional medical care. Always follow your healthcare professional's instructions.

## 2021-11-18 LAB — SARS-COV-2 RNA RESP QL NAA+PROBE: NEGATIVE

## 2021-11-24 ENCOUNTER — TELEPHONE (OUTPATIENT)
Dept: FAMILY MEDICINE | Facility: CLINIC | Age: 51
End: 2021-11-24
Payer: COMMERCIAL

## 2021-11-24 DIAGNOSIS — J01.01 ACUTE RECURRENT MAXILLARY SINUSITIS: Primary | ICD-10-CM

## 2021-11-24 RX ORDER — FLUCONAZOLE 150 MG/1
TABLET ORAL
Qty: 2 TABLET | Refills: 0 | Status: SHIPPED | OUTPATIENT
Start: 2021-11-24 | End: 2023-06-09

## 2021-11-24 RX ORDER — SULFAMETHOXAZOLE/TRIMETHOPRIM 800-160 MG
1 TABLET ORAL 2 TIMES DAILY
Qty: 20 TABLET | Refills: 0 | Status: SHIPPED | OUTPATIENT
Start: 2021-11-24 | End: 2021-12-04

## 2021-11-24 NOTE — TELEPHONE ENCOUNTER
AP and RNs,   Patient calling with URI symptoms   Seen in  11/16/2021  Given Augmentin BID x10 days   Stomach hurts and has side affects from it   Not feeling any better either  Wants alternative abx  Pharmacy pended  Please advise  Thanks,  Chelo CROSS RN

## 2021-12-13 ENCOUNTER — MYC REFILL (OUTPATIENT)
Dept: FAMILY MEDICINE | Facility: CLINIC | Age: 51
End: 2021-12-13
Payer: COMMERCIAL

## 2021-12-13 DIAGNOSIS — I10 HTN, GOAL BELOW 140/90: ICD-10-CM

## 2021-12-13 DIAGNOSIS — E03.9 HYPOTHYROIDISM, UNSPECIFIED TYPE: ICD-10-CM

## 2021-12-13 RX ORDER — LEVOTHYROXINE SODIUM 75 UG/1
75 TABLET ORAL DAILY
Qty: 30 TABLET | Refills: 0 | Status: SHIPPED | OUTPATIENT
Start: 2021-12-13 | End: 2022-01-11

## 2021-12-13 RX ORDER — LISINOPRIL 40 MG/1
40 TABLET ORAL DAILY
Qty: 30 TABLET | Refills: 0 | Status: SHIPPED | OUTPATIENT
Start: 2021-12-13 | End: 2022-01-11

## 2021-12-13 NOTE — TELEPHONE ENCOUNTER
"Spoke with pt on phone. Has appt Guy for physical and labs. Rachele refill until this time.     Requested Prescriptions   Signed Prescriptions Disp Refills    levothyroxine (SYNTHROID/LEVOTHROID) 75 MCG tablet 30 tablet 0     Sig: Take 1 tablet (75 mcg) by mouth daily       Thyroid Protocol Failed - 12/13/2021 10:03 AM        Failed - Recent (12 mo) or future (30 days) visit within the authorizing provider's specialty     Patient has had an office visit with the authorizing provider or a provider within the authorizing providers department within the previous 12 mos or has a future within next 30 days. See \"Patient Info\" tab in inbasket, or \"Choose Columns\" in Meds & Orders section of the refill encounter.              Failed - Normal TSH on file in past 12 months     Recent Labs   Lab Test 05/04/20  1712   TSH 1.88              Passed - Patient is 12 years or older        Passed - Medication is active on med list        Passed - No active pregnancy on record     If patient is pregnant or has had a positive pregnancy test, please check TSH.          Passed - No positive pregnancy test in past 12 months     If patient is pregnant or has had a positive pregnancy test, please check TSH.            lisinopril (ZESTRIL) 40 MG tablet 30 tablet 0     Sig: Take 1 tablet (40 mg) by mouth daily       There is no refill protocol information for this order        Nancy Nelson RN  [facility]     "

## 2021-12-20 ENCOUNTER — VIRTUAL VISIT (OUTPATIENT)
Dept: FAMILY MEDICINE | Facility: CLINIC | Age: 51
End: 2021-12-20
Payer: COMMERCIAL

## 2021-12-20 DIAGNOSIS — E11.9 TYPE 2 DIABETES MELLITUS WITHOUT COMPLICATION, WITHOUT LONG-TERM CURRENT USE OF INSULIN (H): ICD-10-CM

## 2021-12-20 DIAGNOSIS — E03.9 HYPOTHYROIDISM, UNSPECIFIED TYPE: ICD-10-CM

## 2021-12-20 DIAGNOSIS — F33.1 MODERATE RECURRENT MAJOR DEPRESSION (H): Primary | ICD-10-CM

## 2021-12-20 PROCEDURE — 99215 OFFICE O/P EST HI 40 MIN: CPT | Mod: TEL | Performed by: NURSE PRACTITIONER

## 2021-12-20 RX ORDER — BUPROPION HYDROCHLORIDE 150 MG/1
150 TABLET ORAL EVERY MORNING
Qty: 90 TABLET | Refills: 1 | Status: SHIPPED | OUTPATIENT
Start: 2021-12-20 | End: 2022-06-28

## 2021-12-26 ENCOUNTER — HEALTH MAINTENANCE LETTER (OUTPATIENT)
Age: 51
End: 2021-12-26

## 2021-12-28 ENCOUNTER — MYC REFILL (OUTPATIENT)
Dept: FAMILY MEDICINE | Facility: CLINIC | Age: 51
End: 2021-12-28
Payer: COMMERCIAL

## 2021-12-28 ENCOUNTER — MYC MEDICAL ADVICE (OUTPATIENT)
Dept: FAMILY MEDICINE | Facility: CLINIC | Age: 51
End: 2021-12-28
Payer: COMMERCIAL

## 2021-12-28 DIAGNOSIS — E66.813 CLASS 3 SEVERE OBESITY WITH SERIOUS COMORBIDITY AND BODY MASS INDEX (BMI) OF 45.0 TO 49.9 IN ADULT, UNSPECIFIED OBESITY TYPE (H): ICD-10-CM

## 2021-12-28 DIAGNOSIS — E66.01 CLASS 3 SEVERE OBESITY WITH SERIOUS COMORBIDITY AND BODY MASS INDEX (BMI) OF 45.0 TO 49.9 IN ADULT, UNSPECIFIED OBESITY TYPE (H): ICD-10-CM

## 2021-12-28 RX ORDER — NALTREXONE HYDROCHLORIDE 50 MG/1
50 TABLET, FILM COATED ORAL DAILY
Qty: 90 TABLET | Refills: 0 | Status: SHIPPED | OUTPATIENT
Start: 2021-12-28 | End: 2022-05-31

## 2021-12-28 NOTE — TELEPHONE ENCOUNTER
Routing refill request to provider for review/approval because:  Drug not on the [facility] refill protocol   Refill request for naltrexone    Anabella Cheung RN   [facility]

## 2022-01-01 ENCOUNTER — TRANSFERRED RECORDS (OUTPATIENT)
Dept: HEALTH INFORMATION MANAGEMENT | Facility: CLINIC | Age: 52
End: 2022-01-01

## 2022-01-01 LAB — RETINOPATHY: NORMAL

## 2022-01-10 DIAGNOSIS — B00.9 RECURRENT HERPES SIMPLEX: ICD-10-CM

## 2022-01-10 DIAGNOSIS — I10 HTN, GOAL BELOW 140/90: ICD-10-CM

## 2022-01-10 DIAGNOSIS — E03.9 HYPOTHYROIDISM, UNSPECIFIED TYPE: ICD-10-CM

## 2022-01-10 RX ORDER — ACYCLOVIR 400 MG/1
400 TABLET ORAL 3 TIMES DAILY PRN
Qty: 15 TABLET | Refills: 0 | Status: SHIPPED | OUTPATIENT
Start: 2022-01-10 | End: 2022-05-31

## 2022-01-10 NOTE — TELEPHONE ENCOUNTER
"Pt has upcoming appt so nona refill given.     Requested Prescriptions   Signed Prescriptions Disp Refills    acyclovir (ZOVIRAX) 400 MG tablet 15 tablet 0     Sig: Take 1 tablet (400 mg) by mouth 3 times daily as needed (symptoms)       Antivirals for Herpes Protocol Failed - 1/10/2022  2:45 PM        Failed - Normal serum creatinine on file in past 12 months     Recent Labs   Lab Test 05/04/20  1712   CR 0.63       Ok to refill medication if creatinine is low          Passed - Patient is age 12 or older        Passed - Recent (12 mo) or future (30 days) visit within the authorizing provider's specialty     Patient has had an office visit with the authorizing provider or a provider within the authorizing providers department within the previous 12 mos or has a future within next 30 days. See \"Patient Info\" tab in inbasket, or \"Choose Columns\" in Meds & Orders section of the refill encounter.              Passed - Medication is active on med list           Nancy Nelson RN  [facility]     "

## 2022-01-11 RX ORDER — LISINOPRIL 40 MG/1
40 TABLET ORAL DAILY
Qty: 30 TABLET | Refills: 0 | Status: SHIPPED | OUTPATIENT
Start: 2022-01-11 | End: 2022-02-17

## 2022-01-11 RX ORDER — LEVOTHYROXINE SODIUM 75 UG/1
75 TABLET ORAL DAILY
Qty: 30 TABLET | Refills: 0 | Status: SHIPPED | OUTPATIENT
Start: 2022-01-11 | End: 2022-02-17

## 2022-01-11 NOTE — TELEPHONE ENCOUNTER
"Rachele refill x1 as patient has upcoming appt with Jovita Jonas NP.     Requested Prescriptions   Signed Prescriptions Disp Refills    levothyroxine (SYNTHROID/LEVOTHROID) 75 MCG tablet 30 tablet 0     Sig: Take 1 tablet (75 mcg) by mouth daily       Thyroid Protocol Failed - 1/10/2022  8:20 PM        Failed - Normal TSH on file in past 12 months     Recent Labs   Lab Test 05/04/20  1712   TSH 1.88              Passed - Patient is 12 years or older        Passed - Recent (12 mo) or future (30 days) visit within the authorizing provider's specialty     Patient has had an office visit with the authorizing provider or a provider within the authorizing providers department within the previous 12 mos or has a future within next 30 days. See \"Patient Info\" tab in inbasket, or \"Choose Columns\" in Meds & Orders section of the refill encounter.              Passed - Medication is active on med list        Passed - No active pregnancy on record     If patient is pregnant or has had a positive pregnancy test, please check TSH.          Passed - No positive pregnancy test in past 12 months     If patient is pregnant or has had a positive pregnancy test, please check TSH.            lisinopril (ZESTRIL) 40 MG tablet 30 tablet 0     Sig: Take 1 tablet (40 mg) by mouth daily       ACE Inhibitors (Including Combos) Protocol Failed - 1/10/2022  8:20 PM        Failed - Normal serum creatinine on file in past 12 months     Recent Labs   Lab Test 05/04/20  1712   CR 0.63       Ok to refill medication if creatinine is low          Failed - Normal serum potassium on file in past 12 months     Recent Labs   Lab Test 05/04/20  1712   POTASSIUM 3.8             Passed - Blood pressure under 140/90 in past 12 months     BP Readings from Last 3 Encounters:   11/16/21 132/86   08/01/21 (!) 145/87   07/23/21 134/88                 Passed - Recent (12 mo) or future (30 days) visit within the authorizing provider's specialty     Patient has had an " "office visit with the authorizing provider or a provider within the authorizing providers department within the previous 12 mos or has a future within next 30 days. See \"Patient Info\" tab in inbasket, or \"Choose Columns\" in Meds & Orders section of the refill encounter.              Passed - Medication is active on med list        Passed - Patient is age 18 or older        Passed - No active pregnancy on record        Passed - No positive pregnancy test within past 12 months           Nancy Nelson RN  [facility]     "

## 2022-02-14 ENCOUNTER — LAB (OUTPATIENT)
Dept: LAB | Facility: CLINIC | Age: 52
End: 2022-02-14
Payer: COMMERCIAL

## 2022-02-14 DIAGNOSIS — E11.9 TYPE 2 DIABETES MELLITUS WITHOUT COMPLICATION, WITHOUT LONG-TERM CURRENT USE OF INSULIN (H): ICD-10-CM

## 2022-02-14 DIAGNOSIS — E03.9 HYPOTHYROIDISM, UNSPECIFIED TYPE: ICD-10-CM

## 2022-02-14 LAB
ALBUMIN SERPL-MCNC: 3.1 G/DL (ref 3.4–5)
ALP SERPL-CCNC: 70 U/L (ref 40–150)
ALT SERPL W P-5'-P-CCNC: 25 U/L (ref 0–50)
ANION GAP SERPL CALCULATED.3IONS-SCNC: 5 MMOL/L (ref 3–14)
AST SERPL W P-5'-P-CCNC: 15 U/L (ref 0–45)
BILIRUB SERPL-MCNC: 0.4 MG/DL (ref 0.2–1.3)
BUN SERPL-MCNC: 10 MG/DL (ref 7–30)
CALCIUM SERPL-MCNC: 8.7 MG/DL (ref 8.5–10.1)
CHLORIDE BLD-SCNC: 111 MMOL/L (ref 94–109)
CHOLEST SERPL-MCNC: 169 MG/DL
CO2 SERPL-SCNC: 29 MMOL/L (ref 20–32)
CREAT SERPL-MCNC: 0.78 MG/DL (ref 0.52–1.04)
CREAT UR-MCNC: 188 MG/DL
FASTING STATUS PATIENT QL REPORTED: YES
GFR SERPL CREATININE-BSD FRML MDRD: >90 ML/MIN/1.73M2
GLUCOSE BLD-MCNC: 120 MG/DL (ref 70–99)
HBA1C MFR BLD: 6.8 % (ref 0–5.6)
HDLC SERPL-MCNC: 37 MG/DL
LDLC SERPL CALC-MCNC: 108 MG/DL
MICROALBUMIN UR-MCNC: 11 MG/L
MICROALBUMIN/CREAT UR: 5.85 MG/G CR (ref 0–25)
NONHDLC SERPL-MCNC: 132 MG/DL
POTASSIUM BLD-SCNC: 3.6 MMOL/L (ref 3.4–5.3)
PROT SERPL-MCNC: 6.5 G/DL (ref 6.8–8.8)
SODIUM SERPL-SCNC: 145 MMOL/L (ref 133–144)
TRIGL SERPL-MCNC: 120 MG/DL
TSH SERPL DL<=0.005 MIU/L-ACNC: 2.14 MU/L (ref 0.4–4)

## 2022-02-14 PROCEDURE — 83036 HEMOGLOBIN GLYCOSYLATED A1C: CPT

## 2022-02-14 PROCEDURE — 36415 COLL VENOUS BLD VENIPUNCTURE: CPT

## 2022-02-14 PROCEDURE — 80053 COMPREHEN METABOLIC PANEL: CPT

## 2022-02-14 PROCEDURE — 80061 LIPID PANEL: CPT

## 2022-02-14 PROCEDURE — 84443 ASSAY THYROID STIM HORMONE: CPT

## 2022-02-14 PROCEDURE — 82043 UR ALBUMIN QUANTITATIVE: CPT

## 2022-02-15 ENCOUNTER — MYC MEDICAL ADVICE (OUTPATIENT)
Dept: FAMILY MEDICINE | Facility: CLINIC | Age: 52
End: 2022-02-15
Payer: COMMERCIAL

## 2022-02-15 DIAGNOSIS — E11.9 TYPE 2 DIABETES MELLITUS WITHOUT COMPLICATION, WITHOUT LONG-TERM CURRENT USE OF INSULIN (H): Primary | ICD-10-CM

## 2022-02-15 DIAGNOSIS — F33.1 MODERATE EPISODE OF RECURRENT MAJOR DEPRESSIVE DISORDER (H): ICD-10-CM

## 2022-02-15 DIAGNOSIS — L01.00 IMPETIGO: ICD-10-CM

## 2022-02-15 RX ORDER — METFORMIN HCL 500 MG
500 TABLET, EXTENDED RELEASE 24 HR ORAL
Qty: 90 TABLET | Refills: 1 | Status: SHIPPED | OUTPATIENT
Start: 2022-02-15 | End: 2023-06-09

## 2022-02-15 RX ORDER — SIMVASTATIN 20 MG
20 TABLET ORAL AT BEDTIME
Qty: 90 TABLET | Refills: 1 | Status: SHIPPED | OUTPATIENT
Start: 2022-02-15 | End: 2022-10-24

## 2022-02-15 RX ORDER — MUPIROCIN 20 MG/G
OINTMENT TOPICAL 3 TIMES DAILY
Qty: 30 G | Refills: 1 | Status: SHIPPED | OUTPATIENT
Start: 2022-02-15 | End: 2022-02-25

## 2022-02-16 DIAGNOSIS — E03.9 HYPOTHYROIDISM, UNSPECIFIED TYPE: ICD-10-CM

## 2022-02-16 DIAGNOSIS — I10 HTN, GOAL BELOW 140/90: ICD-10-CM

## 2022-02-17 ENCOUNTER — TELEPHONE (OUTPATIENT)
Dept: FAMILY MEDICINE | Facility: CLINIC | Age: 52
End: 2022-02-17
Payer: COMMERCIAL

## 2022-02-17 RX ORDER — LEVOTHYROXINE SODIUM 75 UG/1
75 TABLET ORAL DAILY
Qty: 30 TABLET | Refills: 0 | Status: SHIPPED | OUTPATIENT
Start: 2022-02-17 | End: 2022-02-24

## 2022-02-17 RX ORDER — LISINOPRIL 40 MG/1
40 TABLET ORAL DAILY
Qty: 30 TABLET | Refills: 0 | Status: SHIPPED | OUTPATIENT
Start: 2022-02-17 | End: 2022-03-22

## 2022-02-17 NOTE — TELEPHONE ENCOUNTER
"Requested Prescriptions   Signed Prescriptions Disp Refills    levothyroxine (SYNTHROID/LEVOTHROID) 75 MCG tablet 30 tablet 0     Sig: Take 1 tablet (75 mcg) by mouth daily       Thyroid Protocol Passed - 2/16/2022 11:00 PM        Passed - Patient is 12 years or older        Passed - Recent (12 mo) or future (30 days) visit within the authorizing provider's specialty     Patient has had an office visit with the authorizing provider or a provider within the authorizing providers department within the previous 12 mos or has a future within next 30 days. See \"Patient Info\" tab in inbasket, or \"Choose Columns\" in Meds & Orders section of the refill encounter.              Passed - Medication is active on med list        Passed - Normal TSH on file in past 12 months     Recent Labs   Lab Test 02/14/22  0658   TSH 2.14              Passed - No active pregnancy on record     If patient is pregnant or has had a positive pregnancy test, please check TSH.          Passed - No positive pregnancy test in past 12 months     If patient is pregnant or has had a positive pregnancy test, please check TSH.            lisinopril (ZESTRIL) 40 MG tablet 30 tablet 0     Sig: Take 1 tablet (40 mg) by mouth daily       ACE Inhibitors (Including Combos) Protocol Passed - 2/16/2022 11:00 PM        Passed - Blood pressure under 140/90 in past 12 months     BP Readings from Last 3 Encounters:   11/16/21 132/86   08/01/21 (!) 145/87   07/23/21 134/88                 Passed - Recent (12 mo) or future (30 days) visit within the authorizing provider's specialty     Patient has had an office visit with the authorizing provider or a provider within the authorizing providers department within the previous 12 mos or has a future within next 30 days. See \"Patient Info\" tab in inbasket, or \"Choose Columns\" in Meds & Orders section of the refill encounter.              Passed - Medication is active on med list        Passed - Patient is age 18 or older "        Passed - No active pregnancy on record        Passed - Normal serum creatinine on file in past 12 months     Recent Labs   Lab Test 02/14/22  0658   CR 0.78       Ok to refill medication if creatinine is low          Passed - Normal serum potassium on file in past 12 months     Recent Labs   Lab Test 02/14/22  0658   POTASSIUM 3.6             Passed - No positive pregnancy test within past 12 months           Nancy Nelson RN  [facility]

## 2022-02-17 NOTE — TELEPHONE ENCOUNTER
MTM referral from: [facility] visit (referral by provider)    MTM referral outreach attempt #2 on February 17, 2022 at 1:27 PM      Outcome: Patient not reachable after several attempts, will route to MTM Pharmacist/Provider as an FYI.  MT scheduling number is [phone].  Thank you for the referral.    Ar Tyler, MTM coordinator

## 2022-02-20 ENCOUNTER — HEALTH MAINTENANCE LETTER (OUTPATIENT)
Age: 52
End: 2022-02-20

## 2022-02-24 ENCOUNTER — OFFICE VISIT (OUTPATIENT)
Dept: FAMILY MEDICINE | Facility: CLINIC | Age: 52
End: 2022-02-24
Payer: COMMERCIAL

## 2022-02-24 VITALS
WEIGHT: 271.5 LBS | BODY MASS INDEX: 43.63 KG/M2 | TEMPERATURE: 97.3 F | SYSTOLIC BLOOD PRESSURE: 126 MMHG | DIASTOLIC BLOOD PRESSURE: 78 MMHG | HEART RATE: 85 BPM | HEIGHT: 66 IN | OXYGEN SATURATION: 97 %

## 2022-02-24 DIAGNOSIS — Z12.31 VISIT FOR SCREENING MAMMOGRAM: ICD-10-CM

## 2022-02-24 DIAGNOSIS — G47.00 INSOMNIA, UNSPECIFIED TYPE: ICD-10-CM

## 2022-02-24 DIAGNOSIS — E11.9 TYPE 2 DIABETES MELLITUS WITHOUT COMPLICATION, WITHOUT LONG-TERM CURRENT USE OF INSULIN (H): ICD-10-CM

## 2022-02-24 DIAGNOSIS — E03.9 HYPOTHYROIDISM, UNSPECIFIED TYPE: ICD-10-CM

## 2022-02-24 DIAGNOSIS — E66.01 MORBID OBESITY (H): ICD-10-CM

## 2022-02-24 DIAGNOSIS — I10 BENIGN ESSENTIAL HYPERTENSION: ICD-10-CM

## 2022-02-24 DIAGNOSIS — Z01.419 WELL WOMAN EXAM: Primary | ICD-10-CM

## 2022-02-24 DIAGNOSIS — Z12.4 CERVICAL CANCER SCREENING: ICD-10-CM

## 2022-02-24 PROBLEM — J03.90 TONSILLITIS: Status: RESOLVED | Noted: 2017-03-15 | Resolved: 2022-02-24

## 2022-02-24 PROCEDURE — 87624 HPV HI-RISK TYP POOLED RSLT: CPT | Performed by: NURSE PRACTITIONER

## 2022-02-24 PROCEDURE — 99396 PREV VISIT EST AGE 40-64: CPT | Performed by: NURSE PRACTITIONER

## 2022-02-24 PROCEDURE — 99214 OFFICE O/P EST MOD 30 MIN: CPT | Mod: 25 | Performed by: NURSE PRACTITIONER

## 2022-02-24 PROCEDURE — G0145 SCR C/V CYTO,THINLAYER,RESCR: HCPCS | Performed by: NURSE PRACTITIONER

## 2022-02-24 RX ORDER — QUETIAPINE FUMARATE 25 MG/1
25 TABLET, FILM COATED ORAL
Qty: 90 TABLET | Refills: 1 | Status: SHIPPED | OUTPATIENT
Start: 2022-02-24 | End: 2022-10-10

## 2022-02-24 RX ORDER — LEVOTHYROXINE SODIUM 75 UG/1
75 TABLET ORAL DAILY
Qty: 90 TABLET | Refills: 3 | Status: SHIPPED | OUTPATIENT
Start: 2022-02-24 | End: 2023-04-03

## 2022-02-24 ASSESSMENT — PAIN SCALES - GENERAL: PAINLEVEL: MODERATE PAIN (4)
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
## 2022-02-28 LAB
BKR LAB AP GYN ADEQUACY: NORMAL
BKR LAB AP GYN INTERPRETATION: NORMAL
BKR LAB AP HPV REFLEX: NORMAL
BKR LAB AP PREVIOUS ABNORMAL: NORMAL
PATH REPORT.COMMENTS IMP SPEC: NORMAL
PATH REPORT.COMMENTS IMP SPEC: NORMAL
PATH REPORT.RELEVANT HX SPEC: NORMAL

## 2022-03-02 LAB
HUMAN PAPILLOMA VIRUS 16 DNA: NEGATIVE
HUMAN PAPILLOMA VIRUS 18 DNA: NEGATIVE
HUMAN PAPILLOMA VIRUS FINAL DIAGNOSIS: NORMAL
HUMAN PAPILLOMA VIRUS OTHER HR: NEGATIVE

## 2022-03-11 NOTE — PROGRESS NOTES
Naomy is a 51 year old who is being evaluated via a billable telephone visit.      What phone number would you like to be contacted at? cell  How would you like to obtain your AVS? cell    Assessment & Plan   Problem List Items Addressed This Visit     Hypothyroidism    Relevant Orders    **TSH with free T4 reflex FUTURE 2mo    Diabetes mellitus, type 2 (H)    Relevant Orders    **A1C FUTURE 3mo    **Comprehensive metabolic panel FUTURE 2mo    Lipid panel reflex to direct LDL Fasting    Albumin Random Urine Quantitative with Creat Ratio      Other Visit Diagnoses     Moderate recurrent major depression (H)    -  Primary    Relevant Medications    buPROPion (WELLBUTRIN XL) 150 MG 24 hr tablet    Other Relevant Orders    Adult Mental Health Referral         -increase wellbutrin today; placed counseling referral  -check labs; discuss screening diabetes and re-check thyroid and cholesterol  -follow up within 1-2 months    45 minutes spent on the date of the encounter doing chart review, history and exam, documentation and further activities per the note       See Patient Instructions    No follow-ups on file.    JERRICA Church CNP  [facility]    Subjective   Naomy is a 51 year old who presents for the following health issues ]    HPI     Depression Followup    How are you doing with your depression since your last visit? Worsened     Are you having other symptoms that might be associated with depression? Yes:  feeling tired    Have you had a significant life event?  Grief or Loss     Are you feeling anxious or having panic attacks?   No    Do you have any concerns with your use of alcohol or other drugs? No    Social History     Tobacco Use     Smoking status: Never Smoker     Smokeless tobacco: Never Used   Substance Use Topics     Alcohol use: No     Drug use: No     PHQ 5/14/2019 11/20/2019 12/13/2021   PHQ-9 Total Score 14 14 8   Q9: Thoughts of better off dead/self-harm past 2  PROGRESS NOTE  Date of Service:  3/11/2022  Address: [address], [address]  Dept: [phone]  Loc: [phone]    Subjective:      Patient ID: [id number]  Kamran Mendoza is a 43 y.o. 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     Patient bowels are moving well. Patient does feel her celexa is working well. She feels her PMS is not good, she is irritable. Patient sleeps well, she is only 4-7 hours. She said more due to her busy life. Patient said when she squats down she stands up she gets light headed. Patient does drink 2 tumblers a day. Patient will also get random hand cramp, she said she will be doing something with her hands. Patient said she will have cramping. Patient  Has been having more restless legs. Patient is having tension headaches still, patient said that she was feeling a lot better with physical therapy. Patient said that she has pain that goes up her head. She said on a bad day goes to the eye brows. Patient said most days, she said excedrine helps the most.     Review of Systems   Constitutional: Negative for chills, fatigue and fever. Respiratory: Negative for cough, shortness of breath and wheezing. Cardiovascular: Negative for chest pain, palpitations and leg swelling. Gastrointestinal: Negative for constipation, diarrhea, nausea, rectal pain and vomiting. Psychiatric/Behavioral: Negative for self-injury, sleep disturbance and suicidal ideas. The patient is not nervous/anxious and is not hyperactive. All other systems reviewed and are negative. Objective:   Physical Exam  Vitals reviewed. Constitutional:       Appearance: Normal appearance.    HENT:      Mouth/Throat:      Mouth: Mucous membranes are moist.   Cardiovascular:      Rate and Rhythm: Normal rate and regular rhythm. Pulses: Normal pulses. Heart sounds: Normal heart sounds. Pulmonary:      Effort: Pulmonary effort is normal.      Breath sounds: Normal breath sounds. Abdominal:      General: Abdomen is flat. Palpations: Abdomen is soft. Skin:     Capillary Refill: Capillary refill takes less than 2 seconds. Neurological:      General: No focal deficit present. Mental Status: She is alert and oriented to person, place, and time. Psychiatric:         Mood and Affect: Mood normal.         Behavior: Behavior normal.         Thought Content: Thought content normal.         Judgment: Judgment normal.         Plan:   1. Encounter for preventive care-patient is overall doing okay. Encourage patient to increase her physical activity and to portion her food recommended my fitness pal.  -     Comprehensive Metabolic Panel  -     Vitamin D 25 Hydroxy  -     Lipid Panel  2. Light headed-patient getting lightheaded when bending over recommend patient make sure she is well-hydrated patient will also get blood work today to rule out anemia. -     Comprehensive Metabolic Panel  -     CBC with Auto Differential  -     Vitamin B12 & Folate  3. Tension headache-patient does better when she does her neck exercises from physical therapy recommend patient continue that we will try diclofenac on her neck also recommended acupressure mat to see if this helps with relieving her tension.  -     TSH with Reflex  -     Comprehensive Metabolic Panel  -     Ferritin  -     CBC with Auto Differential  -     Vitamin B12 & Folate  -     diclofenac sodium (VOLTAREN) 1 % GEL; Apply 2 g topically 4 times daily, Topical, 4 TIMES DAILY Starting Fri 3/11/2022, Disp-100 g, R-1, Normal  4. Irregular periods-patient does have irregular periods patient will continue monitoring.   5. Asthma, exercise induced-patient would like to start exercising again and does get exercise-induced asthma we will send in albuterol weeks Several days Several days Several days   F/U: Thoughts of suicide or self-harm - Yes No   F/U: Self harm-plan - No -   F/U: Self-harm action - No -   F/U: Safety concerns - No No     JACINTO-7 SCORE 4/10/2015 12/13/2018   Total Score 4 -   Total Score - 15     Last PHQ-9 12/13/2021   1.  Little interest or pleasure in doing things 1   2.  Feeling down, depressed, or hopeless 1   3.  Trouble falling or staying asleep, or sleeping too much 1   4.  Feeling tired or having little energy 1   5.  Poor appetite or overeating 1   6.  Feeling bad about yourself 1   7.  Trouble concentrating 1   8.  Moving slowly or restless 0   Q9: Thoughts of better off dead/self-harm past 2 weeks 1   PHQ-9 Total Score 8   Difficulty at work, home, or with people -   In the past two weeks have you had thoughts of suicide or self harm? No   Do you have concerns about your personal safety or the safety of others? No   In the past 2 weeks have you thought about a plan or had intention to harm yourself? -   In the past 2 weeks have you acted on these thoughts in any way? -       Suicide Assessment Five-step Evaluation and Treatment (SAFE-T)  {Provider  Link to Depression Care Package SmartSet :1509    Review of Systems   Constitutional, HEENT, cardiovascular, pulmonary, gi and gu systems are negative, except as otherwise noted.      Objective           Vitals:  No vitals were obtained today due to virtual visit.    Physical Exam   healthy, alert and no distress  PSYCH: Alert and oriented times 3; coherent speech, normal   rate and volume, able to articulate logical thoughts, able   to abstract reason, no tangential thoughts, no hallucinations   or delusions  Her affect is normal  RESP: No cough, no audible wheezing, able to talk in full sentences  Remainder of exam unable to be completed due to telephone visits    No results found for any visits on 12/20/21.            Phone call duration: 35 minutes   inhaler patient does not get relief with his inhaler patient will call office.  -     albuterol sulfate HFA (VENTOLIN HFA) 108 (90 Base) MCG/ACT inhaler; Inhale 2 puffs into the lungs 4 times daily as needed for Wheezing, Disp-18 g, R-5Normal             Electronically signed by ENID Olsen CNP on 3/11/22 at 9:53 AM EST     This dictation was generated by voice recognition computer software. Although all attempts are made to edit the dictation for accuracy, there may be errors in the transcription that were not intended.

## 2022-03-20 DIAGNOSIS — I10 HTN, GOAL BELOW 140/90: ICD-10-CM

## 2022-03-22 RX ORDER — LISINOPRIL 40 MG/1
40 TABLET ORAL DAILY
Qty: 30 TABLET | Refills: 0 | Status: SHIPPED | OUTPATIENT
Start: 2022-03-22 | End: 2022-04-14

## 2022-03-22 NOTE — TELEPHONE ENCOUNTER
"Requested Prescriptions   Signed Prescriptions Disp Refills    lisinopril (ZESTRIL) 40 MG tablet 30 tablet 0     Sig: Take 1 tablet (40 mg) by mouth daily       ACE Inhibitors (Including Combos) Protocol Passed - 3/20/2022  4:05 PM        Passed - Blood pressure under 140/90 in past 12 months     BP Readings from Last 3 Encounters:   02/24/22 126/78   11/16/21 132/86   08/01/21 (!) 145/87                 Passed - Recent (12 mo) or future (30 days) visit within the authorizing provider's specialty     Patient has had an office visit with the authorizing provider or a provider within the authorizing providers department within the previous 12 mos or has a future within next 30 days. See \"Patient Info\" tab in inbasket, or \"Choose Columns\" in Meds & Orders section of the refill encounter.              Passed - Medication is active on med list        Passed - Patient is age 18 or older        Passed - No active pregnancy on record        Passed - Normal serum creatinine on file in past 12 months     Recent Labs   Lab Test 02/14/22  0658   CR 0.78       Ok to refill medication if creatinine is low          Passed - Normal serum potassium on file in past 12 months     Recent Labs   Lab Test 02/14/22  0658   POTASSIUM 3.6             Passed - No positive pregnancy test within past 12 months           Nancy Nelson RN  [facility]     "

## 2022-04-07 ENCOUNTER — OFFICE VISIT (OUTPATIENT)
Dept: SURGERY | Facility: CLINIC | Age: 52
End: 2022-04-07
Payer: COMMERCIAL

## 2022-04-07 VITALS
HEIGHT: 66 IN | HEART RATE: 79 BPM | OXYGEN SATURATION: 95 % | SYSTOLIC BLOOD PRESSURE: 146 MMHG | DIASTOLIC BLOOD PRESSURE: 86 MMHG | WEIGHT: 272.9 LBS | BODY MASS INDEX: 43.86 KG/M2

## 2022-04-07 DIAGNOSIS — K62.82 ANAL DYSPLASIA: Primary | ICD-10-CM

## 2022-04-07 LAB
LAB DIRECTOR COMMENTS: NORMAL
LAB DIRECTOR DISCLAIMER: NORMAL
LAB DIRECTOR INTERPRETATION: NORMAL
LAB DIRECTOR METHODOLOGY: NORMAL
LAB DIRECTOR RESULTS: NORMAL
SPECIMEN DESCRIPTION: NORMAL

## 2022-04-07 PROCEDURE — 87624 HPV HI-RISK TYP POOLED RSLT: CPT | Performed by: NURSE PRACTITIONER

## 2022-04-07 PROCEDURE — 88112 CYTOPATH CELL ENHANCE TECH: CPT | Mod: 26 | Performed by: PATHOLOGY

## 2022-04-07 PROCEDURE — 88112 CYTOPATH CELL ENHANCE TECH: CPT | Mod: TC | Performed by: NURSE PRACTITIONER

## 2022-04-07 PROCEDURE — G0452 MOLECULAR PATHOLOGY INTERPR: HCPCS | Mod: 26 | Performed by: PATHOLOGY

## 2022-04-07 PROCEDURE — 46601 DIAGNOSTIC ANOSCOPY: CPT | Performed by: NURSE PRACTITIONER

## 2022-04-07 ASSESSMENT — PAIN SCALES - GENERAL: PAINLEVEL: NO PAIN (0)

## 2022-04-07 NOTE — PROGRESS NOTES
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 She last underwent high resolution anoscopy on 12/10/20 with no evidence of high grade dysplasia. Anal cytology was negative 19. She presents today for high resolution anoscopy.    HPI: Naomy has been doing well. She has been healthy. 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 Her grandchildren are now 2 and 5 and live in [place].    ASSESSMENT: Written, informed consent was obtained prior to procedure.  Prior to the start of the procedure and with procedural staff participation, I verbally confirmed the patient s identity using two indicators, relevant allergies, that the procedure was appropriate and matched the consent or emergent situation, and that the correct equipment/implants were available. Immediately prior to starting the procedure I conducted the Time Out with the procedural staff and re-confirmed the patient s name, procedure, and site/side. (The Joint Commission universal protocol was followed.)  Yes    Sedation (Moderate or Deep): None    Anal cytology was obtained today using a Dacron swab. Digital anal rectal exam was performed with no palpable lesions. Dilute acetic acid soak was completed for 2 minutes. Lubricant was used to insert the anoscope. Performed high resolution microscopy using the colposcope. The dentate line was viewed in its entirety. Abnormal areas noted were mild acetowhitening with striated vessels. No coarse punctation, verruciform lesions, or bright acetowhitening. Internal hemorrhoids without bleeding. Biopsy was not obtained. Fulguration was not performed.   The perianal area was inspected after acetic acid soak. The findings noted  were anal skin tags and mixed hemorrhoids.     The patient tolerated the procedures well.    PLAN: Recommend yearly anal cytology and repeat high resolution anoscopy only if this is normal since she is low risk and no dysplasia since 2017. Patient's questions were answered to her stated satisfaction and she is in agreement with this plan.    For details of past medical history, surgical history, family history, medications, allergies, and review of systems, please see details below.    Medical history:  Past Medical History:   Diagnosis Date     CARDIOVASCULAR SCREENING; LDL GOAL LESS THAN 160 6/6/2012     Hypertension     watching blood pressure     Obesity, unspecified     Obesity     Sleep apnea     no cpap     Thyroid disease        Surgical history:  Past Surgical History:   Procedure Laterality Date     BREAST SURGERY      breast reduction bilat     COLONOSCOPY WITH CO2 INSUFFLATION N/A 9/1/2017    Procedure: COLONOSCOPY WITH CO2 INSUFFLATION;  Colonoscopy, Abdominal pain, left lower quadrant, Parenteau, BMI 42.93, [phone];  Surgeon: Anuel Rodríguez MD;  Location:  OR     ENDOSCOPIC SINUS SURGERY Right 3/15/2017    Procedure: ENDOSCOPIC SINUS SURGERY;;  Surgeon: Vicki Zurita MD;  Location:  OR     EXAM UNDER ANESTHESIA, FULGURATE CONDYLOMA ANUS, COMBINED N/A 9/26/2017    Procedure: COMBINED EXAM UNDER ANESTHESIA, FULGURATE CONDYLOMA ANUS;  Examination Under anethesia, Excision And Fulguration Of Anal Condyloma;  Surgeon: Td Garvey MD;  Location:  OR     LAPAROSCOPIC CHOLECYSTECTOMY  08/05/99     TONSILLECTOMY Bilateral 3/15/2017    Procedure: TONSILLECTOMY;  Bilateral Tonsillectomy, Right Functional Endoscopic Sinus Surgery ;  Surgeon: Vicki Zurita MD;  Location:  OR       Family history:  Family History   Problem Relation Age of Onset     Diabetes Mother      Hypertension Mother      Depression Mother      Substance Abuse Mother          Alcohol     Cancer Father         pancreatic     Impaired Fasting Glucose Father      Asthma Father      Diabetes Brother      Asthma Brother      Rashes/Skin Problems Brother         HIdradenitis Suppurativa     No Known Problems Brother      Heart Disease Maternal Grandmother      Diabetes Maternal Grandmother      Cerebrovascular Disease Maternal Grandfather      Cancer - colorectal Paternal Grandfather      Asthma Daughter      Asthma Son      Depression Son        Medications:  Current Outpatient Medications   Medication Sig Dispense Refill     acyclovir (ZOVIRAX) 400 MG tablet Take 1 tablet (400 mg) by mouth 3 times daily as needed (symptoms) 15 tablet 0     buPROPion (WELLBUTRIN XL) 150 MG 24 hr tablet Take 1 tablet (150 mg) by mouth every morning 90 tablet 1     DULoxetine (CYMBALTA) 60 MG capsule Take 1 capsule (60 mg) by mouth daily   90 capsule 3     fluconazole (DIFLUCAN) 150 MG tablet Take one today and one in 10 days 2 tablet 0     fluticasone (FLONASE) 50 MCG/ACT nasal spray Spray 2 sprays into both nostrils daily Reported on 3/24/2017       levonorgestrel (MIRENA) 20 MCG/24HR IUD 1 each by Intrauterine route once       levothyroxine (SYNTHROID/LEVOTHROID) 75 MCG tablet Take 1 tablet (75 mcg) by mouth daily 90 tablet 3     lisinopril (ZESTRIL) 40 MG tablet Take 1 tablet (40 mg) by mouth daily 30 tablet 0     metFORMIN (GLUCOPHAGE-XR) 500 MG 24 hr tablet Take 1 tablet (500 mg) by mouth daily (with dinner) 90 tablet 1     naltrexone (DEPADE/REVIA) 50 MG tablet Take 1 tablet (50 mg) by mouth daily 90 tablet 0     Phenylephrine-APAP-guaiFENesin (MUCINEX SINUS-MAX PO)        QUEtiapine (SEROQUEL) 25 MG tablet Take 1 tablet (25 mg) by mouth nightly as needed (insomnia) 90 tablet 1     simvastatin (ZOCOR) 20 MG tablet Take 1 tablet (20 mg) by mouth At Bedtime 90 tablet 1     Allergies:  The patientis allergic to percocet [oxycodone-acetaminophen].    Social history:  Social History     Tobacco Use      "Smoking status: Never Smoker     Smokeless tobacco: Never Used   Substance Use Topics     Alcohol use: No     Marital status: .    Review of Systems:  Nursing Notes:   Park Silva  4/7/2022  1:03 PM  Signed  Chief Complaint   Patient presents with     Follow Up     HRA       Vitals:    04/07/22 1300   BP: (!) 146/86   BP Location: Left arm   Patient Position: Sitting   Cuff Size: Adult Large   Pulse: 79   SpO2: 95%   Weight: 272 lb 14.4 oz   Height: 5' 6\"       Body mass index is 44.05 kg/m .    Park Silva CMA         This procedure was performed under a collaborative agreement with Dr. Dev Caraballo MD, Chief of Colon and Rectal Surgery, [facility].    Linnette Howe NP-C  Colon and Rectal Surgery  [facility]      This note was created using speech recognition software and may contain unintended word substitutions.  "

## 2022-04-07 NOTE — NURSING NOTE
"Chief Complaint   Patient presents with     Follow Up     HRA       Vitals:    04/07/22 1300   BP: (!) 146/86   BP Location: Left arm   Patient Position: Sitting   Cuff Size: Adult Large   Pulse: 79   SpO2: 95%   Weight: 272 lb 14.4 oz   Height: 5' 6\"       Body mass index is 44.05 kg/m .    Park Silva CMA    "

## 2022-04-07 NOTE — LETTER
2022       RE: Naomy Kowalski  [address] [facility] [address]     Dear Colleague,    Thank you for referring your patient, Naomy Kowalski, to the [facility] COLON AND RECTAL SURGERY CLINIC [place] [facility]. Please see a copy of my visit note below.    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 She last underwent high resolution anoscopy on 12/10/20 with no evidence of high grade dysplasia. Anal cytology was negative 19. She presents today for high resolution anoscopy.    HPI: Naomy has been doing well. She has been healthy. 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 Her grandchildren are now 2 and 5 and live in [place].    ASSESSMENT: Written, informed consent was obtained prior to procedure.  Prior to the start of the procedure and with procedural staff participation, I verbally confirmed the patient s identity using two indicators, relevant allergies, that the procedure was appropriate and matched the consent or emergent situation, and that the correct equipment/implants were available. Immediately prior to starting the procedure I conducted the Time Out with the procedural staff and re-confirmed the patient s name, procedure, and site/side. (The Joint Commission universal protocol was followed.)  Yes    Sedation (Moderate or Deep): None    Anal cytology was obtained today using a Dacron swab. Digital anal rectal exam was performed with no palpable lesions. Dilute acetic acid soak was completed for 2 minutes. Lubricant was used to insert the anoscope. Performed high resolution microscopy using the colposcope. The dentate line was viewed  in its entirety. Abnormal areas noted were mild acetowhitening with striated vessels. No coarse punctation, verruciform lesions, or bright acetowhitening. Internal hemorrhoids without bleeding. Biopsy was not obtained. Fulguration was not performed.   The perianal area was inspected after acetic acid soak. The findings noted were anal skin tags and mixed hemorrhoids.     The patient tolerated the procedures well.    PLAN: Recommend yearly anal cytology and repeat high resolution anoscopy only if this is normal since she is low risk and no dysplasia since 2017. Patient's questions were answered to her stated satisfaction and she is in agreement with this plan.    For details of past medical history, surgical history, family history, medications, allergies, and review of systems, please see details below.    Medical history:  Past Medical History:   Diagnosis Date     CARDIOVASCULAR SCREENING; LDL GOAL LESS THAN 160 6/6/2012     Hypertension     watching blood pressure     Obesity, unspecified     Obesity     Sleep apnea     no cpap     Thyroid disease        Surgical history:  Past Surgical History:   Procedure Laterality Date     BREAST SURGERY      breast reduction bilat     COLONOSCOPY WITH CO2 INSUFFLATION N/A 9/1/2017    Procedure: COLONOSCOPY WITH CO2 INSUFFLATION;  Colonoscopy, Abdominal pain, left lower quadrant, Parenteau, BMI 42.93, [phone];  Surgeon: Anuel Rodríguez MD;  Location:  OR     ENDOSCOPIC SINUS SURGERY Right 3/15/2017    Procedure: ENDOSCOPIC SINUS SURGERY;;  Surgeon: Vicki Zurita MD;  Location:  OR     EXAM UNDER ANESTHESIA, FULGURATE CONDYLOMA ANUS, COMBINED N/A 9/26/2017    Procedure: COMBINED EXAM UNDER ANESTHESIA, FULGURATE CONDYLOMA ANUS;  Examination Under anethesia, Excision And Fulguration Of Anal Condyloma;  Surgeon: Td Garvey MD;  Location:  OR     LAPAROSCOPIC CHOLECYSTECTOMY  08/05/99     TONSILLECTOMY Bilateral 3/15/2017     Procedure: TONSILLECTOMY;  Bilateral Tonsillectomy, Right Functional Endoscopic Sinus Surgery ;  Surgeon: Vicki Zurita MD;  Location:  OR       Family history:  Family History   Problem Relation Age of Onset     Diabetes Mother      Hypertension Mother      Depression Mother      Substance Abuse Mother         Alcohol     Cancer Father         pancreatic     Impaired Fasting Glucose Father      Asthma Father      Diabetes Brother      Asthma Brother      Rashes/Skin Problems Brother         HIdradenitis Suppurativa     No Known Problems Brother      Heart Disease Maternal Grandmother      Diabetes Maternal Grandmother      Cerebrovascular Disease Maternal Grandfather      Cancer - colorectal Paternal Grandfather      Asthma Daughter      Asthma Son      Depression Son        Medications:  Current Outpatient Medications   Medication Sig Dispense Refill     acyclovir (ZOVIRAX) 400 MG tablet Take 1 tablet (400 mg) by mouth 3 times daily as needed (symptoms) 15 tablet 0     buPROPion (WELLBUTRIN XL) 150 MG 24 hr tablet Take 1 tablet (150 mg) by mouth every morning 90 tablet 1     DULoxetine (CYMBALTA) 60 MG capsule Take 1 capsule (60 mg) by mouth daily   90 capsule 3     fluconazole (DIFLUCAN) 150 MG tablet Take one today and one in 10 days 2 tablet 0     fluticasone (FLONASE) 50 MCG/ACT nasal spray Spray 2 sprays into both nostrils daily Reported on 3/24/2017       levonorgestrel (MIRENA) 20 MCG/24HR IUD 1 each by Intrauterine route once       levothyroxine (SYNTHROID/LEVOTHROID) 75 MCG tablet Take 1 tablet (75 mcg) by mouth daily 90 tablet 3     lisinopril (ZESTRIL) 40 MG tablet Take 1 tablet (40 mg) by mouth daily 30 tablet 0     metFORMIN (GLUCOPHAGE-XR) 500 MG 24 hr tablet Take 1 tablet (500 mg) by mouth daily (with dinner) 90 tablet 1     naltrexone (DEPADE/REVIA) 50 MG tablet Take 1 tablet (50 mg) by mouth daily 90 tablet 0     Phenylephrine-APAP-guaiFENesin (MUCINEX SINUS-MAX PO)        QUEtiapine  "(SEROQUEL) 25 MG tablet Take 1 tablet (25 mg) by mouth nightly as needed (insomnia) 90 tablet 1     simvastatin (ZOCOR) 20 MG tablet Take 1 tablet (20 mg) by mouth At Bedtime 90 tablet 1     Allergies:  The patientis allergic to percocet [oxycodone-acetaminophen].    Social history:  Social History     Tobacco Use     Smoking status: Never Smoker     Smokeless tobacco: Never Used   Substance Use Topics     Alcohol use: No     Marital status: .    Review of Systems:  Nursing Notes:   Park Silva  4/7/2022  1:03 PM  Signed  Chief Complaint   Patient presents with     Follow Up     HRA       Vitals:    04/07/22 1300   BP: (!) 146/86   BP Location: Left arm   Patient Position: Sitting   Cuff Size: Adult Large   Pulse: 79   SpO2: 95%   Weight: 272 lb 14.4 oz   Height: 5' 6\"     Body mass index is 44.05 kg/m .    Park Silva CMA    This procedure was performed under a collaborative agreement with Dr. Dev Caraballo MD, Chief of Colon and Rectal Surgery, [facility].      Linnette Howe NP-C  Colon and Rectal Surgery  [facility]    This note was created using speech recognition software and may contain unintended word substitutions.  "

## 2022-04-08 LAB
PATH REPORT.COMMENTS IMP SPEC: NORMAL
PATH REPORT.COMMENTS IMP SPEC: NORMAL
PATH REPORT.FINAL DX SPEC: NORMAL
PATH REPORT.GROSS SPEC: NORMAL
PATH REPORT.MICROSCOPIC SPEC OTHER STN: NORMAL
PATH REPORT.RELEVANT HX SPEC: NORMAL

## 2022-04-13 DIAGNOSIS — I10 HTN, GOAL BELOW 140/90: ICD-10-CM

## 2022-04-14 RX ORDER — LISINOPRIL 40 MG/1
40 TABLET ORAL DAILY
Qty: 30 TABLET | Refills: 0 | Status: SHIPPED | OUTPATIENT
Start: 2022-04-14 | End: 2022-05-25

## 2022-04-14 NOTE — TELEPHONE ENCOUNTER
"Requested Prescriptions   Pending Prescriptions Disp Refills     lisinopril (ZESTRIL) 40 MG tablet [Pharmacy Med Name: Lisinopril Oral Tablet 40 MG] 30 tablet 0     Sig: Take 1 tablet (40 mg) by mouth daily       ACE Inhibitors (Including Combos) Protocol Failed - 4/13/2022  8:03 PM        Failed - Blood pressure under 140/90 in past 12 months     BP Readings from Last 3 Encounters:   04/07/22 (!) 146/86   02/24/22 126/78   11/16/21 132/86                 Passed - Recent (12 mo) or future (30 days) visit within the authorizing provider's specialty     Patient has had an office visit with the authorizing provider or a provider within the authorizing providers department within the previous 12 mos or has a future within next 30 days. See \"Patient Info\" tab in inbasket, or \"Choose Columns\" in Meds & Orders section of the refill encounter.              Passed - Medication is active on med list        Passed - Patient is age 18 or older        Passed - No active pregnancy on record        Passed - Normal serum creatinine on file in past 12 months     Recent Labs   Lab Test 02/14/22  0658   CR 0.78       Ok to refill medication if creatinine is low          Passed - Normal serum potassium on file in past 12 months     Recent Labs   Lab Test 02/14/22  0658   POTASSIUM 3.6             Passed - No positive pregnancy test within past 12 months           Nancy Nelson RN  [facility]         "

## 2022-05-25 ENCOUNTER — TELEPHONE (OUTPATIENT)
Dept: NURSING | Facility: CLINIC | Age: 52
End: 2022-05-25
Payer: COMMERCIAL

## 2022-05-25 DIAGNOSIS — I10 HTN, GOAL BELOW 140/90: ICD-10-CM

## 2022-05-25 RX ORDER — LISINOPRIL 40 MG/1
40 TABLET ORAL DAILY
Qty: 30 TABLET | Refills: 0 | Status: SHIPPED | OUTPATIENT
Start: 2022-05-25 | End: 2022-07-21

## 2022-05-25 NOTE — TELEPHONE ENCOUNTER
Patient calling to request a refill of her lisinopril.  It looks like it is already in process but she has one pill left and is leaving to go out of town tomorrow she was see for a well check 02/24/2022 so could this be sent to her pharmacy today. Routing to the refill pool.    Dannielle Toussaint RN   [facility] Nurse Advisor  9:59 AM 5/25/2022    COVID 19 Nurse Triage Plan/Patient Instructions    Please be aware that novel coronavirus (COVID-19) may be circulating in the community. If you develop symptoms such as fever, cough, or SOB or if you have concerns about the presence of another infection including coronavirus (COVID-19), please contact your health care provider or visit [email or web address].     Disposition/Instructions    Home care recommended. Follow home care protocol based instructions.    Thank you for taking steps to prevent the spread of this virus.  o Limit your contact with others.  o Wear a simple mask to cover your cough.  o Wash your hands well and often.    Resources    [facility]: About COVID-19: [email or web address]    CDC: What to Do If You're Sick: www.cdc.gov/coronavirus/2019-ncov/about/steps-when-sick.html    CDC: Ending Home Isolation: www.cdc.gov/coronavirus/2019-ncov/hcp/disposition-in-home-patients.html     CDC: Caring for Someone: www.cdc.gov/coronavirus/2019-ncov/if-you-are-sick/care-for-someone.html     [facility]: Interim Guidance for Hospital Discharge to Home: www.[email or web address].[email or web address]/diseases/coronavirus/hcp/hospdischarge.pdf    [facility] clinical trials (COVID-19 research studies): [email or web address]     Below are the COVID-19 hotlines at the [place] Department [facility] ([facility]). Interpreters are available.   o For health questions: Call 694-141-8824 or 1-212.359.5401 (7 a.m. to 7 p.m.)  o For questions about schools and childcare: Call 894-106-5008 or 1-287.925.2632 (7 a.m. to 7 p.m.)

## 2022-05-25 NOTE — TELEPHONE ENCOUNTER
Routing refill request to provider for review/approval because:  Labs out of range:  BP  Please authorize if appropriate.  Thanks,  Della Holden RN

## 2022-05-30 DIAGNOSIS — E66.813 CLASS 3 SEVERE OBESITY WITH SERIOUS COMORBIDITY AND BODY MASS INDEX (BMI) OF 45.0 TO 49.9 IN ADULT, UNSPECIFIED OBESITY TYPE (H): ICD-10-CM

## 2022-05-30 DIAGNOSIS — E66.01 CLASS 3 SEVERE OBESITY WITH SERIOUS COMORBIDITY AND BODY MASS INDEX (BMI) OF 45.0 TO 49.9 IN ADULT, UNSPECIFIED OBESITY TYPE (H): ICD-10-CM

## 2022-05-31 ENCOUNTER — MYC REFILL (OUTPATIENT)
Dept: FAMILY MEDICINE | Facility: CLINIC | Age: 52
End: 2022-05-31
Payer: COMMERCIAL

## 2022-05-31 DIAGNOSIS — B00.9 RECURRENT HERPES SIMPLEX: ICD-10-CM

## 2022-05-31 RX ORDER — NALTREXONE HYDROCHLORIDE 50 MG/1
50 TABLET, FILM COATED ORAL DAILY
Qty: 90 TABLET | Refills: 0 | Status: SHIPPED | OUTPATIENT
Start: 2022-05-31 | End: 2022-08-31

## 2022-05-31 NOTE — TELEPHONE ENCOUNTER
Routing refill request to provider for review/approval because:  Drug not on the [facility] refill protocol   Chelo CROSS RN

## 2022-06-01 RX ORDER — ACYCLOVIR 400 MG/1
400 TABLET ORAL 3 TIMES DAILY PRN
Qty: 15 TABLET | Refills: 0 | Status: SHIPPED | OUTPATIENT
Start: 2022-06-01 | End: 2022-10-02

## 2022-06-01 NOTE — TELEPHONE ENCOUNTER
Prescription approved per [facility] Refill Protocol.    Anabella Cheung RN   [facility]

## 2022-06-12 ENCOUNTER — HEALTH MAINTENANCE LETTER (OUTPATIENT)
Age: 52
End: 2022-06-12

## 2022-06-24 DIAGNOSIS — F33.1 MODERATE RECURRENT MAJOR DEPRESSION (H): ICD-10-CM

## 2022-06-27 ENCOUNTER — MYC MEDICAL ADVICE (OUTPATIENT)
Dept: FAMILY MEDICINE | Facility: CLINIC | Age: 52
End: 2022-06-27

## 2022-06-27 NOTE — TELEPHONE ENCOUNTER
"Requested Prescriptions   Pending Prescriptions Disp Refills     buPROPion (WELLBUTRIN XL) 150 MG 24 hr tablet [Pharmacy Med Name: buPROPion HCl ER (XL) Oral Tablet Extended Release 24 Hour 150 MG] 90 tablet 0     Sig: Take 1 tablet (150 mg) by mouth every morning       SSRIs Protocol Failed - 6/24/2022  7:31 PM        Failed - PHQ-9 score less than 5 in past 6 months     Please review last PHQ-9 score.           Passed - Medication is Bupropion     If the medication is Bupropion (Wellbutrin), and the patient is taking for smoking cessation; OK to refill.          Passed - Medication is active on med list        Passed - Patient is age 18 or older        Passed - No active pregnancy on record        Passed - No positive pregnancy test in last 12 months        Passed - Recent (6 mo) or future (30 days) visit within the authorizing provider's specialty     Patient had office visit in the last 6 months or has a visit in the next 30 days with authorizing provider or within the authorizing provider's specialty.  See \"Patient Info\" tab in inbasket, or \"Choose Columns\" in Meds & Orders section of the refill encounter.               Sent PHQ9 via ana Shah RN  [facility]     "

## 2022-06-28 RX ORDER — BUPROPION HYDROCHLORIDE 150 MG/1
150 TABLET ORAL EVERY MORNING
Qty: 90 TABLET | Refills: 0 | Status: SHIPPED | OUTPATIENT
Start: 2022-06-28 | End: 2022-09-23

## 2022-07-16 DIAGNOSIS — I10 HTN, GOAL BELOW 140/90: ICD-10-CM

## 2022-07-20 NOTE — TELEPHONE ENCOUNTER
"Requested Prescriptions   Pending Prescriptions Disp Refills     lisinopril (ZESTRIL) 40 MG tablet [Pharmacy Med Name: Lisinopril Oral Tablet 40 MG] 30 tablet 0     Sig: Take 1 tablet (40 mg) by mouth daily       ACE Inhibitors (Including Combos) Protocol Failed - 7/16/2022  1:21 PM        Failed - Blood pressure under 140/90 in past 12 months     BP Readings from Last 3 Encounters:   04/07/22 (!) 146/86   02/24/22 126/78   11/16/21 132/86                 Passed - Recent (12 mo) or future (30 days) visit within the authorizing provider's specialty     Patient has had an office visit with the authorizing provider or a provider within the authorizing providers department within the previous 12 mos or has a future within next 30 days. See \"Patient Info\" tab in inbasket, or \"Choose Columns\" in Meds & Orders section of the refill encounter.              Passed - Medication is active on med list        Passed - Patient is age 18 or older        Passed - No active pregnancy on record        Passed - Normal serum creatinine on file in past 12 months     Recent Labs   Lab Test 02/14/22  0658   CR 0.78       Ok to refill medication if creatinine is low          Passed - Normal serum potassium on file in past 12 months     Recent Labs   Lab Test 02/14/22  0658   POTASSIUM 3.6             Passed - No positive pregnancy test within past 12 months           mychart sent to pt to send updated home BPs.    Alondra JONES RN  "
LMOM asking pt to send in updated BPs.    Alondra JONES RN    
70

## 2022-07-21 RX ORDER — LISINOPRIL 40 MG/1
40 TABLET ORAL DAILY
Qty: 30 TABLET | Refills: 0 | Status: SHIPPED | OUTPATIENT
Start: 2022-07-21 | End: 2022-09-23

## 2022-08-30 DIAGNOSIS — E66.01 CLASS 3 SEVERE OBESITY WITH SERIOUS COMORBIDITY AND BODY MASS INDEX (BMI) OF 45.0 TO 49.9 IN ADULT, UNSPECIFIED OBESITY TYPE (H): ICD-10-CM

## 2022-08-30 DIAGNOSIS — E66.813 CLASS 3 SEVERE OBESITY WITH SERIOUS COMORBIDITY AND BODY MASS INDEX (BMI) OF 45.0 TO 49.9 IN ADULT, UNSPECIFIED OBESITY TYPE (H): ICD-10-CM

## 2022-08-31 RX ORDER — NALTREXONE HYDROCHLORIDE 50 MG/1
50 TABLET, FILM COATED ORAL DAILY
Qty: 90 TABLET | Refills: 0 | Status: SHIPPED | OUTPATIENT
Start: 2022-08-31 | End: 2022-12-12

## 2022-08-31 NOTE — TELEPHONE ENCOUNTER
Requested Prescriptions   Pending Prescriptions Disp Refills     naltrexone (DEPADE/REVIA) 50 MG tablet [Pharmacy Med Name: Naltrexone HCl Oral Tablet 50 MG] 90 tablet 0     Sig: Take 1 tablet (50 mg) by mouth daily       There is no refill protocol information for this order        Seen 2-24-22  Weight management plan: Discussed healthy diet and exercise guidelines    Alondra JONES RN

## 2022-09-01 ENCOUNTER — TRANSFERRED RECORDS (OUTPATIENT)
Dept: MULTI SPECIALTY CLINIC | Facility: CLINIC | Age: 52
End: 2022-09-01

## 2022-09-01 LAB — RETINOPATHY: NORMAL

## 2022-09-19 DIAGNOSIS — F33.1 MODERATE EPISODE OF RECURRENT MAJOR DEPRESSIVE DISORDER (H): Primary | ICD-10-CM

## 2022-09-19 ASSESSMENT — PATIENT HEALTH QUESTIONNAIRE - PHQ9
SUM OF ALL RESPONSES TO PHQ QUESTIONS 1-9: 13
SUM OF ALL RESPONSES TO PHQ QUESTIONS 1-9: 13
10. IF YOU CHECKED OFF ANY PROBLEMS, HOW DIFFICULT HAVE THESE PROBLEMS MADE IT FOR YOU TO DO YOUR WORK, TAKE CARE OF THINGS AT HOME, OR GET ALONG WITH OTHER PEOPLE: VERY DIFFICULT
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
## 2022-09-20 ENCOUNTER — TELEPHONE (OUTPATIENT)
Dept: BEHAVIORAL HEALTH | Facility: CLINIC | Age: 52
End: 2022-09-20

## 2022-09-20 NOTE — TELEPHONE ENCOUNTER
[facility] received referral from PCP. Writer called and left a voicemail on  @ 9:28 AM. Writer also sent SuperMamahart message in regards to [facility] referral and scheduling appointment if interested.     KADE Amaro, [facility]  Behavioral Health Clinician  [facility], [address], [address], [address], [place], [address]  [phone]

## 2022-10-09 ENCOUNTER — MYC MEDICAL ADVICE (OUTPATIENT)
Dept: FAMILY MEDICINE | Facility: CLINIC | Age: 52
End: 2022-10-09

## 2022-10-10 RX ORDER — DULOXETIN HYDROCHLORIDE 60 MG/1
60 CAPSULE, DELAYED RELEASE ORAL DAILY
Qty: 90 CAPSULE | Refills: 3 | Status: SHIPPED | OUTPATIENT
Start: 2022-10-10 | End: 2022-10-31

## 2022-10-10 RX ORDER — QUETIAPINE FUMARATE 25 MG/1
25 TABLET, FILM COATED ORAL
Qty: 90 TABLET | Refills: 1 | Status: SHIPPED | OUTPATIENT
Start: 2022-10-10 | End: 2022-10-31

## 2022-10-10 NOTE — PROGRESS NOTES
Answers for HPI/ROS submitted by the patient on 10/31/2022  If you checked off any problems, how difficult have these problems made it for you to do your work, take care of things at home, or get along with other people?: Somewhat difficult  PHQ9 TOTAL SCORE: 16  JACINTO 7 TOTAL SCORE: 10  Do you check your blood pressure regularly outside of the clinic?: No  Are your blood pressures ever more than 140 on the top number (systolic) OR more than 90 on the bottom number (diastolic)? (For example, greater than 140/90): Yes  Are you following a low salt diet?: Yes  Frequency of checking blood sugars:: not at all  Diabetic concerns:: blood sugar frequently over 200  Paraesthesia present:: excessive thirst, blurry vision, weight gain  Have you had a diabetic eye exam within the last year?: Yes  Depression/Anxiety: Depression & Anxiety  Since last visit, patient describes the following symptoms:: Anxiety, Constipation, Depression, Fatigue, Loose stools  Status since last visit:: no change  Anxiety since last: : worse  Other associated symptoms of depression:: Yes  Other associated symotome: : Yes  Significant life event: : job concerns, financial concerns, grief or loss, health concerns  Anxious:: Yes  Current substance use:: No    Assessment & Plan   Problem List Items Addressed This Visit     Morbid obesity, unspecified obesity type (H)    Relevant Orders    Comprehensive Weight Management    HTN, goal below 140/90    Relevant Medications    lisinopril (ZESTRIL) 40 MG tablet    Other Relevant Orders    Med Therapy Management Referral    Diabetes mellitus, type 2 (H)    Relevant Orders    REVIEW OF HEALTH MAINTENANCE PROTOCOL ORDERS (Completed)    Comprehensive metabolic panel (BMP + Alb, Alk Phos, ALT, AST, Total. Bili, TP)    Albumin Random Urine Quantitative with Creat Ratio    Med Therapy Management Referral    Lipid panel reflex to direct LDL Fasting   Other Visit Diagnoses     Moderate recurrent major depression (H)    -  " Primary    Relevant Medications    buPROPion (WELLBUTRIN XL) 300 MG 24 hr tablet    Other Relevant Orders    Med Therapy Management Referral    Atypical mole        Relevant Orders    Adult Dermatology Referral    Intertrigo        Relevant Medications    nystatin (MYCOSTATIN) 938120 UNIT/GM external powder    Hearing loss, unspecified hearing loss type, unspecified laterality        Relevant Orders    Adult Audiology  Referral    Daytime sleepiness        Relevant Orders    Adult Sleep Eval & Management  Referral       -Increase wellbutrin to 300 mg daily  -establish care for counseling  -recheck Hgb A1C today; consider discontinue Metformin and add Ozempic, as she is having significant GI side effects from Metformin  -dermatology referral for multiple moles  -referral to sleep management; evaluation for VANITA.  -referral to weight management for Morbid Obesity        45 minutes spent on the date of the encounter doing chart review, history and exam, documentation and further activities per the note       BMI:   Estimated body mass index is 41.9 kg/m  as calculated from the following:    Height as of this encounter: 1.738 m (5' 8.43\").    Weight as of this encounter: 126.6 kg (279 lb).   Weight management plan: Patient referred to endocrine and/or weight management specialty    See Patient Instructions    No follow-ups on file.    JERRICA Church CNP [facility]    Jodi Moralez is a 52 year old presenting for the following health issues:  RECHECK, Blood work, and Light/dark Spots      HPI     Diabetes Follow-up      How often are you checking your blood sugar? Not at all    What concerns do you have today about your diabetes? Other: fatigue; infections in skin folds     Do you have any of these symptoms? (Select all that apply)  Blurry vision and Weight gain; Frequent urination    Have you had a diabetic eye exam in the last 12 months?Yes        BP Readings " from Last 2 Encounters:   10/31/22 135/87   04/07/22 (!) 146/86     Hemoglobin A1C (%)   Date Value   10/31/2022 7.5 (H)   02/14/2022 6.8 (H)   05/04/2020 6.5 (H)   10/15/2019 6.0 (H)     LDL Cholesterol Calculated (mg/dL)   Date Value   02/14/2022 108 (H)   12/04/2017 105 (H)   01/25/2016 104 (H)         Depression Followup    How are you doing with your depression since your last visit? No change    Are you having other symptoms that might be associated with depression? Yes:  fatigue    Have you had a significant life event?  Financial Concerns and Grief or Loss     Are you feeling anxious or having panic attacks?   Yes:  occasional    Do you have any concerns with your use of alcohol or other drugs? No    Ongoing daytime sleepiness and exhaustion. She tends to work 8-9 days in a row, but schedule varies week to week; often wants to sleep the entire day on her day off. She feels like her sleep is not restful. She got tonsils and adenoids out a few years ago, which was helpful, but she is not sure if she is snoring now. She has to wake up frequently at night to urinate.  REdness/itching in skin folds, especially under her belly; and she has noticed several concerning moles on chest and upper back.  She also complains that individuals at work have complained that she is talking loud at work, which she attributes to not being able to hear well; she would like to get a hearing evaluation.    Social History     Tobacco Use     Smoking status: Never     Smokeless tobacco: Never   Vaping Use     Vaping Use: Never used   Substance Use Topics     Alcohol use: No     Drug use: No     PHQ 12/13/2021 9/19/2022 10/31/2022   PHQ-9 Total Score 8 13 16   Q9: Thoughts of better off dead/self-harm past 2 weeks Several days Several days Not at all   F/U: Thoughts of suicide or self-harm No Yes -   F/U: Self harm-plan - No -   F/U: Self-harm action - No -   F/U: Safety concerns No No -     JACINTO-7 SCORE 4/10/2015 12/13/2018 10/31/2022  "  Total Score 4 - -   Total Score - - 10 (moderate anxiety)   Total Score - 15 10       Review of Systems   CONSTITUTIONAL:POSITIVE  for malaise and weight gain  EYES: POSITIVE for blurred vision bilateral  ENT/MOUTH: NEGATIVE for ear, mouth and throat problems  RESP: NEGATIVE for significant cough or SOB  BREAST: NEGATIVE for masses, tenderness or discharge  CV: NEGATIVE for chest pain, palpitations or peripheral edema  GI: NEGATIVE for nausea, abdominal pain, heartburn, or change in bowel habits  : NEGATIVE for frequency, dysuria, or hematuria  ENDOCRINE: NEGATIVE for temperature intolerance, skin/hair changes  HEME: NEGATIVE for bleeding problems  PSYCHIATRIC: NEGATIVE for changes in mood or affect      Objective    /87 (BP Location: Right arm, Patient Position: Sitting, Cuff Size: Adult Large)   Pulse 87   Temp 97.9  F (36.6  C) (Temporal)   Resp 14   Ht 1.738 m (5' 8.43\")   Wt 126.6 kg (279 lb)   SpO2 98%   BMI 41.90 kg/m    Body mass index is 41.9 kg/m .  Physical Exam   GENERAL: healthy, alert and no distress  HENT: ear canals and TM's normal, nose and mouth without ulcers or lesions  NECK: no adenopathy, no asymmetry, masses, or scars and thyroid normal to palpation  RESP: lungs clear to auscultation - no rales, rhonchi or wheezes  CV: regular rate and rhythm, normal S1 S2, no S3 or S4, no murmur, click or rub, no peripheral edema and peripheral pulses strong  ABDOMEN: soft, nontender, no hepatosplenomegaly, no masses and bowel sounds normal  MS: no gross musculoskeletal defects noted, no edema  SKIN: erythema in intriginal fold of abdominal pannus on left side; several moles with irregular borders present on chest  PSYCH: mentation appears normal, affect normal/bright    Office Visit on 04/07/2022   Component Date Value Ref Range Status     Final Diagnosis 04/07/2022    Final                    Value:This result contains rich text formatting which cannot be displayed here.     Comment " 04/07/2022    Final                    Value:This result contains rich text formatting which cannot be displayed here.     Clinical Information 04/07/2022    Final                    Value:This result contains rich text formatting which cannot be displayed here.     Gross Description 04/07/2022    Final                    Value:This result contains rich text formatting which cannot be displayed here.     Microscopic Description 04/07/2022    Final                    Value:This result contains rich text formatting which cannot be displayed here.     Performing Labs 04/07/2022    Final                    Value:This result contains rich text formatting which cannot be displayed here.     RESULTS 04/07/2022    Final                    Value:This result contains rich text formatting which cannot be displayed here.     INTERPRETATION 04/07/2022    Final                    Value:This result contains rich text formatting which cannot be displayed here.     METHODOLOGY 04/07/2022    Final                    Value:This result contains rich text formatting which cannot be displayed here.     DISCLAIMER 04/07/2022    Final                    Value:This result contains rich text formatting which cannot be displayed here.     Specimen Description 04/07/2022    Final                    Value:This result contains rich text formatting which cannot be displayed here.

## 2022-10-21 DIAGNOSIS — E11.9 TYPE 2 DIABETES MELLITUS WITHOUT COMPLICATION, WITHOUT LONG-TERM CURRENT USE OF INSULIN (H): ICD-10-CM

## 2022-10-23 ENCOUNTER — HEALTH MAINTENANCE LETTER (OUTPATIENT)
Age: 52
End: 2022-10-23

## 2022-10-24 RX ORDER — SIMVASTATIN 20 MG
20 TABLET ORAL AT BEDTIME
Qty: 90 TABLET | Refills: 0 | Status: SHIPPED | OUTPATIENT
Start: 2022-10-24 | End: 2023-03-02

## 2022-10-24 NOTE — TELEPHONE ENCOUNTER
"Requested Prescriptions   Pending Prescriptions Disp Refills     simvastatin (ZOCOR) 20 MG tablet [Pharmacy Med Name: Simvastatin Oral Tablet 20 MG] 90 tablet 0     Sig: Take 1 tablet (20 mg) by mouth At Bedtime       Statins Protocol Passed - 10/21/2022  9:19 PM        Passed - LDL on file in past 12 months     Recent Labs   Lab Test 02/14/22  0658   *             Passed - No abnormal creatine kinase in past 12 months     No lab results found.             Passed - Recent (12 mo) or future (30 days) visit within the authorizing provider's specialty     Patient has had an office visit with the authorizing provider or a provider within the authorizing providers department within the previous 12 mos or has a future within next 30 days. See \"Patient Info\" tab in inbasket, or \"Choose Columns\" in Meds & Orders section of the refill encounter.              Passed - Medication is active on med list        Passed - Patient is age 18 or older        Passed - No active pregnancy on record        Passed - No positive pregnancy test in past 12 months           Prescription approved per [facility] Refill Protocol.  Pt has an appointment on 10/31/2022    Andreina Lucas RN  [facility]    "

## 2022-10-29 DIAGNOSIS — I10 HTN, GOAL BELOW 140/90: ICD-10-CM

## 2022-10-31 ENCOUNTER — APPOINTMENT (OUTPATIENT)
Dept: LAB | Facility: CLINIC | Age: 52
End: 2022-10-31
Payer: COMMERCIAL

## 2022-10-31 ENCOUNTER — VIRTUAL VISIT (OUTPATIENT)
Dept: FAMILY MEDICINE | Facility: CLINIC | Age: 52
End: 2022-10-31
Payer: COMMERCIAL

## 2022-10-31 VITALS
SYSTOLIC BLOOD PRESSURE: 135 MMHG | HEIGHT: 68 IN | TEMPERATURE: 97.9 F | BODY MASS INDEX: 42.28 KG/M2 | RESPIRATION RATE: 14 BRPM | DIASTOLIC BLOOD PRESSURE: 87 MMHG | OXYGEN SATURATION: 98 % | HEART RATE: 87 BPM | WEIGHT: 279 LBS

## 2022-10-31 DIAGNOSIS — E66.01 MORBID OBESITY, UNSPECIFIED OBESITY TYPE (H): ICD-10-CM

## 2022-10-31 DIAGNOSIS — E11.9 TYPE 2 DIABETES MELLITUS WITHOUT COMPLICATION, WITHOUT LONG-TERM CURRENT USE OF INSULIN (H): ICD-10-CM

## 2022-10-31 DIAGNOSIS — F33.1 MODERATE RECURRENT MAJOR DEPRESSION (H): Primary | ICD-10-CM

## 2022-10-31 DIAGNOSIS — L30.4 INTERTRIGO: ICD-10-CM

## 2022-10-31 DIAGNOSIS — R40.0 DAYTIME SLEEPINESS: ICD-10-CM

## 2022-10-31 DIAGNOSIS — I10 HTN, GOAL BELOW 140/90: ICD-10-CM

## 2022-10-31 DIAGNOSIS — H91.90 HEARING LOSS, UNSPECIFIED HEARING LOSS TYPE, UNSPECIFIED LATERALITY: ICD-10-CM

## 2022-10-31 DIAGNOSIS — D22.9 ATYPICAL MOLE: ICD-10-CM

## 2022-10-31 LAB — HBA1C MFR BLD: 7.5 % (ref 0–5.6)

## 2022-10-31 PROCEDURE — 36415 COLL VENOUS BLD VENIPUNCTURE: CPT | Performed by: NURSE PRACTITIONER

## 2022-10-31 PROCEDURE — 82043 UR ALBUMIN QUANTITATIVE: CPT | Performed by: NURSE PRACTITIONER

## 2022-10-31 PROCEDURE — 80061 LIPID PANEL: CPT | Performed by: NURSE PRACTITIONER

## 2022-10-31 PROCEDURE — 80053 COMPREHEN METABOLIC PANEL: CPT | Performed by: NURSE PRACTITIONER

## 2022-10-31 PROCEDURE — 83036 HEMOGLOBIN GLYCOSYLATED A1C: CPT | Performed by: NURSE PRACTITIONER

## 2022-10-31 PROCEDURE — 84443 ASSAY THYROID STIM HORMONE: CPT | Performed by: NURSE PRACTITIONER

## 2022-10-31 PROCEDURE — 99214 OFFICE O/P EST MOD 30 MIN: CPT | Performed by: NURSE PRACTITIONER

## 2022-10-31 RX ORDER — LISINOPRIL 40 MG/1
40 TABLET ORAL DAILY
Qty: 30 TABLET | Refills: 0 | Status: SHIPPED | OUTPATIENT
Start: 2022-10-31 | End: 2022-10-31

## 2022-10-31 RX ORDER — LISINOPRIL 40 MG/1
40 TABLET ORAL DAILY
Qty: 90 TABLET | Refills: 3 | Status: SHIPPED | OUTPATIENT
Start: 2022-10-31 | End: 2023-11-20

## 2022-10-31 RX ORDER — NYSTATIN 100000 [USP'U]/G
POWDER TOPICAL 3 TIMES DAILY PRN
Qty: 120 G | Refills: 3 | Status: SHIPPED | OUTPATIENT
Start: 2022-10-31

## 2022-10-31 RX ORDER — LIRAGLUTIDE 6 MG/ML
INJECTION SUBCUTANEOUS
Qty: 4.2 ML | Refills: 0 | Status: SHIPPED | OUTPATIENT
Start: 2022-10-31 | End: 2023-01-02

## 2022-10-31 RX ORDER — BUPROPION HYDROCHLORIDE 300 MG/1
300 TABLET ORAL EVERY MORNING
Qty: 90 TABLET | Refills: 1 | Status: SHIPPED | OUTPATIENT
Start: 2022-10-31 | End: 2023-06-13

## 2022-10-31 ASSESSMENT — ANXIETY QUESTIONNAIRES
3. WORRYING TOO MUCH ABOUT DIFFERENT THINGS: MORE THAN HALF THE DAYS
2. NOT BEING ABLE TO STOP OR CONTROL WORRYING: MORE THAN HALF THE DAYS
IF YOU CHECKED OFF ANY PROBLEMS ON THIS QUESTIONNAIRE, HOW DIFFICULT HAVE THESE PROBLEMS MADE IT FOR YOU TO DO YOUR WORK, TAKE CARE OF THINGS AT HOME, OR GET ALONG WITH OTHER PEOPLE: VERY DIFFICULT
1. FEELING NERVOUS, ANXIOUS, OR ON EDGE: MORE THAN HALF THE DAYS
7. FEELING AFRAID AS IF SOMETHING AWFUL MIGHT HAPPEN: SEVERAL DAYS
7. FEELING AFRAID AS IF SOMETHING AWFUL MIGHT HAPPEN: SEVERAL DAYS
6. BECOMING EASILY ANNOYED OR IRRITABLE: SEVERAL DAYS
4. TROUBLE RELAXING: MORE THAN HALF THE DAYS
GAD7 TOTAL SCORE: 10
8. IF YOU CHECKED OFF ANY PROBLEMS, HOW DIFFICULT HAVE THESE MADE IT FOR YOU TO DO YOUR WORK, TAKE CARE OF THINGS AT HOME, OR GET ALONG WITH OTHER PEOPLE?: VERY DIFFICULT
GAD7 TOTAL SCORE: 10
5. BEING SO RESTLESS THAT IT IS HARD TO SIT STILL: NOT AT ALL
GAD7 TOTAL SCORE: 10

## 2022-10-31 ASSESSMENT — PATIENT HEALTH QUESTIONNAIRE - PHQ9
SUM OF ALL RESPONSES TO PHQ QUESTIONS 1-9: 16
SUM OF ALL RESPONSES TO PHQ QUESTIONS 1-9: 16
10. IF YOU CHECKED OFF ANY PROBLEMS, HOW DIFFICULT HAVE THESE PROBLEMS MADE IT FOR YOU TO DO YOUR WORK, TAKE CARE OF THINGS AT HOME, OR GET ALONG WITH OTHER PEOPLE: SOMEWHAT DIFFICULT

## 2022-10-31 ASSESSMENT — PAIN SCALES - GENERAL: PAINLEVEL: MODERATE PAIN (4)

## 2022-10-31 NOTE — TELEPHONE ENCOUNTER
"Requested Prescriptions   Pending Prescriptions Disp Refills     lisinopril (ZESTRIL) 40 MG tablet [Pharmacy Med Name: Lisinopril Oral Tablet 40 MG] 30 tablet 0     Sig: Take 1 tablet (40 mg) by mouth daily       ACE Inhibitors (Including Combos) Protocol Failed - 10/29/2022 10:28 PM        Failed - Blood pressure under 140/90 in past 12 months     BP Readings from Last 3 Encounters:   04/07/22 (!) 146/86   02/24/22 126/78   11/16/21 132/86                 Passed - Recent (12 mo) or future (30 days) visit within the authorizing provider's specialty     Patient has had an office visit with the authorizing provider or a provider within the authorizing providers department within the previous 12 mos or has a future within next 30 days. See \"Patient Info\" tab in inbasket, or \"Choose Columns\" in Meds & Orders section of the refill encounter.              Passed - Medication is active on med list        Passed - Patient is age 18 or older        Passed - No active pregnancy on record        Passed - Normal serum creatinine on file in past 12 months     Recent Labs   Lab Test 02/14/22  0658   CR 0.78       Ok to refill medication if creatinine is low          Passed - Normal serum potassium on file in past 12 months     Recent Labs   Lab Test 02/14/22  0658   POTASSIUM 3.6             Passed - No positive pregnancy test within past 12 months           Prescription approved per [facility] Refill Protocol.  Pt has an appointment today with PCP    Andreina Lucas RN  [facility]    "

## 2022-11-01 LAB
ALBUMIN SERPL-MCNC: 3.6 G/DL (ref 3.4–5)
ALP SERPL-CCNC: 77 U/L (ref 40–150)
ALT SERPL W P-5'-P-CCNC: 25 U/L (ref 0–50)
ANION GAP SERPL CALCULATED.3IONS-SCNC: 6 MMOL/L (ref 3–14)
AST SERPL W P-5'-P-CCNC: 16 U/L (ref 0–45)
BILIRUB SERPL-MCNC: 0.4 MG/DL (ref 0.2–1.3)
BUN SERPL-MCNC: 16 MG/DL (ref 7–30)
CALCIUM SERPL-MCNC: 9.4 MG/DL (ref 8.5–10.1)
CHLORIDE BLD-SCNC: 105 MMOL/L (ref 94–109)
CHOLEST SERPL-MCNC: 192 MG/DL
CO2 SERPL-SCNC: 27 MMOL/L (ref 20–32)
CREAT SERPL-MCNC: 0.69 MG/DL (ref 0.52–1.04)
CREAT UR-MCNC: 252 MG/DL
FASTING STATUS PATIENT QL REPORTED: YES
GFR SERPL CREATININE-BSD FRML MDRD: >90 ML/MIN/1.73M2
GLUCOSE BLD-MCNC: 168 MG/DL (ref 70–99)
HDLC SERPL-MCNC: 40 MG/DL
LDLC SERPL CALC-MCNC: 118 MG/DL
MICROALBUMIN UR-MCNC: 20 MG/L
MICROALBUMIN/CREAT UR: 7.94 MG/G CR (ref 0–25)
NONHDLC SERPL-MCNC: 152 MG/DL
POTASSIUM BLD-SCNC: 4.5 MMOL/L (ref 3.4–5.3)
PROT SERPL-MCNC: 7.5 G/DL (ref 6.8–8.8)
SODIUM SERPL-SCNC: 138 MMOL/L (ref 133–144)
TRIGL SERPL-MCNC: 168 MG/DL
TSH SERPL DL<=0.005 MIU/L-ACNC: 2.81 MU/L (ref 0.4–4)

## 2022-11-02 DIAGNOSIS — E11.9 TYPE 2 DIABETES MELLITUS WITHOUT COMPLICATION, WITHOUT LONG-TERM CURRENT USE OF INSULIN (H): Primary | ICD-10-CM

## 2022-11-02 RX ORDER — LIRAGLUTIDE 6 MG/ML
INJECTION SUBCUTANEOUS DAILY
Qty: 90 ML | Refills: 1 | OUTPATIENT
Start: 2022-11-02

## 2022-11-02 NOTE — TELEPHONE ENCOUNTER
Didn't receive prescription for pen needles to go with   liraglutide (VICTOZA) 18 MG/3ML solution  Please send

## 2022-11-02 NOTE — TELEPHONE ENCOUNTER
"Requested Prescriptions   Pending Prescriptions Disp Refills     liraglutide (VICTOZA) 18 MG/3ML solution 90 mL 1     Sig: Inject Subcutaneous daily       GLP-1 Agonists Protocol Passed - 11/2/2022  4:05 PM        Passed - HgbA1C in past 3 or 6 months     If HgbA1C is 8 or greater, it needs to be on file within the past 3 months.  If less than 8, must be on file within the past 6 months.     Recent Labs   Lab Test 10/31/22  1248   A1C 7.5*             Passed - Medication is active on med list        Passed - Patient is age 18 or older        Passed - No active pregnancy on record        Passed - Normal serum creatinine on file in past 12 months     Recent Labs   Lab Test 10/31/22  1248   CR 0.69       Ok to refill medication if creatinine is low          Passed - No positive pregnancy test in past 12 months        Passed - Recent (6 mo) or future (30 days) visit within the authorizing provider's specialty     Patient had office visit in the last 6 months or has a visit in the next 30 days with authorizing provider.  See \"Patient Info\" tab in inbasket, or \"Choose Columns\" in Meds & Orders section of the refill encounter.               Routing refill request to provider for review/approval because medication needs to be removed.        Andreina Lucas RN  [facility]    "

## 2022-11-03 ENCOUNTER — MYC MEDICAL ADVICE (OUTPATIENT)
Dept: FAMILY MEDICINE | Facility: CLINIC | Age: 52
End: 2022-11-03

## 2022-11-03 DIAGNOSIS — E11.9 TYPE 2 DIABETES MELLITUS WITHOUT COMPLICATION, WITHOUT LONG-TERM CURRENT USE OF INSULIN (H): Primary | ICD-10-CM

## 2022-11-04 NOTE — TELEPHONE ENCOUNTER
Pharmacy requesting needles for liraglutide - not included in original script    Pended order     Jami Shah RN  [place]

## 2022-11-17 ENCOUNTER — ANCILLARY PROCEDURE (OUTPATIENT)
Dept: MAMMOGRAPHY | Facility: CLINIC | Age: 52
End: 2022-11-17
Attending: NURSE PRACTITIONER
Payer: COMMERCIAL

## 2022-11-17 DIAGNOSIS — Z12.31 VISIT FOR SCREENING MAMMOGRAM: ICD-10-CM

## 2022-11-17 PROCEDURE — 77067 SCR MAMMO BI INCL CAD: CPT | Mod: GC | Performed by: STUDENT IN AN ORGANIZED HEALTH CARE EDUCATION/TRAINING PROGRAM

## 2022-11-17 PROCEDURE — 77063 BREAST TOMOSYNTHESIS BI: CPT | Mod: GC | Performed by: STUDENT IN AN ORGANIZED HEALTH CARE EDUCATION/TRAINING PROGRAM

## 2022-12-11 DIAGNOSIS — E66.813 CLASS 3 SEVERE OBESITY WITH SERIOUS COMORBIDITY AND BODY MASS INDEX (BMI) OF 45.0 TO 49.9 IN ADULT, UNSPECIFIED OBESITY TYPE (H): ICD-10-CM

## 2022-12-11 DIAGNOSIS — E66.01 CLASS 3 SEVERE OBESITY WITH SERIOUS COMORBIDITY AND BODY MASS INDEX (BMI) OF 45.0 TO 49.9 IN ADULT, UNSPECIFIED OBESITY TYPE (H): ICD-10-CM

## 2022-12-12 RX ORDER — NALTREXONE HYDROCHLORIDE 50 MG/1
50 TABLET, FILM COATED ORAL DAILY
Qty: 90 TABLET | Refills: 0 | Status: SHIPPED | OUTPATIENT
Start: 2022-12-12 | End: 2023-03-13

## 2022-12-12 NOTE — TELEPHONE ENCOUNTER
Requested Prescriptions   Pending Prescriptions Disp Refills     naltrexone (DEPADE/REVIA) 50 MG tablet [Pharmacy Med Name: Naltrexone HCl Oral Tablet 50 MG] 90 tablet 0     Sig: Take 1 tablet (50 mg) by mouth daily       There is no refill protocol information for this order        Routing refill request to provider for review/approval because:  Drug not on the [facility] refill protocol     Upcoming OV 1/2/23.    José Luis Garvey RN   [facility]

## 2023-01-01 ASSESSMENT — PATIENT HEALTH QUESTIONNAIRE - PHQ9
10. IF YOU CHECKED OFF ANY PROBLEMS, HOW DIFFICULT HAVE THESE PROBLEMS MADE IT FOR YOU TO DO YOUR WORK, TAKE CARE OF THINGS AT HOME, OR GET ALONG WITH OTHER PEOPLE: VERY DIFFICULT
SUM OF ALL RESPONSES TO PHQ QUESTIONS 1-9: 19
SUM OF ALL RESPONSES TO PHQ QUESTIONS 1-9: 19

## 2023-01-01 ASSESSMENT — ANXIETY QUESTIONNAIRES
3. WORRYING TOO MUCH ABOUT DIFFERENT THINGS: NEARLY EVERY DAY
7. FEELING AFRAID AS IF SOMETHING AWFUL MIGHT HAPPEN: SEVERAL DAYS
8. IF YOU CHECKED OFF ANY PROBLEMS, HOW DIFFICULT HAVE THESE MADE IT FOR YOU TO DO YOUR WORK, TAKE CARE OF THINGS AT HOME, OR GET ALONG WITH OTHER PEOPLE?: SOMEWHAT DIFFICULT
GAD7 TOTAL SCORE: 14
6. BECOMING EASILY ANNOYED OR IRRITABLE: MORE THAN HALF THE DAYS
GAD7 TOTAL SCORE: 14
5. BEING SO RESTLESS THAT IT IS HARD TO SIT STILL: NOT AT ALL
1. FEELING NERVOUS, ANXIOUS, OR ON EDGE: MORE THAN HALF THE DAYS
7. FEELING AFRAID AS IF SOMETHING AWFUL MIGHT HAPPEN: SEVERAL DAYS
IF YOU CHECKED OFF ANY PROBLEMS ON THIS QUESTIONNAIRE, HOW DIFFICULT HAVE THESE PROBLEMS MADE IT FOR YOU TO DO YOUR WORK, TAKE CARE OF THINGS AT HOME, OR GET ALONG WITH OTHER PEOPLE: SOMEWHAT DIFFICULT
4. TROUBLE RELAXING: NEARLY EVERY DAY
GAD7 TOTAL SCORE: 14
2. NOT BEING ABLE TO STOP OR CONTROL WORRYING: NEARLY EVERY DAY

## 2023-01-02 ENCOUNTER — VIRTUAL VISIT (OUTPATIENT)
Dept: FAMILY MEDICINE | Facility: CLINIC | Age: 53
End: 2023-01-02
Payer: COMMERCIAL

## 2023-01-02 DIAGNOSIS — E66.01 MORBID OBESITY, UNSPECIFIED OBESITY TYPE (H): ICD-10-CM

## 2023-01-02 DIAGNOSIS — F33.1 MODERATE RECURRENT MAJOR DEPRESSION (H): ICD-10-CM

## 2023-01-02 DIAGNOSIS — M25.552 HIP PAIN, LEFT: Primary | ICD-10-CM

## 2023-01-02 DIAGNOSIS — E11.9 TYPE 2 DIABETES MELLITUS WITHOUT COMPLICATION, WITHOUT LONG-TERM CURRENT USE OF INSULIN (H): ICD-10-CM

## 2023-01-02 DIAGNOSIS — G89.29 CHRONIC BILATERAL LOW BACK PAIN WITH SCIATICA, SCIATICA LATERALITY UNSPECIFIED: ICD-10-CM

## 2023-01-02 DIAGNOSIS — M54.40 CHRONIC BILATERAL LOW BACK PAIN WITH SCIATICA, SCIATICA LATERALITY UNSPECIFIED: ICD-10-CM

## 2023-01-02 PROCEDURE — 99214 OFFICE O/P EST MOD 30 MIN: CPT | Mod: GT | Performed by: NURSE PRACTITIONER

## 2023-01-02 RX ORDER — LIRAGLUTIDE 6 MG/ML
1.2 INJECTION SUBCUTANEOUS DAILY
Qty: 6 ML | Refills: 3 | Status: SHIPPED | OUTPATIENT
Start: 2023-01-02 | End: 2023-06-27

## 2023-01-02 RX ORDER — GABAPENTIN 300 MG/1
300 CAPSULE ORAL 3 TIMES DAILY
Qty: 270 CAPSULE | Refills: 3 | Status: SHIPPED | OUTPATIENT
Start: 2023-01-02 | End: 2024-04-10
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
## 2023-01-05 ENCOUNTER — TELEPHONE (OUTPATIENT)
Dept: SURGERY | Facility: CLINIC | Age: 53
End: 2023-01-05

## 2023-01-05 NOTE — TELEPHONE ENCOUNTER
DILIP and sent mychart for scheduling:  With: Linnette Galarza NP   Referring Provider: self   For / Appt Notes: anal pap   Appt Type: Return Patient   Appt Date/Time: due April 2023

## 2023-02-20 DIAGNOSIS — E11.9 TYPE 2 DIABETES MELLITUS WITHOUT COMPLICATION, WITHOUT LONG-TERM CURRENT USE OF INSULIN (H): ICD-10-CM

## 2023-02-21 NOTE — TELEPHONE ENCOUNTER
"Requested Prescriptions   Pending Prescriptions Disp Refills     simvastatin (ZOCOR) 20 MG tablet [Pharmacy Med Name: Simvastatin Oral Tablet 20 MG] 90 tablet 0     Sig: Take 1 tablet (20 mg) by mouth At Bedtime       Statins Protocol Passed - 2/20/2023 10:17 PM        Passed - LDL on file in past 12 months     Recent Labs   Lab Test 10/31/22  1248   *             Passed - No abnormal creatine kinase in past 12 months     No lab results found.             Passed - Recent (12 mo) or future (30 days) visit within the authorizing provider's specialty     Patient has had an office visit with the authorizing provider or a provider within the authorizing providers department within the previous 12 mos or has a future within next 30 days. See \"Patient Info\" tab in inbasket, or \"Choose Columns\" in Meds & Orders section of the refill encounter.              Passed - Medication is active on med list        Passed - Patient is age 18 or older        Passed - No active pregnancy on record        Passed - No positive pregnancy test in past 12 months         Needs annual appointment, ana sent to schedule.    José Luis Garvey RN   [facility]      "

## 2023-02-27 ENCOUNTER — TELEPHONE (OUTPATIENT)
Dept: FAMILY MEDICINE | Facility: CLINIC | Age: 53
End: 2023-02-27
Payer: COMMERCIAL

## 2023-02-28 PROBLEM — E11.9 DIABETES MELLITUS, TYPE 2 (H): Chronic | Status: ACTIVE | Noted: 2020-12-10

## 2023-03-02 RX ORDER — SIMVASTATIN 20 MG
20 TABLET ORAL AT BEDTIME
Qty: 90 TABLET | Refills: 0 | Status: SHIPPED | OUTPATIENT
Start: 2023-03-02 | End: 2023-06-06

## 2023-03-02 NOTE — TELEPHONE ENCOUNTER
Patient scheduled with PCP 4/20/23  Filled Rx request       Jami JONES RN  [facility]

## 2023-03-07 ENCOUNTER — OFFICE VISIT (OUTPATIENT)
Dept: AUDIOLOGY | Facility: CLINIC | Age: 53
End: 2023-03-07
Attending: NURSE PRACTITIONER
Payer: COMMERCIAL

## 2023-03-07 DIAGNOSIS — H90.3 SENSORINEURAL HEARING LOSS, BILATERAL: Primary | ICD-10-CM

## 2023-03-07 PROCEDURE — 92550 TYMPANOMETRY & REFLEX THRESH: CPT | Performed by: AUDIOLOGIST

## 2023-03-07 PROCEDURE — 92557 COMPREHENSIVE HEARING TEST: CPT | Performed by: AUDIOLOGIST

## 2023-03-07 NOTE — PROGRESS NOTES
AUDIOLOGY REPORT    SUBJECTIVE:  Naomy Kowalski is a 52 year old female who was seen in the Audiology Clinic at the [facility] for audiologic evaluation, referred by Jovita Jonas C.N.P. .The patient has been seen previously in this clinic on 8/1/2012 for assessment and results indicated normal hearing bilaterally. The patient reports a perceived decline in hearing and family history of noise induced and age-related hearing loss. The patient denies  bilateral tinnitus, bilateral otalgia, bilateral drainage, bilateral aural fullness and history of noise exposure.  The patient notes difficulty with communication in a variety of listening situations.  They were unaccompanied today.  OBJECTIVE:  Abuse Screening:  Do you feel unsafe at home or work/school? No  Do you feel threatened by someone? Yes  Safety Plan initiated:by patient and local law enforcement  Does anyone try to keep you from having contact with others, or doing things outside of your home? No  Physical signs of abuse present? No     Fall Risk Screen:  1. Have you fallen two or more times in the past year? No  2. Have you fallen and had an injury in the past year? Yes, recently she tripped on a rug at work and hit her head.    Timed Up and Go Score (in seconds): not tested  Is patient a fall risk? No  Referral initiated: No  Fall Risk Assessment Completed by Audiology    Otoscopic exam indicates ears are clear of cerumen bilaterally     Pure Tone Thresholds assessed using conventional audiometry with good  reliability from 250-8000 Hz bilaterally using insert earphones and circumaural headphones     RIGHT:  normal and borderline-normal from 250-1000 Hz sloping to mild sensorineural hearing loss    LEFT:    normal and borderline-normal from 250-1000 Hz sloping to mild sensorineural hearing loss    Tympanogram:    RIGHT: normal eardrum mobility    LEFT:   normal eardrum mobility    Reflexes (reported by stimulus ear):  RIGHT:  Ipsilateral is absent at frequencies tested  RIGHT: Contralateral could not maintain seal  LEFT:   Ipsilateral could not maintain seal  LEFT:   Contralateral is absent at frequencies tested      Speech Reception Threshold:    RIGHT: 20 dB HL    LEFT:   25 dB HL    Speech Reception Thresholds are in good agreement with pure tone thresholds.    Word Recognition Score:     RIGHT: 100% at 65 dB HL using NU-6 recorded word list.    LEFT:   100% at 65 dB HL using NU-6 recorded word list.      ASSESSMENT:     ICD-10-CM    1. Sensorineural hearing loss, bilateral  H90.3 Adult Audiology  Referral          Compared to patient's previous audiogram dated 8/1/2012, hearing has declined by about 10 dB  bilaterally.  Today s results were discussed with the patient in detail.     PLAN:  Patient was counseled regarding hearing loss and impact on communication.  Patient is a borderline candidate for amplification at this time.Handout on good communication strategies, and hearing aid use was given to patient. It is recommended that the patient schedule a hearing aid consultation whenever she feels she is having enough hearing issues to merit use of amplifcation, or if symptoms increase. A hearing recheck is also recommended in 2-4 years. Please call this clinic with questions regarding these results or recommendations.          Khai Chew MA, CCC-A  [place] Licensed Audiologist #[id number]  3/7/2023

## 2023-03-12 DIAGNOSIS — E66.813 CLASS 3 SEVERE OBESITY WITH SERIOUS COMORBIDITY AND BODY MASS INDEX (BMI) OF 45.0 TO 49.9 IN ADULT, UNSPECIFIED OBESITY TYPE (H): ICD-10-CM

## 2023-03-12 DIAGNOSIS — E66.01 CLASS 3 SEVERE OBESITY WITH SERIOUS COMORBIDITY AND BODY MASS INDEX (BMI) OF 45.0 TO 49.9 IN ADULT, UNSPECIFIED OBESITY TYPE (H): ICD-10-CM

## 2023-03-13 RX ORDER — NALTREXONE HYDROCHLORIDE 50 MG/1
50 TABLET, FILM COATED ORAL DAILY
Qty: 90 TABLET | Refills: 0 | Status: SHIPPED | OUTPATIENT
Start: 2023-03-13 | End: 2023-06-13

## 2023-03-19 DIAGNOSIS — B00.9 RECURRENT HERPES SIMPLEX: ICD-10-CM

## 2023-03-20 RX ORDER — ACYCLOVIR 400 MG/1
TABLET ORAL
Qty: 15 TABLET | Refills: 0 | Status: SHIPPED | OUTPATIENT
Start: 2023-03-20 | End: 2023-07-31

## 2023-03-20 NOTE — TELEPHONE ENCOUNTER
Prescription approved per [facility] Refill Protocol.    Anabella Cheung RN   [facility]

## 2023-03-29 ENCOUNTER — OFFICE VISIT (OUTPATIENT)
Dept: DERMATOLOGY | Facility: CLINIC | Age: 53
End: 2023-03-29
Attending: NURSE PRACTITIONER
Payer: COMMERCIAL

## 2023-03-29 DIAGNOSIS — D22.9 MULTIPLE BENIGN NEVI: ICD-10-CM

## 2023-03-29 DIAGNOSIS — L81.4 LENTIGO: ICD-10-CM

## 2023-03-29 DIAGNOSIS — L82.0 INFLAMED SEBORRHEIC KERATOSIS: Primary | ICD-10-CM

## 2023-03-29 DIAGNOSIS — L82.1 SEBORRHEIC KERATOSIS: ICD-10-CM

## 2023-03-29 DIAGNOSIS — D18.01 CHERRY ANGIOMA: ICD-10-CM

## 2023-03-29 PROCEDURE — 99203 OFFICE O/P NEW LOW 30 MIN: CPT | Mod: 25 | Performed by: PHYSICIAN ASSISTANT

## 2023-03-29 PROCEDURE — 17110 DESTRUCTION B9 LES UP TO 14: CPT | Performed by: PHYSICIAN ASSISTANT

## 2023-03-29 NOTE — LETTER
3/29/2023         RE: Naomy Kowalski  [address] [facility] [place] [address]        Dear Colleague,    Thank you for referring your patient, Naomy Kowalski, to the [facility]. Please see a copy of my visit note below.    Naomy Kowalski is an extremely pleasant 52 year old year old female patient here today for skin check. She notes spots on chest, abdomen that are rubbing on clothing, irritating. No painful or bleeding skin lesions.  Patient has no other skin complaints today.  Remainder of the HPI, Meds, PMH, Allergies, FH, and SH was reviewed in chart.    Pertinent Hx:   No personal history of skin cancer.   Past Medical History:   Diagnosis Date     CARDIOVASCULAR SCREENING; LDL GOAL LESS THAN 160 6/6/2012     Hypertension     watching blood pressure     Obesity, unspecified     Obesity     Sleep apnea     no cpap     Thyroid disease        Past Surgical History:   Procedure Laterality Date     BREAST SURGERY      breast reduction bilat     COLONOSCOPY WITH CO2 INSUFFLATION N/A 9/1/2017    Procedure: COLONOSCOPY WITH CO2 INSUFFLATION;  Colonoscopy, Abdominal pain, left lower quadrant, Parenteau, BMI 42.93, [phone];  Surgeon: Anuel Rodríguez MD;  Location:  OR     ENDOSCOPIC SINUS SURGERY Right 3/15/2017    Procedure: ENDOSCOPIC SINUS SURGERY;;  Surgeon: Vicki Zurita MD;  Location:  OR     EXAM UNDER ANESTHESIA, FULGURATE CONDYLOMA ANUS, COMBINED N/A 9/26/2017    Procedure: COMBINED EXAM UNDER ANESTHESIA, FULGURATE CONDYLOMA ANUS;  Examination Under anethesia, Excision And Fulguration Of Anal Condyloma;  Surgeon: Td Garvey MD;  Location:  OR     LAPAROSCOPIC CHOLECYSTECTOMY  08/05/99     TONSILLECTOMY Bilateral 3/15/2017    Procedure: TONSILLECTOMY;  Bilateral Tonsillectomy, Right Functional Endoscopic Sinus Surgery ;  Surgeon: Vicki Zurita MD;  Location:  OR        Family History   Problem Relation Age of Onset      Diabetes Mother      Hypertension Mother      Depression Mother      Substance Abuse Mother         Alcohol     Cancer Father         pancreatic     Impaired Fasting Glucose Father      Asthma Father      Skin Cancer Father      Diabetes Brother      Asthma Brother      Rashes/Skin Problems Brother         HIdradenitis Suppurativa     No Known Problems Brother      Heart Disease Maternal Grandmother      Diabetes Maternal Grandmother      Cerebrovascular Disease Maternal Grandfather      Cancer - colorectal Paternal Grandfather      Asthma Daughter      Asthma Son      Depression Son        Social History     Socioeconomic History     Marital status:      Spouse name: Not on file     Number of children: Not on file     Years of education: Not on file     Highest education level: Not on file   Occupational History     Not on file   Tobacco Use     Smoking status: Never     Smokeless tobacco: Never   Vaping Use     Vaping Use: Never used   Substance and Sexual Activity     Alcohol use: No     Drug use: No     Sexual activity: Not Currently     Partners: Male     Birth control/protection: I.U.D.     Comment: stopped end of June for now (7/14/2017)   Other Topics Concern     Parent/sibling w/ CABG, MI or angioplasty before 65F 55M? No   Social History Narrative     Not on file     Social Determinants of Health     Financial Resource Strain: Not on file   Food Insecurity: Not on file   Transportation Needs: Not on file   Physical Activity: Not on file   Stress: Not on file   Social Connections: Not on file   Intimate Partner Violence: Not on file   Housing Stability: Not on file       Outpatient Encounter Medications as of 3/29/2023   Medication Sig Dispense Refill     acyclovir (ZOVIRAX) 400 MG tablet Take 1 tablet (400 mg) by mouth 3 times daily as needed for symptoms. 15 tablet 0     buPROPion (WELLBUTRIN XL) 300 MG 24 hr tablet Take 1 tablet (300 mg) by mouth every morning 90 tablet 1     fluconazole  (DIFLUCAN) 150 MG tablet Take one today and one in 10 days 2 tablet 0     fluticasone (FLONASE) 50 MCG/ACT nasal spray Spray 2 sprays into both nostrils daily Reported on 3/24/2017       gabapentin (NEURONTIN) 300 MG capsule Take 1 capsule (300 mg) by mouth 3 times daily 270 capsule 3     insulin pen needle (32G X 4 MM) 32G X 4 MM miscellaneous Use one pen needle daily or as directed. 100 each 1     levonorgestrel (MIRENA) 20 MCG/24HR IUD 1 each by Intrauterine route once       levothyroxine (SYNTHROID/LEVOTHROID) 75 MCG tablet Take 1 tablet (75 mcg) by mouth daily 90 tablet 3     liraglutide (VICTOZA) 18 MG/3ML solution Inject 1.2 mg Subcutaneous daily for 30 days 6 mL 3     lisinopril (ZESTRIL) 40 MG tablet Take 1 tablet (40 mg) by mouth daily 90 tablet 3     metFORMIN (GLUCOPHAGE-XR) 500 MG 24 hr tablet Take 1 tablet (500 mg) by mouth daily (with dinner) 90 tablet 1     naltrexone (DEPADE/REVIA) 50 MG tablet Take 1 tablet (50 mg) by mouth daily 90 tablet 0     nystatin (MYCOSTATIN) 282737 UNIT/GM external powder Apply topically 3 times daily as needed (itching) 120 g 3     simvastatin (ZOCOR) 20 MG tablet Take 1 tablet (20 mg) by mouth At Bedtime 90 tablet 0     No facility-administered encounter medications on file as of 3/29/2023.             O:   NAD, WDWN, Alert & Oriented, Mood & Affect wnl, Vitals stable   Here today alone   There were no vitals taken for this visit.   General appearance normal   Vitals stable   Alert, oriented and in no acute distress     Stuck on papules and brown macules on trunk and ext   Red papules on trunk  Brown papules and macules with regular pigment network and borders on torso and extremiteis    The remainder of skin exam is normal       Eyes: Conjunctivae/lids:Normal     ENT: Lips: normal    MSK:Normal    Cardiovascular: peripheral edema none    Pulm: Breathing Normal    Neuro/Psych: Orientation:Alert and Orientedx3 ; Mood/Affect:normal   A/P:  1. Inflamed seborrheic keratoes  on left chest, left upper back, abdomen x 2, and left leg x 3  LN2:  Treated with LN2 for 5s for 1-2 cycles. Warned risks of blistering, pain, pigment change, scarring, and incomplete resolution.  Advised patient to return if lesions do not completely resolve.  Wound care sheet given.  2. Seborrheic keratosis, lentigo, angioma, benign nevi   It was a pleasure speaking to Naomy CUNNINGHAM Dex today.  BENIGN LESIONS DISCUSSED WITH PATIENT:  I discussed the specifics of tumor, prognosis, and genetics of benign lesions.  I explained that treatment of these lesions would be purely cosmetic and not medically neccessary.  I discussed with patient different removal options including excision, cautery and /or laser.      Nature and genetics of benign skin lesions dicussed with patient.  Signs and Symptoms of skin cancer discussed with patient.  ABCDEs of melanoma reviewed with patient.  Patient encouraged to perform monthly skin exams.  UV precautions reviewed with patient.  Risks of non-melanoma skin cancer discussed with patient   Return to clinic in one year or sooner if needed.         Again, thank you for allowing me to participate in the care of your patient.        Sincerely,        Hannah Colvin PA-C

## 2023-03-29 NOTE — PROGRESS NOTES
Naomy Kowalski is an extremely pleasant 52 year old year old female patient here today for skin check. She notes spots on chest, abdomen that are rubbing on clothing, irritating. No painful or bleeding skin lesions.  Patient has no other skin complaints today.  Remainder of the HPI, Meds, PMH, Allergies, FH, and SH was reviewed in chart.    Pertinent Hx:   No personal history of skin cancer.   Past Medical History:   Diagnosis Date     CARDIOVASCULAR SCREENING; LDL GOAL LESS THAN 160 6/6/2012     Hypertension     watching blood pressure     Obesity, unspecified     Obesity     Sleep apnea     no cpap     Thyroid disease        Past Surgical History:   Procedure Laterality Date     BREAST SURGERY      breast reduction bilat     COLONOSCOPY WITH CO2 INSUFFLATION N/A 9/1/2017    Procedure: COLONOSCOPY WITH CO2 INSUFFLATION;  Colonoscopy, Abdominal pain, left lower quadrant, Parenteau, BMI 42.93, [phone];  Surgeon: Anuel Rodríguez MD;  Location:  OR     ENDOSCOPIC SINUS SURGERY Right 3/15/2017    Procedure: ENDOSCOPIC SINUS SURGERY;;  Surgeon: Vicki Zurita MD;  Location: [facility] OR     EXAM UNDER ANESTHESIA, FULGURATE CONDYLOMA ANUS, COMBINED N/A 9/26/2017    Procedure: COMBINED EXAM UNDER ANESTHESIA, FULGURATE CONDYLOMA ANUS;  Examination Under anethesia, Excision And Fulguration Of Anal Condyloma;  Surgeon: Td Garvey MD;  Location:  OR     LAPAROSCOPIC CHOLECYSTECTOMY  08/05/99     TONSILLECTOMY Bilateral 3/15/2017    Procedure: TONSILLECTOMY;  Bilateral Tonsillectomy, Right Functional Endoscopic Sinus Surgery ;  Surgeon: Vicki Zurita MD;  Location:  OR        Family History   Problem Relation Age of Onset     Diabetes Mother      Hypertension Mother      Depression Mother      Substance Abuse Mother         Alcohol     Cancer Father         pancreatic     Impaired Fasting Glucose Father      Asthma Father      Skin Cancer Father      Diabetes Brother       Asthma Brother      Rashes/Skin Problems Brother         HIdradenitis Suppurativa     No Known Problems Brother      Heart Disease Maternal Grandmother      Diabetes Maternal Grandmother      Cerebrovascular Disease Maternal Grandfather      Cancer - colorectal Paternal Grandfather      Asthma Daughter      Asthma Son      Depression Son        Social History     Socioeconomic History     Marital status:      Spouse name: Not on file     Number of children: Not on file     Years of education: Not on file     Highest education level: Not on file   Occupational History     Not on file   Tobacco Use     Smoking status: Never     Smokeless tobacco: Never   Vaping Use     Vaping Use: Never used   Substance and Sexual Activity     Alcohol use: No     Drug use: No     Sexual activity: Not Currently     Partners: Male     Birth control/protection: I.U.D.     Comment: stopped end of June for now (7/14/2017)   Other Topics Concern     Parent/sibling w/ CABG, MI or angioplasty before 65F 55M? No   Social History Narrative     Not on file     Social Determinants of Health     Financial Resource Strain: Not on file   Food Insecurity: Not on file   Transportation Needs: Not on file   Physical Activity: Not on file   Stress: Not on file   Social Connections: Not on file   Intimate Partner Violence: Not on file   Housing Stability: Not on file       Outpatient Encounter Medications as of 3/29/2023   Medication Sig Dispense Refill     acyclovir (ZOVIRAX) 400 MG tablet Take 1 tablet (400 mg) by mouth 3 times daily as needed for symptoms. 15 tablet 0     buPROPion (WELLBUTRIN XL) 300 MG 24 hr tablet Take 1 tablet (300 mg) by mouth every morning 90 tablet 1     fluconazole (DIFLUCAN) 150 MG tablet Take one today and one in 10 days 2 tablet 0     fluticasone (FLONASE) 50 MCG/ACT nasal spray Spray 2 sprays into both nostrils daily Reported on 3/24/2017       gabapentin (NEURONTIN) 300 MG capsule Take 1 capsule (300 mg) by mouth 3  times daily 270 capsule 3     insulin pen needle (32G X 4 MM) 32G X 4 MM miscellaneous Use one pen needle daily or as directed. 100 each 1     levonorgestrel (MIRENA) 20 MCG/24HR IUD 1 each by Intrauterine route once       levothyroxine (SYNTHROID/LEVOTHROID) 75 MCG tablet Take 1 tablet (75 mcg) by mouth daily 90 tablet 3     liraglutide (VICTOZA) 18 MG/3ML solution Inject 1.2 mg Subcutaneous daily for 30 days 6 mL 3     lisinopril (ZESTRIL) 40 MG tablet Take 1 tablet (40 mg) by mouth daily 90 tablet 3     metFORMIN (GLUCOPHAGE-XR) 500 MG 24 hr tablet Take 1 tablet (500 mg) by mouth daily (with dinner) 90 tablet 1     naltrexone (DEPADE/REVIA) 50 MG tablet Take 1 tablet (50 mg) by mouth daily 90 tablet 0     nystatin (MYCOSTATIN) 345694 UNIT/GM external powder Apply topically 3 times daily as needed (itching) 120 g 3     simvastatin (ZOCOR) 20 MG tablet Take 1 tablet (20 mg) by mouth At Bedtime 90 tablet 0     No facility-administered encounter medications on file as of 3/29/2023.             O:   NAD, WDWN, Alert & Oriented, Mood & Affect wnl, Vitals stable   Here today alone   There were no vitals taken for this visit.   General appearance normal   Vitals stable   Alert, oriented and in no acute distress     Stuck on papules and brown macules on trunk and ext   Red papules on trunk  Brown papules and macules with regular pigment network and borders on torso and extremiteis    The remainder of skin exam is normal       Eyes: Conjunctivae/lids:Normal     ENT: Lips: normal    MSK:Normal    Cardiovascular: peripheral edema none    Pulm: Breathing Normal    Neuro/Psych: Orientation:Alert and Orientedx3 ; Mood/Affect:normal   A/P:  1. Inflamed seborrheic keratoes on left chest, left upper back, abdomen x 2, and left leg x 3  LN2:  Treated with LN2 for 5s for 1-2 cycles. Warned risks of blistering, pain, pigment change, scarring, and incomplete resolution.  Advised patient to return if lesions do not completely resolve.   Wound care sheet given.  2. Seborrheic keratosis, lentigo, angioma, benign nevi   It was a pleasure speaking to Naomy Kowalski today.  BENIGN LESIONS DISCUSSED WITH PATIENT:  I discussed the specifics of tumor, prognosis, and genetics of benign lesions.  I explained that treatment of these lesions would be purely cosmetic and not medically neccessary.  I discussed with patient different removal options including excision, cautery and /or laser.      Nature and genetics of benign skin lesions dicussed with patient.  Signs and Symptoms of skin cancer discussed with patient.  ABCDEs of melanoma reviewed with patient.  Patient encouraged to perform monthly skin exams.  UV precautions reviewed with patient.  Risks of non-melanoma skin cancer discussed with patient   Return to clinic in one year or sooner if needed.

## 2023-03-31 DIAGNOSIS — E03.9 HYPOTHYROIDISM, UNSPECIFIED TYPE: ICD-10-CM

## 2023-04-02 ENCOUNTER — HEALTH MAINTENANCE LETTER (OUTPATIENT)
Age: 53
End: 2023-04-02

## 2023-04-03 RX ORDER — LEVOTHYROXINE SODIUM 75 UG/1
75 TABLET ORAL DAILY
Qty: 90 TABLET | Refills: 0 | Status: SHIPPED | OUTPATIENT
Start: 2023-04-03 | End: 2023-07-12

## 2023-04-03 NOTE — TELEPHONE ENCOUNTER
"Requested Prescriptions   Pending Prescriptions Disp Refills     levothyroxine (SYNTHROID/LEVOTHROID) 75 MCG tablet [Pharmacy Med Name: Levothyroxine Sodium Oral Tablet 75 MCG] 90 tablet 0     Sig: Take 1 tablet (75 mcg) by mouth daily       Thyroid Protocol Passed - 3/31/2023 11:09 PM        Passed - Patient is 12 years or older        Passed - Recent (12 mo) or future (30 days) visit within the authorizing provider's specialty     Patient has had an office visit with the authorizing provider or a provider within the authorizing providers department within the previous 12 mos or has a future within next 30 days. See \"Patient Info\" tab in inbasket, or \"Choose Columns\" in Meds & Orders section of the refill encounter.              Passed - Medication is active on med list        Passed - Normal TSH on file in past 12 months     Recent Labs   Lab Test 10/31/22  1248   TSH 2.81              Passed - No active pregnancy on record     If patient is pregnant or has had a positive pregnancy test, please check TSH.          Passed - No positive pregnancy test in past 12 months     If patient is pregnant or has had a positive pregnancy test, please check TSH.             Prescription approved per [facility] Refill Protocol.    Pt has a appointment on 04/20/23    Andreina Lucas RN  [facility]   "

## 2023-05-09 ENCOUNTER — TELEPHONE (OUTPATIENT)
Dept: SURGERY | Facility: CLINIC | Age: 53
End: 2023-05-09
Payer: COMMERCIAL

## 2023-05-09 NOTE — TELEPHONE ENCOUNTER
Called to schedule EVDM appt. [facility] with call center #. Will also send MyChart.      Please Schedule This Appointment:     With: Linnette Galarza NP     Referring Provider: self     For / Appt Notes: anal pap     Appt Type: Return Patient     Appt Date/Time: next available       Thank you!

## 2023-06-01 ENCOUNTER — HEALTH MAINTENANCE LETTER (OUTPATIENT)
Age: 53
End: 2023-06-01

## 2023-06-05 DIAGNOSIS — E11.9 TYPE 2 DIABETES MELLITUS WITHOUT COMPLICATION, WITHOUT LONG-TERM CURRENT USE OF INSULIN (H): ICD-10-CM

## 2023-06-06 RX ORDER — SIMVASTATIN 20 MG
20 TABLET ORAL AT BEDTIME
Qty: 90 TABLET | Refills: 0 | Status: SHIPPED | OUTPATIENT
Start: 2023-06-06 | End: 2023-09-11

## 2023-06-06 NOTE — TELEPHONE ENCOUNTER
"Requested Prescriptions   Pending Prescriptions Disp Refills     simvastatin (ZOCOR) 20 MG tablet [Pharmacy Med Name: Simvastatin Oral Tablet 20 MG] 90 tablet 0     Sig: Take 1 tablet (20 mg) by mouth At Bedtime       Statins Protocol Passed - 6/5/2023 10:21 PM        Passed - LDL on file in past 12 months     Recent Labs   Lab Test 10/31/22  1248   *             Passed - No abnormal creatine kinase in past 12 months     No lab results found.             Passed - Recent (12 mo) or future (30 days) visit within the authorizing provider's specialty     Patient has had an office visit with the authorizing provider or a provider within the authorizing providers department within the previous 12 mos or has a future within next 30 days. See \"Patient Info\" tab in inbasket, or \"Choose Columns\" in Meds & Orders section of the refill encounter.              Passed - Medication is active on med list        Passed - Patient is age 18 or older        Passed - No active pregnancy on record        Passed - No positive pregnancy test in past 12 months         Routing refill request to provider for review/approval because:  Drug interaction warning    Pt has a appointment on 06/27/23    Andreina Lucas RN  [facility]   "

## 2023-06-09 ENCOUNTER — OFFICE VISIT (OUTPATIENT)
Dept: FAMILY MEDICINE | Facility: CLINIC | Age: 53
End: 2023-06-09
Payer: COMMERCIAL

## 2023-06-09 ENCOUNTER — LAB (OUTPATIENT)
Dept: LAB | Facility: CLINIC | Age: 53
End: 2023-06-09
Payer: COMMERCIAL

## 2023-06-09 VITALS
HEIGHT: 68 IN | BODY MASS INDEX: 42.97 KG/M2 | TEMPERATURE: 97.2 F | RESPIRATION RATE: 16 BRPM | DIASTOLIC BLOOD PRESSURE: 71 MMHG | SYSTOLIC BLOOD PRESSURE: 130 MMHG | HEART RATE: 86 BPM | WEIGHT: 283.5 LBS | OXYGEN SATURATION: 98 %

## 2023-06-09 DIAGNOSIS — E11.9 TYPE 2 DIABETES MELLITUS WITHOUT COMPLICATION, WITHOUT LONG-TERM CURRENT USE OF INSULIN (H): ICD-10-CM

## 2023-06-09 DIAGNOSIS — J01.00 ACUTE NON-RECURRENT MAXILLARY SINUSITIS: ICD-10-CM

## 2023-06-09 DIAGNOSIS — Z11.59 NEED FOR HEPATITIS C SCREENING TEST: ICD-10-CM

## 2023-06-09 DIAGNOSIS — M25.552 HIP PAIN, LEFT: ICD-10-CM

## 2023-06-09 DIAGNOSIS — F33.1 MODERATE RECURRENT MAJOR DEPRESSION (H): ICD-10-CM

## 2023-06-09 DIAGNOSIS — E11.9 TYPE 2 DIABETES MELLITUS WITHOUT COMPLICATION, WITHOUT LONG-TERM CURRENT USE OF INSULIN (H): Primary | ICD-10-CM

## 2023-06-09 LAB
BASOPHILS # BLD AUTO: 0.1 10E3/UL (ref 0–0.2)
BASOPHILS NFR BLD AUTO: 0 %
EOSINOPHIL # BLD AUTO: 0.4 10E3/UL (ref 0–0.7)
EOSINOPHIL NFR BLD AUTO: 3 %
ERYTHROCYTE [DISTWIDTH] IN BLOOD BY AUTOMATED COUNT: 12.2 % (ref 10–15)
HBA1C MFR BLD: 8 % (ref 0–5.6)
HCT VFR BLD AUTO: 47.4 % (ref 35–47)
HCV AB SERPL QL IA: NONREACTIVE
HGB BLD-MCNC: 15.4 G/DL (ref 11.7–15.7)
IMM GRANULOCYTES # BLD: 0 10E3/UL
IMM GRANULOCYTES NFR BLD: 0 %
LYMPHOCYTES # BLD AUTO: 3.3 10E3/UL (ref 0.8–5.3)
LYMPHOCYTES NFR BLD AUTO: 28 %
MCH RBC QN AUTO: 29.4 PG (ref 26.5–33)
MCHC RBC AUTO-ENTMCNC: 32.5 G/DL (ref 31.5–36.5)
MCV RBC AUTO: 91 FL (ref 78–100)
MONOCYTES # BLD AUTO: 0.7 10E3/UL (ref 0–1.3)
MONOCYTES NFR BLD AUTO: 6 %
NEUTROPHILS # BLD AUTO: 7.3 10E3/UL (ref 1.6–8.3)
NEUTROPHILS NFR BLD AUTO: 63 %
PLATELET # BLD AUTO: 285 10E3/UL (ref 150–450)
RBC # BLD AUTO: 5.23 10E6/UL (ref 3.8–5.2)
WBC # BLD AUTO: 11.7 10E3/UL (ref 4–11)

## 2023-06-09 PROCEDURE — 36415 COLL VENOUS BLD VENIPUNCTURE: CPT

## 2023-06-09 PROCEDURE — 83036 HEMOGLOBIN GLYCOSYLATED A1C: CPT

## 2023-06-09 PROCEDURE — 99214 OFFICE O/P EST MOD 30 MIN: CPT | Performed by: FAMILY MEDICINE

## 2023-06-09 PROCEDURE — 86803 HEPATITIS C AB TEST: CPT

## 2023-06-09 PROCEDURE — 85025 COMPLETE CBC W/AUTO DIFF WBC: CPT

## 2023-06-09 ASSESSMENT — PATIENT HEALTH QUESTIONNAIRE - PHQ9
SUM OF ALL RESPONSES TO PHQ QUESTIONS 1-9: 7
10. IF YOU CHECKED OFF ANY PROBLEMS, HOW DIFFICULT HAVE THESE PROBLEMS MADE IT FOR YOU TO DO YOUR WORK, TAKE CARE OF THINGS AT HOME, OR GET ALONG WITH OTHER PEOPLE: VERY DIFFICULT
SUM OF ALL RESPONSES TO PHQ QUESTIONS 1-9: 7

## 2023-06-09 NOTE — PROGRESS NOTES
"  Assessment & Plan     Type 2 diabetes mellitus without complication, without long-term current use of insulin (H)  - HEMOGLOBIN A1C; Future    Acute non-recurrent maxillary sinusitis  - amoxicillin-clavulanate (AUGMENTIN) 875-125 MG tablet; Take 1 tablet by mouth 2 times daily for 10 days  - CBC with platelets and differential; Future    Hip pain, left  - Physical Therapy Referral; Future    Moderate recurrent major depression (H)  - Adult Mental Health  Referral; Future       BMI:   Estimated body mass index is 42.72 kg/m  as calculated from the following:    Height as of this encounter: 1.735 m (5' 8.31\").    Weight as of this encounter: 128.6 kg (283 lb 8 oz).       [facility] Moralez is a 52 year old, presenting for the following health issues:    URI (Going on 3 weeks of pressure in head, started off almost unable to talk-voice so hoarse, then came cough, started feeling alittle better then got worst again, cough worse at night, very fatigue-took a few days off and just slept but still just wants to sleep, had times when cold but sweaty, still waking up sweaty at times)        6/9/2023     9:56 AM   Additional Questions   Roomed by Kristine DUNCAN    History of Present Illness       Reason for visit:  Chills sweating a lot cough  Symptom onset:  1-2 weeks ago  Symptoms include:  Sweating a lot chills cough  Symptom intensity:  Severe  Symptom progression:  Staying the same  Had these symptoms before:  No  What makes it worse:  Going to work for 9 hours  What makes it better:  Sleeping    She eats 2-3 servings of fruits and vegetables daily.She consumes 1 sweetened beverage(s) daily.She exercises with enough effort to increase her heart rate 9 or less minutes per day.  She exercises with enough effort to increase her heart rate 3 or less days per week.   She is taking medications regularly.    Today's PHQ-9         PHQ-9 Total Score: 7    PHQ-9 " "Q9 Thoughts of better off dead/self-harm past 2 weeks :   Not at all    How difficult have these problems made it for you to do your work, take care of things at home, or get along with other people: Very difficult     ylost voice 3 weeks ago   Cough  Blowing nose a lot   Trying to sleep well  Drinking lots of fluids   Got better but now came back 6 days ago   Was in a hospital for 12 hours as a guest for her mom     Left hip pain   Has been 10 months  Stepped in a hole   Has gone to the chiropractic 35 visits        Objective    /71 (BP Location: Left arm, Patient Position: Sitting, Cuff Size: Adult Large)   Pulse 86   Temp 97.2  F (36.2  C) (Temporal)   Resp 16   Ht 1.735 m (5' 8.31\")   Wt 128.6 kg (283 lb 8 oz)   LMP  (LMP Unknown)   SpO2 98%   BMI 42.72 kg/m    Body mass index is 42.72 kg/m .     Physical Exam  Constitutional:       General: She is not in acute distress.  Eyes:      General: No scleral icterus.  Cardiovascular:      Rate and Rhythm: Normal rate and regular rhythm.      Heart sounds: Normal heart sounds.   Pulmonary:      Effort: No respiratory distress.      Breath sounds: Normal breath sounds.   Neurological:      Mental Status: She is alert.   Psychiatric:         Mood and Affect: Mood normal.         Behavior: Behavior normal.                      "

## 2023-06-13 DIAGNOSIS — E66.813 CLASS 3 SEVERE OBESITY WITH SERIOUS COMORBIDITY AND BODY MASS INDEX (BMI) OF 45.0 TO 49.9 IN ADULT, UNSPECIFIED OBESITY TYPE (H): ICD-10-CM

## 2023-06-13 DIAGNOSIS — E66.01 CLASS 3 SEVERE OBESITY WITH SERIOUS COMORBIDITY AND BODY MASS INDEX (BMI) OF 45.0 TO 49.9 IN ADULT, UNSPECIFIED OBESITY TYPE (H): ICD-10-CM

## 2023-06-13 DIAGNOSIS — F33.1 MODERATE RECURRENT MAJOR DEPRESSION (H): ICD-10-CM

## 2023-06-13 RX ORDER — BUPROPION HYDROCHLORIDE 300 MG/1
300 TABLET ORAL EVERY MORNING
Qty: 90 TABLET | Refills: 0 | Status: SHIPPED | OUTPATIENT
Start: 2023-06-13 | End: 2023-09-25

## 2023-06-13 RX ORDER — NALTREXONE HYDROCHLORIDE 50 MG/1
50 TABLET, FILM COATED ORAL DAILY
Qty: 90 TABLET | Refills: 0 | Status: SHIPPED | OUTPATIENT
Start: 2023-06-13 | End: 2023-09-25

## 2023-06-13 NOTE — TELEPHONE ENCOUNTER
"Requested Prescriptions   Pending Prescriptions Disp Refills     naltrexone (DEPADE/REVIA) 50 MG tablet [Pharmacy Med Name: Naltrexone HCl Oral Tablet 50 MG] 90 tablet 0     Sig: Take 1 tablet (50 mg) by mouth daily       There is no refill protocol information for this order        buPROPion (WELLBUTRIN XL) 300 MG 24 hr tablet [Pharmacy Med Name: buPROPion HCl ER (XL) Oral Tablet Extended Release 24 Hour 300 MG] 90 tablet 0     Sig: Take 1 tablet (300 mg) by mouth every morning       SSRIs Protocol Failed - 6/13/2023  2:00 AM        Failed - PHQ-9 score less than 5 in past 6 months     Please review last PHQ-9 score.           Passed - Medication is Bupropion     If the medication is Bupropion (Wellbutrin), and the patient is taking for smoking cessation; OK to refill.          Passed - Medication is active on med list        Passed - Patient is age 18 or older        Passed - No active pregnancy on record        Passed - No positive pregnancy test in last 12 months        Passed - Recent (6 mo) or future (30 days) visit within the authorizing provider's specialty     Patient had office visit in the last 6 months or has a visit in the next 30 days with authorizing provider or within the authorizing provider's specialty.  See \"Patient Info\" tab in inbasket, or \"Choose Columns\" in Meds & Orders section of the refill encounter.               Routing refill request to provider for review/approval because medication did not pass protocol.    Pt has a appointment on 06/27/23    Andreina Lucas RN  [facility]   "

## 2023-06-16 ENCOUNTER — MYC MEDICAL ADVICE (OUTPATIENT)
Dept: FAMILY MEDICINE | Facility: CLINIC | Age: 53
End: 2023-06-16
Payer: COMMERCIAL

## 2023-06-20 ASSESSMENT — ENCOUNTER SYMPTOMS
NERVOUS/ANXIOUS: 1
PARESTHESIAS: 0
HEMATOCHEZIA: 0
SORE THROAT: 0
CHILLS: 1
FEVER: 0
DIZZINESS: 0
HEADACHES: 1
WEAKNESS: 1
ARTHRALGIAS: 1
CONSTIPATION: 0
JOINT SWELLING: 0
DIARRHEA: 1
PALPITATIONS: 0
COUGH: 1
HEARTBURN: 0
DYSURIA: 0
SHORTNESS OF BREATH: 0
HEMATURIA: 0
NAUSEA: 0
ABDOMINAL PAIN: 0
BREAST MASS: 0
EYE PAIN: 0
MYALGIAS: 0
FREQUENCY: 0

## 2023-06-27 ENCOUNTER — OFFICE VISIT (OUTPATIENT)
Dept: FAMILY MEDICINE | Facility: CLINIC | Age: 53
End: 2023-06-27
Payer: COMMERCIAL

## 2023-06-27 VITALS
RESPIRATION RATE: 16 BRPM | SYSTOLIC BLOOD PRESSURE: 125 MMHG | BODY MASS INDEX: 43.42 KG/M2 | WEIGHT: 286.5 LBS | OXYGEN SATURATION: 98 % | TEMPERATURE: 98.7 F | HEIGHT: 68 IN | DIASTOLIC BLOOD PRESSURE: 84 MMHG | HEART RATE: 86 BPM

## 2023-06-27 DIAGNOSIS — E11.9 TYPE 2 DIABETES MELLITUS WITHOUT COMPLICATION, WITHOUT LONG-TERM CURRENT USE OF INSULIN (H): ICD-10-CM

## 2023-06-27 DIAGNOSIS — R30.0 DYSURIA: ICD-10-CM

## 2023-06-27 DIAGNOSIS — Z23 ENCOUNTER FOR IMMUNIZATION: ICD-10-CM

## 2023-06-27 DIAGNOSIS — F33.1 MODERATE EPISODE OF RECURRENT MAJOR DEPRESSIVE DISORDER (H): Chronic | ICD-10-CM

## 2023-06-27 DIAGNOSIS — Z00.00 ROUTINE HISTORY AND PHYSICAL EXAMINATION OF ADULT: Primary | ICD-10-CM

## 2023-06-27 LAB
ALBUMIN UR-MCNC: NEGATIVE MG/DL
APPEARANCE UR: CLEAR
BACTERIA #/AREA URNS HPF: ABNORMAL /HPF
BILIRUB UR QL STRIP: NEGATIVE
COLOR UR AUTO: YELLOW
GLUCOSE UR STRIP-MCNC: NEGATIVE MG/DL
HGB UR QL STRIP: NEGATIVE
KETONES UR STRIP-MCNC: ABNORMAL MG/DL
LEUKOCYTE ESTERASE UR QL STRIP: NEGATIVE
MUCOUS THREADS #/AREA URNS LPF: PRESENT /LPF
NITRATE UR QL: NEGATIVE
PH UR STRIP: 5.5 [PH] (ref 5–7)
RBC #/AREA URNS AUTO: ABNORMAL /HPF
SP GR UR STRIP: >=1.03 (ref 1–1.03)
SQUAMOUS #/AREA URNS AUTO: ABNORMAL /LPF
UROBILINOGEN UR STRIP-ACNC: 1 E.U./DL
WBC #/AREA URNS AUTO: ABNORMAL /HPF

## 2023-06-27 PROCEDURE — 99214 OFFICE O/P EST MOD 30 MIN: CPT | Mod: 25 | Performed by: NURSE PRACTITIONER

## 2023-06-27 PROCEDURE — 90471 IMMUNIZATION ADMIN: CPT | Performed by: NURSE PRACTITIONER

## 2023-06-27 PROCEDURE — 90677 PCV20 VACCINE IM: CPT | Performed by: NURSE PRACTITIONER

## 2023-06-27 PROCEDURE — 90472 IMMUNIZATION ADMIN EACH ADD: CPT | Performed by: NURSE PRACTITIONER

## 2023-06-27 PROCEDURE — 90715 TDAP VACCINE 7 YRS/> IM: CPT | Performed by: NURSE PRACTITIONER

## 2023-06-27 PROCEDURE — 99396 PREV VISIT EST AGE 40-64: CPT | Mod: 25 | Performed by: NURSE PRACTITIONER

## 2023-06-27 PROCEDURE — 81001 URINALYSIS AUTO W/SCOPE: CPT | Performed by: NURSE PRACTITIONER

## 2023-06-27 RX ORDER — LIRAGLUTIDE 6 MG/ML
1.8 INJECTION SUBCUTANEOUS DAILY
Qty: 6 ML | Refills: 3 | Status: SHIPPED | OUTPATIENT
Start: 2023-06-27 | End: 2024-04-10

## 2023-06-27 RX ORDER — FLUCONAZOLE 150 MG/1
TABLET ORAL
Qty: 1 TABLET | Refills: 1 | Status: SHIPPED | OUTPATIENT
Start: 2023-06-27

## 2023-06-27 RX ORDER — LAMOTRIGINE 25 MG/1
TABLET ORAL
Qty: 180 TABLET | Refills: 1 | Status: SHIPPED | OUTPATIENT
Start: 2023-06-27 | End: 2023-11-06

## 2023-06-27 ASSESSMENT — ENCOUNTER SYMPTOMS
COUGH: 1
HEARTBURN: 0
FEVER: 0
JOINT SWELLING: 0
DIZZINESS: 0
NERVOUS/ANXIOUS: 1
HEMATOCHEZIA: 0
DYSURIA: 0
PARESTHESIAS: 0
MYALGIAS: 0
CHILLS: 1
WEAKNESS: 1
HEMATURIA: 0
DIARRHEA: 1
HEADACHES: 1
PALPITATIONS: 0
BREAST MASS: 0
SORE THROAT: 0
EYE PAIN: 0
CONSTIPATION: 0
SHORTNESS OF BREATH: 0
FREQUENCY: 0
NAUSEA: 0
ARTHRALGIAS: 1
ABDOMINAL PAIN: 0

## 2023-06-27 ASSESSMENT — PATIENT HEALTH QUESTIONNAIRE - PHQ9
10. IF YOU CHECKED OFF ANY PROBLEMS, HOW DIFFICULT HAVE THESE PROBLEMS MADE IT FOR YOU TO DO YOUR WORK, TAKE CARE OF THINGS AT HOME, OR GET ALONG WITH OTHER PEOPLE: VERY DIFFICULT
SUM OF ALL RESPONSES TO PHQ QUESTIONS 1-9: 22
SUM OF ALL RESPONSES TO PHQ QUESTIONS 1-9: 22

## 2023-06-27 ASSESSMENT — PAIN SCALES - GENERAL: PAINLEVEL: SEVERE PAIN (6)

## 2023-06-27 NOTE — PROGRESS NOTES
SUBJECTIVE:   CC: Naomy is an 52 year old who presents for preventive health visit.       6/27/2023     1:42 PM   Additional Questions   Roomed by Diann Castillo   Accompanied by N/A     Healthy Habits:     Getting at least 3 servings of Calcium per day:  NO    Bi-annual eye exam:  Yes    Dental care twice a year:  Yes    Sleep apnea or symptoms of sleep apnea:  Sleep apnea    Diet:  Diabetic    Frequency of exercise:  1 day/week    Duration of exercise:  15-30 minutes    Taking medications regularly:  Yes    PHQ-2 Total Score: 6    Additional concerns today:  Yes    509    Answers for HPI/ROS submitted by the patient on 6/27/2023  If you checked off any problems, how difficult have these problems made it for you to do your work, take care of things at home, or get along with other people?: Very difficult  PHQ9 TOTAL SCORE: 22    Pt states she has been having hot flashes and night sweats -  was seen by a provider and started an antibiotic - she says she is feeling better, but the stressors in her life are overwhelming  Pt's old therapist no longer accepts her insurance - trying to get in somewhere new.      Diabetes Follow-up    How often are you checking your blood sugar? A few times a week  Concerned about high Hgb A1C; she has been under a lot of stress and notices she is not eating well. She is continuing to take medications daily; no concerns with side effects with any of her medications   -noticed some slight discomfort with urination. Recently completed antibiotics for sinus infection. NO vaginal discharge. No vaginal itching.    BP Readings from Last 2 Encounters:   06/27/23 125/84   06/09/23 130/71     Hemoglobin A1C (%)   Date Value   06/09/2023 8.0 (H)   10/31/2022 7.5 (H)   05/04/2020 6.5 (H)   10/15/2019 6.0 (H)     LDL Cholesterol Calculated (mg/dL)   Date Value   10/31/2022 118 (H)   02/14/2022 108 (H)   12/04/2017 105 (H)   01/25/2016 104 (H)             Depression and Anxiety Follow-Up    How are  you doing with your depression since your last visit? Worsened     How are you doing with your anxiety since your last visit?  Worsened     Are you having other symptoms that might be associated with depression or anxiety? No    Have you had a significant life event? Relationship Concerns, Job Concerns, Financial Concerns and Health Concerns     Do you have any concerns with your use of alcohol or other drugs? No    Social History     Tobacco Use     Smoking status: Never     Smokeless tobacco: Never   Vaping Use     Vaping Use: Never used   Substance Use Topics     Alcohol use: No     Drug use: No         1/1/2023     4:09 PM 6/9/2023     1:05 AM 6/27/2023     1:32 PM   PHQ   PHQ-9 Total Score 19 7 22   Q9: Thoughts of better off dead/self-harm past 2 weeks Not at all Not at all Several days   F/U: Thoughts of suicide or self-harm   Yes   F/U: Self harm-plan   No   F/U: Self-harm action   No   F/U: Safety concerns   No         12/13/2018     9:36 AM 10/31/2022    10:54 AM 1/1/2023     4:09 PM   JACINTO-7 SCORE   Total Score  10 (moderate anxiety) 14 (moderate anxiety)   Total Score 15 10 14         6/27/2023     1:32 PM   Last PHQ-9   1.  Little interest or pleasure in doing things 3   2.  Feeling down, depressed, or hopeless 3   3.  Trouble falling or staying asleep, or sleeping too much 3   4.  Feeling tired or having little energy 3   5.  Poor appetite or overeating 3   6.  Feeling bad about yourself 3   7.  Trouble concentrating 3   8.  Moving slowly or restless 0   Q9: Thoughts of better off dead/self-harm past 2 weeks 1   PHQ-9 Total Score 22   In the past two weeks have you had thoughts of suicide or self harm? Yes   Do you have concerns about your personal safety or the safety of others? No   In the past 2 weeks have you thought about a plan or had intention to harm yourself? No   In the past 2 weeks have you acted on these thoughts in any way? No             Social History     Tobacco Use     Smoking status:  Never     Smokeless tobacco: Never   Substance Use Topics     Alcohol use: No             6/20/2023    12:35 PM   Alcohol Use   Prescreen: >3 drinks/day or >7 drinks/week? No          No data to display              Reviewed orders with patient.  Reviewed health maintenance and updated orders accordingly - Yes  Lab work is in process  Labs reviewed in EPIC    Breast Cancer Screening:    FHS-7:       2/24/2022    11:07 AM 11/17/2022    11:00 AM 6/20/2023    12:39 PM   Breast CA Risk Assessment (FHS-7)   Did any of your first-degree relatives have breast or ovarian cancer? No No No   Did any of your relatives have bilateral breast cancer? No No No   Did any man in your family have breast cancer? No No No   Did any woman in your family have breast and ovarian cancer? Yes No Yes   Did any woman in your family have breast cancer before age 50 y? No No No   Do you have 2 or more relatives with breast and/or ovarian cancer? No No No   Do you have 2 or more relatives with breast and/or bowel cancer?  No No     click delete button to remove this line now  Mammogram Screening: Recommended annual mammography  Pertinent mammograms are reviewed under the imaging tab.          Latest Ref Rng & Units 2/24/2022    11:54 AM 5/20/2019     8:33 AM 5/20/2019     8:00 AM   PAP / HPV   PAP  Negative for Intraepithelial Lesion or Malignancy (NILM)      PAP (Historical)   NIL     HPV 16 DNA Negative Negative   Negative    HPV 18 DNA Negative Negative   Negative    Other HR HPV Negative Negative   Negative      Reviewed and updated as needed this visit by clinical staff   Tobacco  Allergies  Meds              Reviewed and updated as needed this visit by Provider      Review of Systems   Constitutional: Positive for chills. Negative for fever.   HENT: Positive for congestion and hearing loss. Negative for ear pain and sore throat.    Eyes: Positive for visual disturbance. Negative for pain.   Respiratory: Positive for cough. Negative for  "shortness of breath.    Cardiovascular: Negative for chest pain, palpitations and peripheral edema.   Gastrointestinal: Positive for diarrhea. Negative for abdominal pain, constipation, heartburn, hematochezia and nausea.   Breasts:  Negative for tenderness, breast mass and discharge.   Genitourinary: Negative for dysuria, frequency, genital sores, hematuria, pelvic pain, urgency, vaginal bleeding and vaginal discharge.   Musculoskeletal: Positive for arthralgias. Negative for joint swelling and myalgias.   Skin: Negative for rash.   Neurological: Positive for weakness and headaches. Negative for dizziness and paresthesias.   Psychiatric/Behavioral: Positive for mood changes. The patient is nervous/anxious.        OBJECTIVE:   /84 (BP Location: Right arm, Patient Position: Sitting, Cuff Size: Adult Large)   Pulse 86   Temp 98.7  F (37.1  C) (Temporal)   Resp 16   Ht 1.733 m (5' 8.23\")   Wt 130 kg (286 lb 8 oz)   LMP  (LMP Unknown)   SpO2 98%   BMI 43.27 kg/m    Physical Exam  GENERAL: healthy, alert and no distress  EYES: Eyes grossly normal to inspection, PERRL and conjunctivae and sclerae normal  HENT: ear canals and TM's normal, nose and mouth without ulcers or lesions  NECK: no adenopathy, no asymmetry, masses, or scars and thyroid normal to palpation  RESP: lungs clear to auscultation - no rales, rhonchi or wheezes  CV: regular rate and rhythm, normal S1 S2, no S3 or S4, no murmur, click or rub, no peripheral edema and peripheral pulses strong  ABDOMEN: soft, nontender, no hepatosplenomegaly, no masses and bowel sounds normal  MS: no gross musculoskeletal defects noted, no edema  NEURO: Normal strength and tone, mentation intact and speech normal  PSYCH: mentation appears normal, tangential, affect normal/bright, tearful, anxious, fatigued, speech pressured and judgement and insight intact    Diagnostic Test Results:  Labs reviewed in Epic  No results found for this or any previous visit (from the " "past 24 hour(s)).    ASSESSMENT/PLAN:       ICD-10-CM    1. Routine history and physical examination of adult  Z00.00       2. Type 2 diabetes mellitus without complication, without long-term current use of insulin (H)  E11.9 liraglutide (VICTOZA) 18 MG/3ML solution      3. Dysuria  R30.0 fluconazole (DIFLUCAN) 150 MG tablet     UA with Microscopic reflex to Culture - lab collect     UA with Microscopic reflex to Culture - lab collect     UA Microscopic with Reflex to Culture      4. Moderate episode of recurrent major depressive disorder (H)  F33.1 lamoTRIgine (LAMICTAL) 25 MG tablet      5. Encounter for immunization  Z23 Pneumococcal 20 Valent Conjugate (Prevnar 20)     TDAP VACCINE (Adacel, Boostrix)      -Add Lamictal foradjunctive effect on current anti-depressant; discussed establishing care with therapist  -Increase dose of Victoza-discussed self-care stretegies  -Follow up 2 months- complete Anal pap at this visit    Patient has been advised of split billing requirements and indicates understanding: Yes      COUNSELING:  Reviewed preventive health counseling, as reflected in patient instructions       Regular exercise       Healthy diet/nutrition       (Lauren)menopause management      BMI:   Estimated body mass index is 43.27 kg/m  as calculated from the following:    Height as of this encounter: 1.733 m (5' 8.23\").    Weight as of this encounter: 130 kg (286 lb 8 oz).   Weight management plan: Discussed healthy diet and exercise guidelines      She reports that she has never smoked. She has never used smokeless tobacco.      JERRICA [facility]  "

## 2023-06-28 ASSESSMENT — ACTIVITIES OF DAILY LIVING (ADL)
RECREATIONAL ACTIVITIES: EXTREME DIFFICULTY
STANDING FOR 15 MINUTES: EXTREME DIFFICULTY
HEAVY_WORK: UNABLE TO DO
HOS_ADL_ITEM_SCORE_TOTAL: 20
GOING UP 1 FLIGHT OF STAIRS: EXTREME DIFFICULTY
PUTTING ON SOCKS AND SHOES: EXTREME DIFFICULTY
WALKING_15_MINUTES_OR_GREATER: EXTREME DIFFICULTY
WALKING_APPROXIMATELY_10_MINUTES: EXTREME DIFFICULTY
WALKING_UP_STEEP_HILLS: EXTREME DIFFICULTY
GETTING_INTO_AND_OUT_OF_A_BATHTUB: MODERATE DIFFICULTY
DEEP SQUATTING: UNABLE TO DO
STEPPING UP AND DOWN CURBS: MODERATE DIFFICULTY
GOING DOWN 1 FLIGHT OF STAIRS: EXTREME DIFFICULTY
WALKING_INITIALLY: EXTREME DIFFICULTY
ROLLING OVER IN BED: MODERATE DIFFICULTY
SITTING FOR 15 MINUTES: MODERATE DIFFICULTY
WALKING_DOWN_STEEP_HILLS: EXTREME DIFFICULTY
LIGHT_TO_MODERATE_WORK: MODERATE DIFFICULTY
HOW_WOULD_YOU_RATE_YOUR_CURRENT_LEVEL_OF_FUNCTION_DURING_YOUR_USUAL_ACTIVITIES_OF_DAILY_LIVING_FROM_0_TO_100_WITH_100_BEING_YOUR_LEVEL_OF_FUNCTION_PRIOR_TO_YOUR_HIP_PROBLEM_AND_0_BEING_THE_INABILITY_TO_PERFORM_ANY_OF_YOUR_USUAL_DAILY_ACTIVITIES?: 40
GETTING INTO AND OUT OF AN AVERAGE CAR: MODERATE DIFFICULTY
HOS_ADL_SCORE(%): 29.41
TWISTING/PIVOTING ON INVOLVED LEG: EXTREME DIFFICULTY
HOS_ADL_HIGHEST_POTENTIAL_SCORE: 68

## 2023-06-30 ENCOUNTER — THERAPY VISIT (OUTPATIENT)
Dept: PHYSICAL THERAPY | Facility: CLINIC | Age: 53
End: 2023-06-30
Attending: FAMILY MEDICINE
Payer: COMMERCIAL

## 2023-06-30 DIAGNOSIS — M25.552 CHRONIC LEFT HIP PAIN: ICD-10-CM

## 2023-06-30 DIAGNOSIS — G89.29 CHRONIC LEFT HIP PAIN: ICD-10-CM

## 2023-06-30 DIAGNOSIS — M25.552 HIP PAIN, LEFT: ICD-10-CM

## 2023-06-30 PROCEDURE — 97110 THERAPEUTIC EXERCISES: CPT | Mod: GP | Performed by: PHYSICAL THERAPIST

## 2023-06-30 PROCEDURE — 97161 PT EVAL LOW COMPLEX 20 MIN: CPT | Mod: GP | Performed by: PHYSICAL THERAPIST

## 2023-06-30 NOTE — PROGRESS NOTES
PHYSICAL THERAPY EVALUATION  Type of Visit: Evaluation    See electronic medical record for Abuse and Falls Screening details.    Subjective      Presenting condition or subjective complaint: Hip pain, It has been bothering me for a long time, it feels tight and sore all the time. Sometimes while I am walking it will feel like it is going to get stuck, then it feels very weak and I feel like it is going to give out.  Date of onset: 06/09/23    Relevant medical history: Concussions; Depression; Diabetes; High blood pressure; Mental Illness; Overweight; Sleep disorder like apnea; Thyroid problems   Dates & types of surgery:      Prior diagnostic imaging/testing results:       Prior therapy history for the same diagnosis, illness or injury: No      Prior Level of Function   Transfers: Independent  Ambulation: Independent  ADL: Independent  IADL: Childcare, Driving, Finances, Housekeeping, Laundry, Meal preparation, Work, Yard work    Living Environment  Social support: With family members   Type of home: 2-story; House   Stairs to enter the home: Yes 4 Is there a railing: No   Ramp: No   Stairs inside the home: Yes 10 Is there a railing: Yes   Help at home: None  Equipment owned:       Employment: Yes Emergency dispatcher  Hobbies/Interests: swimming and reading    Patient goals for therapy: Walk more. I can only walk a few minutes before it hurts       Objective   HIP EVALUATION  PAIN: Pain Level at Rest: 2/10  Pain Level with Use: 8/10  Pain is Exacerbated By: walking, standing, getting up from a chair, stairs go sideways  Pain is Relieved By: NSAIDs, rest and stretch  Pain Progression: Worsened    GAIT:   Gait Deviations: Antalgic  Base of support increased  Stance time decreased  Stride length decreased  BALANCE/PROPRIOCEPTION: WNL    ROM:   (Degrees) Left AROM Left PROM  Right AROM Right PROM   Hip Flexion 100  114    Hip Extension 12  22    Hip Abduction 28  36    Hip Adduction       Hip Internal Rotation 4  16     Hip External Rotation 22  29    Knee Flexion       Knee Extension       Lumbar Side glide     Lumbar Flexion    Lumbar Extension    Pain:   End feel:       PELVIC/SI SCREEN:   STRENGTH:   Pain: - none + mild ++ moderate +++ severe  Strength Scale: 0-5/5 Left Right   Hip Flexion 4 4   Hip Extension 4 5   Hip Abduction 3 4   Hip Adduction 4 5   Hip Internal Rotation 4 5   Hip External Rotation 4 5   Knee Flexion     Knee Extension       LE FLEXIBILITY: Decreased hip IR L, Decreased hip ER L, Decreased adductors L, Decreased piriformis L, Decreased hip flexors L, Decreased quadriceps L, Decreased hamstrings L, Decreased gastroc L  SPECIAL TESTS:    Left Right   SHILOH Positive    FADIR/Labrum/UZMA     Femoral Nerve     Clayton's Positive    Piriformis Positive    Quadrant Testing     SLR     Slump     Stork with Extension     Sancho Positive           FUNCTIONAL TESTS:   PALPATION:   + Tenderness At Location Left Right   Ischial Tuberosity     Greater Trochanter     IT Band     Hip Flexors +    Piriformis     PSIS     ASIS     Adductors     Abductors     Iliac Crest     Glut Medius     Bursa     Pubis         Assessment & Plan   CLINICAL IMPRESSIONS   Medical Diagnosis: Hip pain, left    Treatment Diagnosis: Chronic left hip pain   Impression/Assessment: Patient is a plesant 52 year old female with left hip pain complaints.  The following significant findings have been identified: Pain, Decreased ROM/flexibility, Decreased strength, Impaired gait and Decreased activity tolerance. These impairments interfere with their ability to perform work tasks, recreational activities, household chores and community mobility as compared to previous level of function.     Clinical Decision Making (Complexity):   Clinical Presentation: Stable/Uncomplicated  Clinical Presentation Rationale: based on medical and personal factors listed in PT evaluation  Clinical Decision Making (Complexity): Low complexity    PLAN OF CARE  Treatment  Interventions:  Modalities: Ultrasound  Interventions: Gait Training, Manual Therapy, Neuromuscular Re-education, Therapeutic Activity, Therapeutic Exercise    Long Term Goals     PT Goal 1  Goal Identifier: Ambulation  Goal Description: The patient will be able to walk for 30 minutes independently noting an increase in pain of no greater than 2/10  Rationale: to maximize safety and independence with performance of ADLs and functional tasks;to maximize safety and independence within the community;to maximize safety and independence with transportation  Goal Progress: The patient notes a pain level of 8/10 after walking for 10 minutes  Target Date: 08/27/23      Frequency of Treatment: 1x daily per week  Duration of Treatment: 8 weeks    Recommended Referrals to Other Professionals:   Education Assessment:        Risks and benefits of evaluation/treatment have been explained.   Patient/Family/caregiver agrees with Plan of Care.     Evaluation Time:     PT Eval, Low Complexity Minutes (47066): 15      Signing Clinician: NISH GONZALEZ

## 2023-07-02 PROBLEM — M25.552 CHRONIC LEFT HIP PAIN: Status: ACTIVE | Noted: 2023-07-02

## 2023-07-02 PROBLEM — G89.29 CHRONIC LEFT HIP PAIN: Status: ACTIVE | Noted: 2023-07-02

## 2023-07-03 ENCOUNTER — E-VISIT (OUTPATIENT)
Dept: FAMILY MEDICINE | Facility: CLINIC | Age: 53
End: 2023-07-03
Payer: COMMERCIAL

## 2023-07-03 DIAGNOSIS — R30.0 DYSURIA: Primary | ICD-10-CM

## 2023-07-03 PROCEDURE — 99207 PR NON-BILLABLE SERV PER CHARTING: CPT | Performed by: NURSE PRACTITIONER

## 2023-07-03 RX ORDER — NITROFURANTOIN 25; 75 MG/1; MG/1
100 CAPSULE ORAL 2 TIMES DAILY
Qty: 14 CAPSULE | Refills: 0 | Status: SHIPPED | OUTPATIENT
Start: 2023-07-03 | End: 2023-07-10

## 2023-07-03 NOTE — PATIENT INSTRUCTIONS
Thank you for choosing us for your care. I have placed an order for a prescription so that you can start treatment. View your full visit summary for details by clicking on the link below. Your pharmacist will able to address any questions you may have about the medication.     If you're not feeling better within 5-7 days, please schedule an appointment.  You can schedule an appointment right here in [facility], or call [phone]  If the visit is for the same symptoms as your eVisit, we'll refund the cost of your eVisit if seen within seven days.

## 2023-07-12 DIAGNOSIS — E03.9 HYPOTHYROIDISM, UNSPECIFIED TYPE: ICD-10-CM

## 2023-07-12 RX ORDER — LEVOTHYROXINE SODIUM 75 UG/1
75 TABLET ORAL DAILY
Qty: 90 TABLET | Refills: 3 | Status: SHIPPED | OUTPATIENT
Start: 2023-07-12 | End: 2024-08-01

## 2023-07-12 NOTE — TELEPHONE ENCOUNTER
"Requested Prescriptions   Pending Prescriptions Disp Refills     levothyroxine (SYNTHROID/LEVOTHROID) 75 MCG tablet [Pharmacy Med Name: Levothyroxine Sodium Oral Tablet 75 MCG] 90 tablet 0     Sig: Take 1 tablet (75 mcg) by mouth daily       Thyroid Protocol Passed - 7/12/2023 12:36 PM        Passed - Patient is 12 years or older        Passed - Recent (12 mo) or future (30 days) visit within the authorizing provider's specialty     Patient has had an office visit with the authorizing provider or a provider within the authorizing providers department within the previous 12 mos or has a future within next 30 days. See \"Patient Info\" tab in inbasket, or \"Choose Columns\" in Meds & Orders section of the refill encounter.              Passed - Medication is active on med list        Passed - Normal TSH on file in past 12 months     Recent Labs   Lab Test 10/31/22  1248   TSH 2.81              Passed - No active pregnancy on record     If patient is pregnant or has had a positive pregnancy test, please check TSH.          Passed - No positive pregnancy test in past 12 months     If patient is pregnant or has had a positive pregnancy test, please check TSH.             Last annual on 6/27/23.    Prescription approved per [facility] Refill Protocol.    José Luis Garvey RN   [facility]    "

## 2023-07-21 ENCOUNTER — THERAPY VISIT (OUTPATIENT)
Dept: PHYSICAL THERAPY | Facility: CLINIC | Age: 53
End: 2023-07-21
Payer: COMMERCIAL

## 2023-07-21 DIAGNOSIS — G89.29 CHRONIC LEFT HIP PAIN: Primary | ICD-10-CM

## 2023-07-21 DIAGNOSIS — M25.552 CHRONIC LEFT HIP PAIN: Primary | ICD-10-CM

## 2023-07-21 PROCEDURE — 97140 MANUAL THERAPY 1/> REGIONS: CPT | Mod: GP | Performed by: PHYSICAL THERAPIST

## 2023-07-21 PROCEDURE — 97110 THERAPEUTIC EXERCISES: CPT | Mod: GP | Performed by: PHYSICAL THERAPIST

## 2023-07-27 DIAGNOSIS — F33.1 MODERATE RECURRENT MAJOR DEPRESSION (H): Primary | ICD-10-CM

## 2023-07-27 RX ORDER — QUETIAPINE FUMARATE 25 MG/1
25-50 TABLET, FILM COATED ORAL
Qty: 180 TABLET | Refills: 1 | Status: SHIPPED | OUTPATIENT
Start: 2023-07-27 | End: 2024-02-14

## 2023-07-27 NOTE — TELEPHONE ENCOUNTER
Requested Prescriptions   Pending Prescriptions Disp Refills    QUEtiapine (SEROQUEL) 25 MG tablet 90 tablet 1     Sig: Take 1 tablet (25 mg) by mouth nightly as needed (insomnia)       There is no refill protocol information for this order        Routing refill request to provider for review/approval because:  Drug not on the [facility] refill protocol     Wilian Sorensen RN  [facility]

## 2023-07-27 NOTE — TELEPHONE ENCOUNTER
Pt is stating that sometimes her Quetiapine does not seem to be working as well.   She was wondering if she could possibly increase her dosage. Currently taking 25mg prn for sleep.     Thanks,   Wilian Sorensen RN  [facility]

## 2023-07-27 NOTE — TELEPHONE ENCOUNTER
Please call patient.  She can try 50mg.  The biggest issue is that it can cause significant weight gain pretty quickly.  The higher the dose the worse this side effect seems to be.

## 2023-07-28 DIAGNOSIS — B00.9 RECURRENT HERPES SIMPLEX: ICD-10-CM

## 2023-07-28 NOTE — TELEPHONE ENCOUNTER
Sent mychart with msg from Dr. Sorensen regarding increased Quetiapine dose    Jami JONES RN  [facility]

## 2023-07-31 RX ORDER — ACYCLOVIR 400 MG/1
TABLET ORAL
Qty: 15 TABLET | Refills: 0 | Status: SHIPPED | OUTPATIENT
Start: 2023-07-31 | End: 2024-02-14

## 2023-07-31 NOTE — TELEPHONE ENCOUNTER
"Requested Prescriptions   Pending Prescriptions Disp Refills    acyclovir (ZOVIRAX) 400 MG tablet [Pharmacy Med Name: Acyclovir Oral Tablet 400 MG] 15 tablet 0     Sig: Take 1 tablet (400 mg) by mouth 3 times daily as needed for symptoms.       Antivirals for Herpes Protocol Passed - 7/28/2023  7:05 PM        Passed - Patient is age 12 or older        Passed - Recent (12 mo) or future (30 days) visit within the authorizing provider's specialty     Patient has had an office visit with the authorizing provider or a provider within the authorizing providers department within the previous 12 mos or has a future within next 30 days. See \"Patient Info\" tab in inbasket, or \"Choose Columns\" in Meds & Orders section of the refill encounter.              Passed - Medication is active on med list        Passed - Normal serum creatinine on file in past 12 months     Recent Labs   Lab Test 10/31/22  1248   CR 0.69       Ok to refill medication if creatinine is low              Prescription approved per [facility] Refill Protocol.     Pt has a appointment on 08/22/23    Andreina Lucas RN  [facility]   "

## 2023-09-08 DIAGNOSIS — E11.9 TYPE 2 DIABETES MELLITUS WITHOUT COMPLICATION, WITHOUT LONG-TERM CURRENT USE OF INSULIN (H): ICD-10-CM

## 2023-09-11 RX ORDER — SIMVASTATIN 20 MG
20 TABLET ORAL AT BEDTIME
Qty: 90 TABLET | Refills: 0 | Status: SHIPPED | OUTPATIENT
Start: 2023-09-11 | End: 2023-12-26

## 2023-09-11 NOTE — TELEPHONE ENCOUNTER
"Requested Prescriptions   Pending Prescriptions Disp Refills    simvastatin (ZOCOR) 20 MG tablet [Pharmacy Med Name: Simvastatin Oral Tablet 20 MG] 90 tablet 0     Sig: Take 1 tablet (20 mg) by mouth At Bedtime       Statins Protocol Passed - 9/8/2023 11:04 PM        Passed - LDL on file in past 12 months     Recent Labs   Lab Test 10/31/22  1248   *             Passed - No abnormal creatine kinase in past 12 months     No lab results found.             Passed - Recent (12 mo) or future (30 days) visit within the authorizing provider's specialty     Patient has had an office visit with the authorizing provider or a provider within the authorizing providers department within the previous 12 mos or has a future within next 30 days. See \"Patient Info\" tab in inbasket, or \"Choose Columns\" in Meds & Orders section of the refill encounter.              Passed - Medication is active on med list        Passed - Patient is age 18 or older        Passed - No active pregnancy on record        Passed - No positive pregnancy test in past 12 months          Prescription approved per [facility] Refill Protocol.     Pt has a appointment on 09/28/23    Andreina Lucas RN  [facility]   "

## 2023-09-23 DIAGNOSIS — F33.1 MODERATE RECURRENT MAJOR DEPRESSION (H): ICD-10-CM

## 2023-09-23 DIAGNOSIS — E66.01 CLASS 3 SEVERE OBESITY WITH SERIOUS COMORBIDITY AND BODY MASS INDEX (BMI) OF 45.0 TO 49.9 IN ADULT, UNSPECIFIED OBESITY TYPE (H): ICD-10-CM

## 2023-09-23 DIAGNOSIS — E66.813 CLASS 3 SEVERE OBESITY WITH SERIOUS COMORBIDITY AND BODY MASS INDEX (BMI) OF 45.0 TO 49.9 IN ADULT, UNSPECIFIED OBESITY TYPE (H): ICD-10-CM

## 2023-09-25 RX ORDER — NALTREXONE HYDROCHLORIDE 50 MG/1
50 TABLET, FILM COATED ORAL DAILY
Qty: 90 TABLET | Refills: 0 | Status: SHIPPED | OUTPATIENT
Start: 2023-09-25 | End: 2024-01-02

## 2023-09-25 RX ORDER — BUPROPION HYDROCHLORIDE 300 MG/1
300 TABLET ORAL EVERY MORNING
Qty: 90 TABLET | Refills: 0 | Status: SHIPPED | OUTPATIENT
Start: 2023-09-25 | End: 2024-01-02

## 2023-09-25 NOTE — TELEPHONE ENCOUNTER
"Requested Prescriptions   Pending Prescriptions Disp Refills    naltrexone (DEPADE/REVIA) 50 MG tablet [Pharmacy Med Name: Naltrexone HCl Oral Tablet 50 MG] 90 tablet 0     Sig: Take 1 tablet (50 mg) by mouth daily       There is no refill protocol information for this order       buPROPion (WELLBUTRIN XL) 300 MG 24 hr tablet [Pharmacy Med Name: buPROPion HCl ER (XL) Oral Tablet Extended Release 24 Hour 300 MG] 90 tablet 0     Sig: Take 1 tablet (300 mg) by mouth every morning       SSRIs Protocol Failed - 9/23/2023  9:31 PM        Failed - PHQ-9 score less than 5 in past 6 months     Please review last PHQ-9 score.           Passed - Medication is Bupropion     If the medication is Bupropion (Wellbutrin), and the patient is taking for smoking cessation; OK to refill.          Passed - Medication is active on med list        Passed - Patient is age 18 or older        Passed - No active pregnancy on record        Passed - No positive pregnancy test in last 12 months        Passed - Recent (6 mo) or future (30 days) visit within the authorizing provider's specialty     Patient had office visit in the last 6 months or has a visit in the next 30 days with authorizing provider or within the authorizing provider's specialty.  See \"Patient Info\" tab in inbasket, or \"Choose Columns\" in Meds & Orders section of the refill encounter.              Routing refill request to provider for review/approval because medication did not pass protocol.    Pt has an appointment on 09/28/23    Andreina Lucas RN  [facility]   "

## 2023-10-18 ENCOUNTER — PATIENT OUTREACH (OUTPATIENT)
Dept: CARE COORDINATION | Facility: CLINIC | Age: 53
End: 2023-10-18
Payer: COMMERCIAL

## 2023-11-05 DIAGNOSIS — F33.1 MODERATE EPISODE OF RECURRENT MAJOR DEPRESSIVE DISORDER (H): Chronic | ICD-10-CM

## 2023-11-06 RX ORDER — LAMOTRIGINE 100 MG/1
100 TABLET ORAL DAILY
Qty: 90 TABLET | Refills: 1 | Status: SHIPPED | OUTPATIENT
Start: 2023-11-06 | End: 2024-06-03

## 2023-11-08 ENCOUNTER — MYC MEDICAL ADVICE (OUTPATIENT)
Dept: SURGERY | Facility: CLINIC | Age: 53
End: 2023-11-08
Payer: COMMERCIAL

## 2023-11-11 ENCOUNTER — HEALTH MAINTENANCE LETTER (OUTPATIENT)
Age: 53
End: 2023-11-11

## 2023-11-15 ENCOUNTER — PATIENT OUTREACH (OUTPATIENT)
Dept: CARE COORDINATION | Facility: CLINIC | Age: 53
End: 2023-11-15
Payer: COMMERCIAL

## 2023-11-30 ENCOUNTER — TELEPHONE (OUTPATIENT)
Dept: PHARMACY | Facility: OTHER | Age: 53
End: 2023-11-30
Payer: COMMERCIAL

## 2023-11-30 NOTE — TELEPHONE ENCOUNTER
Patient referred to Medication Therapy Management (MTM) by their health insurance [employer]. Called today for scheduling, no answer. Left  with [facility] scheduling number [phone].

## 2023-12-10 ENCOUNTER — OFFICE VISIT (OUTPATIENT)
Dept: URGENT CARE | Facility: URGENT CARE | Age: 53
End: 2023-12-10
Payer: COMMERCIAL

## 2023-12-10 VITALS
OXYGEN SATURATION: 95 % | DIASTOLIC BLOOD PRESSURE: 95 MMHG | BODY MASS INDEX: 40.16 KG/M2 | WEIGHT: 265.9 LBS | HEART RATE: 73 BPM | SYSTOLIC BLOOD PRESSURE: 158 MMHG | RESPIRATION RATE: 12 BRPM | TEMPERATURE: 97.8 F

## 2023-12-10 DIAGNOSIS — J01.90 ACUTE SINUSITIS WITH SYMPTOMS > 10 DAYS: Primary | ICD-10-CM

## 2023-12-10 PROCEDURE — 99213 OFFICE O/P EST LOW 20 MIN: CPT

## 2023-12-10 NOTE — PROGRESS NOTES
ASSESSMENT:  (J01.90) Acute sinusitis with symptoms > 10 days  (primary encounter diagnosis)  Plan: amoxicillin-clavulanate (AUGMENTIN) 875-125 MG         tablet    PLAN:  Acute sinusitis patient instructions discussed and provided.  Informed the patient to take the antibiotic as prescribed and finish the full course even if symptoms improve.  We discussed trying yogurt with active cultures or probiotic such as Culturelle daily to help find diarrhea while taking the antibiotic.  Informed the patient to get plenty of rest, drink fluids and use Tylenol as needed for pain with the maximum dose being 4000 mg in a 24-hour period of time.  Instructed the patient to follow-up with her primary care provider should symptoms persist.  Patient acknowledged her understanding of the above plan.    The use of Dragon/91JinRongation services may have been used to construct the content in this note; any grammatical or spelling errors are non-intentional. Please contact the author of this note directly if you are in need of any clarification.      SUBJECTIVE:   Naomy Kowalski is a 53 year old female presenting with a chief complaint of runny nose, stuffy nose, cough - non-productive, chills, ear pain both, facial pain/pressure, headache, and fatigue.  Onset of symptoms was 1 month ago.  Course of illness is worsening.    Patient denies: fever, sore throat, vomiting, and diarrhea  Treatment measures tried include Tylenol/Ibuprofen, Sudafed and sinus rinse  Predisposing factors include mold in her basement.    ROS:  Negative except noted above.    OBJECTIVE:  BP (!) 158/95   Pulse 73   Temp 97.8  F (36.6  C) (Tympanic)   Resp 12   Wt 120.6 kg (265 lb 14.4 oz)   SpO2 95%   BMI 40.16 kg/m    GENERAL APPEARANCE: healthy, alert and no distress  EYES: EOMI,  PERRL, conjunctiva clear  HENT: TM's normal bilaterally, nasal turbinates erythematous, swollen, and oral mucous membranes moist, no erythema noted  NECK: supple, nontender, no  lymphadenopathy  RESP: lungs clear to auscultation - no rales, rhonchi or wheezes  CV: regular rates and rhythm, normal S1 S2, no murmur noted  SKIN: no suspicious lesions or rashes

## 2023-12-10 NOTE — PATIENT INSTRUCTIONS
Take the antibiotic as prescribed and finish the full course even if symptoms improve.  Try yogurt with active cultures or probiotics such as Culturelle daily to help prevent diarrhea while using antibiotics.  Get plenty of rest and drink fluids.  Can use Tylenol as needed for pain.  Maximum dose of Tylenol is 4000mg in a 24 hour period of time.  Follow up with your primary care provider should symptoms persist.

## 2023-12-15 ENCOUNTER — TRANSFERRED RECORDS (OUTPATIENT)
Dept: MULTI SPECIALTY CLINIC | Facility: CLINIC | Age: 53
End: 2023-12-15

## 2023-12-15 LAB — RETINOPATHY: NORMAL

## 2023-12-22 DIAGNOSIS — E11.9 TYPE 2 DIABETES MELLITUS WITHOUT COMPLICATION, WITHOUT LONG-TERM CURRENT USE OF INSULIN (H): ICD-10-CM

## 2023-12-26 RX ORDER — SIMVASTATIN 20 MG
20 TABLET ORAL AT BEDTIME
Qty: 90 TABLET | Refills: 3 | Status: SHIPPED | OUTPATIENT
Start: 2023-12-26

## 2023-12-31 DIAGNOSIS — F33.1 MODERATE RECURRENT MAJOR DEPRESSION (H): ICD-10-CM

## 2023-12-31 DIAGNOSIS — E66.813 CLASS 3 SEVERE OBESITY WITH SERIOUS COMORBIDITY AND BODY MASS INDEX (BMI) OF 45.0 TO 49.9 IN ADULT, UNSPECIFIED OBESITY TYPE (H): ICD-10-CM

## 2023-12-31 DIAGNOSIS — E66.01 CLASS 3 SEVERE OBESITY WITH SERIOUS COMORBIDITY AND BODY MASS INDEX (BMI) OF 45.0 TO 49.9 IN ADULT, UNSPECIFIED OBESITY TYPE (H): ICD-10-CM

## 2024-01-02 RX ORDER — NALTREXONE HYDROCHLORIDE 50 MG/1
50 TABLET, FILM COATED ORAL DAILY
Qty: 90 TABLET | Refills: 0 | Status: SHIPPED | OUTPATIENT
Start: 2024-01-02 | End: 2024-04-09

## 2024-01-02 RX ORDER — BUPROPION HYDROCHLORIDE 300 MG/1
300 TABLET ORAL EVERY MORNING
Qty: 90 TABLET | Refills: 0 | Status: SHIPPED | OUTPATIENT
Start: 2024-01-02 | End: 2024-04-10

## 2024-01-20 ENCOUNTER — HEALTH MAINTENANCE LETTER (OUTPATIENT)
Age: 54
End: 2024-01-20

## 2024-02-13 DIAGNOSIS — B00.9 RECURRENT HERPES SIMPLEX: ICD-10-CM

## 2024-02-14 DIAGNOSIS — F33.1 MODERATE RECURRENT MAJOR DEPRESSION (H): ICD-10-CM

## 2024-02-14 RX ORDER — ACYCLOVIR 400 MG/1
TABLET ORAL
Qty: 15 TABLET | Refills: 4 | Status: SHIPPED | OUTPATIENT
Start: 2024-02-14

## 2024-02-14 RX ORDER — QUETIAPINE FUMARATE 25 MG/1
25-50 TABLET, FILM COATED ORAL
Qty: 180 TABLET | Refills: 1 | Status: SHIPPED | OUTPATIENT
Start: 2024-02-14

## 2024-03-24 DIAGNOSIS — I10 HTN, GOAL BELOW 140/90: ICD-10-CM

## 2024-03-25 RX ORDER — LISINOPRIL 40 MG/1
40 TABLET ORAL DAILY
Qty: 90 TABLET | Refills: 3 | Status: SHIPPED | OUTPATIENT
Start: 2024-03-25

## 2024-03-26 ENCOUNTER — MYC MEDICAL ADVICE (OUTPATIENT)
Dept: SURGERY | Facility: CLINIC | Age: 54
End: 2024-03-26
Payer: COMMERCIAL

## 2024-03-30 ENCOUNTER — HEALTH MAINTENANCE LETTER (OUTPATIENT)
Age: 54
End: 2024-03-30

## 2024-04-08 ENCOUNTER — ANCILLARY PROCEDURE (OUTPATIENT)
Dept: MAMMOGRAPHY | Facility: CLINIC | Age: 54
End: 2024-04-08
Attending: NURSE PRACTITIONER
Payer: COMMERCIAL

## 2024-04-08 DIAGNOSIS — Z12.31 VISIT FOR SCREENING MAMMOGRAM: ICD-10-CM

## 2024-04-08 PROCEDURE — 77063 BREAST TOMOSYNTHESIS BI: CPT | Mod: GC | Performed by: STUDENT IN AN ORGANIZED HEALTH CARE EDUCATION/TRAINING PROGRAM

## 2024-04-08 PROCEDURE — 77067 SCR MAMMO BI INCL CAD: CPT | Mod: GC | Performed by: STUDENT IN AN ORGANIZED HEALTH CARE EDUCATION/TRAINING PROGRAM

## 2024-04-08 SDOH — HEALTH STABILITY: PHYSICAL HEALTH: ON AVERAGE, HOW MANY MINUTES DO YOU ENGAGE IN EXERCISE AT THIS LEVEL?: 10 MIN

## 2024-04-08 SDOH — HEALTH STABILITY: PHYSICAL HEALTH: ON AVERAGE, HOW MANY DAYS PER WEEK DO YOU ENGAGE IN MODERATE TO STRENUOUS EXERCISE (LIKE A BRISK WALK)?: 2 DAYS

## 2024-04-08 ASSESSMENT — SOCIAL DETERMINANTS OF HEALTH (SDOH): HOW OFTEN DO YOU GET TOGETHER WITH FRIENDS OR RELATIVES?: ONCE A WEEK

## 2024-04-08 NOTE — COMMUNITY RESOURCES LIST (ENGLISH)
April 8, 2024           YOUR PERSONALIZED LIST OF SERVICES & PROGRAMS           & SHELTER    Case Management      Living - Housing Stabilization Services  [address] [place] [address] (Distance: 6.2 miles)  Phone: [phone]  Website: https://www.[employer]  Language: Romanian, English, Citizen of [place]  Fee: Insurance, Self pay      [facility] Stabilization Services (HSS)  Phone: [phone]) [phone]  Website: https://www.VODECLIC.net/resources  Language: English, Serbian  Hours: Mon 8:00 AM - 4:00 PM Tue 8:00 AM - 4:00 PM Wed 8:00 AM - 4:00 PM Thu 8:00 AM - 4:00 PM Fri 8:00 AM - 4:00 PM  Fee: Free, Insurance  Accessibility: Ada accessible, Blind accommodation, Deaf or hard of hearing, Translation services      [employer] - Housing Stabilization Services  Phone: [phone]  Website: https://homebasemn.com/  Language: English  Hours: Mon 8:00 AM - 4:00 PM Tue 8:00 AM - 4:00 PM Wed 8:00 AM - 4:00 PM Thu 8:00 AM - 4:00 PM Fri 8:00 AM - 4:00 PM  Fee: Free  Accessibility: Blind accommodation, Deaf or hard of hearing  Transportation Options: Free transportation    Payment Assistance      [facility]  [address] [place] [address] (Distance: 1.6 miles)  Phone: [phone]  Website: https://www.[employer]  Language: English  Fee: Free  Accessibility: Translation services      [employer] - [employer] the [employer]  Phone: (857) 399-3780  Website: https://www.zsc23-mxuefsdibrrrrp.org/programs.html  Language: English  Hours: Mon 7:00 AM - 7:00 PM Tue 7:00 AM - 7:00 PM Wed 7:00 AM - 7:00 PM Thu 7:00 AM - 7:00 PM Fri 7:00 AM - 7:00 PM  Fee: Free      - FINANCIAL SERVICES  Phone: [phone]) [phone]  Website: http://SendTask.[employer]    Mediation & Eviction Prevention      Living - Housing Stabilization Services  [address] [place] [address] (Distance: 6.2 miles)  Phone: [phone]) [phone]  Website:  https://www.[employer]  Language: Thai, English, Saudi Arabian  Fee: Insurance, Self pay      [employer] - Foreclosure Prevention  [address] [place] [address] (Distance: 10.6 miles)  Phone: [phone]) [phone]  Website: http://www.[employer]  Language: English, Albanian, Hmong  Fee: Free, Sliding scale  Accessibility: Blind accommodation, Ada accessible      Line - Tenant Rights / Eviction Prevention  Website: https://Embarklymn.org/u-ltzj-tb-/  Language: English, Albanian               IMPORTANT NUMBERS & WEBSITES        Emergency Services  911  .   United Way  211 http://211unitedway.org  .   Poison Control  (762) 311-9281 http://[place]poison.org http://wisconsinpoison.org  .     Suicide and Crisis Lifeline  988 http://988Shoutline.org  .   Childhelp Banning Child Abuse Hotline  724.473.8837 http://Childhelphotline.org   .   Banning Sexual Assault Hotline  (790) 455-3897 (HOPE) http://smartclip.[employer]   .     Banning Runaway Safeline  (991) 121-4022 (RUNAWAY) http://ChugruPivotDesk.org  .   Pregnancy & Postpartum Support  Call/text 285-040-9455  [place]: http://ppsupportmn.org  [place]: http://Sysorex.com/wi  .   Substance Abuse National Helpline ([facility])  723-969-HELP (2594) http://Findtreatment.gov   .                DISCLAIMER: These resources have been generated via the [employer] Platform. [employer] does not endorse any service providers mentioned in this resource list. [employer] does not guarantee that the services mentioned in this resource list will be available to you or will improve your health or wellness.    [facility]

## 2024-04-09 DIAGNOSIS — E66.813 CLASS 3 SEVERE OBESITY WITH SERIOUS COMORBIDITY AND BODY MASS INDEX (BMI) OF 45.0 TO 49.9 IN ADULT, UNSPECIFIED OBESITY TYPE (H): ICD-10-CM

## 2024-04-09 DIAGNOSIS — E66.01 CLASS 3 SEVERE OBESITY WITH SERIOUS COMORBIDITY AND BODY MASS INDEX (BMI) OF 45.0 TO 49.9 IN ADULT, UNSPECIFIED OBESITY TYPE (H): ICD-10-CM

## 2024-04-09 DIAGNOSIS — F33.1 MODERATE RECURRENT MAJOR DEPRESSION (H): ICD-10-CM

## 2024-04-09 RX ORDER — NALTREXONE HYDROCHLORIDE 50 MG/1
50 TABLET, FILM COATED ORAL DAILY
Qty: 90 TABLET | Refills: 0 | Status: SHIPPED | OUTPATIENT
Start: 2024-04-09 | End: 2024-07-09

## 2024-04-10 ENCOUNTER — OFFICE VISIT (OUTPATIENT)
Dept: FAMILY MEDICINE | Facility: CLINIC | Age: 54
End: 2024-04-10
Payer: COMMERCIAL

## 2024-04-10 VITALS
SYSTOLIC BLOOD PRESSURE: 132 MMHG | TEMPERATURE: 98.4 F | DIASTOLIC BLOOD PRESSURE: 85 MMHG | RESPIRATION RATE: 11 BRPM | OXYGEN SATURATION: 99 % | HEIGHT: 67 IN | WEIGHT: 245.5 LBS | BODY MASS INDEX: 38.53 KG/M2 | HEART RATE: 80 BPM

## 2024-04-10 DIAGNOSIS — I10 HTN, GOAL BELOW 140/90: ICD-10-CM

## 2024-04-10 DIAGNOSIS — E66.01 MORBID OBESITY, UNSPECIFIED OBESITY TYPE (H): ICD-10-CM

## 2024-04-10 DIAGNOSIS — E03.9 HYPOTHYROIDISM, UNSPECIFIED TYPE: ICD-10-CM

## 2024-04-10 DIAGNOSIS — Z00.00 ROUTINE HISTORY AND PHYSICAL EXAMINATION OF ADULT: Primary | ICD-10-CM

## 2024-04-10 DIAGNOSIS — F33.1 MODERATE EPISODE OF RECURRENT MAJOR DEPRESSIVE DISORDER (H): Chronic | ICD-10-CM

## 2024-04-10 DIAGNOSIS — E11.9 TYPE 2 DIABETES MELLITUS WITHOUT COMPLICATION, WITHOUT LONG-TERM CURRENT USE OF INSULIN (H): ICD-10-CM

## 2024-04-10 LAB — HBA1C MFR BLD: 6.7 % (ref 0–5.6)

## 2024-04-10 PROCEDURE — 99207 PR FOOT EXAM NO CHARGE: CPT | Performed by: NURSE PRACTITIONER

## 2024-04-10 PROCEDURE — 99214 OFFICE O/P EST MOD 30 MIN: CPT | Mod: 25 | Performed by: NURSE PRACTITIONER

## 2024-04-10 PROCEDURE — 82043 UR ALBUMIN QUANTITATIVE: CPT | Performed by: NURSE PRACTITIONER

## 2024-04-10 PROCEDURE — 83001 ASSAY OF GONADOTROPIN (FSH): CPT | Performed by: NURSE PRACTITIONER

## 2024-04-10 PROCEDURE — 99396 PREV VISIT EST AGE 40-64: CPT | Performed by: NURSE PRACTITIONER

## 2024-04-10 PROCEDURE — 82570 ASSAY OF URINE CREATININE: CPT | Performed by: NURSE PRACTITIONER

## 2024-04-10 PROCEDURE — 83002 ASSAY OF GONADOTROPIN (LH): CPT | Performed by: NURSE PRACTITIONER

## 2024-04-10 PROCEDURE — 80048 BASIC METABOLIC PNL TOTAL CA: CPT | Performed by: NURSE PRACTITIONER

## 2024-04-10 PROCEDURE — 36415 COLL VENOUS BLD VENIPUNCTURE: CPT | Performed by: NURSE PRACTITIONER

## 2024-04-10 PROCEDURE — 84443 ASSAY THYROID STIM HORMONE: CPT | Performed by: NURSE PRACTITIONER

## 2024-04-10 PROCEDURE — 80061 LIPID PANEL: CPT | Performed by: NURSE PRACTITIONER

## 2024-04-10 PROCEDURE — 83036 HEMOGLOBIN GLYCOSYLATED A1C: CPT | Performed by: NURSE PRACTITIONER

## 2024-04-10 RX ORDER — BUPROPION HYDROCHLORIDE 300 MG/1
300 TABLET ORAL EVERY MORNING
Qty: 90 TABLET | Refills: 0 | Status: SHIPPED | OUTPATIENT
Start: 2024-04-10 | End: 2024-07-09

## 2024-04-10 ASSESSMENT — PATIENT HEALTH QUESTIONNAIRE - PHQ9
10. IF YOU CHECKED OFF ANY PROBLEMS, HOW DIFFICULT HAVE THESE PROBLEMS MADE IT FOR YOU TO DO YOUR WORK, TAKE CARE OF THINGS AT HOME, OR GET ALONG WITH OTHER PEOPLE: SOMEWHAT DIFFICULT
SUM OF ALL RESPONSES TO PHQ QUESTIONS 1-9: 7
SUM OF ALL RESPONSES TO PHQ QUESTIONS 1-9: 7

## 2024-04-10 ASSESSMENT — PAIN SCALES - GENERAL: PAINLEVEL: NO PAIN (0)

## 2024-04-10 NOTE — PROGRESS NOTES
Preventive Care Visit  [facility]  JERRICA Church CNP, Family Medicine  Apr 10, 2024      ICD-10-CM    1. Routine history and physical examination of adult  Z00.00       2. HTN, goal below 140/90  I10 BASIC METABOLIC PANEL     BASIC METABOLIC PANEL      3. Moderate episode of recurrent major depressive disorder (H)  F33.1       4. Morbid obesity, unspecified obesity type (H)  E66.01       5. Type 2 diabetes mellitus without complication, without long-term current use of insulin (H)  E11.9 HEMOGLOBIN A1C     Albumin Random Urine Quantitative with Creat Ratio     TSH WITH FREE T4 REFLEX     Lipid panel reflex to direct LDL Fasting     HEMOGLOBIN A1C     Albumin Random Urine Quantitative with Creat Ratio     TSH WITH FREE T4 REFLEX     Lipid panel reflex to direct LDL Fasting     semaglutide (OZEMPIC) 2 MG/3ML pen      6. Hypothyroidism, unspecified type  E03.9 Follicle stimulating hormone     Luteinizing Hormone     Follicle stimulating hormone     Luteinizing Hormone        -Has not been taking Victoza over the past several months; not checking BG at home, but is exercising regularly. She is interested in starting another GLP-1 for help with weight loss. Has lost 40 pounds in the past several months.  -Mood overall stable; referred for counseling services  -IUD was placed in 2017; due to come out this year. We will check labs to see if she is post-menopausal  -BP stable    Subjective   Naomy is a 53 year old, presenting for the following:  Physical        4/10/2024     2:41 PM   Additional Questions   Roomed by Diann WALKER    Diabetes Follow-up    How often are you checking your blood sugar? Not at all  What concerns do you have today about your diabetes? None   Do you have any of these symptoms? (Select all that apply)  No numbness or tingling in feet.  No redness, sores or blisters on feet.  No complaints of excessive thirst.  No reports of blurry vision.  No  "significant changes to weight.      BP Readings from Last 2 Encounters:   04/10/24 132/85   12/10/23 (!) 158/95     Hemoglobin A1C (%)   Date Value   04/10/2024 6.7 (H)   06/09/2023 8.0 (H)   05/04/2020 6.5 (H)   10/15/2019 6.0 (H)     LDL Cholesterol Calculated (mg/dL)   Date Value   10/31/2022 118 (H)   02/14/2022 108 (H)   12/04/2017 105 (H)   01/25/2016 104 (H)             Depression and Anxiety   How are you doing with your depression since your last visit? No change  How are you doing with your anxiety since your last visit?  No change  Are you having other symptoms that might be associated with depression or anxiety? No  Have you had a significant life event? Relationship Concerns, Job Concerns, Financial Concerns, and Housing Concerns   Do you have any concerns with your use of alcohol or other drugs? No  She has had a significant change in her relationships over the past few months, which she felt was a catalyst for positive change for her. She feels that medications for her mood are working well, and not \"numbing her\"     Social History     Tobacco Use    Smoking status: Never    Smokeless tobacco: Never   Vaping Use    Vaping status: Never Used   Substance Use Topics    Alcohol use: No    Drug use: No         6/9/2023     1:05 AM 6/27/2023     1:32 PM 4/10/2024     8:18 AM   PHQ   PHQ-9 Total Score 7 22 7   Q9: Thoughts of better off dead/self-harm past 2 weeks Not at all Several days Not at all   F/U: Thoughts of suicide or self-harm  Yes    F/U: Self harm-plan  No    F/U: Self-harm action  No    F/U: Safety concerns  No          12/13/2018     9:36 AM 10/31/2022    10:54 AM 1/1/2023     4:09 PM   JACINTO-7 SCORE   Total Score  10 (moderate anxiety) 14 (moderate anxiety)   Total Score 15 10 14         Suicide Assessment Five-step Evaluation and Treatment (SAFE-T)        4/8/2024   General Health   How would you rate your overall physical health? (!) FAIR   Feel stress (tense, anxious, or unable to sleep) " Rather much   (!) STRESS CONCERN      4/8/2024   Nutrition   Three or more servings of calcium each day? (!) NO   Diet: Regular (no restrictions)   How many servings of fruit and vegetables per day? (!) 0-1   How many sweetened beverages each day? 0-1         4/8/2024   Exercise   Days per week of moderate/strenous exercise 2 days   Average minutes spent exercising at this level 10 min   (!) EXERCISE CONCERN      4/8/2024   Social Factors   Frequency of gathering with friends or relatives Once a week   Worry food won't last until get money to buy more No   Food not last or not have enough money for food? No   Do you have housing?  Yes   Are you worried about losing your housing? Yes   Lack of transportation? No   Unable to get utilities (heat,electricity)? No   Want help with housing or utility concern? No   (!) HOUSING CONCERN PRESENT      4/8/2024   Fall Risk   Fallen 2 or more times in the past year? No   Trouble with walking or balance? No          4/8/2024   Dental   Dentist two times every year? Yes         4/8/2024   TB Screening   Were you born outside of the [place]? No       Today's PHQ-9 Score:       4/10/2024     8:18 AM   PHQ-9 SCORE   PHQ-9 Total Score MyChart 7 (Mild depression)   PHQ-9 Total Score 7         4/8/2024   Substance Use   Alcohol more than 3/day or more than 7/wk No   Do you use any other substances recreationally? No     Social History     Tobacco Use    Smoking status: Never    Smokeless tobacco: Never   Vaping Use    Vaping status: Never Used   Substance Use Topics    Alcohol use: No    Drug use: No         4/8/2024   LAST FHS-7 RESULTS   1st degree relative breast or ovarian cancer No   Any relative bilateral breast cancer No   Any male have breast cancer No   Any ONE woman have BOTH breast AND ovarian cancer No   Any woman with breast cancer before 50yrs No   2 or more relatives with breast AND/OR ovarian cancer No   2 or more relatives with breast AND/OR bowel cancer No              "4/8/2024   STI Screening   New sexual partner(s) since last STI/HIV test? No           Latest Ref Rng & Units 2/24/2022    11:54 AM 5/20/2019     8:33 AM 5/20/2019     8:00 AM   PAP / HPV   PAP  Negative for Intraepithelial Lesion or Malignancy (NILM)      PAP (Historical)   NIL     HPV 16 DNA Negative Negative   Negative    HPV 18 DNA Negative Negative   Negative    Other HR HPV Negative Negative   Negative      ASCVD Risk   The 10-year ASCVD risk score (Marguerite COYNE, et al., 2019) is: 6.5%    Values used to calculate the score:      Age: 53 years      Sex: Female      Is Non- : No      Diabetic: Yes      Tobacco smoker: No      Systolic Blood Pressure: 132 mmHg      Is BP treated: Yes      HDL Cholesterol: 40 mg/dL      Total Cholesterol: 192 mg/dL                          Review of Systems  Constitutional, neuro, ENT, endocrine, pulmonary, cardiac, gastrointestinal, genitourinary, musculoskeletal, integument and psychiatric systems are negative, except as otherwise noted.     Objective    Exam  /85 (BP Location: Left arm, Patient Position: Sitting, Cuff Size: Adult Large)   Pulse 80   Temp 98.4  F (36.9  C) (Temporal)   Resp 11   Ht 1.698 m (5' 6.85\")   Wt 111.4 kg (245 lb 8 oz)   LMP  (LMP Unknown)   SpO2 99%   BMI 38.62 kg/m     Estimated body mass index is 38.62 kg/m  as calculated from the following:    Height as of this encounter: 1.698 m (5' 6.85\").    Weight as of this encounter: 111.4 kg (245 lb 8 oz).    Physical Exam  GENERAL: alert and no distress  EYES: Eyes grossly normal to inspection, PERRL and conjunctivae and sclerae normal  HENT: ear canals and TM's normal, nose and mouth without ulcers or lesions  NECK: no adenopathy, no asymmetry, masses, or scars  RESP: lungs clear to auscultation - no rales, rhonchi or wheezes  CV: regular rate and rhythm, normal S1 S2, no S3 or S4, no murmur, click or rub, no peripheral edema  ABDOMEN: soft, nontender, no " hepatosplenomegaly, no masses and bowel sounds normal  MS: no gross musculoskeletal defects noted, no edema  SKIN: no suspicious lesions or rashes  NEURO: Normal strength and tone, mentation intact and speech normal  PSYCH: mentation appears normal, affect normal/bright  Foot exam: normal sensation throughout; skin warm, dry and intact      Signed Electronically by: JERRICA Church CNP

## 2024-04-10 NOTE — PATIENT INSTRUCTIONS
"[facility]  Phone: [phone]    [phone]    -Look into Ramonita Brach meditations \"RAIN\"- this is helpful for emotional stability, recognizing emotions, nurturing self.    Great job with the pilates, stretching and strengthening!    Keep working on staying hopeful, surrounding yourself with positive messages and inspiration.     -IUD was placed in 2017, so it is time for either a replacement (to control perimenopausal bleeding) or possibly hormone replacement considering where you are in the menopause process    -it's ok to \"table\" some of the issues until you are in a safe and stable place.   "

## 2024-04-11 LAB
ANION GAP SERPL CALCULATED.3IONS-SCNC: 13 MMOL/L (ref 7–15)
BUN SERPL-MCNC: 12.1 MG/DL (ref 6–20)
CALCIUM SERPL-MCNC: 9.7 MG/DL (ref 8.6–10)
CHLORIDE SERPL-SCNC: 108 MMOL/L (ref 98–107)
CHOLEST SERPL-MCNC: 152 MG/DL
CREAT SERPL-MCNC: 0.76 MG/DL (ref 0.51–0.95)
CREAT UR-MCNC: 372 MG/DL
DEPRECATED HCO3 PLAS-SCNC: 24 MMOL/L (ref 22–29)
EGFRCR SERPLBLD CKD-EPI 2021: >90 ML/MIN/1.73M2
FASTING STATUS PATIENT QL REPORTED: YES
FSH SERPL IRP2-ACNC: 38.9 MIU/ML
GLUCOSE SERPL-MCNC: 108 MG/DL (ref 70–99)
HDLC SERPL-MCNC: 44 MG/DL
LDLC SERPL CALC-MCNC: 83 MG/DL
LH SERPL-ACNC: 20.1 MIU/ML
MICROALBUMIN UR-MCNC: 20.4 MG/L
MICROALBUMIN/CREAT UR: 5.48 MG/G CR (ref 0–25)
NONHDLC SERPL-MCNC: 108 MG/DL
POTASSIUM SERPL-SCNC: 4 MMOL/L (ref 3.4–5.3)
SODIUM SERPL-SCNC: 145 MMOL/L (ref 135–145)
TRIGL SERPL-MCNC: 124 MG/DL
TSH SERPL DL<=0.005 MIU/L-ACNC: 1.5 UIU/ML (ref 0.3–4.2)

## 2024-04-15 ENCOUNTER — OFFICE VISIT (OUTPATIENT)
Dept: SURGERY | Facility: CLINIC | Age: 54
End: 2024-04-15
Payer: COMMERCIAL

## 2024-04-15 VITALS
HEART RATE: 84 BPM | WEIGHT: 247.2 LBS | OXYGEN SATURATION: 96 % | BODY MASS INDEX: 38.8 KG/M2 | DIASTOLIC BLOOD PRESSURE: 86 MMHG | HEIGHT: 67 IN | SYSTOLIC BLOOD PRESSURE: 136 MMHG

## 2024-04-15 DIAGNOSIS — K62.5 RECTAL BLEEDING: ICD-10-CM

## 2024-04-15 DIAGNOSIS — K62.82 ANAL DYSPLASIA: Primary | ICD-10-CM

## 2024-04-15 DIAGNOSIS — A63.0 ANAL CONDYLOMA: ICD-10-CM

## 2024-04-15 LAB
LAB DIRECTOR COMMENTS: NORMAL
LAB DIRECTOR DISCLAIMER: NORMAL
LAB DIRECTOR INTERPRETATION: NORMAL
LAB DIRECTOR METHODOLOGY: NORMAL
LAB DIRECTOR RESULTS: NORMAL
LOCATION OF TASK: NORMAL
SPECIMEN DESCRIPTION: NORMAL

## 2024-04-15 PROCEDURE — 88112 CYTOPATH CELL ENHANCE TECH: CPT | Mod: 26 | Performed by: PATHOLOGY

## 2024-04-15 PROCEDURE — 88112 CYTOPATH CELL ENHANCE TECH: CPT | Mod: TC | Performed by: NURSE PRACTITIONER

## 2024-04-15 PROCEDURE — 99203 OFFICE O/P NEW LOW 30 MIN: CPT | Mod: 25 | Performed by: NURSE PRACTITIONER

## 2024-04-15 PROCEDURE — 87624 HPV HI-RISK TYP POOLED RSLT: CPT | Performed by: NURSE PRACTITIONER

## 2024-04-15 PROCEDURE — 46600 DIAGNOSTIC ANOSCOPY SPX: CPT | Performed by: NURSE PRACTITIONER

## 2024-04-15 PROCEDURE — G0452 MOLECULAR PATHOLOGY INTERPR: HCPCS | Mod: 26 | Performed by: PATHOLOGY

## 2024-04-15 ASSESSMENT — PAIN SCALES - GENERAL: PAINLEVEL: NO PAIN (0)

## 2024-04-15 NOTE — NURSING NOTE
"Chief Complaint   Patient presents with    RECHECK     Anal pap.       Vitals:    04/15/24 1031   BP: 136/86   BP Location: Right arm   Patient Position: Sitting   Cuff Size: Adult Regular   Pulse: 84   SpO2: 96%   Weight: 247 lb 3.2 oz   Height: 5' 7\"       Body mass index is 38.72 kg/m .      Elbert Earl EMT    "

## 2024-04-15 NOTE — PROGRESS NOTES
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 She last underwent high resolution anoscopy on 22 with no evidence of high grade dysplasia. Anal cytology was negative at that time.    Interval history: Naomy has been doing well. She has had some intermittent rectal bleeding again. No pain with bowel movements. She has a new grandson in [place].     Physical Examination: Exam was chaperoned by IJEOMA Caldwell  /86 (BP Location: Right arm, Patient Position: Sitting, Cuff Size: Adult Regular)   Pulse 84   Ht 5' 7\"   Wt 247 lb 3.2 oz   LMP  (LMP Unknown)   SpO2 96%   BMI 38.72 kg/m    General: alert, oriented, in no acute distress, sitting comfortably  HEENT: mucous membranes moist     Perianal external examination:  Perianal skin: Intact with no excoriation or lichenification.  Lesions: No evidence of an external lesion, nodularity, or induration in the perianal region.  Eversion of buttocks: There was not evidence of an anal fissure. Details: N/A.  Skin tags or external hemorrhoids: None.    Digital rectal examination: Was performed.   Sphincter tone: Good.  Palpable lesions: No.  Other: None.  Bimanual examination: was not performed.    Anoscopy: Was performed.   Hemorrhoids: Yes. Small internal hemorrhoids without active bleeding  Lesions: No.    Assessment/Plan: 53 year old female with history of anal condyloma with high grade dysplasia. Anal Pap obtained today with HPV genotyping and will follow up with her with these results for further plan. Advised starting on a daily fiber supplement and if bleeding persists would recommend a colonoscopy. Patient's questions were answered to her stated satisfaction and " she is in agreement with this plan.     Medical history:  Past Medical History:   Diagnosis Date    CARDIOVASCULAR SCREENING; LDL GOAL LESS THAN 160 06/06/2012    Hypertension     watching blood pressure    Obesity, unspecified     Obesity    Sleep apnea     no cpap    Thyroid disease     Type 2 diabetes mellitus (H)        Surgical history:  Past Surgical History:   Procedure Laterality Date    BREAST SURGERY      breast reduction bilat    COLONOSCOPY WITH CO2 INSUFFLATION N/A 9/1/2017    Procedure: COLONOSCOPY WITH CO2 INSUFFLATION;  Colonoscopy, Abdominal pain, left lower quadrant, [place], BMI 42.93, [phone];  Surgeon: Anuel Rodríguez MD;  Location: [facility] OR    ENDOSCOPIC SINUS SURGERY Right 3/15/2017    Procedure: ENDOSCOPIC SINUS SURGERY;;  Surgeon: Vicki Zurita MD;  Location: [facility] OR    EXAM UNDER ANESTHESIA, FULGURATE CONDYLOMA ANUS, COMBINED N/A 9/26/2017    Procedure: COMBINED EXAM UNDER ANESTHESIA, FULGURATE CONDYLOMA ANUS;  Examination Under anethesia, Excision And Fulguration Of Anal Condyloma;  Surgeon: Td Garvey MD;  Location: [facility] OR    LAPAROSCOPIC CHOLECYSTECTOMY  08/05/99    TONSILLECTOMY Bilateral 3/15/2017    Procedure: TONSILLECTOMY;  Bilateral Tonsillectomy, Right Functional Endoscopic Sinus Surgery ;  Surgeon: Vicki Zurita MD;  Location: [facility] OR       Problem list:    Patient Active Problem List    Diagnosis Date Noted    Chronic left hip pain 07/02/2023     Priority: Medium    Diabetes mellitus, type 2 (H) 12/10/2020     Priority: Medium    Morbid obesity, unspecified obesity type (H) 10/03/2016     Priority: Medium    HTN, goal below 140/90 06/15/2015     Priority: Medium    Dysfunction of eustachian tube 01/03/2014     Priority: Medium    Menorrhagia, treated with mirena IUD (inserted July 2013)  12/02/2013     Priority: Medium    Family history of diabetes mellitus 12/02/2013     Priority: Medium    Moderate episode of recurrent major  depressive disorder (H) 10/01/2012     Priority: Medium    CARDIOVASCULAR SCREENING; LDL GOAL LESS THAN 160 06/06/2012     Priority: Medium    Hypothyroidism 07/18/2011     Priority: Medium       Medications:  Current Outpatient Medications   Medication Sig Dispense Refill    acyclovir (ZOVIRAX) 400 MG tablet Take 1 tablet (400 mg) by mouth 3 times daily as needed for symptoms. 15 tablet 4    buPROPion (WELLBUTRIN XL) 300 MG 24 hr tablet Take 1 tablet (300 mg) by mouth every morning 90 tablet 0    fluconazole (DIFLUCAN) 150 MG tablet Take one by mouth today, repeat in three days if continue to experience vaginal symptoms 1 tablet 1    fluticasone (FLONASE) 50 MCG/ACT nasal spray Spray 2 sprays into both nostrils daily Reported on 3/24/2017      lamoTRIgine (LAMICTAL) 100 MG tablet Take 1 tablet (100 mg) by mouth daily Take 90 tablet 1    levonorgestrel (MIRENA) 20 MCG/24HR IUD 1 each by Intrauterine route once      levothyroxine (SYNTHROID/LEVOTHROID) 75 MCG tablet Take 1 tablet (75 mcg) by mouth daily 90 tablet 3    lisinopril (ZESTRIL) 40 MG tablet Take 1 tablet (40 mg) by mouth daily 90 tablet 3    naltrexone (DEPADE/REVIA) 50 MG tablet Take 1 tablet (50 mg) by mouth daily 90 tablet 0    nystatin (MYCOSTATIN) 577266 UNIT/GM external powder Apply topically 3 times daily as needed (itching) 120 g 3    QUEtiapine (SEROQUEL) 25 MG tablet Take 1-2 tablets (25-50 mg) by mouth nightly as needed (insomnia) 180 tablet 1    semaglutide (OZEMPIC) 2 MG/3ML pen Inject 0.25 mg Subcutaneous every 7 days for 30 days 3 mL 0    simvastatin (ZOCOR) 20 MG tablet Take 1 tablet (20 mg) by mouth At Bedtime 90 tablet 3    insulin pen needle (32G X 4 MM) 32G X 4 MM miscellaneous Use one pen needle daily or as directed. (Patient not taking: Reported on 4/10/2024) 100 each 1       Allergies:  Allergies   Allergen Reactions    Percocet [Oxycodone-Acetaminophen] Itching       Family history:  Family History   Problem Relation Age of Onset     "Diabetes Mother     Hypertension Mother     Depression Mother     Substance Abuse Mother         Alcohol    Other - See Comments Mother         bladder issue; fistula    Cancer Father         pancreatic    Impaired Fasting Glucose Father     Asthma Father     Skin Cancer Father     Diabetes Brother     Asthma Brother     Rashes/Skin Problems Brother         HIdradenitis Suppurativa    No Known Problems Brother     Heart Disease Maternal Grandmother     Diabetes Maternal Grandmother     Cerebrovascular Disease Maternal Grandfather     Cancer - colorectal Paternal Grandfather     Asthma Daughter     Asthma Son     Depression Son        Social history:  Social History     Tobacco Use    Smoking status: Never    Smokeless tobacco: Never   Substance Use Topics    Alcohol use: No     Marital status: .    Nursing Notes:   Elbert Earl  4/15/2024 10:34 AM  Signed  Chief Complaint   Patient presents with    RECHECK     Anal pap.       Vitals:    04/15/24 1031   BP: 136/86   BP Location: Right arm   Patient Position: Sitting   Cuff Size: Adult Regular   Pulse: 84   SpO2: 96%   Weight: 247 lb 3.2 oz   Height: 5' 7\"       Body mass index is 38.72 kg/m .      IJEOMA Hopper      30 minutes spent on the date of encounter performing chart review, history and exam, documentation and further activities as noted above with an additional 2 minutes for anoscopy.     Linnette Howe NP-C  Colon and Rectal Surgery  [facility]    "

## 2024-04-15 NOTE — LETTER
"4/15/2024       RE: Naomy Kowalski  [address] [facility] [place] [address]     Dear Colleague,    Thank you for referring your patient, Naomy Kowalski, to the [facility] COLON AND RECTAL SURGERY CLINIC [place] at [facility]. Please see a copy of my visit note below.    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 She last underwent high resolution anoscopy on 22 with no evidence of high grade dysplasia. Anal cytology was negative at that time.    Interval history: Naomy has been doing well. She has had some intermittent rectal bleeding again. No pain with bowel movements. She has a new grandson in [place].     Physical Examination: Exam was chaperoned by Elbert Mitchell, EMT  /86 (BP Location: Right arm, Patient Position: Sitting, Cuff Size: Adult Regular)   Pulse 84   Ht 5' 7\"   Wt 247 lb 3.2 oz   LMP  (LMP Unknown)   SpO2 96%   BMI 38.72 kg/m    General: alert, oriented, in no acute distress, sitting comfortably  HEENT: mucous membranes moist     Perianal external examination:  Perianal skin: Intact with no excoriation or lichenification.  Lesions: No evidence of an external lesion, nodularity, or induration in the perianal region.  Eversion of buttocks: There was not evidence of an anal fissure. Details: N/A.  Skin tags or external hemorrhoids: None.    Digital rectal examination: Was performed.   Sphincter tone: Good.  Palpable lesions: No.  Other: None.  Bimanual examination: was not performed.    Anoscopy: Was performed.   Hemorrhoids: Yes. Small internal hemorrhoids without active bleeding  Lesions: No.    Assessment/Plan: 53 year old female with " history of anal condyloma with high grade dysplasia. Anal Pap obtained today with HPV genotyping and will follow up with her with these results for further plan. Advised starting on a daily fiber supplement and if bleeding persists would recommend a colonoscopy. Patient's questions were answered to her stated satisfaction and she is in agreement with this plan.     Medical history:  Past Medical History:   Diagnosis Date    CARDIOVASCULAR SCREENING; LDL GOAL LESS THAN 160 06/06/2012    Hypertension     watching blood pressure    Obesity, unspecified     Obesity    Sleep apnea     no cpap    Thyroid disease     Type 2 diabetes mellitus (H)        Surgical history:  Past Surgical History:   Procedure Laterality Date    BREAST SURGERY      breast reduction bilat    COLONOSCOPY WITH CO2 INSUFFLATION N/A 9/1/2017    Procedure: COLONOSCOPY WITH CO2 INSUFFLATION;  Colonoscopy, Abdominal pain, left lower quadrant, [place], BMI 42.93, [phone];  Surgeon: Anuel Rodríguez MD;  Location:  OR    ENDOSCOPIC SINUS SURGERY Right 3/15/2017    Procedure: ENDOSCOPIC SINUS SURGERY;;  Surgeon: Vicki Zurita MD;  Location: [facility] OR    EXAM UNDER ANESTHESIA, FULGURATE CONDYLOMA ANUS, COMBINED N/A 9/26/2017    Procedure: COMBINED EXAM UNDER ANESTHESIA, FULGURATE CONDYLOMA ANUS;  Examination Under anethesia, Excision And Fulguration Of Anal Condyloma;  Surgeon: Td Garvey MD;  Location: [facility] OR    LAPAROSCOPIC CHOLECYSTECTOMY  08/05/99    TONSILLECTOMY Bilateral 3/15/2017    Procedure: TONSILLECTOMY;  Bilateral Tonsillectomy, Right Functional Endoscopic Sinus Surgery ;  Surgeon: Vicki Zurita MD;  Location: [facility] OR       Problem list:    Patient Active Problem List    Diagnosis Date Noted    Chronic left hip pain 07/02/2023     Priority: Medium    Diabetes mellitus, type 2 (H) 12/10/2020     Priority: Medium    Morbid obesity, unspecified obesity type (H) 10/03/2016     Priority: Medium     HTN, goal below 140/90 06/15/2015     Priority: Medium    Dysfunction of eustachian tube 01/03/2014     Priority: Medium    Menorrhagia, treated with mirena IUD (inserted July 2013)  12/02/2013     Priority: Medium    Family history of diabetes mellitus 12/02/2013     Priority: Medium    Moderate episode of recurrent major depressive disorder (H) 10/01/2012     Priority: Medium    CARDIOVASCULAR SCREENING; LDL GOAL LESS THAN 160 06/06/2012     Priority: Medium    Hypothyroidism 07/18/2011     Priority: Medium       Medications:  Current Outpatient Medications   Medication Sig Dispense Refill    acyclovir (ZOVIRAX) 400 MG tablet Take 1 tablet (400 mg) by mouth 3 times daily as needed for symptoms. 15 tablet 4    buPROPion (WELLBUTRIN XL) 300 MG 24 hr tablet Take 1 tablet (300 mg) by mouth every morning 90 tablet 0    fluconazole (DIFLUCAN) 150 MG tablet Take one by mouth today, repeat in three days if continue to experience vaginal symptoms 1 tablet 1    fluticasone (FLONASE) 50 MCG/ACT nasal spray Spray 2 sprays into both nostrils daily Reported on 3/24/2017      lamoTRIgine (LAMICTAL) 100 MG tablet Take 1 tablet (100 mg) by mouth daily Take 90 tablet 1    levonorgestrel (MIRENA) 20 MCG/24HR IUD 1 each by Intrauterine route once      levothyroxine (SYNTHROID/LEVOTHROID) 75 MCG tablet Take 1 tablet (75 mcg) by mouth daily 90 tablet 3    lisinopril (ZESTRIL) 40 MG tablet Take 1 tablet (40 mg) by mouth daily 90 tablet 3    naltrexone (DEPADE/REVIA) 50 MG tablet Take 1 tablet (50 mg) by mouth daily 90 tablet 0    nystatin (MYCOSTATIN) 180678 UNIT/GM external powder Apply topically 3 times daily as needed (itching) 120 g 3    QUEtiapine (SEROQUEL) 25 MG tablet Take 1-2 tablets (25-50 mg) by mouth nightly as needed (insomnia) 180 tablet 1    semaglutide (OZEMPIC) 2 MG/3ML pen Inject 0.25 mg Subcutaneous every 7 days for 30 days 3 mL 0    simvastatin (ZOCOR) 20 MG tablet Take 1 tablet (20 mg) by mouth At Bedtime 90  "tablet 3    insulin pen needle (32G X 4 MM) 32G X 4 MM miscellaneous Use one pen needle daily or as directed. (Patient not taking: Reported on 4/10/2024) 100 each 1       Allergies:  Allergies   Allergen Reactions    Percocet [Oxycodone-Acetaminophen] Itching       Family history:  Family History   Problem Relation Age of Onset    Diabetes Mother     Hypertension Mother     Depression Mother     Substance Abuse Mother         Alcohol    Other - See Comments Mother         bladder issue; fistula    Cancer Father         pancreatic    Impaired Fasting Glucose Father     Asthma Father     Skin Cancer Father     Diabetes Brother     Asthma Brother     Rashes/Skin Problems Brother         HIdradenitis Suppurativa    No Known Problems Brother     Heart Disease Maternal Grandmother     Diabetes Maternal Grandmother     Cerebrovascular Disease Maternal Grandfather     Cancer - colorectal Paternal Grandfather     Asthma Daughter     Asthma Son     Depression Son        Social history:  Social History     Tobacco Use    Smoking status: Never    Smokeless tobacco: Never   Substance Use Topics    Alcohol use: No     Marital status: .    Nursing Notes:   Elbert Earl  4/15/2024 10:34 AM  Signed  Chief Complaint   Patient presents with    RECHECK     Anal pap.       Vitals:    04/15/24 1031   BP: 136/86   BP Location: Right arm   Patient Position: Sitting   Cuff Size: Adult Regular   Pulse: 84   SpO2: 96%   Weight: 247 lb 3.2 oz   Height: 5' 7\"       Body mass index is 38.72 kg/m .      IJEOMA Hopper      30 minutes spent on the date of encounter performing chart review, history and exam, documentation and further activities as noted above with an additional 2 minutes for anoscopy.         Again, thank you for allowing me to participate in the care of your patient.      Sincerely,    JERRICA Osorio CNP    "

## 2024-04-16 LAB
PATH REPORT.COMMENTS IMP SPEC: NORMAL
PATH REPORT.FINAL DX SPEC: NORMAL
PATH REPORT.GROSS SPEC: NORMAL
PATH REPORT.MICROSCOPIC SPEC OTHER STN: NORMAL
PATH REPORT.RELEVANT HX SPEC: NORMAL

## 2024-05-31 ENCOUNTER — MYC REFILL (OUTPATIENT)
Dept: FAMILY MEDICINE | Facility: CLINIC | Age: 54
End: 2024-05-31
Payer: COMMERCIAL

## 2024-05-31 DIAGNOSIS — F33.1 MODERATE EPISODE OF RECURRENT MAJOR DEPRESSIVE DISORDER (H): Chronic | ICD-10-CM

## 2024-06-03 RX ORDER — LAMOTRIGINE 100 MG/1
100 TABLET ORAL DAILY
Qty: 90 TABLET | Refills: 3 | Status: SHIPPED | OUTPATIENT
Start: 2024-06-03

## 2024-06-03 RX ORDER — LAMOTRIGINE 100 MG/1
100 TABLET ORAL DAILY
Qty: 90 TABLET | Refills: 1 | OUTPATIENT
Start: 2024-06-03

## 2024-06-06 ENCOUNTER — TRANSFERRED RECORDS (OUTPATIENT)
Dept: MULTI SPECIALTY CLINIC | Facility: CLINIC | Age: 54
End: 2024-06-06

## 2024-06-06 LAB — RETINOPATHY: NORMAL

## 2024-06-18 ENCOUNTER — NURSE TRIAGE (OUTPATIENT)
Dept: FAMILY MEDICINE | Facility: CLINIC | Age: 54
End: 2024-06-18
Payer: COMMERCIAL

## 2024-06-18 NOTE — TELEPHONE ENCOUNTER
"Call received from patient stating she has a dog that is very scared of thunderstorms. Last night her dog crawled under her bed and got stuck. Patient tried to get the dog out and she injured her back. This morning patient woke with back pain. States she just went to the bathroom and she is having severe pain and she can \"barely move\". Patient is crying and is difficult to understand due to this. Pain is in her left side above her hip and is shooting into her left ankle. Patient asking if she should go to an urgent care. Advised ER due to the severity of the pain. Patient agrees.   Reason for Disposition   SEVERE back pain (e.g., excruciating, unable to do any normal activities) and not improved after pain medicine and CARE ADVICE    Additional Information   Negative: Passed out (i.e., fainted, collapsed and was not responding)   Negative: Shock suspected (e.g., cold/pale/clammy skin, too weak to stand, low BP, rapid pulse)   Negative: Sounds like a life-threatening emergency to the triager   Negative: Major injury to the back (e.g., MVA, fall > 10 feet or 3 meters, penetrating injury, etc.)   Negative: Pain in the upper back over the ribs (rib cage) that radiates (travels) into the chest   Negative: Pain in the upper back over the ribs (rib cage) and worsened by coughing (or clearly increases with breathing)   Negative: Back pain during pregnancy   Negative: SEVERE back pain of sudden onset and age > 60 years   Negative: SEVERE abdominal pain (e.g., excruciating)   Negative: Abdominal pain and age > 60 years   Negative: Unable to urinate (or only a few drops) and bladder feels very full   Negative: Loss of bladder or bowel control (urine or bowel incontinence; wetting self, leaking stool) of new-onset   Negative: Numbness (loss of sensation) in groin or rectal area   Negative: Pain radiates into groin, scrotum   Negative: Blood in urine (red, pink, or tea-colored)   Negative: Vomiting and pain over lower ribs of " back (i.e., flank - kidney area)   Negative: Weakness of a leg or foot (e.g., unable to bear weight, dragging foot)   Negative: Patient sounds very sick or weak to the triager   Negative: Fever > 100.4 F (38.0 C) and flank pain   Negative: Pain or burning with passing urine (urination)    Protocols used: Back Pain-A-[place]

## 2024-07-08 DIAGNOSIS — F33.1 MODERATE RECURRENT MAJOR DEPRESSION (H): ICD-10-CM

## 2024-07-08 DIAGNOSIS — E66.01 CLASS 3 SEVERE OBESITY WITH SERIOUS COMORBIDITY AND BODY MASS INDEX (BMI) OF 45.0 TO 49.9 IN ADULT, UNSPECIFIED OBESITY TYPE (H): ICD-10-CM

## 2024-07-08 DIAGNOSIS — E66.813 CLASS 3 SEVERE OBESITY WITH SERIOUS COMORBIDITY AND BODY MASS INDEX (BMI) OF 45.0 TO 49.9 IN ADULT, UNSPECIFIED OBESITY TYPE (H): ICD-10-CM

## 2024-07-09 RX ORDER — BUPROPION HYDROCHLORIDE 300 MG/1
300 TABLET ORAL EVERY MORNING
Qty: 90 TABLET | Refills: 3 | Status: SHIPPED | OUTPATIENT
Start: 2024-07-09

## 2024-07-09 RX ORDER — NALTREXONE HYDROCHLORIDE 50 MG/1
50 TABLET, FILM COATED ORAL DAILY
Qty: 90 TABLET | Refills: 3 | Status: SHIPPED | OUTPATIENT
Start: 2024-07-09

## 2024-07-10 ENCOUNTER — TRANSFERRED RECORDS (OUTPATIENT)
Dept: HEALTH INFORMATION MANAGEMENT | Facility: CLINIC | Age: 54
End: 2024-07-10

## 2024-07-10 ENCOUNTER — NURSE TRIAGE (OUTPATIENT)
Dept: FAMILY MEDICINE | Facility: CLINIC | Age: 54
End: 2024-07-10

## 2024-07-10 NOTE — TELEPHONE ENCOUNTER
"Nurse Triage SBAR    Is this a 2nd Level Triage? NO    Situation: Pt calling for blood in stools.     Background: Hx Small internal hemorrhoids without active bleeding. Pt reports some bleeding that is not this severe. Polyps removed a few years ago    Assessment: 11pm stomach cramp and needed to go to restroom. Pt later became sweaty and had BM 45 mins later that resulted blood in toilet. Pt was able to sleep and currently at work with no concerns other than blood in stools.     Blood in toilet bowl 3x within 12 hours at 6:30am, 8am, and 9:30am. Pt reports Enough blood to cover bottom of toilet bowl.     Protocol Recommended Disposition:   Go to ED Now: Pt is agreeable with ED visit and will proceed to ED for further evaluation.     Rachele Crouch RN on 7/10/2024 at 9:49 AM     Reason for Disposition   MODERATE rectal bleeding (small blood clots, passing blood without stool, or toilet water turns red) more than once a day    Additional Information   Negative: Passed out (i.e., fainted, collapsed and was not responding)   Negative: Shock suspected (e.g., cold/pale/clammy skin, too weak to stand, low BP, rapid pulse)   Negative: Vomiting red blood or black (coffee ground) material   Negative: Sounds like a life-threatening emergency to the triager   Negative: Diarrhea is main symptom   Negative: Rectal symptoms   Negative: SEVERE rectal bleeding (large blood clots; constant or on and off bleeding)   Negative: SEVERE dizziness (e.g., unable to stand, requires support to walk, feels like passing out now)    Answer Assessment - Initial Assessment Questions  1. APPEARANCE of BLOOD: \"What color is it?\" \"Is it passed separately, on the surface of the stool, or mixed in with the stool?\"       Bright red   2. AMOUNT: \"How much blood was passed?\"       3x covers bottom of toilet bowl   3. FREQUENCY: \"How many times has blood been passed with the stools?\"       3 times in less than 12 hours   4. ONSET: \"When was the blood first " "seen in the stools?\" (Days or weeks)       11pm 7/9/24  5. DIARRHEA: \"Is there also some diarrhea?\" If Yes, ask: \"How many diarrhea stools in the past 24 hours?\"       11pm   6. CONSTIPATION: \"Do you have constipation?\" If Yes, ask: \"How bad is it?\"      no  7. RECURRENT SYMPTOMS: \"Have you had blood in your stools before?\" If Yes, ask: \"When was the last time?\" and \"What happened that time?\"       no  8. BLOOD THINNERS: \"Do you take any blood thinners?\" (e.g., Coumadin/warfarin, Pradaxa/dabigatran, aspirin)      no  9. OTHER SYMPTOMS: \"Do you have any other symptoms?\"  (e.g., abdomen pain, vomiting, dizziness, fever)      Abdomen pain over night, slight soreness today like period cramps    Protocols used: Rectal Bleeding-A-OH    "

## 2024-07-11 ENCOUNTER — TRANSFERRED RECORDS (OUTPATIENT)
Dept: HEALTH INFORMATION MANAGEMENT | Facility: CLINIC | Age: 54
End: 2024-07-11
Payer: COMMERCIAL

## 2024-07-22 ENCOUNTER — MYC MEDICAL ADVICE (OUTPATIENT)
Dept: FAMILY MEDICINE | Facility: CLINIC | Age: 54
End: 2024-07-22
Payer: COMMERCIAL

## 2024-07-22 DIAGNOSIS — B37.31 YEAST INFECTION OF THE VAGINA: Primary | ICD-10-CM

## 2024-07-22 RX ORDER — FLUCONAZOLE 150 MG/1
150 TABLET ORAL
Qty: 3 TABLET | Refills: 0 | Status: SHIPPED | OUTPATIENT
Start: 2024-07-22 | End: 2024-07-29

## 2024-07-26 ENCOUNTER — TRANSFERRED RECORDS (OUTPATIENT)
Dept: HEALTH INFORMATION MANAGEMENT | Facility: CLINIC | Age: 54
End: 2024-07-26
Payer: COMMERCIAL

## 2024-07-31 DIAGNOSIS — E03.9 HYPOTHYROIDISM, UNSPECIFIED TYPE: ICD-10-CM

## 2024-08-01 RX ORDER — LEVOTHYROXINE SODIUM 75 UG/1
75 TABLET ORAL DAILY
Qty: 90 TABLET | Refills: 1 | Status: SHIPPED | OUTPATIENT
Start: 2024-08-01

## 2024-08-05 ASSESSMENT — ANXIETY QUESTIONNAIRES
5. BEING SO RESTLESS THAT IT IS HARD TO SIT STILL: NOT AT ALL
6. BECOMING EASILY ANNOYED OR IRRITABLE: SEVERAL DAYS
2. NOT BEING ABLE TO STOP OR CONTROL WORRYING: MORE THAN HALF THE DAYS
3. WORRYING TOO MUCH ABOUT DIFFERENT THINGS: MORE THAN HALF THE DAYS
1. FEELING NERVOUS, ANXIOUS, OR ON EDGE: SEVERAL DAYS
7. FEELING AFRAID AS IF SOMETHING AWFUL MIGHT HAPPEN: SEVERAL DAYS
7. FEELING AFRAID AS IF SOMETHING AWFUL MIGHT HAPPEN: SEVERAL DAYS
8. IF YOU CHECKED OFF ANY PROBLEMS, HOW DIFFICULT HAVE THESE MADE IT FOR YOU TO DO YOUR WORK, TAKE CARE OF THINGS AT HOME, OR GET ALONG WITH OTHER PEOPLE?: SOMEWHAT DIFFICULT
IF YOU CHECKED OFF ANY PROBLEMS ON THIS QUESTIONNAIRE, HOW DIFFICULT HAVE THESE PROBLEMS MADE IT FOR YOU TO DO YOUR WORK, TAKE CARE OF THINGS AT HOME, OR GET ALONG WITH OTHER PEOPLE: SOMEWHAT DIFFICULT
4. TROUBLE RELAXING: SEVERAL DAYS
GAD7 TOTAL SCORE: 8
GAD7 TOTAL SCORE: 8

## 2024-08-05 ASSESSMENT — PATIENT HEALTH QUESTIONNAIRE - PHQ9
SUM OF ALL RESPONSES TO PHQ QUESTIONS 1-9: 7
SUM OF ALL RESPONSES TO PHQ QUESTIONS 1-9: 7
10. IF YOU CHECKED OFF ANY PROBLEMS, HOW DIFFICULT HAVE THESE PROBLEMS MADE IT FOR YOU TO DO YOUR WORK, TAKE CARE OF THINGS AT HOME, OR GET ALONG WITH OTHER PEOPLE: SOMEWHAT DIFFICULT

## 2024-08-06 ENCOUNTER — OFFICE VISIT (OUTPATIENT)
Dept: FAMILY MEDICINE | Facility: CLINIC | Age: 54
End: 2024-08-06
Payer: COMMERCIAL

## 2024-08-06 VITALS
WEIGHT: 249.5 LBS | HEART RATE: 76 BPM | BODY MASS INDEX: 39.16 KG/M2 | SYSTOLIC BLOOD PRESSURE: 115 MMHG | RESPIRATION RATE: 16 BRPM | OXYGEN SATURATION: 99 % | HEIGHT: 67 IN | TEMPERATURE: 98 F | DIASTOLIC BLOOD PRESSURE: 77 MMHG

## 2024-08-06 DIAGNOSIS — Z78.0 MENOPAUSE: ICD-10-CM

## 2024-08-06 DIAGNOSIS — E11.9 TYPE 2 DIABETES MELLITUS WITHOUT COMPLICATION, WITHOUT LONG-TERM CURRENT USE OF INSULIN (H): Primary | ICD-10-CM

## 2024-08-06 DIAGNOSIS — R06.83 SNORING: ICD-10-CM

## 2024-08-06 DIAGNOSIS — E03.9 HYPOTHYROIDISM, UNSPECIFIED TYPE: Chronic | ICD-10-CM

## 2024-08-06 DIAGNOSIS — Z13.0 SCREENING FOR DEFICIENCY ANEMIA: ICD-10-CM

## 2024-08-06 DIAGNOSIS — E66.01 MORBID OBESITY, UNSPECIFIED OBESITY TYPE (H): Chronic | ICD-10-CM

## 2024-08-06 LAB
ERYTHROCYTE [DISTWIDTH] IN BLOOD BY AUTOMATED COUNT: 12 % (ref 10–15)
HBA1C MFR BLD: 6.5 % (ref 0–5.6)
HCT VFR BLD AUTO: 46.5 % (ref 35–47)
HGB BLD-MCNC: 15.3 G/DL (ref 11.7–15.7)
MCH RBC QN AUTO: 30.4 PG (ref 26.5–33)
MCHC RBC AUTO-ENTMCNC: 32.9 G/DL (ref 31.5–36.5)
MCV RBC AUTO: 92 FL (ref 78–100)
PLATELET # BLD AUTO: 281 10E3/UL (ref 150–450)
RBC # BLD AUTO: 5.04 10E6/UL (ref 3.8–5.2)
WBC # BLD AUTO: 13.5 10E3/UL (ref 4–11)

## 2024-08-06 PROCEDURE — 83036 HEMOGLOBIN GLYCOSYLATED A1C: CPT | Performed by: NURSE PRACTITIONER

## 2024-08-06 PROCEDURE — 84443 ASSAY THYROID STIM HORMONE: CPT | Performed by: NURSE PRACTITIONER

## 2024-08-06 PROCEDURE — G2211 COMPLEX E/M VISIT ADD ON: HCPCS | Performed by: NURSE PRACTITIONER

## 2024-08-06 PROCEDURE — 36415 COLL VENOUS BLD VENIPUNCTURE: CPT | Performed by: NURSE PRACTITIONER

## 2024-08-06 PROCEDURE — 85027 COMPLETE CBC AUTOMATED: CPT | Performed by: NURSE PRACTITIONER

## 2024-08-06 PROCEDURE — 99214 OFFICE O/P EST MOD 30 MIN: CPT | Performed by: NURSE PRACTITIONER

## 2024-08-06 ASSESSMENT — PAIN SCALES - GENERAL: PAINLEVEL: NO PAIN (0)

## 2024-08-06 NOTE — PROGRESS NOTES
.    ICD-10-CM    1. Type 2 diabetes mellitus without complication, without long-term current use of insulin (H)  E11.9 HEMOGLOBIN A1C     HEMOGLOBIN A1C      2. Snoring  R06.83 Adult Sleep Eval & Management  Referral      3. Hypothyroidism, unspecified type  E03.9 TSH with free T4 reflex     TSH with free T4 reflex      4. Screening for deficiency anemia  Z13.0 CBC with platelets     CBC with platelets      5. Morbid obesity, unspecified obesity type (H)  E66.01       6. Menopause  Z78.0         Sugar levels stable  Mood stable; she is working with a therapist  She is not bothered by menopausal symptoms currently; no bleeding. She will make a follow up to get an IUD removed as it is .  She continues to work on diet/exercise  Follow up 1 month- IUD removal    The longitudinal plan of care for the diagnosis(es)/condition(s) as documented were addressed during this visit. Due to the added complexity in care, I will continue to support Naomy in the subsequent management and with ongoing continuity of care.      Jodi Moralez is a 53 year old, presenting for the following health issues:  Follow Up        2024     3:16 PM   Additional Questions   Roomed by Sonia Borja     Via the Health Maintenance questionnaire, the patient has reported the following services have been completed , this information has been sent to the abstraction team.  History of Present Illness       Mental Health Follow-up:  Patient presents to follow-up on Depression & Anxiety.Patient's depression since last visit has been:  Better  The patient is not having other symptoms associated with depression.  Patient's anxiety since last visit has been:  Better  The patient is not having other symptoms associated with anxiety.  Any significant life events: job concerns, financial concerns, housing concerns and health concerns  Patient is not feeling anxious or having panic attacks.  Patient has no concerns about alcohol or drug  "use.    Diabetes:   She presents for follow up of diabetes.    She is not checking blood glucose.         She has no concerns regarding her diabetes at this time.   She is not experiencing numbness or burning in feet, excessive thirst, blurry vision, weight changes or redness, sores or blisters on feet. The patient has had a diabetic eye exam in the last 12 months. Eye exam performed on 12/2023. Location of last eye exam [facility].        Hypothyroidism:     Since last visit, patient describes the following symptoms::  Constipation, Fatigue and Loose stools    She eats 2-3 servings of fruits and vegetables daily.She consumes 2 sweetened beverage(s) daily.She exercises with enough effort to increase her heart rate 9 or less minutes per day.  She exercises with enough effort to increase her heart rate 3 or less days per week.   She is taking medications regularly.       Ongoing snoring issues; has a history of allergies as well. Was told while she was hospitalized that her oxygen levels dipped while she was sleeping and they recommended getting a sleep study. Her sleep has improved in the past few years, but she continues to wake up fatigued in the morning, and often wakes up during the night. Previously she would sleep for \"too long\" but now that is improved and she is waking up earlier.       Review of Systems  Constitutional, HEENT, cardiovascular, pulmonary, gi and gu systems are negative, except as otherwise noted.      Objective    /77   Pulse 76   Temp 98  F (36.7  C) (Temporal)   Resp 16   Ht 1.693 m (5' 6.65\")   Wt 113.2 kg (249 lb 8 oz)   LMP  (LMP Unknown)   SpO2 99%   BMI 39.48 kg/m    Body mass index is 39.48 kg/m .  Physical Exam   GENERAL: alert and no distress  EYES: Eyes grossly normal to inspection, PERRL and conjunctivae and sclerae normal  HENT: normal cephalic/atraumatic, both ears: clear effusion, nasal mucosa edematous , oropharynx clear, and oral mucous membranes " moist  NECK: no adenopathy, no asymmetry, masses, or scars  RESP: lungs clear to auscultation - no rales, rhonchi or wheezes  CV: regular rate and rhythm, normal S1 S2, no S3 or S4, no murmur, click or rub, no peripheral edema  ABDOMEN: soft, nontender, no hepatosplenomegaly, no masses and bowel sounds normal  MS: no gross musculoskeletal defects noted, no edema  SKIN: no suspicious lesions or rashes  PSYCH: mentation appears normal, affect normal/bright    Office Visit on 04/15/2024   Component Date Value Ref Range Status    Final Diagnosis 04/15/2024    Final                    Value:This result contains rich text formatting which cannot be displayed here.    Clinical Information 04/15/2024    Final                    Value:This result contains rich text formatting which cannot be displayed here.    Gross Description 04/15/2024    Final                    Value:This result contains rich text formatting which cannot be displayed here.    Microscopic Description 04/15/2024    Final                    Value:This result contains rich text formatting which cannot be displayed here.    Performing Labs 04/15/2024    Final                    Value:This result contains rich text formatting which cannot be displayed here.    RESULTS 04/15/2024    Final                    Value:This result contains rich text formatting which cannot be displayed here.    INTERPRETATION 04/15/2024    Final                    Value:This result contains rich text formatting which cannot be displayed here.    COMMENTS 04/15/2024    Final                    Value:This result contains rich text formatting which cannot be displayed here.    METHODOLOGY 04/15/2024    Final                    Value:This result contains rich text formatting which cannot be displayed here.    DISCLAIMER 04/15/2024    Final                    Value:This result contains rich text formatting which cannot be displayed here.    Specimen Description 04/15/2024    Final                     Value:This result contains rich text formatting which cannot be displayed here.    Signout Location if Remote 04/15/2024    Final                    Value:Report signed out at:   [facility]           Signed Electronically by: JERRICA Church CNP

## 2024-08-06 NOTE — PATIENT INSTRUCTIONS
-make appointment with sleep medicine  -Restart some allergy medications  -consider air purifier, look on [employer]  -please schedule with me to get IUD removed; we will hold off on hormone replacement therapy since you are overall doing well

## 2024-08-07 PROBLEM — E11.9 DIABETES MELLITUS, TYPE 2 (H): Chronic | Status: RESOLVED | Noted: 2020-12-10 | Resolved: 2024-08-07

## 2024-08-07 PROBLEM — E11.9 DIABETES MELLITUS, TYPE 2 (H): Status: ACTIVE | Noted: 2024-08-07

## 2024-08-07 LAB — TSH SERPL DL<=0.005 MIU/L-ACNC: 1.81 UIU/ML (ref 0.3–4.2)

## 2024-12-28 ENCOUNTER — HEALTH MAINTENANCE LETTER (OUTPATIENT)
Age: 54
End: 2024-12-28

## 2025-01-28 DIAGNOSIS — E03.9 HYPOTHYROIDISM, UNSPECIFIED TYPE: ICD-10-CM

## 2025-01-29 RX ORDER — LEVOTHYROXINE SODIUM 75 UG/1
75 TABLET ORAL DAILY
Qty: 90 TABLET | Refills: 0 | Status: SHIPPED | OUTPATIENT
Start: 2025-01-29

## 2025-03-11 ENCOUNTER — PATIENT OUTREACH (OUTPATIENT)
Dept: CARE COORDINATION | Facility: CLINIC | Age: 55
End: 2025-03-11
Payer: COMMERCIAL

## 2025-03-23 ENCOUNTER — HEALTH MAINTENANCE LETTER (OUTPATIENT)
Age: 55
End: 2025-03-23

## 2025-03-25 ENCOUNTER — PATIENT OUTREACH (OUTPATIENT)
Dept: CARE COORDINATION | Facility: CLINIC | Age: 55
End: 2025-03-25
Payer: COMMERCIAL

## 2025-04-07 DIAGNOSIS — E03.9 HYPOTHYROIDISM, UNSPECIFIED TYPE: ICD-10-CM

## 2025-04-08 RX ORDER — LEVOTHYROXINE SODIUM 75 UG/1
75 TABLET ORAL DAILY
Qty: 90 TABLET | Refills: 0 | Status: SHIPPED | OUTPATIENT
Start: 2025-04-08

## 2025-04-13 ENCOUNTER — HEALTH MAINTENANCE LETTER (OUTPATIENT)
Age: 55
End: 2025-04-13

## 2025-04-14 DIAGNOSIS — E11.9 TYPE 2 DIABETES MELLITUS WITHOUT COMPLICATION, WITHOUT LONG-TERM CURRENT USE OF INSULIN (H): ICD-10-CM

## 2025-04-14 DIAGNOSIS — F33.1 MODERATE RECURRENT MAJOR DEPRESSION (H): ICD-10-CM

## 2025-04-15 DIAGNOSIS — B00.9 RECURRENT HERPES SIMPLEX: ICD-10-CM

## 2025-04-15 RX ORDER — QUETIAPINE FUMARATE 25 MG/1
25-50 TABLET, FILM COATED ORAL
Qty: 180 TABLET | Refills: 0 | Status: SHIPPED | OUTPATIENT
Start: 2025-04-15

## 2025-04-15 RX ORDER — SIMVASTATIN 20 MG
20 TABLET ORAL AT BEDTIME
Qty: 90 TABLET | Refills: 0 | Status: SHIPPED | OUTPATIENT
Start: 2025-04-15

## 2025-04-16 RX ORDER — ACYCLOVIR 400 MG/1
TABLET ORAL
Qty: 15 TABLET | Refills: 0 | Status: SHIPPED | OUTPATIENT
Start: 2025-04-16

## 2025-05-04 ENCOUNTER — HEALTH MAINTENANCE LETTER (OUTPATIENT)
Age: 55
End: 2025-05-04

## 2025-05-13 ENCOUNTER — TELEPHONE (OUTPATIENT)
Dept: SURGERY | Facility: CLINIC | Age: 55
End: 2025-05-13
Payer: COMMERCIAL

## 2025-05-13 NOTE — TELEPHONE ENCOUNTER
Left Voicemail (1st Attempt) and Sent Mychart (1st Attempt) for the patient to call back and schedule the following:    Appointment type: Ret Patient   Provider: Linnette Pringle NP   Return date: Next available   Specialty phone number: [phone]  Additional appointment(s) needed: n/a  Additonal Notes: anal pap

## 2025-05-19 ENCOUNTER — TELEPHONE (OUTPATIENT)
Dept: SURGERY | Facility: CLINIC | Age: 55
End: 2025-05-19
Payer: COMMERCIAL

## 2025-05-19 NOTE — TELEPHONE ENCOUNTER
Please Schedule This Appointment:     With: Linnette Galarza NP     Referring Provider: missy     For / Appt Notes: anal pap     Appt Type: Return Patient     Appt Date/Time: first available

## 2025-05-25 ENCOUNTER — HEALTH MAINTENANCE LETTER (OUTPATIENT)
Age: 55
End: 2025-05-25

## 2025-06-23 DIAGNOSIS — F33.1 MODERATE EPISODE OF RECURRENT MAJOR DEPRESSIVE DISORDER (H): Chronic | ICD-10-CM

## 2025-06-24 RX ORDER — LAMOTRIGINE 100 MG/1
100 TABLET ORAL DAILY
Qty: 90 TABLET | Refills: 0 | Status: SHIPPED | OUTPATIENT
Start: 2025-06-24

## 2025-06-29 ENCOUNTER — OFFICE VISIT (OUTPATIENT)
Dept: URGENT CARE | Facility: URGENT CARE | Age: 55
End: 2025-06-29
Payer: COMMERCIAL

## 2025-06-29 VITALS
DIASTOLIC BLOOD PRESSURE: 84 MMHG | SYSTOLIC BLOOD PRESSURE: 126 MMHG | OXYGEN SATURATION: 98 % | RESPIRATION RATE: 18 BRPM | WEIGHT: 259 LBS | HEIGHT: 68 IN | TEMPERATURE: 98.1 F | HEART RATE: 87 BPM | BODY MASS INDEX: 39.25 KG/M2

## 2025-06-29 DIAGNOSIS — J02.9 SORE THROAT: Primary | ICD-10-CM

## 2025-06-29 DIAGNOSIS — H66.002 ACUTE SUPPURATIVE OTITIS MEDIA OF LEFT EAR WITHOUT SPONTANEOUS RUPTURE OF TYMPANIC MEMBRANE, RECURRENCE NOT SPECIFIED: ICD-10-CM

## 2025-06-29 DIAGNOSIS — H10.9 CONJUNCTIVITIS OF BOTH EYES, UNSPECIFIED CONJUNCTIVITIS TYPE: ICD-10-CM

## 2025-06-29 PROCEDURE — 3074F SYST BP LT 130 MM HG: CPT | Performed by: FAMILY MEDICINE

## 2025-06-29 PROCEDURE — 99213 OFFICE O/P EST LOW 20 MIN: CPT | Performed by: FAMILY MEDICINE

## 2025-06-29 PROCEDURE — 3079F DIAST BP 80-89 MM HG: CPT | Performed by: FAMILY MEDICINE

## 2025-06-29 PROCEDURE — 1125F AMNT PAIN NOTED PAIN PRSNT: CPT | Performed by: FAMILY MEDICINE

## 2025-06-29 RX ORDER — POLYMYXIN B SULFATE AND TRIMETHOPRIM 1; 10000 MG/ML; [USP'U]/ML
1-2 SOLUTION OPHTHALMIC 4 TIMES DAILY
Qty: 10 ML | Refills: 0 | Status: SHIPPED | OUTPATIENT
Start: 2025-06-29 | End: 2025-07-06

## 2025-06-29 RX ORDER — CEFDINIR 300 MG/1
300 CAPSULE ORAL 2 TIMES DAILY
Qty: 14 CAPSULE | Refills: 0 | Status: SHIPPED | OUTPATIENT
Start: 2025-06-29 | End: 2025-07-06

## 2025-06-29 ASSESSMENT — PAIN SCALES - GENERAL: PAINLEVEL_OUTOF10: MILD PAIN (3)

## 2025-06-29 NOTE — LETTER
2025    Naomy Kowalski   1970        To Whom it May Concern;    Please excuse Naomy Kowalski from work/school for a healthcare visit on 2025.  Recommend staying home until symptoms have improved for 24 hours.     Sincerely,        Amaya Houston MD

## 2025-06-29 NOTE — PROGRESS NOTES
"Urgent Care Clinic Visit    Chief Complaint   Patient presents with    Eye Problem     Left eye has been crusty - started Wednesday night and Friday right eye started getting crusty as well    URI     Sore throat, sinus pressure, ear pressure, voice change - started Wednesday night                   6/29/2025    10:36 AM   Additional Questions   Roomed by Patricia   Accompanied by Self         6/29/2025   Forms   Any forms needing to be completed Yes     Cc: sore throat and sinus     History:  duration -  4   days  Sinus congestion voice change ear pressure   fever  - No  cough  -Yes  ill contacts - No  able to swallow liquids and solids -YES  other symptoms hoarseness   Rash: No  Has tried over the counter medications no relief  because of persistence, patient came in to be seen.    Declined covid test patient will just isolate     ROS:  denies any exertional chest pain or shortness of breath  denies any unusual rash or joint swelling  denies post-tussive emesis or pertussis like symptoms  Negative for constitutional, eye, ear, nose, throat, skin, respiratory, cardiac, and gastrointestinal other than those outlined in the HPI.    PMH: chart reviewed  FH: chart reviewed    SH: chart reviewed and as above   Physical Exam:   BP (!) 151/103 (BP Location: Left arm, Patient Position: Sitting, Cuff Size: Adult Regular)   Pulse 87   Temp 98.1  F (36.7  C) (Oral)   Resp 18   Ht 1.727 m (5' 8\")   Wt 117.5 kg (259 lb)   SpO2 98%   BMI 39.38 kg/m    General : Awake Alert not in any acute cardiorespiratory distress  Head:       Normocephalic Atraumatic  Eyes:    Pupils equally reactive to light and accomodation. Sclera not icteric. Mild mattering noted bilaterally   ENT:   midline nasal septum, mild nasal congestion, sinuses non-tender  left ear: no tragal tenderness, no mastoid tenderness, normal EAC, tympaninc membrane bulging and dull slight erythema   right ear: left ear: no tragal tenderness, no mastoid tenderness, " normal EAC, normal TM  mouth moist buccal mucosa, No hyperemic posterior pharyngeal wall, no trismus  tonsils: tonsils are present and normal  anterior cervical nodes: No tender  posterior cervical nodes: No  palpable  Heart:  Regular in rate and rhythm, no murmurs rubs or gallops  Lungs: Symmetrical Chest Expansion, no retractions, clear breath sounds    Psych: Appropriate mood and affect. Pleasant  Skin: patient undressed to level of his/her comfort. No visible concerning lesions.    Labs:   None    ICD-10-CM    1. Sore throat  J02.9 CANCELED: Streptococcus A Rapid Screen w/Reflex to PCR - Clinic Collect      2. Acute suppurative otitis media of left ear without spontaneous rupture of tympanic membrane, recurrence not specified  H66.002 cefdinir (OMNICEF) 300 MG capsule      3. Conjunctivitis of both eyes, unspecified conjunctivitis type  H10.9 polymixin b-trimethoprim (POLYTRIM) 24858-7.1 UNIT/ML-% ophthalmic solution        Prescribed with omnicef and polytrim - although the plan is watch and wait approach and trial of supportive treatment  first.   For conjunctivitis: if with any worsening symptoms, pain, photophobia, worsening redness, swelling, feeling of foreign body go to ER or see your eye doctor  supportive treatment: advised supportive treatment, Advised to come back in if with any worsening symptoms or if not better despite supportive measures. Especially if with any worsening sore throat, inability to eat or drink or swallow, or trismus. Symptoms of peritonsillar abscess discussed. Patient voiced understanding.adverse reactions of medication discussed  OTC medications discussed  advised to come back in right away if with any worsening symptoms or if with no relief despite treatment plan  patient voiced understanding and had no further questions at this time.        Amaya Houston M.D.

## 2025-07-01 ENCOUNTER — OFFICE VISIT (OUTPATIENT)
Dept: FAMILY MEDICINE | Facility: CLINIC | Age: 55
End: 2025-07-01
Payer: COMMERCIAL

## 2025-07-01 VITALS
SYSTOLIC BLOOD PRESSURE: 108 MMHG | BODY MASS INDEX: 41.54 KG/M2 | OXYGEN SATURATION: 97 % | TEMPERATURE: 98.3 F | HEIGHT: 66 IN | WEIGHT: 258.5 LBS | DIASTOLIC BLOOD PRESSURE: 82 MMHG | HEART RATE: 98 BPM

## 2025-07-01 DIAGNOSIS — E11.9 TYPE 2 DIABETES MELLITUS WITHOUT COMPLICATION, WITHOUT LONG-TERM CURRENT USE OF INSULIN (H): ICD-10-CM

## 2025-07-01 DIAGNOSIS — Z13.6 SCREENING FOR CARDIOVASCULAR CONDITION: ICD-10-CM

## 2025-07-01 DIAGNOSIS — R30.0 DYSURIA: ICD-10-CM

## 2025-07-01 DIAGNOSIS — I10 HTN, GOAL BELOW 140/90: Primary | ICD-10-CM

## 2025-07-01 DIAGNOSIS — J32.9 SINUSITIS, UNSPECIFIED CHRONICITY, UNSPECIFIED LOCATION: ICD-10-CM

## 2025-07-01 DIAGNOSIS — E03.9 HYPOTHYROIDISM, UNSPECIFIED TYPE: Chronic | ICD-10-CM

## 2025-07-01 LAB
ANION GAP SERPL CALCULATED.3IONS-SCNC: 13 MMOL/L (ref 7–15)
BUN SERPL-MCNC: 15 MG/DL (ref 6–20)
CALCIUM SERPL-MCNC: 10 MG/DL (ref 8.8–10.4)
CHLORIDE SERPL-SCNC: 104 MMOL/L (ref 98–107)
CHOLEST SERPL-MCNC: 199 MG/DL
CREAT SERPL-MCNC: 0.81 MG/DL (ref 0.51–0.95)
CREAT UR-MCNC: 467 MG/DL
EGFRCR SERPLBLD CKD-EPI 2021: 85 ML/MIN/1.73M2
EST. AVERAGE GLUCOSE BLD GHB EST-MCNC: 157 MG/DL
FASTING STATUS PATIENT QL REPORTED: ABNORMAL
FASTING STATUS PATIENT QL REPORTED: ABNORMAL
GLUCOSE SERPL-MCNC: 132 MG/DL (ref 70–99)
HBA1C MFR BLD: 7.1 % (ref 0–5.6)
HCO3 SERPL-SCNC: 25 MMOL/L (ref 22–29)
HDLC SERPL-MCNC: 42 MG/DL
LDLC SERPL CALC-MCNC: 115 MG/DL
MICROALBUMIN UR-MCNC: 31.1 MG/L
MICROALBUMIN/CREAT UR: 6.66 MG/G CR (ref 0–25)
NONHDLC SERPL-MCNC: 157 MG/DL
POTASSIUM SERPL-SCNC: 4.4 MMOL/L (ref 3.4–5.3)
SODIUM SERPL-SCNC: 142 MMOL/L (ref 135–145)
TRIGL SERPL-MCNC: 211 MG/DL

## 2025-07-01 PROCEDURE — 3051F HG A1C>EQUAL 7.0%<8.0%: CPT | Performed by: NURSE PRACTITIONER

## 2025-07-01 PROCEDURE — 36415 COLL VENOUS BLD VENIPUNCTURE: CPT | Performed by: NURSE PRACTITIONER

## 2025-07-01 PROCEDURE — 99214 OFFICE O/P EST MOD 30 MIN: CPT | Performed by: NURSE PRACTITIONER

## 2025-07-01 PROCEDURE — G2211 COMPLEX E/M VISIT ADD ON: HCPCS | Performed by: NURSE PRACTITIONER

## 2025-07-01 PROCEDURE — 84443 ASSAY THYROID STIM HORMONE: CPT | Performed by: NURSE PRACTITIONER

## 2025-07-01 PROCEDURE — 3079F DIAST BP 80-89 MM HG: CPT | Performed by: NURSE PRACTITIONER

## 2025-07-01 PROCEDURE — 82043 UR ALBUMIN QUANTITATIVE: CPT | Performed by: NURSE PRACTITIONER

## 2025-07-01 PROCEDURE — 82570 ASSAY OF URINE CREATININE: CPT | Performed by: NURSE PRACTITIONER

## 2025-07-01 PROCEDURE — 1125F AMNT PAIN NOTED PAIN PRSNT: CPT | Performed by: NURSE PRACTITIONER

## 2025-07-01 PROCEDURE — 83036 HEMOGLOBIN GLYCOSYLATED A1C: CPT | Performed by: NURSE PRACTITIONER

## 2025-07-01 PROCEDURE — 3074F SYST BP LT 130 MM HG: CPT | Performed by: NURSE PRACTITIONER

## 2025-07-01 PROCEDURE — 80048 BASIC METABOLIC PNL TOTAL CA: CPT | Performed by: NURSE PRACTITIONER

## 2025-07-01 PROCEDURE — 80061 LIPID PANEL: CPT | Performed by: NURSE PRACTITIONER

## 2025-07-01 RX ORDER — FLUTICASONE PROPIONATE 50 MCG
2 SPRAY, SUSPENSION (ML) NASAL 2 TIMES DAILY PRN
Qty: 18.2 ML | Refills: 3 | Status: SHIPPED | OUTPATIENT
Start: 2025-07-01

## 2025-07-01 RX ORDER — FLUCONAZOLE 150 MG/1
TABLET ORAL
Qty: 1 TABLET | Refills: 1 | Status: SHIPPED | OUTPATIENT
Start: 2025-07-01

## 2025-07-01 ASSESSMENT — PAIN SCALES - GENERAL: PAINLEVEL_OUTOF10: SEVERE PAIN (7)

## 2025-07-01 ASSESSMENT — PATIENT HEALTH QUESTIONNAIRE - PHQ9
SUM OF ALL RESPONSES TO PHQ QUESTIONS 1-9: 8
10. IF YOU CHECKED OFF ANY PROBLEMS, HOW DIFFICULT HAVE THESE PROBLEMS MADE IT FOR YOU TO DO YOUR WORK, TAKE CARE OF THINGS AT HOME, OR GET ALONG WITH OTHER PEOPLE: SOMEWHAT DIFFICULT
SUM OF ALL RESPONSES TO PHQ QUESTIONS 1-9: 8

## 2025-07-01 NOTE — PROGRESS NOTES
"  Assessment & Plan   Problem List Items Addressed This Visit          Cardiovascular/Peripheral Vascular    HTN, goal below 140/90 - Primary (Chronic)    Relevant Orders    BASIC METABOLIC PANEL       Endocrine    Diabetes mellitus, type 2 (H)    Relevant Orders    HEMOGLOBIN A1C (Completed)    Adult Eye  Referral    Lipid panel reflex to direct LDL Non-fasting    Albumin Random Urine Quantitative with Creat Ratio     Other Visit Diagnoses         Screening for cardiovascular condition          Sinusitis, unspecified chronicity, unspecified location        Relevant Medications    fluticasone (FLONASE) 50 MCG/ACT nasal spray    fluconazole (DIFLUCAN) 150 MG tablet      Dysuria        Relevant Medications    fluconazole (DIFLUCAN) 150 MG tablet           BP Stable  REcheck labs today  Follow up for physical  The longitudinal plan of care for the diagnosis(es)/condition(s) as documented were addressed during this visit. Due to the added complexity in care, I will continue to support Naomy in the subsequent management and with ongoing continuity of care.         BMI  Estimated body mass index is 41.72 kg/m  as calculated from the following:    Height as of this encounter: 1.676 m (5' 6\").    Weight as of this encounter: 117.3 kg (258 lb 8 oz).       Subjective   Naomy is a 54 year old, presenting for the following health issues:  Sinus Problem and Throat Problem        7/1/2025    11:12 AM   Additional Questions   Roomed by Diann BROCK     Sinus Problem     History of Present Illness       Reason for visit:  Throat ear ache sinus pressure tightness in chest  Symptom onset:  3-7 days ago  Symptoms include:  Throat and ear pain sinus pressure headache  Symptom intensity:  Severe  Symptom progression:  Staying the same  Had these symptoms before:  No   She is taking medications regularly.      She has been on antibiotics for three days. She has continued to notice pain in her sinuses, and pressure in her ears. Not " "much cough. She has been taking ibuprofen/tylenol for achiness.  Has been very stressed the past few months training as a .      Review of Systems  Constitutional, HEENT, cardiovascular, pulmonary, gi and gu systems are negative, except as otherwise noted.      Objective    /82   Pulse 98   Temp 98.3  F (36.8  C) (Temporal)   Ht 1.676 m (5' 6\")   Wt 117.3 kg (258 lb 8 oz)   LMP  (LMP Unknown)   SpO2 97%   BMI 41.72 kg/m    Body mass index is 41.72 kg/m .  Physical Exam   GENERAL: alert and no distress  EYES: Eyes grossly normal to inspection, PERRL and conjunctivae and sclerae normal  HENT: ear canals and TM's normal, nose and mouth without ulcers or lesions  NECK: bilateral anterior cervical adenopathy, no asymmetry, masses, or scars, and thyroid normal to palpation  RESP: lungs clear to auscultation - no rales, rhonchi or wheezes  CV: regular rate and rhythm, normal S1 S2, no S3 or S4, no murmur, click or rub, no peripheral edema  MS: no gross musculoskeletal defects noted, no edema  PSYCH: mentation appears normal, affect normal/bright    Office Visit on 08/06/2024   Component Date Value Ref Range Status    Hemoglobin A1C 08/06/2024 6.5 (H)  0.0 - 5.6 % Final    Normal <5.7%   Prediabetes 5.7-6.4%    Diabetes 6.5% or higher     Note: Adopted from ADA consensus guidelines.    TSH 08/06/2024 1.81  0.30 - 4.20 uIU/mL Final    WBC Count 08/06/2024 13.5 (H)  4.0 - 11.0 10e3/uL Final    RBC Count 08/06/2024 5.04  3.80 - 5.20 10e6/uL Final    Hemoglobin 08/06/2024 15.3  11.7 - 15.7 g/dL Final    Hematocrit 08/06/2024 46.5  35.0 - 47.0 % Final    MCV 08/06/2024 92  78 - 100 fL Final    MCH 08/06/2024 30.4  26.5 - 33.0 pg Final    MCHC 08/06/2024 32.9  31.5 - 36.5 g/dL Final    RDW 08/06/2024 12.0  10.0 - 15.0 % Final    Platelet Count 08/06/2024 281  150 - 450 10e3/uL Final           Signed Electronically by: JERRICA Church CNP    "

## 2025-07-01 NOTE — LETTER
July 1, 2025      Naomy Kowalski  [address] [facility] [place] [address]        To Whom It May Concern:    Naomy Kowalski  was seen on 7/1/2025.  Please excuse her due to acute illness.      Sincerely,        JERRICA Church CNP    Electronically signed

## 2025-07-02 ENCOUNTER — RESULTS FOLLOW-UP (OUTPATIENT)
Dept: FAMILY MEDICINE | Facility: CLINIC | Age: 55
End: 2025-07-02

## 2025-07-02 ENCOUNTER — PATIENT OUTREACH (OUTPATIENT)
Dept: CARE COORDINATION | Facility: CLINIC | Age: 55
End: 2025-07-02
Payer: COMMERCIAL

## 2025-07-02 DIAGNOSIS — E78.5 HYPERLIPIDEMIA LDL GOAL <100: Primary | ICD-10-CM

## 2025-07-02 DIAGNOSIS — E03.9 HYPOTHYROIDISM, UNSPECIFIED TYPE: Chronic | ICD-10-CM

## 2025-07-02 LAB — TSH SERPL DL<=0.005 MIU/L-ACNC: 2.63 UIU/ML (ref 0.3–4.2)

## 2025-07-02 RX ORDER — SIMVASTATIN 40 MG
40 TABLET ORAL AT BEDTIME
Qty: 90 TABLET | Refills: 3 | Status: SHIPPED | OUTPATIENT
Start: 2025-07-02

## 2025-07-05 ENCOUNTER — PATIENT OUTREACH (OUTPATIENT)
Dept: CARE COORDINATION | Facility: CLINIC | Age: 55
End: 2025-07-05
Payer: COMMERCIAL

## 2025-07-26 DIAGNOSIS — F33.1 MODERATE RECURRENT MAJOR DEPRESSION (H): ICD-10-CM

## 2025-07-29 RX ORDER — BUPROPION HYDROCHLORIDE 300 MG/1
300 TABLET ORAL EVERY MORNING
Qty: 90 TABLET | Refills: 0 | Status: SHIPPED | OUTPATIENT
Start: 2025-07-29

## 2025-08-07 DIAGNOSIS — E03.9 HYPOTHYROIDISM, UNSPECIFIED TYPE: ICD-10-CM

## 2025-08-07 DIAGNOSIS — E66.813 CLASS 3 SEVERE OBESITY WITH SERIOUS COMORBIDITY AND BODY MASS INDEX (BMI) OF 45.0 TO 49.9 IN ADULT, UNSPECIFIED OBESITY TYPE (H): ICD-10-CM

## 2025-08-12 RX ORDER — LEVOTHYROXINE SODIUM 75 UG/1
75 TABLET ORAL DAILY
Qty: 90 TABLET | Refills: 3 | Status: SHIPPED | OUTPATIENT
Start: 2025-08-12

## 2025-08-12 RX ORDER — NALTREXONE HYDROCHLORIDE 50 MG/1
50 TABLET, FILM COATED ORAL DAILY
Qty: 90 TABLET | Refills: 3 | Status: SHIPPED | OUTPATIENT
Start: 2025-08-12

## 2025-08-13 ENCOUNTER — OFFICE VISIT (OUTPATIENT)
Dept: FAMILY MEDICINE | Facility: CLINIC | Age: 55
End: 2025-08-13
Payer: COMMERCIAL

## 2025-08-13 VITALS
BODY MASS INDEX: 42.13 KG/M2 | HEART RATE: 82 BPM | DIASTOLIC BLOOD PRESSURE: 84 MMHG | SYSTOLIC BLOOD PRESSURE: 126 MMHG | OXYGEN SATURATION: 97 % | WEIGHT: 261 LBS

## 2025-08-13 DIAGNOSIS — E11.9 TYPE 2 DIABETES MELLITUS WITHOUT COMPLICATION, WITHOUT LONG-TERM CURRENT USE OF INSULIN (H): ICD-10-CM

## 2025-08-13 DIAGNOSIS — F33.1 MODERATE EPISODE OF RECURRENT MAJOR DEPRESSIVE DISORDER (H): Primary | Chronic | ICD-10-CM

## 2025-08-13 DIAGNOSIS — Z28.39 INCOMPLETE IMMUNIZATION SERIES: ICD-10-CM

## 2025-08-13 PROCEDURE — 3074F SYST BP LT 130 MM HG: CPT | Performed by: NURSE PRACTITIONER

## 2025-08-13 PROCEDURE — 1126F AMNT PAIN NOTED NONE PRSNT: CPT | Performed by: NURSE PRACTITIONER

## 2025-08-13 PROCEDURE — 99215 OFFICE O/P EST HI 40 MIN: CPT | Performed by: NURSE PRACTITIONER

## 2025-08-13 PROCEDURE — 3079F DIAST BP 80-89 MM HG: CPT | Performed by: NURSE PRACTITIONER

## 2025-08-13 PROCEDURE — G2211 COMPLEX E/M VISIT ADD ON: HCPCS | Performed by: NURSE PRACTITIONER

## 2025-08-13 RX ORDER — PROPRANOLOL HYDROCHLORIDE 10 MG/1
10-20 TABLET ORAL 3 TIMES DAILY PRN
Qty: 270 TABLET | Refills: 1 | Status: SHIPPED | OUTPATIENT
Start: 2025-08-13

## 2025-08-13 ASSESSMENT — PAIN SCALES - GENERAL: PAINLEVEL_OUTOF10: NO PAIN (0)

## (undated) DEVICE — SOL WATER IRRIG 1000ML BOTTLE 07139-09

## (undated) DEVICE — SYR 10ML FINGER CONTROL W/O NDL 309695

## (undated) DEVICE — WIPE INSTRUMENT MEROCEL 400200

## (undated) DEVICE — GLOVE PROTEXIS MICRO 7.5  2D73PM75

## (undated) DEVICE — ESU SUCTION CAUTERY 10FR FOOT CONTROL E2505-10FR

## (undated) DEVICE — Device

## (undated) DEVICE — ESU GROUND PAD ADULT W/CORD E7507

## (undated) DEVICE — SOL ADH LIQUID BENZOIN SWAB 0.6ML C1544

## (undated) DEVICE — LINEN TOWEL PACK X6 WHITE 5487

## (undated) DEVICE — GLOVE PROTEXIS BLUE W/NEU-THERA 8.0  2D73EB80

## (undated) DEVICE — STRAP UNIVERSAL POSITIONING 2-PIECE 4X47X76" 91-287

## (undated) DEVICE — GOWN LG DISP 9515

## (undated) DEVICE — SPONGE TONSIL W/STRING MED 23275-680

## (undated) DEVICE — PACK RECTAL UMMC

## (undated) DEVICE — SOL NACL 0.9% INJ 1000ML BAG 07983-09

## (undated) DEVICE — SU VICRYL 3-0 SH 27" J316H

## (undated) DEVICE — DECANTER VIAL 2006S

## (undated) DEVICE — LINEN TOWEL PACK X5 5464

## (undated) DEVICE — SPONGE COTTONOID 1/2X3" 20-07S

## (undated) DEVICE — SOL NACL 0.9% IRRIG 1000ML BOTTLE 2F7124

## (undated) DEVICE — TUBING SUCTION 10'X3/16" N510

## (undated) DEVICE — PACK ENT ENDOSCOPY UMMC

## (undated) DEVICE — DRAPE SPLIT SHEET 77X108 REINFORCED 29436

## (undated) DEVICE — TUBING SMOKE EVAC 2.2CMX3M SEA3715

## (undated) DEVICE — DRSG TELFA 3X8" 1238

## (undated) DEVICE — STRAP ARMBOARD IV POSITIONING 1.5X32" VELCRO 31142980

## (undated) DEVICE — SPONGE DOUBLE 1.25" W/STRING 23275-690

## (undated) DEVICE — TUBING SUCTION MEDI-VAC SOFT 3/16"X20' N520A

## (undated) DEVICE — TAPE CLOTH 3" CARDINAL 3TRCL03

## (undated) DEVICE — ESU ELEC BLADE 2.75" COATED/INSULATED E1455

## (undated) DEVICE — NDL 25GA 2"  8881200441

## (undated) DEVICE — SYR 03ML LL W/O NDL 309657

## (undated) DEVICE — DRSG GAUZE 4X4" TRAY 6939

## (undated) DEVICE — ESU PENCIL W/COATED BLADE E2450H

## (undated) DEVICE — SUCTION MANIFOLD DORNOCH ULTRA CART UL-CL500

## (undated) DEVICE — SOL WATER IRRIG 1000ML BOTTLE 2F7114

## (undated) DEVICE — ESU PENCIL W/SMOKE EVAC NEPTUNE STRYKER 0703-046-000

## (undated) DEVICE — GLOVE SENSICARE PI POWDER FREE 6.5 LATEX FREE MSG9065

## (undated) DEVICE — PEN MARKING W/RULER DYNJSM04

## (undated) RX ORDER — FENTANYL CITRATE 50 UG/ML
INJECTION, SOLUTION INTRAMUSCULAR; INTRAVENOUS
Status: DISPENSED
Start: 2017-09-26

## (undated) RX ORDER — LIDOCAINE HYDROCHLORIDE AND EPINEPHRINE 10; 10 MG/ML; UG/ML
INJECTION, SOLUTION INFILTRATION; PERINEURAL
Status: DISPENSED
Start: 2020-12-10

## (undated) RX ORDER — HYDROMORPHONE HYDROCHLORIDE 1 MG/ML
INJECTION, SOLUTION INTRAMUSCULAR; INTRAVENOUS; SUBCUTANEOUS
Status: DISPENSED
Start: 2017-03-15

## (undated) RX ORDER — ACETIC ACID 5 %
LIQUID (ML) MISCELLANEOUS
Status: DISPENSED
Start: 2020-12-10

## (undated) RX ORDER — LIDOCAINE HYDROCHLORIDE AND EPINEPHRINE 10; 10 MG/ML; UG/ML
INJECTION, SOLUTION INFILTRATION; PERINEURAL
Status: DISPENSED
Start: 2019-07-11

## (undated) RX ORDER — FERRIC SUBSULFATE 0.21 G/G
LIQUID TOPICAL
Status: DISPENSED
Start: 2020-12-10

## (undated) RX ORDER — LIDOCAINE HYDROCHLORIDE 20 MG/ML
INJECTION, SOLUTION EPIDURAL; INFILTRATION; INTRACAUDAL; PERINEURAL
Status: DISPENSED
Start: 2017-09-26

## (undated) RX ORDER — OXYMETAZOLINE HYDROCHLORIDE 0.05 G/100ML
SPRAY NASAL
Status: DISPENSED
Start: 2017-03-15

## (undated) RX ORDER — PHENYLEPHRINE HCL IN 0.9% NACL 1 MG/10 ML
SYRINGE (ML) INTRAVENOUS
Status: DISPENSED
Start: 2017-09-26

## (undated) RX ORDER — BUPIVACAINE HYDROCHLORIDE AND EPINEPHRINE 2.5; 5 MG/ML; UG/ML
INJECTION, SOLUTION EPIDURAL; INFILTRATION; INTRACAUDAL; PERINEURAL
Status: DISPENSED
Start: 2017-03-15

## (undated) RX ORDER — LIDOCAINE HYDROCHLORIDE AND EPINEPHRINE 15; 5 MG/ML; UG/ML
INJECTION, SOLUTION EPIDURAL
Status: DISPENSED
Start: 2017-09-07

## (undated) RX ORDER — EPHEDRINE SULFATE 50 MG/ML
INJECTION, SOLUTION INTRAMUSCULAR; INTRAVENOUS; SUBCUTANEOUS
Status: DISPENSED
Start: 2017-09-26

## (undated) RX ORDER — ACETAMINOPHEN 325 MG/1
TABLET ORAL
Status: DISPENSED
Start: 2017-09-26

## (undated) RX ORDER — HYDROCODONE BITARTRATE AND ACETAMINOPHEN 7.5; 325 MG/15ML; MG/15ML
SOLUTION ORAL
Status: DISPENSED
Start: 2017-03-15

## (undated) RX ORDER — FERRIC SUBSULFATE 0.21 G/G
LIQUID TOPICAL
Status: DISPENSED
Start: 2018-06-28

## (undated) RX ORDER — PROPOFOL 10 MG/ML
INJECTION, EMULSION INTRAVENOUS
Status: DISPENSED
Start: 2017-09-26

## (undated) RX ORDER — FERRIC SUBSULFATE 0.21 G/G
LIQUID TOPICAL
Status: DISPENSED
Start: 2017-09-07

## (undated) RX ORDER — ACETIC ACID 5 %
LIQUID (ML) MISCELLANEOUS
Status: DISPENSED
Start: 2019-07-11

## (undated) RX ORDER — FERRIC SUBSULFATE 0.21 G/G
LIQUID TOPICAL
Status: DISPENSED
Start: 2022-04-07

## (undated) RX ORDER — LIDOCAINE HYDROCHLORIDE AND EPINEPHRINE 10; 10 MG/ML; UG/ML
INJECTION, SOLUTION INFILTRATION; PERINEURAL
Status: DISPENSED
Start: 2017-03-15

## (undated) RX ORDER — ONDANSETRON 2 MG/ML
INJECTION INTRAMUSCULAR; INTRAVENOUS
Status: DISPENSED
Start: 2017-09-26

## (undated) RX ORDER — ACETIC ACID 5 %
LIQUID (ML) MISCELLANEOUS
Status: DISPENSED
Start: 2022-04-07

## (undated) RX ORDER — ACETIC ACID 5 %
LIQUID (ML) MISCELLANEOUS
Status: DISPENSED
Start: 2017-12-20

## (undated) RX ORDER — LIDOCAINE HYDROCHLORIDE AND EPINEPHRINE 10; 10 MG/ML; UG/ML
INJECTION, SOLUTION INFILTRATION; PERINEURAL
Status: DISPENSED
Start: 2018-06-28

## (undated) RX ORDER — CHLORHEXIDINE GLUCONATE ORAL RINSE 1.2 MG/ML
SOLUTION DENTAL
Status: DISPENSED
Start: 2017-03-15

## (undated) RX ORDER — LIDOCAINE HYDROCHLORIDE AND EPINEPHRINE 10; 10 MG/ML; UG/ML
INJECTION, SOLUTION INFILTRATION; PERINEURAL
Status: DISPENSED
Start: 2022-04-07

## (undated) RX ORDER — GABAPENTIN 300 MG/1
CAPSULE ORAL
Status: DISPENSED
Start: 2017-09-26

## (undated) RX ORDER — FENTANYL CITRATE 50 UG/ML
INJECTION, SOLUTION INTRAMUSCULAR; INTRAVENOUS
Status: DISPENSED
Start: 2017-03-15

## (undated) RX ORDER — OXYCODONE HYDROCHLORIDE 5 MG/1
TABLET ORAL
Status: DISPENSED
Start: 2017-09-26

## (undated) RX ORDER — HYDROMORPHONE HYDROCHLORIDE 1 MG/ML
INJECTION, SOLUTION INTRAMUSCULAR; INTRAVENOUS; SUBCUTANEOUS
Status: DISPENSED
Start: 2017-09-26

## (undated) RX ORDER — FERRIC SUBSULFATE 0.21 G/G
LIQUID TOPICAL
Status: DISPENSED
Start: 2017-12-20

## (undated) RX ORDER — LIDOCAINE HYDROCHLORIDE AND EPINEPHRINE 10; 10 MG/ML; UG/ML
INJECTION, SOLUTION INFILTRATION; PERINEURAL
Status: DISPENSED
Start: 2017-12-20

## (undated) RX ORDER — FERRIC SUBSULFATE 0.21 G/G
LIQUID TOPICAL
Status: DISPENSED
Start: 2019-07-11

## (undated) RX ORDER — ACETIC ACID 5 %
LIQUID (ML) MISCELLANEOUS
Status: DISPENSED
Start: 2018-06-28